# Patient Record
Sex: FEMALE | Race: WHITE | NOT HISPANIC OR LATINO | Employment: OTHER | URBAN - METROPOLITAN AREA
[De-identification: names, ages, dates, MRNs, and addresses within clinical notes are randomized per-mention and may not be internally consistent; named-entity substitution may affect disease eponyms.]

---

## 2017-04-14 ENCOUNTER — HOSPITAL ENCOUNTER (OUTPATIENT)
Dept: LAB | Facility: MEDICAL CENTER | Age: 72
End: 2017-04-14
Attending: INTERNAL MEDICINE
Payer: MEDICARE

## 2017-04-14 LAB
25(OH)D3 SERPL-MCNC: 24 NG/ML (ref 30–100)
ALBUMIN SERPL BCP-MCNC: 3.8 G/DL (ref 3.2–4.9)
ALBUMIN/GLOB SERPL: 1.2 G/DL
ALP SERPL-CCNC: 74 U/L (ref 30–99)
ALT SERPL-CCNC: 14 U/L (ref 2–50)
ANION GAP SERPL CALC-SCNC: 7 MMOL/L (ref 0–11.9)
APPEARANCE UR: CLEAR
AST SERPL-CCNC: 17 U/L (ref 12–45)
BACTERIA #/AREA URNS HPF: ABNORMAL /HPF
BASOPHILS # BLD AUTO: 0.6 % (ref 0–1.8)
BASOPHILS # BLD: 0.04 K/UL (ref 0–0.12)
BILIRUB SERPL-MCNC: 0.5 MG/DL (ref 0.1–1.5)
BILIRUB UR QL STRIP.AUTO: NEGATIVE
BUN SERPL-MCNC: 20 MG/DL (ref 8–22)
CALCIUM SERPL-MCNC: 9.6 MG/DL (ref 8.5–10.5)
CHLORIDE SERPL-SCNC: 101 MMOL/L (ref 96–112)
CHOLEST SERPL-MCNC: 118 MG/DL (ref 100–199)
CO2 SERPL-SCNC: 29 MMOL/L (ref 20–33)
COLOR UR: ABNORMAL
CREAT SERPL-MCNC: 1.14 MG/DL (ref 0.5–1.4)
CREAT UR-MCNC: 111.4 MG/DL
EOSINOPHIL # BLD AUTO: 0.22 K/UL (ref 0–0.51)
EOSINOPHIL NFR BLD: 3.3 % (ref 0–6.9)
EPI CELLS #/AREA URNS HPF: ABNORMAL /HPF
ERYTHROCYTE [DISTWIDTH] IN BLOOD BY AUTOMATED COUNT: 41.1 FL (ref 35.9–50)
EST. AVERAGE GLUCOSE BLD GHB EST-MCNC: 212 MG/DL
FERRITIN SERPL-MCNC: 76 NG/ML (ref 10–291)
GFR SERPL CREATININE-BSD FRML MDRD: 47 ML/MIN/1.73 M 2
GLOBULIN SER CALC-MCNC: 3.3 G/DL (ref 1.9–3.5)
GLUCOSE SERPL-MCNC: 172 MG/DL (ref 65–99)
GLUCOSE UR STRIP.AUTO-MCNC: NEGATIVE MG/DL
HBA1C MFR BLD: 9 % (ref 0–5.6)
HCT VFR BLD AUTO: 38 % (ref 37–47)
HDLC SERPL-MCNC: 39 MG/DL
HGB BLD-MCNC: 12.4 G/DL (ref 12–16)
HYALINE CASTS #/AREA URNS LPF: ABNORMAL /LPF
IMM GRANULOCYTES # BLD AUTO: 0.01 K/UL (ref 0–0.11)
IMM GRANULOCYTES NFR BLD AUTO: 0.1 % (ref 0–0.9)
IRON SATN MFR SERPL: 20 % (ref 15–55)
IRON SERPL-MCNC: 61 UG/DL (ref 40–170)
KETONES UR STRIP.AUTO-MCNC: NEGATIVE MG/DL
LDLC SERPL CALC-MCNC: 46 MG/DL
LEUKOCYTE ESTERASE UR QL STRIP.AUTO: ABNORMAL
LYMPHOCYTES # BLD AUTO: 1.72 K/UL (ref 1–4.8)
LYMPHOCYTES NFR BLD: 25.6 % (ref 22–41)
MAGNESIUM SERPL-MCNC: 2 MG/DL (ref 1.5–2.5)
MCH RBC QN AUTO: 30 PG (ref 27–33)
MCHC RBC AUTO-ENTMCNC: 32.6 G/DL (ref 33.6–35)
MCV RBC AUTO: 91.8 FL (ref 81.4–97.8)
MICRO URNS: ABNORMAL
MONOCYTES # BLD AUTO: 0.47 K/UL (ref 0–0.85)
MONOCYTES NFR BLD AUTO: 7 % (ref 0–13.4)
NEUTROPHILS # BLD AUTO: 4.25 K/UL (ref 2–7.15)
NEUTROPHILS NFR BLD: 63.4 % (ref 44–72)
NITRITE UR QL STRIP.AUTO: NEGATIVE
NRBC # BLD AUTO: 0 K/UL
NRBC BLD AUTO-RTO: 0 /100 WBC
PH UR STRIP.AUTO: 6 [PH]
PHOSPHATE SERPL-MCNC: 4 MG/DL (ref 2.5–4.5)
PLATELET # BLD AUTO: 171 K/UL (ref 164–446)
PMV BLD AUTO: 13.1 FL (ref 9–12.9)
POTASSIUM SERPL-SCNC: 4 MMOL/L (ref 3.6–5.5)
PROT SERPL-MCNC: 7.1 G/DL (ref 6–8.2)
PROT UR QL STRIP: NEGATIVE MG/DL
PROT UR-MCNC: 16.6 MG/DL (ref 0–15)
PROT/CREAT UR: 149 MG/G (ref 10–107)
PTH-INTACT SERPL-MCNC: 147.8 PG/ML (ref 14–72)
RBC # BLD AUTO: 4.14 M/UL (ref 4.2–5.4)
RBC # URNS HPF: ABNORMAL /HPF
RBC UR QL AUTO: NEGATIVE
SODIUM SERPL-SCNC: 137 MMOL/L (ref 135–145)
SP GR UR STRIP.AUTO: 1.01
TIBC SERPL-MCNC: 304 UG/DL (ref 250–450)
TRANS CELLS #/AREA URNS HPF: ABNORMAL /HPF
TRIGL SERPL-MCNC: 163 MG/DL (ref 0–149)
URATE SERPL-MCNC: 7.7 MG/DL (ref 1.9–8.2)
WBC # BLD AUTO: 6.7 K/UL (ref 4.8–10.8)
WBC #/AREA URNS HPF: ABNORMAL /HPF

## 2017-04-14 PROCEDURE — 83036 HEMOGLOBIN GLYCOSYLATED A1C: CPT

## 2017-04-14 PROCEDURE — 82728 ASSAY OF FERRITIN: CPT

## 2017-04-14 PROCEDURE — 82570 ASSAY OF URINE CREATININE: CPT

## 2017-04-14 PROCEDURE — 82306 VITAMIN D 25 HYDROXY: CPT

## 2017-04-14 PROCEDURE — 84550 ASSAY OF BLOOD/URIC ACID: CPT

## 2017-04-14 PROCEDURE — 81001 URINALYSIS AUTO W/SCOPE: CPT

## 2017-04-14 PROCEDURE — 83540 ASSAY OF IRON: CPT

## 2017-04-14 PROCEDURE — 83735 ASSAY OF MAGNESIUM: CPT

## 2017-04-14 PROCEDURE — 85025 COMPLETE CBC W/AUTO DIFF WBC: CPT

## 2017-04-14 PROCEDURE — 84100 ASSAY OF PHOSPHORUS: CPT

## 2017-04-14 PROCEDURE — 80053 COMPREHEN METABOLIC PANEL: CPT

## 2017-04-14 PROCEDURE — 83970 ASSAY OF PARATHORMONE: CPT

## 2017-04-14 PROCEDURE — 36415 COLL VENOUS BLD VENIPUNCTURE: CPT

## 2017-04-14 PROCEDURE — 84156 ASSAY OF PROTEIN URINE: CPT

## 2017-04-14 PROCEDURE — 83550 IRON BINDING TEST: CPT

## 2017-04-14 PROCEDURE — 80061 LIPID PANEL: CPT

## 2017-06-05 ENCOUNTER — HOSPITAL ENCOUNTER (OUTPATIENT)
Dept: LAB | Facility: MEDICAL CENTER | Age: 72
End: 2017-06-05
Attending: INTERNAL MEDICINE
Payer: MEDICARE

## 2017-06-05 LAB
ALBUMIN SERPL BCP-MCNC: 3.6 G/DL (ref 3.2–4.9)
ALBUMIN/GLOB SERPL: 1.2 G/DL
ALP SERPL-CCNC: 76 U/L (ref 30–99)
ALT SERPL-CCNC: 12 U/L (ref 2–50)
ANION GAP SERPL CALC-SCNC: 8 MMOL/L (ref 0–11.9)
AST SERPL-CCNC: 13 U/L (ref 12–45)
BILIRUB SERPL-MCNC: 0.5 MG/DL (ref 0.1–1.5)
BUN SERPL-MCNC: 26 MG/DL (ref 8–22)
CALCIUM SERPL-MCNC: 9.5 MG/DL (ref 8.5–10.5)
CHLORIDE SERPL-SCNC: 101 MMOL/L (ref 96–112)
CO2 SERPL-SCNC: 26 MMOL/L (ref 20–33)
CREAT SERPL-MCNC: 1.31 MG/DL (ref 0.5–1.4)
EST. AVERAGE GLUCOSE BLD GHB EST-MCNC: 214 MG/DL
GFR SERPL CREATININE-BSD FRML MDRD: 40 ML/MIN/1.73 M 2
GLOBULIN SER CALC-MCNC: 3 G/DL (ref 1.9–3.5)
GLUCOSE SERPL-MCNC: 311 MG/DL (ref 65–99)
HBA1C MFR BLD: 9.1 % (ref 0–5.6)
POTASSIUM SERPL-SCNC: 4.8 MMOL/L (ref 3.6–5.5)
PROT SERPL-MCNC: 6.6 G/DL (ref 6–8.2)
SODIUM SERPL-SCNC: 135 MMOL/L (ref 135–145)

## 2017-06-05 PROCEDURE — 36415 COLL VENOUS BLD VENIPUNCTURE: CPT

## 2017-06-05 PROCEDURE — 80053 COMPREHEN METABOLIC PANEL: CPT

## 2017-06-05 PROCEDURE — 83036 HEMOGLOBIN GLYCOSYLATED A1C: CPT

## 2017-07-01 ENCOUNTER — HOSPITAL ENCOUNTER (OUTPATIENT)
Dept: LAB | Facility: MEDICAL CENTER | Age: 72
End: 2017-07-01
Attending: INTERNAL MEDICINE
Payer: MEDICARE

## 2017-07-01 LAB
25(OH)D3 SERPL-MCNC: 35 NG/ML (ref 30–100)
ALBUMIN SERPL BCP-MCNC: 3.6 G/DL (ref 3.2–4.9)
ALBUMIN/GLOB SERPL: 1 G/DL
ALP SERPL-CCNC: 79 U/L (ref 30–99)
ALT SERPL-CCNC: 11 U/L (ref 2–50)
ANION GAP SERPL CALC-SCNC: 6 MMOL/L (ref 0–11.9)
APPEARANCE UR: CLEAR
AST SERPL-CCNC: 13 U/L (ref 12–45)
BACTERIA #/AREA URNS HPF: ABNORMAL /HPF
BASOPHILS # BLD AUTO: 0.7 % (ref 0–1.8)
BASOPHILS # BLD: 0.05 K/UL (ref 0–0.12)
BILIRUB SERPL-MCNC: 0.4 MG/DL (ref 0.1–1.5)
BILIRUB UR QL STRIP.AUTO: NEGATIVE
BUN SERPL-MCNC: 26 MG/DL (ref 8–22)
CALCIUM SERPL-MCNC: 9.4 MG/DL (ref 8.5–10.5)
CHLORIDE SERPL-SCNC: 102 MMOL/L (ref 96–112)
CHOLEST SERPL-MCNC: 136 MG/DL (ref 100–199)
CO2 SERPL-SCNC: 27 MMOL/L (ref 20–33)
COLOR UR: YELLOW
CREAT SERPL-MCNC: 1.19 MG/DL (ref 0.5–1.4)
CREAT UR-MCNC: 48.1 MG/DL
EOSINOPHIL # BLD AUTO: 0.17 K/UL (ref 0–0.51)
EOSINOPHIL NFR BLD: 2.3 % (ref 0–6.9)
EPI CELLS #/AREA URNS HPF: ABNORMAL /HPF
ERYTHROCYTE [DISTWIDTH] IN BLOOD BY AUTOMATED COUNT: 44.2 FL (ref 35.9–50)
EST. AVERAGE GLUCOSE BLD GHB EST-MCNC: 194 MG/DL
FERRITIN SERPL-MCNC: 91.4 NG/ML (ref 10–291)
GFR SERPL CREATININE-BSD FRML MDRD: 44 ML/MIN/1.73 M 2
GLOBULIN SER CALC-MCNC: 3.5 G/DL (ref 1.9–3.5)
GLUCOSE SERPL-MCNC: 177 MG/DL (ref 65–99)
GLUCOSE UR STRIP.AUTO-MCNC: NEGATIVE MG/DL
HBA1C MFR BLD: 8.4 % (ref 0–5.6)
HCT VFR BLD AUTO: 35.2 % (ref 37–47)
HDLC SERPL-MCNC: 41 MG/DL
HGB BLD-MCNC: 11.2 G/DL (ref 12–16)
HYALINE CASTS #/AREA URNS LPF: ABNORMAL /LPF
IMM GRANULOCYTES # BLD AUTO: 0.03 K/UL (ref 0–0.11)
IMM GRANULOCYTES NFR BLD AUTO: 0.4 % (ref 0–0.9)
IRON SATN MFR SERPL: 21 % (ref 15–55)
IRON SERPL-MCNC: 58 UG/DL (ref 40–170)
KETONES UR STRIP.AUTO-MCNC: NEGATIVE MG/DL
LDLC SERPL CALC-MCNC: 69 MG/DL
LEUKOCYTE ESTERASE UR QL STRIP.AUTO: ABNORMAL
LYMPHOCYTES # BLD AUTO: 1.72 K/UL (ref 1–4.8)
LYMPHOCYTES NFR BLD: 23.4 % (ref 22–41)
MAGNESIUM SERPL-MCNC: 2.3 MG/DL (ref 1.5–2.5)
MCH RBC QN AUTO: 29.8 PG (ref 27–33)
MCHC RBC AUTO-ENTMCNC: 31.8 G/DL (ref 33.6–35)
MCV RBC AUTO: 93.6 FL (ref 81.4–97.8)
MICRO URNS: ABNORMAL
MONOCYTES # BLD AUTO: 0.49 K/UL (ref 0–0.85)
MONOCYTES NFR BLD AUTO: 6.7 % (ref 0–13.4)
NEUTROPHILS # BLD AUTO: 4.89 K/UL (ref 2–7.15)
NEUTROPHILS NFR BLD: 66.5 % (ref 44–72)
NITRITE UR QL STRIP.AUTO: NEGATIVE
NRBC # BLD AUTO: 0 K/UL
NRBC BLD AUTO-RTO: 0 /100 WBC
PH UR STRIP.AUTO: 6.5 [PH]
PHOSPHATE SERPL-MCNC: 3.8 MG/DL (ref 2.5–4.5)
PLATELET # BLD AUTO: 210 K/UL (ref 164–446)
PMV BLD AUTO: 12.4 FL (ref 9–12.9)
POTASSIUM SERPL-SCNC: 4.4 MMOL/L (ref 3.6–5.5)
PROT SERPL-MCNC: 7.1 G/DL (ref 6–8.2)
PROT UR QL STRIP: NEGATIVE MG/DL
PROT UR-MCNC: 9.1 MG/DL (ref 0–15)
PROT/CREAT UR: 189 MG/G (ref 10–107)
PTH-INTACT SERPL-MCNC: 132.9 PG/ML (ref 14–72)
RBC # BLD AUTO: 3.76 M/UL (ref 4.2–5.4)
RBC # URNS HPF: ABNORMAL /HPF
RBC UR QL AUTO: NEGATIVE
SODIUM SERPL-SCNC: 135 MMOL/L (ref 135–145)
SP GR UR STRIP.AUTO: 1.01
TIBC SERPL-MCNC: 274 UG/DL (ref 250–450)
TRIGL SERPL-MCNC: 131 MG/DL (ref 0–149)
URATE SERPL-MCNC: 7.1 MG/DL (ref 1.9–8.2)
WBC # BLD AUTO: 7.4 K/UL (ref 4.8–10.8)
WBC #/AREA URNS HPF: ABNORMAL /HPF

## 2017-07-01 PROCEDURE — 81001 URINALYSIS AUTO W/SCOPE: CPT

## 2017-07-01 PROCEDURE — 84550 ASSAY OF BLOOD/URIC ACID: CPT

## 2017-07-01 PROCEDURE — 84100 ASSAY OF PHOSPHORUS: CPT

## 2017-07-01 PROCEDURE — 82570 ASSAY OF URINE CREATININE: CPT

## 2017-07-01 PROCEDURE — 80061 LIPID PANEL: CPT

## 2017-07-01 PROCEDURE — 83970 ASSAY OF PARATHORMONE: CPT

## 2017-07-01 PROCEDURE — 83036 HEMOGLOBIN GLYCOSYLATED A1C: CPT

## 2017-07-01 PROCEDURE — 82306 VITAMIN D 25 HYDROXY: CPT

## 2017-07-01 PROCEDURE — 36415 COLL VENOUS BLD VENIPUNCTURE: CPT

## 2017-07-01 PROCEDURE — 84156 ASSAY OF PROTEIN URINE: CPT

## 2017-07-01 PROCEDURE — 82728 ASSAY OF FERRITIN: CPT

## 2017-07-01 PROCEDURE — 85025 COMPLETE CBC W/AUTO DIFF WBC: CPT

## 2017-07-01 PROCEDURE — 83550 IRON BINDING TEST: CPT

## 2017-07-01 PROCEDURE — 83735 ASSAY OF MAGNESIUM: CPT

## 2017-07-01 PROCEDURE — 80053 COMPREHEN METABOLIC PANEL: CPT

## 2017-07-01 PROCEDURE — 83540 ASSAY OF IRON: CPT

## 2017-09-02 ENCOUNTER — HOSPITAL ENCOUNTER (OUTPATIENT)
Dept: LAB | Facility: MEDICAL CENTER | Age: 72
End: 2017-09-02
Attending: INTERNAL MEDICINE
Payer: MEDICARE

## 2017-09-02 LAB
ALBUMIN SERPL BCP-MCNC: 3.6 G/DL (ref 3.2–4.9)
ALBUMIN/GLOB SERPL: 1.2 G/DL
ALP SERPL-CCNC: 75 U/L (ref 30–99)
ALT SERPL-CCNC: 20 U/L (ref 2–50)
ANION GAP SERPL CALC-SCNC: 8 MMOL/L (ref 0–11.9)
AST SERPL-CCNC: 21 U/L (ref 12–45)
BILIRUB SERPL-MCNC: 0.5 MG/DL (ref 0.1–1.5)
BUN SERPL-MCNC: 20 MG/DL (ref 8–22)
CALCIUM SERPL-MCNC: 9.7 MG/DL (ref 8.5–10.5)
CHLORIDE SERPL-SCNC: 102 MMOL/L (ref 96–112)
CO2 SERPL-SCNC: 28 MMOL/L (ref 20–33)
CREAT SERPL-MCNC: 1.13 MG/DL (ref 0.5–1.4)
EST. AVERAGE GLUCOSE BLD GHB EST-MCNC: 223 MG/DL
FASTING STATUS PATIENT QL REPORTED: NORMAL
GFR SERPL CREATININE-BSD FRML MDRD: 47 ML/MIN/1.73 M 2
GLOBULIN SER CALC-MCNC: 3.1 G/DL (ref 1.9–3.5)
GLUCOSE SERPL-MCNC: 334 MG/DL (ref 65–99)
HBA1C MFR BLD: 9.4 % (ref 0–5.6)
POTASSIUM SERPL-SCNC: 4.1 MMOL/L (ref 3.6–5.5)
PROT SERPL-MCNC: 6.7 G/DL (ref 6–8.2)
SODIUM SERPL-SCNC: 138 MMOL/L (ref 135–145)

## 2017-09-02 PROCEDURE — 36415 COLL VENOUS BLD VENIPUNCTURE: CPT

## 2017-09-02 PROCEDURE — 83036 HEMOGLOBIN GLYCOSYLATED A1C: CPT

## 2017-09-02 PROCEDURE — 80053 COMPREHEN METABOLIC PANEL: CPT

## 2017-10-05 ENCOUNTER — OFFICE VISIT (OUTPATIENT)
Dept: CARDIOLOGY | Facility: MEDICAL CENTER | Age: 72
End: 2017-10-05
Payer: MEDICARE

## 2017-10-05 VITALS
DIASTOLIC BLOOD PRESSURE: 74 MMHG | BODY MASS INDEX: 35.79 KG/M2 | HEIGHT: 67 IN | HEART RATE: 94 BPM | WEIGHT: 228 LBS | OXYGEN SATURATION: 94 % | SYSTOLIC BLOOD PRESSURE: 152 MMHG

## 2017-10-05 DIAGNOSIS — I10 ESSENTIAL HYPERTENSION, BENIGN: ICD-10-CM

## 2017-10-05 DIAGNOSIS — I27.20 PULMONARY HYPERTENSION (HCC): Chronic | ICD-10-CM

## 2017-10-05 DIAGNOSIS — E78.2 HYPERLIPIDEMIA, MIXED: ICD-10-CM

## 2017-10-05 DIAGNOSIS — R55 NEAR SYNCOPE: ICD-10-CM

## 2017-10-05 DIAGNOSIS — I05.0 MITRAL VALVE STENOSIS, UNSPECIFIED ETIOLOGY: ICD-10-CM

## 2017-10-05 PROCEDURE — 99214 OFFICE O/P EST MOD 30 MIN: CPT | Performed by: INTERNAL MEDICINE

## 2017-10-05 ASSESSMENT — ENCOUNTER SYMPTOMS
SHORTNESS OF BREATH: 0
DIZZINESS: 1
PALPITATIONS: 0

## 2017-10-05 NOTE — PROGRESS NOTES
"Subjective:   Ophelia Sosa is a 71 y.o. female who presents today for followup evaluation of systolic murmur and hypertension.    Last seen on 8/18/2016.    Since 8/18/2016 appointment the patient has had episodes of increased fatigue and lightheadedness with near syncope while at the grocery store by the time she is finished and checking out.  She states that her blood pressure has dropped at times to the 80s when she is sitting in her recliner.  No loss of consciousness.  She's lost \"52 pounds\" over the past year and a half because of chronic dental problems requiring teeth extractions.  Her diabetes and blood pressure labile with levels down to the 60s. Her glycohemoglobin is over 9%.  She was to get on Holter monitor last year but didn't feel well.    Past medical history  1-2 months ago the patient began having head congestion and treated for sinus infection by her PCP Dr. Roberta Hurd  She had 2 courses of antibiotics.  She's had chronic balance problems that have worsened.  She's had positional lightheadedness.  Dr. Hurd 1st discontinue losartan and then Lasix.  She continued to have problems and was hospitalized on 7/23/2016 after she passed out at home due to hypoglycemia.  Blood sugar was 49.    Was seen by PMA.  On CPAP.    Past medical history  Follow for systolic murmur.  Has other coronary risk factors being addressed by other physicians.  No cardiac symptoms.  Has problems with Charcot joint/foot.    Past Medical History:   Diagnosis Date   • ASTHMA     patient denies   • Congestive heart failure (CMS-[facility])    • COPD    • COPD (chronic obstructive pulmonary disease) (CMS-[facility])    • Diabetes    • Diastolic dysfunction    • DM (diabetes mellitus) (CMS-[facility])    • Heart murmur    • HLD (hyperlipidemia)    • HTN (hypertension)    • Hypertension    • Obstructive sleep apnea    • Osteoarthritis    • Pulmonary hypertension    • Sleep apnea      Past Surgical History:   Procedure Laterality Date   • BREAST " BIOPSY     • GYN SURGERY  hysterectomy   • MITRAL VALVE REPLACE     • OTHER ABDOMINAL SURGERY     • OTHER ORTHOPEDIC SURGERY     • TONSILLECTOMY Bilateral      Family History   Problem Relation Age of Onset   • Hypertension Mother    • Heart Disease Mother    • Diabetes Father    • Hypertension Father    • Diabetes Maternal Grandmother    • Diabetes Paternal Grandfather      History   Smoking Status   • Former Smoker   • Packs/day: 1.50   • Quit date: 11/2/2012   Smokeless Tobacco   • Never Used     Allergies   Allergen Reactions   • Ativan      DELIRIUM, CONFUSION.   • Advair [Fluticasone-Salmeterol]    • Ambien [Zolpidem Tartrate]    • Erythromycin Vomiting   • Lipitor [Atorvastatin Calcium]    • Pcn [Penicillins] Hives   • Pravachol [Pravastatin Sodium]      ADVERSE REACTION.     Outpatient Encounter Prescriptions as of 10/5/2017   Medication Sig Dispense Refill   • albuterol (VENTOLIN OR PROVENTIL) 108 (90 BASE) MCG/ACT Aero Soln inhalation aerosol Inhale 2 Puffs by mouth every 6 hours as needed for Shortness of Breath.     • mupirocin (BACTROBAN) 2 % Ointment Apply  to affected area(s) as needed.     • fluticasone (FLONASE) 50 MCG/ACT nasal spray Spray 1 Spray in nose as needed.     • furosemide (LASIX) 40 MG TABS TAKE ONE TABLET ORALLY EVERY  OTHER DAY ALTERNATING WITH 2 TABS EVERY O THER DAY 45 Tab 6   • KLOR-CON 8 MEQ tablet TAKE 1 TAB BY MOUTH EVERY DAY   ALTERNATING WITH 2 TABLETS EVERY OTHERDAY. 45 Each 1   • insulin NPH (HUMULIN N) 100 UNIT/ML SUSP Inject  as instructed. USE AS DIRECTED.      • hydrocodone-acetaminophen (VICODIN ES) 7.5-750 MG per tablet Take  by mouth.       • losartan (COZAAR) 50 MG TABS Take 100 mg by mouth every day.     • glimepiride (AMARYL) 4 MG TABS Take 4 mg by mouth 2 times a day. Night time dose at 1700     • rosuvastatin (CRESTOR) 5 MG TABS Take 5 mg by mouth every evening. M, W, F     • vitamin D, Ergocalciferol, (DRISDOL) 89251 UNITS Cap capsule Take 50,000 Units by mouth  "every 30 days.     • tiotropium (SPIRIVA) 18 MCG Cap Inhale 18 mcg by mouth at bedtime as needed.     • omeprazole (PRILOSEC) 20 MG delayed-release capsule Take 20 mg by mouth every day.     • clobetasol (TEMOVATE) 0.05 % Ointment Apply  to affected area(s) as needed.     • fluticasone-salmeterol (ADVAIR) 100-50 MCG/DOSE AEPB Inhale 1 Puff by mouth every 12 hours.       • flyticasone (CUTIVATE) 0.005 % ointment Apply  to affected area(s). apply thin layer as directed        No facility-administered encounter medications on file as of 10/5/2017.      Review of Systems   Respiratory: Negative for shortness of breath.    Cardiovascular: Negative for chest pain, palpitations and leg swelling.   Neurological: Positive for dizziness.        Objective:   /74   Pulse 94   Ht 1.702 m (5' 7.01\")   Wt 103.4 kg (228 lb)   SpO2 94%   BMI 35.70 kg/m²     Physical Exam   Constitutional: She is oriented to person, place, and time. She appears well-nourished. No distress.   Neck: No JVD present. Carotid bruit is not present.   Normal jugular venous pressure.   Cardiovascular: Normal rate, regular rhythm, S1 normal, S2 normal and normal heart sounds.  Exam reveals no gallop and no friction rub.    No murmur heard.  Pulses:       Carotid pulses are 1+ on the right side, and 1+ on the left side.       Radial pulses are 1+ on the right side, and 1+ on the left side.        Posterior tibial pulses are 1+ on the right side, and 1+ on the left side.   Pulmonary/Chest: Effort normal and breath sounds normal. She has no wheezes. She has no rhonchi. She has no rales.   Abdominal:   Markedly protuberant   Musculoskeletal: She exhibits no edema.   Neurological: She is alert and oriented to person, place, and time. Coordination abnormal.   Skin: Skin is warm and dry.   Psychiatric: She has a normal mood and affect. Her behavior is normal.     11/05/2012 ECHOCARDIOGRAM  EF 70%.  Mild tricuspid regurgitation. Right ventricular systolic " "pressure is   estimated to be 30 mmHg.  Mitral annular calcification. Thickened mitral valve leaflets. Mild   mitral stenosis. Mild mitral regurgitation.  Thickened aortic valve leaflets. Aortic annular calcification. Mild   aortic stenosis.  Moderate tricuspid regurgitation. Right ventricular systolic pressure   is estimated to be 57 mmhg.  No prior study available for comparison    09/28/2016 ECHOCARDIOGRAM  Normal left ventricular size and systolic function.  Severe concentric left ventricular hypertrophy.  Left ventricular ejection fraction is visually estimated to be 70%.  Diffuse severe mitral annular calcification.  Thickened mitral valve leaflets: Mean transvalvular gradient is 8  mmHg   at a heart rate of  75BPM; Mitral valve area 2.7 cm2.  Aortic sclerosis without stenosis.  Estimated right ventricular systolic pressure  is 55 mmHg.  Right heart pressures are consistent with moderate pulmonary   hypertension.  Mildly dilated right ventricle.  Normal right ventricular systolic function    Assessment:     1. Near syncope  ECHOCARDIOGRAM COMP W/O CONT    CBC WITHOUT DIFFERENTIAL    HOLTER MONITOR STUDY   2. Mitral valve stenosis, unspecified etiology  ECHOCARDIOGRAM COMP W/O CONT   3. Pulmonary hypertension  ECHOCARDIOGRAM COMP W/O CONT   4. Essential hypertension, benign     5. Hyperlipidemia, mixed         Medical Decision Making:  Today's Assessment / Status / Plan:     Near syncope.    Mitral stenosis.    Diastolic CHF. Compensated. Diagnosed 2004.    Pulmonary hypertension.    Sleep apnea. Untreated. Not on CPAP.     Hypertension. Controlled as an outpatient. \"White coat syndrome\".    Diabetes mellitus. Poorly controlled.    Infection times sinus infection ×2.    Balance problems. Significant.    Syncope related to hypoglycemia 7/23/2016 at the [facility].    History of lightheadedness. Required previous discontinuation Lasix and losartan.    Dental extractions.    Weight " loss.    Recommendation  Repeat echocardiogram  24-hour Holter monitor.  Recheck CBC.  Follow-up 3 months.    We'll make further recommendations based on the above evaluation.

## 2017-10-06 ENCOUNTER — TELEPHONE (OUTPATIENT)
Dept: CARDIOLOGY | Facility: MEDICAL CENTER | Age: 72
End: 2017-10-06

## 2017-10-07 NOTE — TELEPHONE ENCOUNTER
Pt is confused d/t what it says on her visit summary. Questions answered regarding what lab work she needs.     question about future labwork   Received: Today   Message Contents   HECTOR Briggs/Rishabh       Patient has question about future lab work Dr. Wilkinson wants. She can be reached at [phone].

## 2017-10-10 ENCOUNTER — HOSPITAL ENCOUNTER (OUTPATIENT)
Dept: LAB | Facility: MEDICAL CENTER | Age: 72
End: 2017-10-10
Attending: INTERNAL MEDICINE
Payer: MEDICARE

## 2017-10-10 DIAGNOSIS — R55 NEAR SYNCOPE: ICD-10-CM

## 2017-10-10 LAB
ERYTHROCYTE [DISTWIDTH] IN BLOOD BY AUTOMATED COUNT: 43.4 FL (ref 35.9–50)
HCT VFR BLD AUTO: 35.6 % (ref 37–47)
HGB BLD-MCNC: 11.4 G/DL (ref 12–16)
MCH RBC QN AUTO: 29.9 PG (ref 27–33)
MCHC RBC AUTO-ENTMCNC: 32 G/DL (ref 33.6–35)
MCV RBC AUTO: 93.4 FL (ref 81.4–97.8)
PLATELET # BLD AUTO: 204 K/UL (ref 164–446)
PMV BLD AUTO: 12.4 FL (ref 9–12.9)
RBC # BLD AUTO: 3.81 M/UL (ref 4.2–5.4)
WBC # BLD AUTO: 9.8 K/UL (ref 4.8–10.8)

## 2017-10-10 PROCEDURE — 36415 COLL VENOUS BLD VENIPUNCTURE: CPT

## 2017-10-10 PROCEDURE — 85027 COMPLETE CBC AUTOMATED: CPT

## 2017-10-25 ENCOUNTER — HOSPITAL ENCOUNTER (OUTPATIENT)
Dept: CARDIOLOGY | Facility: MEDICAL CENTER | Age: 72
End: 2017-10-25
Attending: INTERNAL MEDICINE
Payer: MEDICARE

## 2017-10-25 DIAGNOSIS — I27.20 PULMONARY HYPERTENSION (HCC): Chronic | ICD-10-CM

## 2017-10-25 DIAGNOSIS — R55 NEAR SYNCOPE: ICD-10-CM

## 2017-10-25 DIAGNOSIS — I05.0 MITRAL VALVE STENOSIS, UNSPECIFIED ETIOLOGY: ICD-10-CM

## 2017-10-25 PROCEDURE — 93306 TTE W/DOPPLER COMPLETE: CPT

## 2017-10-25 PROCEDURE — 93306 TTE W/DOPPLER COMPLETE: CPT | Mod: 26 | Performed by: INTERNAL MEDICINE

## 2017-10-26 ENCOUNTER — TELEPHONE (OUTPATIENT)
Dept: CARDIOLOGY | Facility: MEDICAL CENTER | Age: 72
End: 2017-10-26

## 2017-10-26 LAB
LV EJECT FRACT  99904: 65
LV EJECT FRACT MOD 2C 99903: 60.37
LV EJECT FRACT MOD 4C 99902: 63.8
LV EJECT FRACT MOD BP 99901: 63.14

## 2017-10-30 NOTE — TELEPHONE ENCOUNTER
Pt notified regarding her lab results and echo results per Dr. Wilkinson. She explains she is still experiencing episodes of exertional lightheadedness. She is scheduled for a holter monitor 11/14. She is hoping this will reveal what is going on.  Reviewed her appt time/date for the holter placement

## 2017-11-25 ENCOUNTER — APPOINTMENT (OUTPATIENT)
Dept: LAB | Facility: MEDICAL CENTER | Age: 72
End: 2017-11-25
Payer: MEDICARE

## 2017-12-01 ENCOUNTER — HOSPITAL ENCOUNTER (OUTPATIENT)
Dept: LAB | Facility: MEDICAL CENTER | Age: 72
End: 2017-12-01
Attending: INTERNAL MEDICINE
Payer: MEDICARE

## 2017-12-01 LAB
ALBUMIN SERPL BCP-MCNC: 3.7 G/DL (ref 3.2–4.9)
ALBUMIN/GLOB SERPL: 1.1 G/DL
ALP SERPL-CCNC: 75 U/L (ref 30–99)
ALT SERPL-CCNC: 14 U/L (ref 2–50)
ANION GAP SERPL CALC-SCNC: 6 MMOL/L (ref 0–11.9)
AST SERPL-CCNC: 15 U/L (ref 12–45)
BILIRUB SERPL-MCNC: 0.4 MG/DL (ref 0.1–1.5)
BUN SERPL-MCNC: 25 MG/DL (ref 8–22)
CALCIUM SERPL-MCNC: 10.2 MG/DL (ref 8.5–10.5)
CHLORIDE SERPL-SCNC: 100 MMOL/L (ref 96–112)
CO2 SERPL-SCNC: 31 MMOL/L (ref 20–33)
CREAT SERPL-MCNC: 0.98 MG/DL (ref 0.5–1.4)
EST. AVERAGE GLUCOSE BLD GHB EST-MCNC: 220 MG/DL
GFR SERPL CREATININE-BSD FRML MDRD: 56 ML/MIN/1.73 M 2
GLOBULIN SER CALC-MCNC: 3.4 G/DL (ref 1.9–3.5)
GLUCOSE SERPL-MCNC: 233 MG/DL (ref 65–99)
HBA1C MFR BLD: 9.3 % (ref 0–5.6)
POTASSIUM SERPL-SCNC: 4.1 MMOL/L (ref 3.6–5.5)
PROT SERPL-MCNC: 7.1 G/DL (ref 6–8.2)
SODIUM SERPL-SCNC: 137 MMOL/L (ref 135–145)
T4 FREE SERPL-MCNC: 0.78 NG/DL (ref 0.53–1.43)
TSH SERPL DL<=0.005 MIU/L-ACNC: 0.82 UIU/ML (ref 0.3–3.7)

## 2017-12-01 PROCEDURE — 83036 HEMOGLOBIN GLYCOSYLATED A1C: CPT

## 2017-12-01 PROCEDURE — 36415 COLL VENOUS BLD VENIPUNCTURE: CPT

## 2017-12-01 PROCEDURE — 84443 ASSAY THYROID STIM HORMONE: CPT

## 2017-12-01 PROCEDURE — 84439 ASSAY OF FREE THYROXINE: CPT

## 2017-12-01 PROCEDURE — 80053 COMPREHEN METABOLIC PANEL: CPT

## 2017-12-23 ENCOUNTER — APPOINTMENT (OUTPATIENT)
Dept: LAB | Facility: MEDICAL CENTER | Age: 72
End: 2017-12-23
Payer: MEDICARE

## 2018-01-09 ENCOUNTER — NON-PROVIDER VISIT (OUTPATIENT)
Dept: CARDIOLOGY | Facility: MEDICAL CENTER | Age: 73
End: 2018-01-09
Payer: MEDICARE

## 2018-01-09 DIAGNOSIS — R55 NEAR SYNCOPE: ICD-10-CM

## 2018-01-09 DIAGNOSIS — I49.3 PVC (PREMATURE VENTRICULAR CONTRACTION): ICD-10-CM

## 2018-01-09 DIAGNOSIS — I49.1 PREMATURE ATRIAL CONTRACTION: ICD-10-CM

## 2018-01-09 DIAGNOSIS — I45.10 RBBB: ICD-10-CM

## 2018-01-18 ENCOUNTER — TELEPHONE (OUTPATIENT)
Dept: CARDIOLOGY | Facility: MEDICAL CENTER | Age: 73
End: 2018-01-18

## 2018-01-18 NOTE — TELEPHONE ENCOUNTER
----- Message from Dora Craven, Med Ass't sent at 1/18/2018 10:36 AM PST -----  Regarding: Results   Contact: [phone]  SW     Please call patient she she would like results from Monitor.    Thanks

## 2018-01-19 LAB — EKG IMPRESSION: NORMAL

## 2018-01-19 PROCEDURE — 93224 XTRNL ECG REC UP TO 48 HRS: CPT | Performed by: INTERNAL MEDICINE

## 2018-01-22 NOTE — TELEPHONE ENCOUNTER
"Discussed patient's Holter Monitor results and next appointment scheduled on 2/5/18.  Patient currently was diagnosed with the flu.      Patient also reports that her blood pressures has been on the low side.  She denies any chest pain. She only complains of light headedness.  She states she has been holding her Losartan.  She states she has continued to take her lasix which was ordered by Dr. Temple.      She reports over last week BP was taken only when she \"wasn't feeling well\" and are as follows.     116/79  98/47  88/42     Since holding her Losartan she feels better and last /49 .      FYI to Dr. Wilkinson.  Patient has F/V on 2/5/18      "

## 2018-01-23 NOTE — TELEPHONE ENCOUNTER
Contacted patient to follow up.  Patient reports yesterday afternoon BP was 146/90 and after working in the kitchen, then getting ready for bed she didn't feel good, she took BP again last night and it was 97/49 at 2100.      Patient advised to keep BP log, and stay well hydrated and call office in one week to report BP log. Patient also advised to call with any concerns.  Patient acknowledges understanding.

## 2018-02-28 ENCOUNTER — OFFICE VISIT (OUTPATIENT)
Dept: CARDIOLOGY | Facility: MEDICAL CENTER | Age: 73
End: 2018-02-28
Payer: MEDICARE

## 2018-02-28 VITALS
BODY MASS INDEX: 35.47 KG/M2 | SYSTOLIC BLOOD PRESSURE: 172 MMHG | HEIGHT: 67 IN | OXYGEN SATURATION: 94 % | WEIGHT: 226 LBS | RESPIRATION RATE: 14 BRPM | DIASTOLIC BLOOD PRESSURE: 71 MMHG | HEART RATE: 80 BPM

## 2018-02-28 DIAGNOSIS — I50.32 DIASTOLIC CHF, CHRONIC (HCC): ICD-10-CM

## 2018-02-28 DIAGNOSIS — I05.0 MITRAL VALVE STENOSIS, UNSPECIFIED ETIOLOGY: ICD-10-CM

## 2018-02-28 DIAGNOSIS — I10 ESSENTIAL HYPERTENSION, BENIGN: ICD-10-CM

## 2018-02-28 DIAGNOSIS — R55 NEAR SYNCOPE: ICD-10-CM

## 2018-02-28 PROCEDURE — 99214 OFFICE O/P EST MOD 30 MIN: CPT | Performed by: INTERNAL MEDICINE

## 2018-02-28 RX ORDER — PEN NEEDLE, DIABETIC 29 G X1/2"
NEEDLE, DISPOSABLE MISCELLANEOUS
Refills: 99 | COMMUNITY
Start: 2018-01-29 | End: 2018-11-16 | Stop reason: CLARIF

## 2018-02-28 RX ORDER — LEVOFLOXACIN 500 MG/1
TABLET, FILM COATED ORAL
Refills: 0 | COMMUNITY
Start: 2018-01-16 | End: 2018-11-16 | Stop reason: CLARIF

## 2018-02-28 RX ORDER — CLOBETASOL PROPIONATE 0.5 MG/G
CREAM TOPICAL
Refills: 2 | COMMUNITY
Start: 2018-01-26 | End: 2018-11-16 | Stop reason: CLARIF

## 2018-02-28 RX ORDER — BLOOD-GLUCOSE METER
KIT MISCELLANEOUS
Refills: 99 | COMMUNITY
Start: 2018-01-11 | End: 2018-11-16 | Stop reason: CLARIF

## 2018-02-28 RX ORDER — LOSARTAN POTASSIUM 100 MG/1
100 TABLET ORAL
Refills: 1 | COMMUNITY
Start: 2018-01-18 | End: 2018-11-16 | Stop reason: CLARIF

## 2018-02-28 RX ORDER — HYDROCODONE BITARTRATE AND ACETAMINOPHEN 7.5; 325 MG/1; MG/1
TABLET ORAL
Refills: 0 | COMMUNITY
Start: 2017-12-07 | End: 2021-06-09

## 2018-02-28 ASSESSMENT — ENCOUNTER SYMPTOMS
DIZZINESS: 0
PALPITATIONS: 0
LOSS OF CONSCIOUSNESS: 0
SHORTNESS OF BREATH: 0

## 2018-02-28 NOTE — PROGRESS NOTES
"Subjective:   Ophelia Sosa is a 71 y.o. female who presents today for followup evaluation of near syncope, aortic stenosis, diastolic CHF and hypertension.    Last seen on 10/5/2017.    Since 10/5/2017 appointment the patient has gotten better.  At her last appointment she was evaluated for near syncope.  She closely monitored her blood pressure and had low recordings below 100.  Her Holter monitor was unremarkable for an explanation of her near-syncope.  In January, 2018 she discontinue losartan.  Her near syncopal episodes completely resolved.  She has continued to monitor her blood pressure which has been consistently normal-see media for blood pressure log.   She states that she has \"white coat syndrome\" for years.  She continues to have problems with her balance but is thankful that \"I'm not almost passing out all the time\".  She was diagnosed with \"congestive heart failure many years ago followed by Dr. Temple on chronic Lasix therapy which she continues.  She has no active heart failure symptoms.    Since 8/18/2016 appointment the patient has had episodes of increased fatigue and lightheadedness with near syncope while at the grocery store by the time she is finished and checking out.  She states that her blood pressure has dropped at times to the 80s when she is sitting in her recliner.  No loss of consciousness.  She's lost \"52 pounds\" over the past year and a half because of chronic dental problems requiring teeth extractions.  Her diabetes and blood pressure labile with levels down to the 60s. Her glycohemoglobin is over 9%.  She was to get on Holter monitor last year but didn't feel well.    Past medical history  1-2 months ago the patient began having head congestion and treated for sinus infection by her PCP Dr. Roberta Hurd  She had 2 courses of antibiotics.  She's had chronic balance problems that have worsened.  She's had positional lightheadedness.  Dr. Hurd 1st discontinue losartan and then " "Lasix.  She continued to have problems and was hospitalized on 7/23/2016 after she passed out at home due to hypoglycemia.  Blood sugar was 49.    Was seen by PMA.  On CPAP.    Past medical history  Follow for systolic murmur.  Has other coronary risk factors being addressed by other physicians.  No cardiac symptoms.  Has problems with Charcot joint/foot.    Past Medical History:   Diagnosis Date   • ASTHMA     patient denies   • Congestive heart failure (CMS-[facility])    • COPD    • COPD (chronic obstructive pulmonary disease) (CMS-HCC)    • Diabetes    • Diastolic dysfunction    • DM (diabetes mellitus) (CMS-HCC)    • Heart murmur    • HLD (hyperlipidemia)    • HTN (hypertension)    • Hypertension    • Obstructive sleep apnea    • Osteoarthritis    • Pulmonary hypertension    • Sleep apnea      Past Surgical History:   Procedure Laterality Date   • BREAST BIOPSY     • GYN SURGERY  hysterectomy   • MITRAL VALVE REPLACE     • OTHER ABDOMINAL SURGERY     • OTHER ORTHOPEDIC SURGERY     • TONSILLECTOMY Bilateral      Family History   Problem Relation Age of Onset   • Hypertension Mother    • Heart Disease Mother    • Diabetes Father    • Hypertension Father    • Diabetes Maternal Grandmother    • Diabetes Paternal Grandfather      History   Smoking Status   • Former Smoker   • Packs/day: 1.50   • Quit date: 11/2/2012   Smokeless Tobacco   • Never Used     Allergies   Allergen Reactions   • Ativan      DELIRIUM, CONFUSION.   • Advair [Fluticasone-Salmeterol]    • Ambien [Zolpidem Tartrate]    • Erythromycin Vomiting   • Lipitor [Atorvastatin Calcium]    • Pcn [Penicillins] Hives   • Pravachol [Pravastatin Sodium]      ADVERSE REACTION.     Outpatient Encounter Prescriptions as of 2/28/2018   Medication Sig Dispense Refill   • losartan (COZAAR) 100 MG Tab Take 100 mg by mouth every day.  1   • B-D INS SYR ULTRAFINE 1CC/30G 30G X 1/2\" 1 ML Misc TEST BLOOD SUGAR 3 TIMES DAILY  99   • FREESTYLE LITE strip TEST BLOOD SUGAR 5 " TIMES DAILY  99   • clobetasol (TEMOVATE) 0.05 % Cream APPLY SPARINGLYTO AFFECTED AREA TWICE A DAY  2   • HYDROcodone-acetaminophen (NORCO) 7.5-325 MG per tablet TAKE 1 TO 2 TABLETS BY MOUTH TWICE A DAY AS NEEDED FOR PAIN  0   • vitamin D, Ergocalciferol, (DRISDOL) 14059 UNITS Cap capsule Take 50,000 Units by mouth every 30 days.     • albuterol (VENTOLIN OR PROVENTIL) 108 (90 BASE) MCG/ACT Aero Soln inhalation aerosol Inhale 2 Puffs by mouth every 6 hours as needed for Shortness of Breath.     • tiotropium (SPIRIVA) 18 MCG Cap Inhale 18 mcg by mouth at bedtime as needed.     • omeprazole (PRILOSEC) 20 MG delayed-release capsule Take 20 mg by mouth as needed.     • mupirocin (BACTROBAN) 2 % Ointment Apply  to affected area(s) as needed.     • fluticasone (FLONASE) 50 MCG/ACT nasal spray Spray 1 Spray in nose as needed.     • furosemide (LASIX) 40 MG TABS TAKE ONE TABLET ORALLY EVERY  OTHER DAY ALTERNATING WITH 2 TABS EVERY O THER DAY (Patient taking differently: TAKE ONE TABLET ORALLY EVERY  OTHER DAY ALTERNATING WITH 2 TABS EVERY OTHER DAY (TAKES 1 TAB AT NIGHT)) 45 Tab 6   • KLOR-CON 8 MEQ tablet TAKE 1 TAB BY MOUTH EVERY DAY   ALTERNATING WITH 2 TABLETS EVERY OTHERDAY. 45 Each 1   • fluticasone-salmeterol (ADVAIR) 100-50 MCG/DOSE AEPB Inhale 1 Puff by mouth every 12 hours. PRN     • flyticasone (CUTIVATE) 0.005 % ointment Apply  to affected area(s). apply thin layer as directed      • insulin NPH (HUMULIN N) 100 UNIT/ML SUSP Inject  as instructed. USE AS DIRECTED.      • glimepiride (AMARYL) 4 MG TABS Take 4 mg by mouth 2 times a day. Night time dose at 1700     • rosuvastatin (CRESTOR) 5 MG TABS Take 5 mg by mouth. M, W, F     • levoFLOXacin (LEVAQUIN) 500 MG tablet TAKE ONE TABLET DAILY FOR SINUSITIS AND BRONCHITIS  0   • clobetasol (TEMOVATE) 0.05 % Ointment Apply  to affected area(s) as needed.     • hydrocodone-acetaminophen (VICODIN ES) 7.5-750 MG per tablet Take  by mouth.       • losartan (COZAAR) 50 MG  "TABS Take 100 mg by mouth every day.       No facility-administered encounter medications on file as of 2/28/2018.      Review of Systems   Respiratory: Negative for shortness of breath.    Cardiovascular: Negative for chest pain, palpitations and leg swelling.   Neurological: Negative for dizziness and loss of consciousness.        Objective:   BP (!) 172/71   Pulse 80   Resp 14   Ht 1.702 m (5' 7\")   Wt 102.5 kg (226 lb)   SpO2 94%   BMI 35.40 kg/m²     Physical Exam   Constitutional: She is oriented to person, place, and time. She appears well-nourished. No distress.   Neck: No JVD present. Carotid bruit is not present.   Normal jugular venous pressure.   Cardiovascular: Normal rate, regular rhythm, S1 normal, S2 normal and normal heart sounds.  Exam reveals no gallop and no friction rub.    No murmur heard.  Pulses:       Carotid pulses are 1+ on the right side, and 1+ on the left side.       Radial pulses are 1+ on the right side, and 1+ on the left side.        Posterior tibial pulses are 1+ on the right side, and 1+ on the left side.   Pulmonary/Chest: Effort normal and breath sounds normal. She has no wheezes. She has no rhonchi. She has no rales.   Abdominal:   Markedly protuberant   Musculoskeletal: She exhibits no edema.   Neurological: She is alert and oriented to person, place, and time. Coordination abnormal.   Skin: Skin is warm and dry.   Psychiatric: She has a normal mood and affect. Her behavior is normal.     11/05/2012 ECHOCARDIOGRAM  EF 70%.  Mild tricuspid regurgitation. Right ventricular systolic pressure is   estimated to be 30 mmHg.  Mitral annular calcification. Thickened mitral valve leaflets. Mild   mitral stenosis. Mild mitral regurgitation.  Thickened aortic valve leaflets. Aortic annular calcification. Mild   aortic stenosis.  Moderate tricuspid regurgitation. Right ventricular systolic pressure   is estimated to be 57 mmhg.  No prior study available for comparison    09/28/2016 " "ECHOCARDIOGRAM  Normal left ventricular size and systolic function.  Severe concentric left ventricular hypertrophy.  Left ventricular ejection fraction is visually estimated to be 70%.  Diffuse severe mitral annular calcification.  Thickened mitral valve leaflets: Mean transvalvular gradient is 8  mmHg   at a heart rate of  75BPM; Mitral valve area 2.7 cm2.  Aortic sclerosis without stenosis.  Estimated right ventricular systolic pressure  is 55 mmHg.  Right heart pressures are consistent with moderate pulmonary   hypertension.  Mildly dilated right ventricle.  Normal right ventricular systolic function     10/25/2017 ECHOCARDIOGRAM  Normal left ventricular systolic function.  Left ventricular ejection fraction is visually estimated to be 65%.  Moderate concentric left ventricular hypertrophy.  Dense diffuse mitral annular calcification with thickened mitral valve   leaflets.  Mild to moderate mitral stenosis.  Right heart pressures are consistent with moderate pulmonary   hypertension.  Aortic sclerosis without stenosis.    Assessment:     1. Near syncope     2. Mitral valve stenosis, unspecified etiology     3. Diastolic CHF, chronic (CMS-HCC)     4. Essential hypertension, benign         Medical Decision Making:  Today's Assessment / Status / Plan:     Near syncope. Resolved secondary to antihypertensive medication.    Mitral stenosis.    Diastolic and valvular CHF. Compensated. Diagnosed 2004.    Pulmonary hypertension.    Hypertension. Controlled as an outpatient. \"White coat syndrome\".    Diabetes mellitus. Poorly controlled.    Sleep apnea. Untreated. Not on CPAP.    Infection times sinus infection ×2.    Balance problems. Significant.    Syncope related to hypoglycemia 7/23/2016 at the [facility].    History of lightheadedness. Required previous discontinuation Lasix and losartan.    Dental extractions.    Weight loss.    Recommendation  Continue current therapy.  Reviewed her blood pressure " log.  Continue to monitor blood pressure.  Follow-up 6 months.

## 2018-03-02 ENCOUNTER — HOSPITAL ENCOUNTER (OUTPATIENT)
Dept: LAB | Facility: MEDICAL CENTER | Age: 73
End: 2018-03-02
Attending: INTERNAL MEDICINE
Payer: MEDICARE

## 2018-03-02 LAB
25(OH)D3 SERPL-MCNC: 28 NG/ML (ref 30–100)
ALBUMIN SERPL BCP-MCNC: 3.8 G/DL (ref 3.2–4.9)
ALBUMIN SERPL BCP-MCNC: 3.9 G/DL (ref 3.2–4.9)
ALBUMIN/GLOB SERPL: 1.1 G/DL
ALBUMIN/GLOB SERPL: 1.1 G/DL
ALP SERPL-CCNC: 72 U/L (ref 30–99)
ALP SERPL-CCNC: 75 U/L (ref 30–99)
ALT SERPL-CCNC: 11 U/L (ref 2–50)
ALT SERPL-CCNC: 11 U/L (ref 2–50)
ANION GAP SERPL CALC-SCNC: 8 MMOL/L (ref 0–11.9)
ANION GAP SERPL CALC-SCNC: 9 MMOL/L (ref 0–11.9)
APPEARANCE UR: CLEAR
AST SERPL-CCNC: 16 U/L (ref 12–45)
AST SERPL-CCNC: 18 U/L (ref 12–45)
BACTERIA #/AREA URNS HPF: ABNORMAL /HPF
BASOPHILS # BLD AUTO: 0.6 % (ref 0–1.8)
BASOPHILS # BLD: 0.05 K/UL (ref 0–0.12)
BILIRUB SERPL-MCNC: 0.4 MG/DL (ref 0.1–1.5)
BILIRUB SERPL-MCNC: 0.4 MG/DL (ref 0.1–1.5)
BILIRUB UR QL STRIP.AUTO: NEGATIVE
BUN SERPL-MCNC: 18 MG/DL (ref 8–22)
BUN SERPL-MCNC: 20 MG/DL (ref 8–22)
CALCIUM SERPL-MCNC: 9.1 MG/DL (ref 8.5–10.5)
CALCIUM SERPL-MCNC: 9.4 MG/DL (ref 8.5–10.5)
CHLORIDE SERPL-SCNC: 101 MMOL/L (ref 96–112)
CHLORIDE SERPL-SCNC: 101 MMOL/L (ref 96–112)
CHOLEST SERPL-MCNC: 132 MG/DL (ref 100–199)
CO2 SERPL-SCNC: 27 MMOL/L (ref 20–33)
CO2 SERPL-SCNC: 28 MMOL/L (ref 20–33)
COLOR UR: YELLOW
CREAT SERPL-MCNC: 1.14 MG/DL (ref 0.5–1.4)
CREAT SERPL-MCNC: 1.14 MG/DL (ref 0.5–1.4)
CREAT UR-MCNC: 95.7 MG/DL
EOSINOPHIL # BLD AUTO: 0.16 K/UL (ref 0–0.51)
EOSINOPHIL NFR BLD: 2.1 % (ref 0–6.9)
EPI CELLS #/AREA URNS HPF: ABNORMAL /HPF
ERYTHROCYTE [DISTWIDTH] IN BLOOD BY AUTOMATED COUNT: 42.6 FL (ref 35.9–50)
EST. AVERAGE GLUCOSE BLD GHB EST-MCNC: 206 MG/DL
GLOBULIN SER CALC-MCNC: 3.4 G/DL (ref 1.9–3.5)
GLOBULIN SER CALC-MCNC: 3.4 G/DL (ref 1.9–3.5)
GLUCOSE SERPL-MCNC: 228 MG/DL (ref 65–99)
GLUCOSE SERPL-MCNC: 232 MG/DL (ref 65–99)
GLUCOSE UR STRIP.AUTO-MCNC: NEGATIVE MG/DL
HBA1C MFR BLD: 8.8 % (ref 0–5.6)
HCT VFR BLD AUTO: 38.2 % (ref 37–47)
HDLC SERPL-MCNC: 42 MG/DL
HGB BLD-MCNC: 12.3 G/DL (ref 12–16)
HYALINE CASTS #/AREA URNS LPF: ABNORMAL /LPF
IMM GRANULOCYTES # BLD AUTO: 0.03 K/UL (ref 0–0.11)
IMM GRANULOCYTES NFR BLD AUTO: 0.4 % (ref 0–0.9)
KETONES UR STRIP.AUTO-MCNC: NEGATIVE MG/DL
LDLC SERPL CALC-MCNC: 67 MG/DL
LEUKOCYTE ESTERASE UR QL STRIP.AUTO: ABNORMAL
LYMPHOCYTES # BLD AUTO: 1.98 K/UL (ref 1–4.8)
LYMPHOCYTES NFR BLD: 25.5 % (ref 22–41)
MAGNESIUM SERPL-MCNC: 2 MG/DL (ref 1.5–2.5)
MCH RBC QN AUTO: 29.6 PG (ref 27–33)
MCHC RBC AUTO-ENTMCNC: 32.2 G/DL (ref 33.6–35)
MCV RBC AUTO: 91.8 FL (ref 81.4–97.8)
MICRO URNS: ABNORMAL
MONOCYTES # BLD AUTO: 0.56 K/UL (ref 0–0.85)
MONOCYTES NFR BLD AUTO: 7.2 % (ref 0–13.4)
NEUTROPHILS # BLD AUTO: 4.98 K/UL (ref 2–7.15)
NEUTROPHILS NFR BLD: 64.2 % (ref 44–72)
NITRITE UR QL STRIP.AUTO: NEGATIVE
NRBC # BLD AUTO: 0 K/UL
NRBC BLD-RTO: 0 /100 WBC
PH UR STRIP.AUTO: 6.5 [PH]
PHOSPHATE SERPL-MCNC: 3 MG/DL (ref 2.5–4.5)
PLATELET # BLD AUTO: 227 K/UL (ref 164–446)
PMV BLD AUTO: 12.1 FL (ref 9–12.9)
POTASSIUM SERPL-SCNC: 3.9 MMOL/L (ref 3.6–5.5)
POTASSIUM SERPL-SCNC: 4 MMOL/L (ref 3.6–5.5)
PROT SERPL-MCNC: 7.2 G/DL (ref 6–8.2)
PROT SERPL-MCNC: 7.3 G/DL (ref 6–8.2)
PROT UR QL STRIP: 30 MG/DL
PROT UR-MCNC: 37.4 MG/DL (ref 0–15)
PROT/CREAT UR: 391 MG/G (ref 10–107)
PTH-INTACT SERPL-MCNC: 167.3 PG/ML (ref 14–72)
RBC # BLD AUTO: 4.16 M/UL (ref 4.2–5.4)
RBC # URNS HPF: ABNORMAL /HPF
RBC UR QL AUTO: NEGATIVE
SODIUM SERPL-SCNC: 137 MMOL/L (ref 135–145)
SODIUM SERPL-SCNC: 137 MMOL/L (ref 135–145)
SP GR UR STRIP.AUTO: 1.02
TRIGL SERPL-MCNC: 115 MG/DL (ref 0–149)
URATE SERPL-MCNC: 7.6 MG/DL (ref 1.9–8.2)
UROBILINOGEN UR STRIP.AUTO-MCNC: 0.2 MG/DL
WBC # BLD AUTO: 7.8 K/UL (ref 4.8–10.8)
WBC #/AREA URNS HPF: ABNORMAL /HPF

## 2018-03-02 PROCEDURE — 84100 ASSAY OF PHOSPHORUS: CPT

## 2018-03-02 PROCEDURE — 84156 ASSAY OF PROTEIN URINE: CPT

## 2018-03-02 PROCEDURE — 84550 ASSAY OF BLOOD/URIC ACID: CPT

## 2018-03-02 PROCEDURE — 81001 URINALYSIS AUTO W/SCOPE: CPT

## 2018-03-02 PROCEDURE — 85025 COMPLETE CBC W/AUTO DIFF WBC: CPT

## 2018-03-02 PROCEDURE — 80061 LIPID PANEL: CPT

## 2018-03-02 PROCEDURE — 83970 ASSAY OF PARATHORMONE: CPT

## 2018-03-02 PROCEDURE — 83036 HEMOGLOBIN GLYCOSYLATED A1C: CPT

## 2018-03-02 PROCEDURE — 36415 COLL VENOUS BLD VENIPUNCTURE: CPT

## 2018-03-02 PROCEDURE — 80053 COMPREHEN METABOLIC PANEL: CPT | Mod: 91

## 2018-03-02 PROCEDURE — 83735 ASSAY OF MAGNESIUM: CPT

## 2018-03-02 PROCEDURE — 82570 ASSAY OF URINE CREATININE: CPT

## 2018-03-02 PROCEDURE — 82306 VITAMIN D 25 HYDROXY: CPT

## 2018-03-02 PROCEDURE — 80053 COMPREHEN METABOLIC PANEL: CPT

## 2018-03-08 ENCOUNTER — TELEPHONE (OUTPATIENT)
Dept: PULMONOLOGY | Facility: HOSPICE | Age: 73
End: 2018-03-08

## 2018-03-21 ENCOUNTER — TELEPHONE (OUTPATIENT)
Dept: CARDIOLOGY | Facility: MEDICAL CENTER | Age: 73
End: 2018-03-21

## 2018-03-21 DIAGNOSIS — I73.9 PERIPHERAL VASCULAR DISEASE (HCC): ICD-10-CM

## 2018-03-21 NOTE — TELEPHONE ENCOUNTER
"discuss her medication losartan   Received: Today   Message Contents   [address] [place] Powers R.N.   Phone Number: [phone]             DARION/Rishabh     Pt is calling to discuss her medication losartan. States she saw her nephrologist, Dr. Rosas last week and suggested she be put back on a low dosage of losartan 50 mg. Please call pt to advise at  [phone] after 4 pm or tomorrow morning.        Pt reports at recent appt with Dr. Rosas, nephrology, he suggested that she go back on losartan 50mg due to rising blood pressure and it \"would help protect her kidneys\". Pts BP is now in 130's-140's/70's-80's. Pt would like to retry losartan 50mg. She has a new BP cuff and will continue to monitor her readings.     To Dr. Wilkinson  "

## 2018-03-23 NOTE — TELEPHONE ENCOUNTER
Okay on the losartan 50 mg but she had near syncope with low blood pressures on it before.  She should closely monitor her symptoms and blood pressure.

## 2018-05-24 ENCOUNTER — PATIENT OUTREACH (OUTPATIENT)
Dept: HEALTH INFORMATION MANAGEMENT | Facility: OTHER | Age: 73
End: 2018-05-24

## 2018-05-24 NOTE — PROGRESS NOTES
Outcome: Called pt to verify info. When I was leaving a voice message she answered and said that wasn't feeling good and hung up.    Please transfer to Patient Outreach Team at [phone] when patient returns call.    [facility] Verified: yes    Attempt # 1

## 2018-05-30 DIAGNOSIS — J45.909 ASTHMA WITH BRONCHITIS: ICD-10-CM

## 2018-05-30 RX ORDER — ALBUTEROL SULFATE 90 UG/1
2 AEROSOL, METERED RESPIRATORY (INHALATION) EVERY 6 HOURS PRN
Qty: 8.5 G | Refills: 1 | Status: SHIPPED | OUTPATIENT
Start: 2018-05-30 | End: 2020-01-03

## 2018-05-30 NOTE — TELEPHONE ENCOUNTER
Pt notified on vm that rx was sent to the pharmacy on file and to  [phone] if he had any questions.

## 2018-05-30 NOTE — TELEPHONE ENCOUNTER
Caller Name: Ophelia Sosa                 Call Back Number: [phone] (home)         Patient approves a detailed voicemail message: yes    Have we ever prescribed this med? Yes.  If yes, what date? 6/10/16    Last OV: 6/10/16- Dr. Charles    Next OV: 6/2/18    DX: Asthma with Bronchitis    Medications: Ventolin HFA

## 2018-06-02 ENCOUNTER — HOSPITAL ENCOUNTER (OUTPATIENT)
Dept: LAB | Facility: MEDICAL CENTER | Age: 73
End: 2018-06-02
Attending: INTERNAL MEDICINE
Payer: MEDICARE

## 2018-06-02 LAB
ALBUMIN SERPL BCP-MCNC: 3.8 G/DL (ref 3.2–4.9)
ALBUMIN/GLOB SERPL: 1.2 G/DL
ALP SERPL-CCNC: 64 U/L (ref 30–99)
ALT SERPL-CCNC: 16 U/L (ref 2–50)
ANION GAP SERPL CALC-SCNC: 7 MMOL/L (ref 0–11.9)
AST SERPL-CCNC: 16 U/L (ref 12–45)
BILIRUB SERPL-MCNC: 0.5 MG/DL (ref 0.1–1.5)
BUN SERPL-MCNC: 25 MG/DL (ref 8–22)
CALCIUM SERPL-MCNC: 9.3 MG/DL (ref 8.5–10.5)
CHLORIDE SERPL-SCNC: 102 MMOL/L (ref 96–112)
CO2 SERPL-SCNC: 28 MMOL/L (ref 20–33)
CREAT SERPL-MCNC: 1.14 MG/DL (ref 0.5–1.4)
EST. AVERAGE GLUCOSE BLD GHB EST-MCNC: 232 MG/DL
GLOBULIN SER CALC-MCNC: 3.3 G/DL (ref 1.9–3.5)
GLUCOSE SERPL-MCNC: 293 MG/DL (ref 65–99)
HBA1C MFR BLD: 9.7 % (ref 0–5.6)
POTASSIUM SERPL-SCNC: 4.4 MMOL/L (ref 3.6–5.5)
PROT SERPL-MCNC: 7.1 G/DL (ref 6–8.2)
SODIUM SERPL-SCNC: 137 MMOL/L (ref 135–145)

## 2018-06-02 PROCEDURE — 80053 COMPREHEN METABOLIC PANEL: CPT

## 2018-06-02 PROCEDURE — 36415 COLL VENOUS BLD VENIPUNCTURE: CPT

## 2018-06-02 PROCEDURE — 83036 HEMOGLOBIN GLYCOSYLATED A1C: CPT

## 2018-07-26 ENCOUNTER — HOSPITAL ENCOUNTER (OUTPATIENT)
Dept: RADIOLOGY | Facility: MEDICAL CENTER | Age: 73
End: 2018-07-26
Attending: FAMILY MEDICINE
Payer: MEDICARE

## 2018-07-26 DIAGNOSIS — R42 VERTIGO: ICD-10-CM

## 2018-07-26 PROCEDURE — 70551 MRI BRAIN STEM W/O DYE: CPT

## 2018-08-31 ENCOUNTER — HOSPITAL ENCOUNTER (OUTPATIENT)
Dept: LAB | Facility: MEDICAL CENTER | Age: 73
End: 2018-08-31
Attending: INTERNAL MEDICINE
Payer: MEDICARE

## 2018-08-31 ENCOUNTER — APPOINTMENT (OUTPATIENT)
Dept: LAB | Facility: MEDICAL CENTER | Age: 73
End: 2018-08-31
Payer: MEDICARE

## 2018-08-31 LAB
ALBUMIN SERPL BCP-MCNC: 4 G/DL (ref 3.2–4.9)
ALBUMIN/GLOB SERPL: 1.5 G/DL
ALP SERPL-CCNC: 81 U/L (ref 30–99)
ALT SERPL-CCNC: 15 U/L (ref 2–50)
ANION GAP SERPL CALC-SCNC: 9 MMOL/L (ref 0–11.9)
AST SERPL-CCNC: 18 U/L (ref 12–45)
BILIRUB SERPL-MCNC: 0.4 MG/DL (ref 0.1–1.5)
BUN SERPL-MCNC: 23 MG/DL (ref 8–22)
CALCIUM SERPL-MCNC: 9.7 MG/DL (ref 8.5–10.5)
CHLORIDE SERPL-SCNC: 99 MMOL/L (ref 96–112)
CO2 SERPL-SCNC: 29 MMOL/L (ref 20–33)
CREAT SERPL-MCNC: 1.19 MG/DL (ref 0.5–1.4)
EST. AVERAGE GLUCOSE BLD GHB EST-MCNC: 229 MG/DL
GLOBULIN SER CALC-MCNC: 2.6 G/DL (ref 1.9–3.5)
GLUCOSE SERPL-MCNC: 277 MG/DL (ref 65–99)
HBA1C MFR BLD: 9.6 % (ref 0–5.6)
POTASSIUM SERPL-SCNC: 4.2 MMOL/L (ref 3.6–5.5)
PROT SERPL-MCNC: 6.6 G/DL (ref 6–8.2)
SODIUM SERPL-SCNC: 137 MMOL/L (ref 135–145)

## 2018-08-31 PROCEDURE — 80053 COMPREHEN METABOLIC PANEL: CPT

## 2018-08-31 PROCEDURE — 83036 HEMOGLOBIN GLYCOSYLATED A1C: CPT

## 2018-08-31 PROCEDURE — 36415 COLL VENOUS BLD VENIPUNCTURE: CPT

## 2018-09-05 ENCOUNTER — OFFICE VISIT (OUTPATIENT)
Dept: CARDIOLOGY | Facility: MEDICAL CENTER | Age: 73
End: 2018-09-05
Payer: MEDICARE

## 2018-09-05 VITALS
HEIGHT: 67 IN | RESPIRATION RATE: 14 BRPM | WEIGHT: 228 LBS | HEART RATE: 78 BPM | OXYGEN SATURATION: 95 % | DIASTOLIC BLOOD PRESSURE: 78 MMHG | SYSTOLIC BLOOD PRESSURE: 148 MMHG | BODY MASS INDEX: 35.79 KG/M2

## 2018-09-05 DIAGNOSIS — I50.30 DIASTOLIC CHF DUE TO VALVULAR DISEASE (HCC): ICD-10-CM

## 2018-09-05 DIAGNOSIS — I10 ESSENTIAL HYPERTENSION, BENIGN: ICD-10-CM

## 2018-09-05 DIAGNOSIS — I38 DIASTOLIC CHF DUE TO VALVULAR DISEASE (HCC): ICD-10-CM

## 2018-09-05 DIAGNOSIS — I27.20 PULMONARY HYPERTENSION (HCC): Chronic | ICD-10-CM

## 2018-09-05 DIAGNOSIS — I05.0 MITRAL VALVE STENOSIS, UNSPECIFIED ETIOLOGY: ICD-10-CM

## 2018-09-05 PROCEDURE — 99214 OFFICE O/P EST MOD 30 MIN: CPT | Performed by: INTERNAL MEDICINE

## 2018-09-05 RX ORDER — PEN NEEDLE, DIABETIC 29 G X1/2"
NEEDLE, DISPOSABLE MISCELLANEOUS
COMMUNITY
Start: 2018-08-28 | End: 2018-11-16 | Stop reason: CLARIF

## 2018-09-05 RX ORDER — DIAZEPAM 2 MG/1
1 TABLET ORAL 2 TIMES DAILY
COMMUNITY
Start: 2018-06-30 | End: 2018-11-16

## 2018-09-05 RX ORDER — POTASSIUM CHLORIDE 600 MG/1
CAPSULE, EXTENDED RELEASE ORAL
COMMUNITY
Start: 2018-07-12 | End: 2018-11-16 | Stop reason: CLARIF

## 2018-09-05 ASSESSMENT — ENCOUNTER SYMPTOMS
LOSS OF CONSCIOUSNESS: 0
PALPITATIONS: 0
COUGH: 0
DIZZINESS: 1
SHORTNESS OF BREATH: 0
MYALGIAS: 0

## 2018-09-05 NOTE — PROGRESS NOTES
"Chief Complaint   Patient presents with   •  CHF related to mitral stenosis.            Subjective:   Ophelia Sosa is a 73 y.o. female who presents today for followup evaluation of near syncope, aortic stenosis, diastolic CHF and hypertension.     Last seen on 2/28/2018.    Since 2/20/2018 the patient had no cardiac symptoms.  She was restarted on losartan 50 mg daily by Dr. Phan Rosas, nephrologist.  She has developed vertigo.  MRI showed no specific evidence of stroke.  She has no heart failure symptoms.     Since 10/5/2017 appointment the patient has gotten better.  At her last appointment she was evaluated for near syncope.  She closely monitored her blood pressure and had low recordings below 100.  Her Holter monitor was unremarkable for an explanation of her near-syncope.  In January, 2018 she discontinue losartan.  Her near syncopal episodes completely resolved.  She has continued to monitor her blood pressure which has been consistently normal-see media for blood pressure log.   She states that she has \"white coat syndrome\" for years.  She continues to have problems with her balance but is thankful that \"I'm not almost passing out all the time\".  She was diagnosed with \"congestive heart failure many years ago followed by Dr. Temple on chronic Lasix therapy which she continues.  She has no active heart failure symptoms.     Since 8/18/2016 appointment the patient has had episodes of increased fatigue and lightheadedness with near syncope while at the grocery store by the time she is finished and checking out.  She states that her blood pressure has dropped at times to the 80s when she is sitting in her recliner.  No loss of consciousness.  She's lost \"52 pounds\" over the past year and a half because of chronic dental problems requiring teeth extractions.  Her diabetes and blood pressure labile with levels down to the 60s. Her glycohemoglobin is over 9%.  She was to get on Holter monitor last year but " didn't feel well.     Past medical history  1-2 months ago the patient began having head congestion and treated for sinus infection by her PCP Dr. Roberta Hurd  She had 2 courses of antibiotics.  She's had chronic balance problems that have worsened.  She's had positional lightheadedness.  Dr. Hurd 1st discontinue losartan and then Lasix.  She continued to have problems and was hospitalized on 7/23/2016 after she passed out at home due to hypoglycemia.  Blood sugar was 49.     Was seen by PMA.  On CPAP.     Past medical history  Follow for systolic murmur.  Has other coronary risk factors being addressed by other physicians.  No cardiac symptoms.  Has problems with Charcot joint/foot.    Past Medical History:   Diagnosis Date   • ASTHMA     patient denies   • Congestive heart failure (HCC)    • COPD    • COPD (chronic obstructive pulmonary disease) (HCC)    • Diabetes    • Diastolic dysfunction    • DM (diabetes mellitus) (HCC)    • Heart murmur    • HLD (hyperlipidemia)    • HTN (hypertension)    • Hypertension    • Obstructive sleep apnea    • Osteoarthritis    • Pulmonary hypertension (HCC)    • Sleep apnea      Past Surgical History:   Procedure Laterality Date   • BREAST BIOPSY     • GYN SURGERY  hysterectomy   • MITRAL VALVE REPLACE     • OTHER ABDOMINAL SURGERY     • OTHER ORTHOPEDIC SURGERY     • TONSILLECTOMY Bilateral      Family History   Problem Relation Age of Onset   • Hypertension Mother    • Heart Disease Mother    • Diabetes Father    • Hypertension Father    • Diabetes Maternal Grandmother    • Diabetes Paternal Grandfather      Social History     Social History   • Marital status: Single     Spouse name: N/A   • Number of children: N/A   • Years of education: N/A     Occupational History   • Not on file.     Social History Main Topics   • Smoking status: Former Smoker     Packs/day: 1.50     Quit date: 11/2/2012   • Smokeless tobacco: Never Used   • Alcohol use No   • Drug use: No   • Sexual  "activity: Not on file     Other Topics Concern   • Not on file     Social History Narrative   • No narrative on file     Allergies   Allergen Reactions   • Ativan      DELIRIUM, CONFUSION.   • Advair [Fluticasone-Salmeterol]    • Ambien [Zolpidem Tartrate]    • Erythromycin Vomiting   • Lipitor [Atorvastatin Calcium]    • Pcn [Penicillins] Hives   • Pravachol [Pravastatin Sodium]      ADVERSE REACTION.     Outpatient Encounter Prescriptions as of 9/5/2018   Medication Sig Dispense Refill   • diazePAM (VALIUM) 2 MG Tab Take 1 mg by mouth 2 Times a Day.     • BD INSULIN SYRINGE ULTRAFINE 31G X 5/16\" 0.3 ML Misc      • albuterol 108 (90 Base) MCG/ACT Aero Soln inhalation aerosol Inhale 2 Puffs by mouth every 6 hours as needed for Shortness of Breath. 8.5 g 1   • B-D INS SYR ULTRAFINE 1CC/30G 30G X 1/2\" 1 ML Misc TEST BLOOD SUGAR 3 TIMES DAILY  99   • FREESTYLE LITE strip TEST BLOOD SUGAR 5 TIMES DAILY  99   • clobetasol (TEMOVATE) 0.05 % Cream APPLY SPARINGLYTO AFFECTED AREA TWICE A DAY  2   • HYDROcodone-acetaminophen (NORCO) 7.5-325 MG per tablet TAKE 1 TO 2 TABLETS BY MOUTH TWICE A DAY AS NEEDED FOR PAIN  0   • tiotropium (SPIRIVA) 18 MCG Cap Inhale 18 mcg by mouth at bedtime as needed.     • omeprazole (PRILOSEC) 20 MG delayed-release capsule Take 20 mg by mouth as needed.     • mupirocin (BACTROBAN) 2 % Ointment Apply  to affected area(s) as needed.     • fluticasone (FLONASE) 50 MCG/ACT nasal spray Spray 1 Spray in nose as needed.     • furosemide (LASIX) 40 MG TABS TAKE ONE TABLET ORALLY EVERY  OTHER DAY ALTERNATING WITH 2 TABS EVERY O THER DAY (Patient taking differently: TAKES 1 TAB AT NIGHT) 45 Tab 6   • KLOR-CON 8 MEQ tablet TAKE 1 TAB BY MOUTH EVERY DAY   ALTERNATING WITH 2 TABLETS EVERY OTHERDAY. (Patient taking differently: TAKE ONE TAB BY MOUTH AT NIGHT) 45 Each 1   • flyticasone (CUTIVATE) 0.005 % ointment Apply  to affected area(s). apply thin layer as directed      • insulin NPH (HUMULIN N) 100 " "UNIT/ML SUSP Inject  as instructed. USE AS DIRECTED.      • losartan (COZAAR) 50 MG TABS Take 100 mg by mouth every day.     • glimepiride (AMARYL) 4 MG TABS Take 4 mg by mouth 2 times a day. Night time dose at 1700     • rosuvastatin (CRESTOR) 5 MG TABS Take 5 mg by mouth. M, W, F     • KLOR-CON SPRINKLE 8 MEQ Cap CR capsule      • losartan (COZAAR) 100 MG Tab Take 100 mg by mouth every day.  1   • levoFLOXacin (LEVAQUIN) 500 MG tablet TAKE ONE TABLET DAILY FOR SINUSITIS AND BRONCHITIS  0   • vitamin D, Ergocalciferol, (DRISDOL) 31404 UNITS Cap capsule Take 50,000 Units by mouth every 30 days.     • clobetasol (TEMOVATE) 0.05 % Ointment Apply  to affected area(s) as needed.     • fluticasone-salmeterol (ADVAIR) 100-50 MCG/DOSE AEPB Inhale 1 Puff by mouth every 12 hours. PRN     • hydrocodone-acetaminophen (VICODIN ES) 7.5-750 MG per tablet Take  by mouth.         No facility-administered encounter medications on file as of 9/5/2018.      Review of Systems   Respiratory: Negative for cough and shortness of breath.    Cardiovascular: Negative for chest pain and palpitations.   Musculoskeletal: Negative for myalgias.   Neurological: Positive for dizziness. Negative for loss of consciousness.        Objective:   /78   Pulse 78   Resp 14   Ht 1.702 m (5' 7\")   Wt 103.4 kg (228 lb)   SpO2 95%   BMI 35.71 kg/m²     Physical Exam   Constitutional: She is oriented to person, place, and time. She appears well-developed and well-nourished. No distress.   Walks with the assistance of a cane.   Neck: No JVD present.   Cardiovascular: Normal rate, regular rhythm and intact distal pulses.  Exam reveals no gallop and no friction rub.    Murmur heard.  Pulmonary/Chest: Effort normal and breath sounds normal. No respiratory distress. She has no wheezes. She has no rales.   Abdominal: Soft. She exhibits no distension and no mass. There is no tenderness.   Protuberant.   Musculoskeletal: She exhibits no edema. "   Neurological: She is alert and oriented to person, place, and time.   Skin: Skin is warm and dry.   Psychiatric: She has a normal mood and affect. Her behavior is normal.     11/05/2012 ECHOCARDIOGRAM  EF 70%.  Mild tricuspid regurgitation. Right ventricular systolic pressure is   estimated to be 30 mmHg.  Mitral annular calcification. Thickened mitral valve leaflets. Mild   mitral stenosis. Mild mitral regurgitation.  Thickened aortic valve leaflets. Aortic annular calcification. Mild   aortic stenosis.  Moderate tricuspid regurgitation. Right ventricular systolic pressure   is estimated to be 57 mmhg.  No prior study available for comparison     09/28/2016 ECHOCARDIOGRAM  Normal left ventricular size and systolic function.  Severe concentric left ventricular hypertrophy.  Left ventricular ejection fraction is visually estimated to be 70%.  Diffuse severe mitral annular calcification.  Thickened mitral valve leaflets: Mean transvalvular gradient is 8  mmHg   at a heart rate of  75BPM; Mitral valve area 2.7 cm2.  Aortic sclerosis without stenosis.  Estimated right ventricular systolic pressure  is 55 mmHg.  Right heart pressures are consistent with moderate pulmonary   hypertension.  Mildly dilated right ventricle.  Normal right ventricular systolic function      10/25/2017 ECHOCARDIOGRAM  Normal left ventricular systolic function.  Left ventricular ejection fraction is visually estimated to be 65%.  Moderate concentric left ventricular hypertrophy.  Dense diffuse mitral annular calcification with thickened mitral valve   leaflets.  Mild to moderate mitral stenosis.  Right heart pressures are consistent with moderate pulmonary   hypertension.  Aortic sclerosis without stenosis.    1. Age-related cerebral atrophy.    2. Minimal periventricular white matter changes consistent with chronic microvascular ischemic gliosis.    Assessment:     1. Mitral valve stenosis, unspecified etiology  ECHOCARDIOGRAM COMP W/O CONT    2. Pulmonary hypertension (HCC)     3. Diastolic CHF due to valvular disease (HCC)     4. Essential hypertension, benign         Medical Decision Making:  Today's Assessment / Status / Plan:   Mitral stenosis.     Diastolic and valvular CHF. Compensated. Diagnosed 2004.     Pulmonary hypertension.     Hypertension.  Restarted on losartan 50 mg daily.     Diabetes mellitus. Poorly controlled.     Sleep apnea. Untreated. Not on CPAP.     Infection times sinus infection ×2.     Balance problems. Significant.  Vertigo.     Syncope related to hypoglycemia 7/23/2016 at the [facility].     History of lightheadedness. Required previous discontinuation Lasix and losartan.     Dental extractions.     Weight loss.     Recommendation and Recommendation  1.  The patient is stable from a cardiac standpoint related to her mitral stenosis and pulmonary hypertension.  2.  Follow-up echocardiogram.  3.  Follow-up 6 months.

## 2018-09-22 ENCOUNTER — HOSPITAL ENCOUNTER (OUTPATIENT)
Dept: LAB | Facility: MEDICAL CENTER | Age: 73
End: 2018-09-22
Attending: INTERNAL MEDICINE
Payer: MEDICARE

## 2018-09-22 LAB
25(OH)D3 SERPL-MCNC: 40 NG/ML (ref 30–100)
ALBUMIN SERPL BCP-MCNC: 4.1 G/DL (ref 3.2–4.9)
ALBUMIN/GLOB SERPL: 1.2 G/DL
ALP SERPL-CCNC: 77 U/L (ref 30–99)
ALT SERPL-CCNC: 15 U/L (ref 2–50)
ANION GAP SERPL CALC-SCNC: 8 MMOL/L (ref 0–11.9)
APPEARANCE UR: CLEAR
AST SERPL-CCNC: 15 U/L (ref 12–45)
BACTERIA #/AREA URNS HPF: ABNORMAL /HPF
BASOPHILS # BLD AUTO: 0.9 % (ref 0–1.8)
BASOPHILS # BLD: 0.07 K/UL (ref 0–0.12)
BILIRUB SERPL-MCNC: 0.5 MG/DL (ref 0.1–1.5)
BILIRUB UR QL STRIP.AUTO: NEGATIVE
BUN SERPL-MCNC: 25 MG/DL (ref 8–22)
CALCIUM SERPL-MCNC: 9.8 MG/DL (ref 8.5–10.5)
CHLORIDE SERPL-SCNC: 99 MMOL/L (ref 96–112)
CHOLEST SERPL-MCNC: 124 MG/DL (ref 100–199)
CO2 SERPL-SCNC: 29 MMOL/L (ref 20–33)
COLOR UR: YELLOW
CREAT SERPL-MCNC: 1.4 MG/DL (ref 0.5–1.4)
CREAT UR-MCNC: 99.2 MG/DL
EOSINOPHIL # BLD AUTO: 0.21 K/UL (ref 0–0.51)
EOSINOPHIL NFR BLD: 2.8 % (ref 0–6.9)
EPI CELLS #/AREA URNS HPF: ABNORMAL /HPF
ERYTHROCYTE [DISTWIDTH] IN BLOOD BY AUTOMATED COUNT: 42.7 FL (ref 35.9–50)
EST. AVERAGE GLUCOSE BLD GHB EST-MCNC: 220 MG/DL
FERRITIN SERPL-MCNC: 45.9 NG/ML (ref 10–291)
GLOBULIN SER CALC-MCNC: 3.3 G/DL (ref 1.9–3.5)
GLUCOSE SERPL-MCNC: 345 MG/DL (ref 65–99)
GLUCOSE UR STRIP.AUTO-MCNC: 250 MG/DL
HBA1C MFR BLD: 9.3 % (ref 0–5.6)
HCT VFR BLD AUTO: 41.1 % (ref 37–47)
HDLC SERPL-MCNC: 44 MG/DL
HGB BLD-MCNC: 13 G/DL (ref 12–16)
HYALINE CASTS #/AREA URNS LPF: ABNORMAL /LPF
IMM GRANULOCYTES # BLD AUTO: 0.01 K/UL (ref 0–0.11)
IMM GRANULOCYTES NFR BLD AUTO: 0.1 % (ref 0–0.9)
IRON SATN MFR SERPL: 18 % (ref 15–55)
IRON SERPL-MCNC: 60 UG/DL (ref 40–170)
KETONES UR STRIP.AUTO-MCNC: NEGATIVE MG/DL
LDLC SERPL CALC-MCNC: 51 MG/DL
LEUKOCYTE ESTERASE UR QL STRIP.AUTO: ABNORMAL
LYMPHOCYTES # BLD AUTO: 1.76 K/UL (ref 1–4.8)
LYMPHOCYTES NFR BLD: 23.8 % (ref 22–41)
MAGNESIUM SERPL-MCNC: 2.2 MG/DL (ref 1.5–2.5)
MCH RBC QN AUTO: 29.5 PG (ref 27–33)
MCHC RBC AUTO-ENTMCNC: 31.6 G/DL (ref 33.6–35)
MCV RBC AUTO: 93.2 FL (ref 81.4–97.8)
MICRO URNS: ABNORMAL
MONOCYTES # BLD AUTO: 0.46 K/UL (ref 0–0.85)
MONOCYTES NFR BLD AUTO: 6.2 % (ref 0–13.4)
NEUTROPHILS # BLD AUTO: 4.9 K/UL (ref 2–7.15)
NEUTROPHILS NFR BLD: 66.2 % (ref 44–72)
NITRITE UR QL STRIP.AUTO: NEGATIVE
NRBC # BLD AUTO: 0 K/UL
NRBC BLD-RTO: 0 /100 WBC
PH UR STRIP.AUTO: 6 [PH]
PHOSPHATE SERPL-MCNC: 3.8 MG/DL (ref 2.5–4.5)
PLATELET # BLD AUTO: 200 K/UL (ref 164–446)
PMV BLD AUTO: 12.4 FL (ref 9–12.9)
POTASSIUM SERPL-SCNC: 4.1 MMOL/L (ref 3.6–5.5)
PROT SERPL-MCNC: 7.4 G/DL (ref 6–8.2)
PROT UR QL STRIP: NEGATIVE MG/DL
PROT UR-MCNC: 14.1 MG/DL (ref 0–15)
PROT/CREAT UR: 142 MG/G (ref 10–107)
PTH-INTACT SERPL-MCNC: 149.9 PG/ML (ref 14–72)
RBC # BLD AUTO: 4.41 M/UL (ref 4.2–5.4)
RBC # URNS HPF: ABNORMAL /HPF
RBC UR QL AUTO: NEGATIVE
SODIUM SERPL-SCNC: 136 MMOL/L (ref 135–145)
SP GR UR STRIP.AUTO: 1.02
TIBC SERPL-MCNC: 326 UG/DL (ref 250–450)
TRIGL SERPL-MCNC: 146 MG/DL (ref 0–149)
URATE SERPL-MCNC: 8.2 MG/DL (ref 1.9–8.2)
UROBILINOGEN UR STRIP.AUTO-MCNC: 0.2 MG/DL
WBC # BLD AUTO: 7.4 K/UL (ref 4.8–10.8)
WBC #/AREA URNS HPF: ABNORMAL /HPF

## 2018-09-22 PROCEDURE — 84156 ASSAY OF PROTEIN URINE: CPT

## 2018-09-22 PROCEDURE — 80053 COMPREHEN METABOLIC PANEL: CPT

## 2018-09-22 PROCEDURE — 82728 ASSAY OF FERRITIN: CPT

## 2018-09-22 PROCEDURE — 82570 ASSAY OF URINE CREATININE: CPT

## 2018-09-22 PROCEDURE — 83735 ASSAY OF MAGNESIUM: CPT

## 2018-09-22 PROCEDURE — 83970 ASSAY OF PARATHORMONE: CPT

## 2018-09-22 PROCEDURE — 83550 IRON BINDING TEST: CPT

## 2018-09-22 PROCEDURE — 81001 URINALYSIS AUTO W/SCOPE: CPT

## 2018-09-22 PROCEDURE — 83540 ASSAY OF IRON: CPT

## 2018-09-22 PROCEDURE — 36415 COLL VENOUS BLD VENIPUNCTURE: CPT

## 2018-09-22 PROCEDURE — 82306 VITAMIN D 25 HYDROXY: CPT

## 2018-09-22 PROCEDURE — 84100 ASSAY OF PHOSPHORUS: CPT

## 2018-09-22 PROCEDURE — 80061 LIPID PANEL: CPT

## 2018-09-22 PROCEDURE — 84550 ASSAY OF BLOOD/URIC ACID: CPT

## 2018-09-22 PROCEDURE — 85025 COMPLETE CBC W/AUTO DIFF WBC: CPT

## 2018-09-22 PROCEDURE — 83036 HEMOGLOBIN GLYCOSYLATED A1C: CPT

## 2018-10-17 ENCOUNTER — APPOINTMENT (OUTPATIENT)
Dept: LAB | Facility: MEDICAL CENTER | Age: 73
End: 2018-10-17
Payer: MEDICARE

## 2018-10-18 ENCOUNTER — PATIENT OUTREACH (OUTPATIENT)
Dept: HEALTH INFORMATION MANAGEMENT | Facility: OTHER | Age: 73
End: 2018-10-18

## 2018-10-18 NOTE — PROGRESS NOTES
Outcome: Left Message    Please transfer to Patient Outreach Team at [phone] when patient returns call.    WebIZ Checked & Epic Updated:  yes    [facility] Verified: yes    Attempt # 1

## 2018-11-03 NOTE — PROGRESS NOTES
Outcome: Non [facility] PCP - has been seeing her provider at [facility] since 1996Left Message    Please transfer to Patient Outreach Team at [phone] when patient returns call.    Attempt # 2

## 2018-11-16 ENCOUNTER — HOSPITAL ENCOUNTER (INPATIENT)
Facility: MEDICAL CENTER | Age: 73
LOS: 2 days | DRG: 243 | End: 2018-11-18
Attending: EMERGENCY MEDICINE | Admitting: INTERNAL MEDICINE
Payer: MEDICARE

## 2018-11-16 ENCOUNTER — APPOINTMENT (OUTPATIENT)
Dept: RADIOLOGY | Facility: MEDICAL CENTER | Age: 73
DRG: 243 | End: 2018-11-16
Attending: EMERGENCY MEDICINE
Payer: MEDICARE

## 2018-11-16 ENCOUNTER — APPOINTMENT (OUTPATIENT)
Dept: RADIOLOGY | Facility: MEDICAL CENTER | Age: 73
DRG: 243 | End: 2018-11-16
Attending: INTERNAL MEDICINE
Payer: MEDICARE

## 2018-11-16 DIAGNOSIS — I45.9 HEART BLOCK: ICD-10-CM

## 2018-11-16 PROBLEM — I44.2 COMPLETE HEART BLOCK (HCC): Chronic | Status: ACTIVE | Noted: 2018-11-16

## 2018-11-16 PROBLEM — I45.2 RIGHT BUNDLE BRANCH BLOCK (RBBB) PLUS LEFT ANTERIOR (LA) HEMIBLOCK: Chronic | Status: ACTIVE | Noted: 2018-11-16

## 2018-11-16 PROBLEM — N17.9 STAGE 3 ACUTE KIDNEY INJURY (HCC): Status: ACTIVE | Noted: 2018-11-16

## 2018-11-16 LAB
ALBUMIN SERPL BCP-MCNC: 3.9 G/DL (ref 3.2–4.9)
ALBUMIN/GLOB SERPL: 1.3 G/DL
ALP SERPL-CCNC: 70 U/L (ref 30–99)
ALT SERPL-CCNC: 10 U/L (ref 2–50)
ANION GAP SERPL CALC-SCNC: 8 MMOL/L (ref 0–11.9)
APTT PPP: 26.4 SEC (ref 24.7–36)
AST SERPL-CCNC: 12 U/L (ref 12–45)
BASOPHILS # BLD AUTO: 0.6 % (ref 0–1.8)
BASOPHILS # BLD: 0.05 K/UL (ref 0–0.12)
BILIRUB SERPL-MCNC: 0.1 MG/DL (ref 0.1–1.5)
BUN SERPL-MCNC: 24 MG/DL (ref 8–22)
CALCIUM SERPL-MCNC: 9.8 MG/DL (ref 8.5–10.5)
CHLORIDE SERPL-SCNC: 101 MMOL/L (ref 96–112)
CO2 SERPL-SCNC: 29 MMOL/L (ref 20–33)
CREAT SERPL-MCNC: 1.24 MG/DL (ref 0.5–1.4)
EKG IMPRESSION: NORMAL
EOSINOPHIL # BLD AUTO: 0.11 K/UL (ref 0–0.51)
EOSINOPHIL NFR BLD: 1.3 % (ref 0–6.9)
ERYTHROCYTE [DISTWIDTH] IN BLOOD BY AUTOMATED COUNT: 44.3 FL (ref 35.9–50)
EST. AVERAGE GLUCOSE BLD GHB EST-MCNC: 212 MG/DL
GLOBULIN SER CALC-MCNC: 3.1 G/DL (ref 1.9–3.5)
GLUCOSE BLD-MCNC: 122 MG/DL (ref 65–99)
GLUCOSE BLD-MCNC: 167 MG/DL (ref 65–99)
GLUCOSE BLD-MCNC: 198 MG/DL (ref 65–99)
GLUCOSE SERPL-MCNC: 256 MG/DL (ref 65–99)
HBA1C MFR BLD: 9 % (ref 0–5.6)
HCT VFR BLD AUTO: 37 % (ref 37–47)
HGB BLD-MCNC: 12.1 G/DL (ref 12–16)
IMM GRANULOCYTES # BLD AUTO: 0.02 K/UL (ref 0–0.11)
IMM GRANULOCYTES NFR BLD AUTO: 0.2 % (ref 0–0.9)
INR PPP: 1.03 (ref 0.87–1.13)
LYMPHOCYTES # BLD AUTO: 1.62 K/UL (ref 1–4.8)
LYMPHOCYTES NFR BLD: 18.6 % (ref 22–41)
MAGNESIUM SERPL-MCNC: 2.1 MG/DL (ref 1.5–2.5)
MCH RBC QN AUTO: 29.7 PG (ref 27–33)
MCHC RBC AUTO-ENTMCNC: 32.7 G/DL (ref 33.6–35)
MCV RBC AUTO: 90.9 FL (ref 81.4–97.8)
MONOCYTES # BLD AUTO: 0.54 K/UL (ref 0–0.85)
MONOCYTES NFR BLD AUTO: 6.2 % (ref 0–13.4)
NEUTROPHILS # BLD AUTO: 6.37 K/UL (ref 2–7.15)
NEUTROPHILS NFR BLD: 73.1 % (ref 44–72)
NRBC # BLD AUTO: 0 K/UL
NRBC BLD-RTO: 0 /100 WBC
PLATELET # BLD AUTO: 196 K/UL (ref 164–446)
PMV BLD AUTO: 11.3 FL (ref 9–12.9)
POTASSIUM SERPL-SCNC: 4 MMOL/L (ref 3.6–5.5)
PROT SERPL-MCNC: 7 G/DL (ref 6–8.2)
PROTHROMBIN TIME: 13.6 SEC (ref 12–14.6)
RBC # BLD AUTO: 4.07 M/UL (ref 4.2–5.4)
SODIUM SERPL-SCNC: 138 MMOL/L (ref 135–145)
TROPONIN I SERPL-MCNC: 0.01 NG/ML (ref 0–0.04)
TSH SERPL DL<=0.005 MIU/L-ACNC: 1.63 UIU/ML (ref 0.38–5.33)
WBC # BLD AUTO: 8.7 K/UL (ref 4.8–10.8)

## 2018-11-16 PROCEDURE — 84443 ASSAY THYROID STIM HORMONE: CPT

## 2018-11-16 PROCEDURE — 84484 ASSAY OF TROPONIN QUANT: CPT

## 2018-11-16 PROCEDURE — 99153 MOD SED SAME PHYS/QHP EA: CPT

## 2018-11-16 PROCEDURE — 33208 INSRT HEART PM ATRIAL & VENT: CPT | Mod: KX | Performed by: INTERNAL MEDICINE

## 2018-11-16 PROCEDURE — 99223 1ST HOSP IP/OBS HIGH 75: CPT | Mod: GC | Performed by: INTERNAL MEDICINE

## 2018-11-16 PROCEDURE — 700111 HCHG RX REV CODE 636 W/ 250 OVERRIDE (IP)

## 2018-11-16 PROCEDURE — 93005 ELECTROCARDIOGRAM TRACING: CPT | Performed by: EMERGENCY MEDICINE

## 2018-11-16 PROCEDURE — 304952 HCHG R 2 PADS

## 2018-11-16 PROCEDURE — 83036 HEMOGLOBIN GLYCOSYLATED A1C: CPT

## 2018-11-16 PROCEDURE — 33208 INSRT HEART PM ATRIAL & VENT: CPT

## 2018-11-16 PROCEDURE — 770020 HCHG ROOM/CARE - TELE (206)

## 2018-11-16 PROCEDURE — 99291 CRITICAL CARE FIRST HOUR: CPT

## 2018-11-16 PROCEDURE — C1894 INTRO/SHEATH, NON-LASER: HCPCS

## 2018-11-16 PROCEDURE — 93005 ELECTROCARDIOGRAM TRACING: CPT | Performed by: INTERNAL MEDICINE

## 2018-11-16 PROCEDURE — C1898 LEAD, PMKR, OTHER THAN TRANS: HCPCS

## 2018-11-16 PROCEDURE — 700117 HCHG RX CONTRAST REV CODE 255: Performed by: INTERNAL MEDICINE

## 2018-11-16 PROCEDURE — 305387 HCHG SUTURES

## 2018-11-16 PROCEDURE — 0JH606Z INSERTION OF PACEMAKER, DUAL CHAMBER INTO CHEST SUBCUTANEOUS TISSUE AND FASCIA, OPEN APPROACH: ICD-10-PCS | Performed by: INTERNAL MEDICINE

## 2018-11-16 PROCEDURE — A9270 NON-COVERED ITEM OR SERVICE: HCPCS | Performed by: STUDENT IN AN ORGANIZED HEALTH CARE EDUCATION/TRAINING PROGRAM

## 2018-11-16 PROCEDURE — 71045 X-RAY EXAM CHEST 1 VIEW: CPT

## 2018-11-16 PROCEDURE — 700111 HCHG RX REV CODE 636 W/ 250 OVERRIDE (IP): Performed by: STUDENT IN AN ORGANIZED HEALTH CARE EDUCATION/TRAINING PROGRAM

## 2018-11-16 PROCEDURE — 02H63JZ INSERTION OF PACEMAKER LEAD INTO RIGHT ATRIUM, PERCUTANEOUS APPROACH: ICD-10-PCS | Performed by: INTERNAL MEDICINE

## 2018-11-16 PROCEDURE — 304853 HCHG PPM TEST CABLE

## 2018-11-16 PROCEDURE — 85610 PROTHROMBIN TIME: CPT

## 2018-11-16 PROCEDURE — 02HK3JZ INSERTION OF PACEMAKER LEAD INTO RIGHT VENTRICLE, PERCUTANEOUS APPROACH: ICD-10-PCS | Performed by: INTERNAL MEDICINE

## 2018-11-16 PROCEDURE — 99152 MOD SED SAME PHYS/QHP 5/>YRS: CPT

## 2018-11-16 PROCEDURE — C1892 INTRO/SHEATH,FIXED,PEEL-AWAY: HCPCS

## 2018-11-16 PROCEDURE — 82962 GLUCOSE BLOOD TEST: CPT | Mod: 91

## 2018-11-16 PROCEDURE — 700102 HCHG RX REV CODE 250 W/ 637 OVERRIDE(OP): Performed by: STUDENT IN AN ORGANIZED HEALTH CARE EDUCATION/TRAINING PROGRAM

## 2018-11-16 PROCEDURE — C1785 PMKR, DUAL, RATE-RESP: HCPCS

## 2018-11-16 PROCEDURE — 93010 ELECTROCARDIOGRAM REPORT: CPT | Performed by: INTERNAL MEDICINE

## 2018-11-16 PROCEDURE — 700101 HCHG RX REV CODE 250

## 2018-11-16 PROCEDURE — 99223 1ST HOSP IP/OBS HIGH 75: CPT | Performed by: INTERNAL MEDICINE

## 2018-11-16 PROCEDURE — 85025 COMPLETE CBC W/AUTO DIFF WBC: CPT

## 2018-11-16 PROCEDURE — 80053 COMPREHEN METABOLIC PANEL: CPT

## 2018-11-16 PROCEDURE — 85730 THROMBOPLASTIN TIME PARTIAL: CPT

## 2018-11-16 PROCEDURE — 99152 MOD SED SAME PHYS/QHP 5/>YRS: CPT | Performed by: INTERNAL MEDICINE

## 2018-11-16 PROCEDURE — 93005 ELECTROCARDIOGRAM TRACING: CPT

## 2018-11-16 PROCEDURE — 83735 ASSAY OF MAGNESIUM: CPT

## 2018-11-16 RX ORDER — FUROSEMIDE 40 MG/1
40 TABLET ORAL EVERY EVENING
COMMUNITY
End: 2020-04-09 | Stop reason: SDUPTHER

## 2018-11-16 RX ORDER — INSULIN GLARGINE 100 [IU]/ML
0.2 INJECTION, SOLUTION SUBCUTANEOUS EVERY EVENING
Status: DISCONTINUED | OUTPATIENT
Start: 2018-11-16 | End: 2018-11-16

## 2018-11-16 RX ORDER — DEXTROSE MONOHYDRATE 25 G/50ML
25 INJECTION, SOLUTION INTRAVENOUS
Status: DISCONTINUED | OUTPATIENT
Start: 2018-11-16 | End: 2018-11-16

## 2018-11-16 RX ORDER — LOSARTAN POTASSIUM 50 MG/1
50 TABLET ORAL DAILY
Status: DISCONTINUED | OUTPATIENT
Start: 2018-11-16 | End: 2018-11-18 | Stop reason: HOSPADM

## 2018-11-16 RX ORDER — CHOLECALCIFEROL (VITAMIN D3) 50 MCG
6000 TABLET ORAL
Status: DISCONTINUED | OUTPATIENT
Start: 2018-11-16 | End: 2018-11-16

## 2018-11-16 RX ORDER — CHOLECALCIFEROL (VITAMIN D3) 50 MCG
6000 TABLET ORAL
COMMUNITY
End: 2021-10-19

## 2018-11-16 RX ORDER — POLYETHYLENE GLYCOL 3350 17 G/17G
1 POWDER, FOR SOLUTION ORAL
Status: DISCONTINUED | OUTPATIENT
Start: 2018-11-16 | End: 2018-11-18 | Stop reason: HOSPADM

## 2018-11-16 RX ORDER — LIDOCAINE HYDROCHLORIDE 20 MG/ML
INJECTION, SOLUTION INFILTRATION; PERINEURAL
Status: COMPLETED
Start: 2018-11-16 | End: 2018-11-16

## 2018-11-16 RX ORDER — ESCITALOPRAM OXALATE 10 MG/1
10 TABLET ORAL EVERY MORNING
COMMUNITY
End: 2020-01-09

## 2018-11-16 RX ORDER — DIAZEPAM 2 MG/1
1 TABLET ORAL 2 TIMES DAILY
Status: ON HOLD | COMMUNITY
End: 2018-11-18

## 2018-11-16 RX ORDER — MIDAZOLAM HYDROCHLORIDE 1 MG/ML
INJECTION INTRAMUSCULAR; INTRAVENOUS
Status: COMPLETED
Start: 2018-11-16 | End: 2018-11-16

## 2018-11-16 RX ORDER — ROSUVASTATIN CALCIUM 5 MG/1
5 TABLET, COATED ORAL
Status: DISCONTINUED | OUTPATIENT
Start: 2018-11-16 | End: 2018-11-18 | Stop reason: HOSPADM

## 2018-11-16 RX ORDER — BUPIVACAINE HYDROCHLORIDE 2.5 MG/ML
INJECTION, SOLUTION EPIDURAL; INFILTRATION; INTRACAUDAL
Status: COMPLETED
Start: 2018-11-16 | End: 2018-11-16

## 2018-11-16 RX ORDER — POTASSIUM CHLORIDE 600 MG/1
8 TABLET, FILM COATED, EXTENDED RELEASE ORAL EVERY EVENING
COMMUNITY
End: 2020-04-09 | Stop reason: SDUPTHER

## 2018-11-16 RX ORDER — ACETAMINOPHEN 325 MG/1
650 TABLET ORAL EVERY 6 HOURS PRN
Status: DISCONTINUED | OUTPATIENT
Start: 2018-11-16 | End: 2018-11-18 | Stop reason: HOSPADM

## 2018-11-16 RX ORDER — DEXTROSE MONOHYDRATE 25 G/50ML
25 INJECTION, SOLUTION INTRAVENOUS
Status: DISCONTINUED | OUTPATIENT
Start: 2018-11-16 | End: 2018-11-18 | Stop reason: HOSPADM

## 2018-11-16 RX ORDER — AMOXICILLIN 250 MG
2 CAPSULE ORAL 2 TIMES DAILY
Status: DISCONTINUED | OUTPATIENT
Start: 2018-11-16 | End: 2018-11-18 | Stop reason: HOSPADM

## 2018-11-16 RX ORDER — BISACODYL 10 MG
10 SUPPOSITORY, RECTAL RECTAL
Status: DISCONTINUED | OUTPATIENT
Start: 2018-11-16 | End: 2018-11-18 | Stop reason: HOSPADM

## 2018-11-16 RX ORDER — ESCITALOPRAM OXALATE 10 MG/1
10 TABLET ORAL EVERY MORNING
Status: DISCONTINUED | OUTPATIENT
Start: 2018-11-17 | End: 2018-11-18 | Stop reason: HOSPADM

## 2018-11-16 RX ORDER — HEPARIN SODIUM 5000 [USP'U]/ML
5000 INJECTION, SOLUTION INTRAVENOUS; SUBCUTANEOUS EVERY 8 HOURS
Status: DISCONTINUED | OUTPATIENT
Start: 2018-11-16 | End: 2018-11-18 | Stop reason: HOSPADM

## 2018-11-16 RX ORDER — LABETALOL HYDROCHLORIDE 5 MG/ML
10 INJECTION, SOLUTION INTRAVENOUS EVERY 4 HOURS PRN
Status: DISCONTINUED | OUTPATIENT
Start: 2018-11-16 | End: 2018-11-16

## 2018-11-16 RX ADMIN — VANCOMYCIN HYDROCHLORIDE 1400 MG: 1 INJECTION, POWDER, LYOPHILIZED, FOR SOLUTION INTRAVENOUS at 15:20

## 2018-11-16 RX ADMIN — ROSUVASTATIN CALCIUM 5 MG: 5 TABLET, FILM COATED ORAL at 18:28

## 2018-11-16 RX ADMIN — LIDOCAINE HYDROCHLORIDE: 20 INJECTION, SOLUTION INFILTRATION; PERINEURAL at 15:17

## 2018-11-16 RX ADMIN — LOSARTAN POTASSIUM 50 MG: 50 TABLET ORAL at 18:29

## 2018-11-16 RX ADMIN — FENTANYL CITRATE 100 MCG: 50 INJECTION, SOLUTION INTRAMUSCULAR; INTRAVENOUS at 15:21

## 2018-11-16 RX ADMIN — IOHEXOL 10 ML: 350 INJECTION, SOLUTION INTRAVENOUS at 16:00

## 2018-11-16 RX ADMIN — MIDAZOLAM HYDROCHLORIDE 2 MG: 1 INJECTION, SOLUTION INTRAMUSCULAR; INTRAVENOUS at 15:29

## 2018-11-16 RX ADMIN — MIDAZOLAM HYDROCHLORIDE 4 MG: 1 INJECTION, SOLUTION INTRAMUSCULAR; INTRAVENOUS at 15:21

## 2018-11-16 RX ADMIN — VANCOMYCIN HYDROCHLORIDE 1000 MG: 1 INJECTION, POWDER, LYOPHILIZED, FOR SOLUTION INTRAVENOUS at 15:20

## 2018-11-16 RX ADMIN — BUPIVACAINE HYDROCHLORIDE: 2.5 INJECTION, SOLUTION EPIDURAL; INFILTRATION; INTRACAUDAL; PERINEURAL at 15:17

## 2018-11-16 ASSESSMENT — COGNITIVE AND FUNCTIONAL STATUS - GENERAL
CLIMB 3 TO 5 STEPS WITH RAILING: A LITTLE
MOBILITY SCORE: 23
SUGGESTED CMS G CODE MODIFIER MOBILITY: CI
SUGGESTED CMS G CODE MODIFIER DAILY ACTIVITY: CH
DAILY ACTIVITIY SCORE: 24

## 2018-11-16 ASSESSMENT — ENCOUNTER SYMPTOMS
LOSS OF CONSCIOUSNESS: 1
HEMOPTYSIS: 0
BLURRED VISION: 0
ABDOMINAL PAIN: 0
SEIZURES: 0
DOUBLE VISION: 0
NAUSEA: 0
EYE REDNESS: 0
FOCAL WEAKNESS: 0
POLYDIPSIA: 0
WEIGHT LOSS: 1
MYALGIAS: 0
DEPRESSION: 1
ORTHOPNEA: 0
NERVOUS/ANXIOUS: 1
NECK PAIN: 0
DEPRESSION: 0
DIAPHORESIS: 0
WEAKNESS: 1
HEADACHES: 0
TINGLING: 1
HALLUCINATIONS: 1
COUGH: 0
FEVER: 0
PALPITATIONS: 0
CHILLS: 0
PHOTOPHOBIA: 0
DIZZINESS: 1
EYE PAIN: 0
EYE DISCHARGE: 0
PALPITATIONS: 1
HEARTBURN: 0

## 2018-11-16 ASSESSMENT — COPD QUESTIONNAIRES
COPD SCREENING SCORE: 2
DURING THE PAST 4 WEEKS HOW MUCH DID YOU FEEL SHORT OF BREATH: NONE/LITTLE OF THE TIME
IN THE PAST 12 MONTHS DO YOU DO LESS THAN YOU USED TO BECAUSE OF YOUR BREATHING PROBLEMS: DISAGREE/UNSURE
DO YOU EVER COUGH UP ANY MUCUS OR PHLEGM?: NO/ONLY WITH OCCASIONAL COLDS OR INFECTIONS
HAVE YOU SMOKED AT LEAST 100 CIGARETTES IN YOUR ENTIRE LIFE: NO/DON'T KNOW

## 2018-11-16 ASSESSMENT — LIFESTYLE VARIABLES
EVER_SMOKED: YES
ALCOHOL_USE: NO
SUBSTANCE_ABUSE: 0

## 2018-11-16 ASSESSMENT — PATIENT HEALTH QUESTIONNAIRE - PHQ9
2. FEELING DOWN, DEPRESSED, IRRITABLE, OR HOPELESS: NOT AT ALL
1. LITTLE INTEREST OR PLEASURE IN DOING THINGS: NOT AT ALL
SUM OF ALL RESPONSES TO PHQ9 QUESTIONS 1 AND 2: 0

## 2018-11-16 ASSESSMENT — PAIN SCALES - GENERAL
PAINLEVEL_OUTOF10: 0
PAINLEVEL_OUTOF10: 0

## 2018-11-16 NOTE — NON-PROVIDER
"      Internal Medicine Medical Student Admitting History and Physical  Note Author: Danny Chavez, Student    Name Ophelia Sosa 1945   Age/Sex 73 y.o. female   MRN [id number]   Code Status DNAR/DNI       Chief Complaint:  Syncope    HPI:  Patient is a 73 year old female presenting with syncope with pertinent past medical history of CHF, DMII, HTN, and RBBB. Patient had a Holter monitor that did not demonstrate any significant arrhythmias but has been having recurrent syncopal episodes this AM. Patient reports she was in her usual state of health this AM when she passed out spilling a glass of water on her table. She nearly passed out again when she got up from cleaning the water. Patient reports multiple episodes of dizziness and nearly passing out when she moved to standing position from sitting down. Patient had 2 additional similar episodes in transit to the ED and in the ED during one of which she had a significant pause over 10 seconds with complete heart block and rare ventricular escape. Patient reports experiencing hallucinations (seeing colors) and \"feeling funny\" during the times she nearly passed out likening them to a drug trip. During these syncopal episodes, patient remembers coming to her senses almost immediately after regaining consciousness.     In the ED, ECG reveals RBB. Telemetry monitoring reveals heart block with few escape. Patient was placed on pacer pads. Patient was seen by Dr. Gallardo, cardiology, taken into cath lab for permanent pacemaker implantation, completed successfully.     Review of Systems   Constitutional: Positive for malaise/fatigue and weight loss. Negative for diaphoresis.   Eyes: Negative for blurred vision, double vision, photophobia, pain, discharge and redness.   Cardiovascular: Positive for palpitations. Negative for chest pain and orthopnea.   Gastrointestinal: Negative for abdominal pain and heartburn.   Neurological: Positive for dizziness, tingling, loss " of consciousness and weakness. Negative for focal weakness, seizures and headaches.   Psychiatric/Behavioral: Positive for depression and hallucinations. Negative for substance abuse. The patient is nervous/anxious.              Past Medical History (chronic problems, known complications, current management)     Past Medical History:   Diagnosis Date   • ASTHMA     patient denies   • Complete heart block (HCC) 11/16/2018   • Congestive heart failure (HCC)    • COPD    • COPD (chronic obstructive pulmonary disease) (HCC)    • Depressed    • Diabetes    • Diastolic dysfunction    • DM (diabetes mellitus) (HCC)    • Heart murmur    • HLD (hyperlipidemia)    • HTN (hypertension)    • Hypertension    • Obstructive sleep apnea    • Osteoarthritis    • Pulmonary hypertension (HCC)    • 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   • Sleep apnea      Past Surgical History:  Past Surgical History:   Procedure Laterality Date   • BREAST BIOPSY     • GYN SURGERY  hysterectomy   • MITRAL VALVE REPLACE     • OTHER ABDOMINAL SURGERY     • OTHER ORTHOPEDIC SURGERY     • TONSILLECTOMY Bilateral        Medication Allergy/Sensitivities:  Ativan; Advair [fluticasone-salmeterol]; Ambien [zolpidem tartrate]; Erythromycin; Lipitor [atorvastatin calcium]; Pcn [penicillins]; and Pravachol [pravastatin sodium]    Family History:  Family History   Problem Relation Age of Onset   • Hypertension Mother    • Heart Disease Mother    • Diabetes Father    • Hypertension Father    • Diabetes Maternal Grandmother    • Diabetes Paternal Grandfather        Social History:  Social History     Social History   • Marital status: Single     Spouse name: N/A   • Number of children: N/A   • Years of education: N/A     Occupational History   • Not on file.     Social History Main Topics   • Smoking status: Former Smoker     Packs/day: 1.50     Quit date: 11/2/2012   • Smokeless tobacco: Never Used   • Alcohol use No   • Drug  "use: No   • Sexual activity: Not on file     Other Topics Concern   • Not on file     Social History Narrative   • No narrative on file   Total pack years: 20            Physical Exam     Vitals:    11/16/18 1113 11/16/18 1116 11/16/18 1230 11/16/18 1245   BP:  160/53     Pulse:  65 61 63   Resp:  20 18 (!) 22   Temp:  36.7 °C (98.1 °F)     TempSrc:  Temporal     SpO2:   (!) 83% 95%   Weight: 96 kg (211 lb 10.3 oz)      Height: 1.702 m (5' 7\")        Body mass index is 33.15 kg/m².  /53   Pulse 63   Temp 36.7 °C (98.1 °F) (Temporal)   Resp (!) 22   Ht 1.702 m (5' 7\")   Wt 96 kg (211 lb 10.3 oz)   SpO2 95%   BMI 33.15 kg/m²   O2 therapy: Pulse Oximetry: 95 %, O2 Delivery: None (Room Air)    Physical Exam   Constitutional: She is oriented to person, place, and time and well-developed, well-nourished, and in no distress.   HENT:   Head: Normocephalic and atraumatic.   Eyes: Conjunctivae and EOM are normal. Right eye exhibits no discharge. Left eye exhibits no discharge. No scleral icterus.   Neck: Normal range of motion. Neck supple. No tracheal deviation present.   Cardiovascular: Normal rate and regular rhythm.  Exam reveals no gallop and no friction rub.    Murmur heard.  Systolic murmur noted.    Pulmonary/Chest: Effort normal and breath sounds normal. No respiratory distress. She has no wheezes. She has no rales.   Musculoskeletal: Normal range of motion. She exhibits no deformity.   Lymphadenopathy:     She has no cervical adenopathy.   Neurological: She is alert and oriented to person, place, and time. No cranial nerve deficit. GCS score is 15.   Skin: Skin is warm and dry. She is not diaphoretic.   Psychiatric:   Difficulty word finding.      Results for PAYAL BAUER (MRN [id number]) as of 11/16/2018 13:58   Ref. Range 11/16/2018 11:15   WBC Latest Ref Range: 4.8 - 10.8 K/uL 8.7   RBC Latest Ref Range: 4.20 - 5.40 M/uL 4.07 (L)   Hemoglobin Latest Ref Range: 12.0 - 16.0 g/dL 12.1   Hematocrit Latest " Ref Range: 37.0 - 47.0 % 37.0   MCV Latest Ref Range: 81.4 - 97.8 fL 90.9   MCH Latest Ref Range: 27.0 - 33.0 pg 29.7   MCHC Latest Ref Range: 33.6 - 35.0 g/dL 32.7 (L)   RDW Latest Ref Range: 35.9 - 50.0 fL 44.3   Platelet Count Latest Ref Range: 164 - 446 K/uL 196   MPV Latest Ref Range: 9.0 - 12.9 fL 11.3   Neutrophils-Polys Latest Ref Range: 44.00 - 72.00 % 73.10 (H)   Neutrophils (Absolute) Latest Ref Range: 2.00 - 7.15 K/uL 6.37   Lymphocytes Latest Ref Range: 22.00 - 41.00 % 18.60 (L)   Lymphs (Absolute) Latest Ref Range: 1.00 - 4.80 K/uL 1.62   Monocytes Latest Ref Range: 0.00 - 13.40 % 6.20   Monos (Absolute) Latest Ref Range: 0.00 - 0.85 K/uL 0.54   Eosinophils Latest Ref Range: 0.00 - 6.90 % 1.30   Eos (Absolute) Latest Ref Range: 0.00 - 0.51 K/uL 0.11   Basophils Latest Ref Range: 0.00 - 1.80 % 0.60   Baso (Absolute) Latest Ref Range: 0.00 - 0.12 K/uL 0.05   Immature Granulocytes Latest Ref Range: 0.00 - 0.90 % 0.20   Immature Granulocytes (abs) Latest Ref Range: 0.00 - 0.11 K/uL 0.02   Nucleated RBC Latest Units: /100 WBC 0.00   NRBC (Absolute) Latest Units: K/uL 0.00   Sodium Latest Ref Range: 135 - 145 mmol/L 138   Potassium Latest Ref Range: 3.6 - 5.5 mmol/L 4.0   Chloride Latest Ref Range: 96 - 112 mmol/L 101   Co2 Latest Ref Range: 20 - 33 mmol/L 29   Anion Gap Latest Ref Range: 0.0 - 11.9  8.0   Glucose Latest Ref Range: 65 - 99 mg/dL 256 (H)   Bun Latest Ref Range: 8 - 22 mg/dL 24 (H)   Creatinine Latest Ref Range: 0.50 - 1.40 mg/dL 1.24   GFR If  Latest Ref Range: >60 mL/min/1.73 m 2 51 (A)   GFR If Non  Latest Ref Range: >60 mL/min/1.73 m 2 42 (A)   Calcium Latest Ref Range: 8.5 - 10.5 mg/dL 9.8   AST(SGOT) Latest Ref Range: 12 - 45 U/L 12   ALT(SGPT) Latest Ref Range: 2 - 50 U/L 10   Alkaline Phosphatase Latest Ref Range: 30 - 99 U/L 70   Total Bilirubin Latest Ref Range: 0.1 - 1.5 mg/dL 0.1   Albumin Latest Ref Range: 3.2 - 4.9 g/dL 3.9   Total Protein Latest  "Ref Range: 6.0 - 8.2 g/dL 7.0   Globulin Latest Ref Range: 1.9 - 3.5 g/dL 3.1   A-G Ratio Latest Units: g/dL 1.3   Magnesium Latest Ref Range: 1.5 - 2.5 mg/dL 2.1   Troponin I Latest Ref Range: 0.00 - 0.04 ng/mL 0.01   PT Latest Ref Range: 12.0 - 14.6 sec 13.6   INR Latest Ref Range: 0.87 - 1.13  1.03   APTT Latest Ref Range: 24.7 - 36.0 sec 26.4   TSH Latest Ref Range: 0.380 - 5.330 uIU/mL 1.630     ECG:  SINUS RHYTHM   RBBB AND LAFB     CXR:   No acute cardiopulmonary abnormality.    Assessment/Plan     Patient is a 73 year old female with pertinent history of CHF, RBBB, DMII and HTN presenting with new onset repeated syncopal/presyncopal episodes. Patient describes the episodes as position dependent and are accompanied by \"weird\" feelings, malaise and hallucination-like experiences. In the ED patient was placed on telemetry and a greater than 10s of heart block with rare escape beats were noted. Patient was symptomatic at this time. Patient is likely having similar episodes of complete heart block during her syncopal episodes. Other differential include orthostatic syncope given patient's reported history of having these episodes when sitting/standing up.     # HEART BLOCK - Likely secondary to worsening RBBB which she has had chronically. Patient has multiple conditions that can increase her risk of developing cardiomyopathy/heart block such as DMII, HTN, Hyperlipidemia, CHF with ventricular thickening and previous RBBB. Patient is not on any chronotropic agents.     - Urgent permanent pacemaker implantation done today successfully by Dr. Glalardo, cardiology.   - Patient admitted to telemetry.  - Continue to monitor for additional heart block events.   - Avoid CCB and BB  - Hold Labetolol for now.     # SYNCOPE - Patient reports multiple episodes of syncopal and presyncopal events that correlated with sitting/standing up and \"weird\" feeling/hallucinations. This is likely secondary to her heart block as one was " caught on tele during one of her symptomatic episodes. However, syncope to position change is also concerning for orthostatic syncope.     - Continue to monitor patient on tele for symptoms post procedure.  - Orthostatics in the morning. Check with nurse.   - Hold labetolol for now for HTN management.     # CHF - Present on admission/chronic condition. Last echocardiogram was last year.    - Echocardiogram in AM.  - Monitor patient fluid status in AM.  - Holding furosemide at the moment.     # HTN - Present on admission/chronic condition.    - continue home losartan.  - holding furosemide.  - avoid labetolol for now.      # DMII - Present on admission/chronic condition. Poorly controlled.    - On admission blood sugar was 256.  - Accuchecks.  - Hypoglycemia protocol.  - Patient's home insulin regimen reported:   - Morning regular insulin 4 U and NPH 50 U   - After dinner, if high regular insulin 4 U   - Before bed, if high 2-6 U NPH  - Patient currently on moderate SSI.     # PULMONARY HTN - Present on admission/chronic condition. Stable     - Continue to monitor.     # COPD - Present on admission/chronic condition. Stable    - Continue to monitor.     # HYPERLIPIDEMIA - Present on admission/chronic condition. Stable    - Continue home rosuvastatin.    # CHARLIE - Present on admission/chronic condition. Stable    - Continue to monitor.    # OSTEOARTHRITIS - Present on admission/chronic condition. Stable    - Continue to monitor.     # MAJOR DEPRESSION - Patient reports chronic major depression that acutely worsened one month ago. Patient was started on Lexapro by PCP. Denies SI and HI today.     - Depression screening in AM.  - Continue home lexapro.

## 2018-11-16 NOTE — OP REPORT
[facility] ELECTROPHYSIOLOGY PROCEDURE NOTE    PROCEDURE PERFORMED: Permanent Pacemaker Implantation    DATE OF SERVICE: 11/16/2018    : Marshal Cook MD    ASSISTANT: None    ANESTHESIA: Local and moderate sedation (start time 255, stop time 338, total dose given 6 mg IV versed, 100 mcg IV fentanyl)    EBL: 30 cc    SPECIMENS: None    INDICATION(S):  High grade AV block  Syncope    DESCRIPTION OF PROCEDURE:  After informed written consent, the patient was brought to the electrophysiology lab in the fasting, unsedated state. The patient was prepped and draped in the usual sterile fashion. The procedure was performed under moderate sedation with local anesthetic. A left upper extremity venogram was performed, demonstrating a patent subclavian vein. A left infraclavicular incision was made with a scalpel and the pectoral device pocket was created using a combination of blunt dissection and electrocautery. The modified Seldinger technique was used to gain access to the left axillary vein. A peel-away hemostasis sheath was placed in the vein. Under fluoroscopic guidance, the pacemaker leads were introduced into the heart. The ventricular lead was advanced to the RVOT and then lowered into position at the RV apex. The atrial lead was positioned on R atrial appendage. The leads were tested and had satisfactory sensing and pacing parameters. High output ventricular pacing did not produce extracardiac stimulation. The leads were sutured to the underlying pectoral muscle with interrupted silk over a silastic suture sleeve. The device pocket was irrigated with antibiotic solution, inspected, and no bleeding was seen. The leads were connected to the pacemaker pulse generator and the device was inserted into the pocket. The wound was closed with three layers of absorbable sutures. Following recovery from sedation, the patient was transferred to a monitored bed in good condition.     IMPLANTED DEVICE  INFORMATION:  Pulse generator is a [employer] model UY3180  Serial # [id number]    LEAD INFORMATION:  1)Right atrial lead is a SJM model #[id number]/46, serial #[id number], P wave 1.5 millivolts, threshold 2.25 Volts at 0.5 milliseconds, pacing impedance 760 Ohms.    2)Right ventricular lead is a SJM model #[id number]/52, serial #[id number], R wave 5.2 millivolts, threshold 0.75 Volts at 0.5 milliseconds, pacing impedance 740 Ohms.    DEVICE PROGRAMMING:  DDD 60 -120 ppm    COMPLICATION(S): None    FLUOROSCOPY TIME: 1.7 minutes    IMPRESSIONS:  1. Successful dual chamber pacemaker implantation    RECOMMENDATIONS:  1. Transfer to monitored bed  2. Chest x-ray  3. Device interrogation prior to hospital discharge  4. Followup in device clinic

## 2018-11-16 NOTE — ED NOTES
At aprox 1121 pt began to have increased dizziness and fleeing unwell. Pt had an episode of 5-10 seconds of unresponsiveness and had a slight amount of muscle spasms. Pt became responsive again and stated she had a hallucination/dream. Pt alert and oriented with clear memory, only unsure about what happened in the few seconds.

## 2018-11-16 NOTE — CONSULTS
Reason for Consult:  Asked by Dr Gresham to see this patient with recurrent syncope  Patient's PCP: Ashleigh Hurd M.D.    CC:   Chief Complaint   Patient presents with   • Syncope     3-4 syncopal episodes since 0900 per pt's report.   • Dizziness     pt reports intermittent dizziness. EMS states when pt was c/o intesnes dizziness they noted ventricular pause of 5-6 seconds on monitor.       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 Temple prior to that for a history of hypertension diabetes dyslipidemia right bundle branch block she has had multiple episodes of syncope and vertigo over the years and had a Holter monitor that did not demonstrate any significant arrhythmias but unfortunately the last 2 days she has had recurrent syncope she had true syncope yesterday falling in her house did not seek medical care but then had recurrent syncope likely multiple times again today without a clear trigger.  Here in the emergency room at 1121 she had a significant pause over 10 seconds with complete heart block and rare ventricular escape.    Medications / Drug list prior to admission:  No current facility-administered medications on file prior to encounter.      Current Outpatient Prescriptions on File Prior to Encounter   Medication Sig Dispense Refill   • albuterol 108 (90 Base) MCG/ACT Aero Soln inhalation aerosol Inhale 2 Puffs by mouth every 6 hours as needed for Shortness of Breath. 8.5 g 1   • HYDROcodone-acetaminophen (NORCO) 7.5-325 MG per tablet TAKE 1/2 TO 1 TABLET BY MOUTH AS NEEDED  0   • fluticasone (FLONASE) 50 MCG/ACT nasal spray Spray 1 Spray in nose 1 time daily as needed.     • losartan (COZAAR) 50 MG TABS Take 50 mg by mouth every day.     • glimepiride (AMARYL) 4 MG TABS Take 4 mg by mouth 2 times a day. Night time dose at 1700     • rosuvastatin (CRESTOR) 5 MG TABS Take 5 mg by mouth 3 times a week. M, W, F         Current list of  administered Medications:  No current facility-administered medications for this encounter.     Current Outpatient Prescriptions:   •  furosemide (LASIX) 40 MG Tab, Take 40 mg by mouth every evening., Disp: , Rfl:   •  potassium chloride (KLOR-CON) 8 MEQ tablet, Take 8 mEq by mouth every evening., Disp: , Rfl:   •  diazePAM (VALIUM) 2 MG Tab, Take 1 mg by mouth 2 Times a Day. VERTIGO, Disp: , Rfl:   •  Cholecalciferol (VITAMIN D) 2000 UNIT Tab, Take 6,000 Units by mouth 3 times a week. M,W,F, Disp: , Rfl:   •  escitalopram (LEXAPRO) 10 MG Tab, Take 10 mg by mouth every morning., Disp: , Rfl:   •  albuterol 108 (90 Base) MCG/ACT Aero Soln inhalation aerosol, Inhale 2 Puffs by mouth every 6 hours as needed for Shortness of Breath., Disp: 8.5 g, Rfl: 1  •  HYDROcodone-acetaminophen (NORCO) 7.5-325 MG per tablet, TAKE 1/2 TO 1 TABLET BY MOUTH AS NEEDED, Disp: , Rfl: 0  •  fluticasone (FLONASE) 50 MCG/ACT nasal spray, Spray 1 Spray in nose 1 time daily as needed., Disp: , Rfl:   •  losartan (COZAAR) 50 MG TABS, Take 50 mg by mouth every day., Disp: , Rfl:   •  glimepiride (AMARYL) 4 MG TABS, Take 4 mg by mouth 2 times a day. Night time dose at 1700, Disp: , Rfl:   •  rosuvastatin (CRESTOR) 5 MG TABS, Take 5 mg by mouth 3 times a week. M, W, F, Disp: , Rfl:     Past Medical History:   Diagnosis Date   • ASTHMA     patient denies   • Congestive heart failure (HCC)    • COPD    • COPD (chronic obstructive pulmonary disease) (HCC)    • Depressed    • Diabetes    • Diastolic dysfunction    • DM (diabetes mellitus) (HCC)    • Heart murmur    • HLD (hyperlipidemia)    • HTN (hypertension)    • Hypertension    • Obstructive sleep apnea    • Osteoarthritis    • Pulmonary hypertension (HCC)    • Sleep apnea        Past Surgical History:   Procedure Laterality Date   • BREAST BIOPSY     • GYN SURGERY  hysterectomy   • MITRAL VALVE REPLACE     • OTHER ABDOMINAL SURGERY     • OTHER ORTHOPEDIC SURGERY     • TONSILLECTOMY Bilateral   "      Family History   Problem Relation Age of Onset   • Hypertension Mother    • Heart Disease Mother    • Diabetes Father    • Hypertension Father    • Diabetes Maternal Grandmother    • Diabetes Paternal Grandfather      Patient family history was personally reviewed, no pertinent family history to current presentation    Social History     Social History   • Marital status: Single     Spouse name: N/A   • Number of children: N/A   • Years of education: N/A     Occupational History   • Not on file.     Social History Main Topics   • Smoking status: Former Smoker     Packs/day: 1.50     Quit date: 11/2/2012   • Smokeless tobacco: Never Used   • Alcohol use No   • Drug use: No   • Sexual activity: Not on file     Other Topics Concern   • Not on file     Social History Narrative   • No narrative on file       ALLERGIES:  Allergies   Allergen Reactions   • Ativan      DELIRIUM, CONFUSION.   • Advair [Fluticasone-Salmeterol]    • Ambien [Zolpidem Tartrate]    • Erythromycin Vomiting   • Lipitor [Atorvastatin Calcium]    • Pcn [Penicillins] Hives   • Pravachol [Pravastatin Sodium]      ADVERSE REACTION.       Review of systems:  A complete review of symptoms was reviewed with patient. This is reviewed in H&P and PMH. ALL OTHERS reviewed and negative    Physical exam:  Patient Vitals for the past 24 hrs:   BP Temp Temp src Pulse Resp SpO2 Height Weight   11/16/18 1245 - - - 63 (!) 22 95 % - -   11/16/18 1230 - - - 61 18 (!) 83 % - -   11/16/18 1116 160/53 36.7 °C (98.1 °F) Temporal 65 20 - - -   11/16/18 1113 - - - - - - 1.702 m (5' 7\") 96 kg (211 lb 10.3 oz)       General: No acute distress.   EYES: no jaundice  HEENT: OP clear   Neck: No bruits No JVD.   CVS:  RRR. S1 + S2.  Systolic ejection murmur do not appreciate a diastolic murmur although she does have an element of mitral stenosis  Resp: CTAB. No wheezing or crackles/rhonchi.  Abdomen: Soft, NT, ND,  Skin: Grossly nothing acute no obvious rashes  Neurological: " Alert, Moves all extremities, no cranial nerve defects on limited exam  Extremities: Pulse 2+ in b/l LE.  no edema. No cyanosis.       Data:  Laboratory studies personally reviewed by me:  Recent Results (from the past 24 hour(s))   EKG    Collection Time: 18 11:14 AM   Result Value Ref Range    Report       [facility] Emergency Dept.    Test Date:  2018  Pt Name:    PAYAL BAUER                Department: ER  MRN:        [id number]                      Room:        10  Gender:     Female                       Technician: KAYE  :        [id number]                   Requested By:ER TRIAGE PROTOCOL  Order #:    [id number]                    Reading MD:    Measurements  Intervals                                Axis  Rate:       65                           P:          -24  UT:         193                          QRS:        -59  QRSD:       126                          T:          14  QT:         456  QTc:        475    Interpretive Statements  SINUS RHYTHM  RBBB AND LAFB  Compared to ECG 2015 05:48:40  Left anterior fascicular block now present  Early repolarization no longer present     CBC WITH DIFFERENTIAL    Collection Time: 18 11:15 AM   Result Value Ref Range    WBC 8.7 4.8 - 10.8 K/uL    RBC 4.07 (L) 4.20 - 5.40 M/uL    Hemoglobin 12.1 12.0 - 16.0 g/dL    Hematocrit 37.0 37.0 - 47.0 %    MCV 90.9 81.4 - 97.8 fL    MCH 29.7 27.0 - 33.0 pg    MCHC 32.7 (L) 33.6 - 35.0 g/dL    RDW 44.3 35.9 - 50.0 fL    Platelet Count 196 164 - 446 K/uL    MPV 11.3 9.0 - 12.9 fL    Neutrophils-Polys 73.10 (H) 44.00 - 72.00 %    Lymphocytes 18.60 (L) 22.00 - 41.00 %    Monocytes 6.20 0.00 - 13.40 %    Eosinophils 1.30 0.00 - 6.90 %    Basophils 0.60 0.00 - 1.80 %    Immature Granulocytes 0.20 0.00 - 0.90 %    Nucleated RBC 0.00 /100 WBC    Neutrophils (Absolute) 6.37 2.00 - 7.15 K/uL    Lymphs (Absolute) 1.62 1.00 - 4.80 K/uL    Monos (Absolute) 0.54 0.00 - 0.85 K/uL    Eos (Absolute)  0.11 0.00 - 0.51 K/uL    Baso (Absolute) 0.05 0.00 - 0.12 K/uL    Immature Granulocytes (abs) 0.02 0.00 - 0.11 K/uL    NRBC (Absolute) 0.00 K/uL   COMP METABOLIC PANEL    Collection Time: 11/16/18 11:15 AM   Result Value Ref Range    Sodium 138 135 - 145 mmol/L    Potassium 4.0 3.6 - 5.5 mmol/L    Chloride 101 96 - 112 mmol/L    Co2 29 20 - 33 mmol/L    Anion Gap 8.0 0.0 - 11.9    Glucose 256 (H) 65 - 99 mg/dL    Bun 24 (H) 8 - 22 mg/dL    Creatinine 1.24 0.50 - 1.40 mg/dL    Calcium 9.8 8.5 - 10.5 mg/dL    AST(SGOT) 12 12 - 45 U/L    ALT(SGPT) 10 2 - 50 U/L    Alkaline Phosphatase 70 30 - 99 U/L    Total Bilirubin 0.1 0.1 - 1.5 mg/dL    Albumin 3.9 3.2 - 4.9 g/dL    Total Protein 7.0 6.0 - 8.2 g/dL    Globulin 3.1 1.9 - 3.5 g/dL    A-G Ratio 1.3 g/dL   TROPONIN    Collection Time: 11/16/18 11:15 AM   Result Value Ref Range    Troponin I 0.01 0.00 - 0.04 ng/mL   PRTOTHROMBIN TIME (INR)    Collection Time: 11/16/18 11:15 AM   Result Value Ref Range    PT 13.6 12.0 - 14.6 sec    INR 1.03 0.87 - 1.13   APTT    Collection Time: 11/16/18 11:15 AM   Result Value Ref Range    APTT 26.4 24.7 - 36.0 sec   TSH    Collection Time: 11/16/18 11:15 AM   Result Value Ref Range    TSH 1.630 0.380 - 5.330 uIU/mL   ESTIMATED GFR    Collection Time: 11/16/18 11:15 AM   Result Value Ref Range    GFR If  51 (A) >60 mL/min/1.73 m 2    GFR If Non  42 (A) >60 mL/min/1.73 m 2       Imaging:  DX-CHEST-PORTABLE (1 VIEW)   Final Result      No acute cardiopulmonary abnormality.              EKG : personally reviewed by me sinus rhythm with right bundle branch block and left anterior fascicular block    Telemetry shows complete heart block    All pertinent features of laboratory and imaging reviewed including primary images where applicable      Principal Problem:    Complete heart block (HCC) (Chronic) POA: Yes  Active Problems:    Essential hypertension, benign POA: Yes    Syncope and collapse POA: Yes     Mitral stenosis POA: Yes    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 (Chronic) POA: Yes  Resolved Problems:    * No resolved hospital problems. *      Assessment / Plan:  Complete heart block with recurrent syncope  Fortunately were able to capture complete heart block on the telemetry in the emergency room with a pause in the heart over 10 seconds and she actually did not have a meaningful junctional escape with a ventricular escape so she would clearly benefit from a pacemaker she was hesitant as she thought that this would require general anesthesia but I reinforced is just moderate sedation with a local.    We will arrange for pacemaker next couple of hours if she is stable she can likely go home tomorrow    Plan of care discussed with the patient the cath team including Dr. lopez and Dr. Gresham      Future Appointments  Date Time Provider Department Center   12/17/2018 1:00 PM A [facility] None   3/11/2019 1:00 PM Aman Wilkinson M.D. [facility] None       It is my pleasure to participate in the care of Ms. Sosa.  Please do not hesitate to contact me with questions or concerns.    Michael Gallardo MD PhD FAC  Cardiologist [facility]    11/16/2018

## 2018-11-16 NOTE — ED PROVIDER NOTES
ED Provider Note    CHIEF COMPLAINT  Chief Complaint   Patient presents with   • Syncope     3-4 syncopal episodes since 0900 per pt's report.   • Dizziness     pt reports intermittent dizziness. EMS states when pt was c/o intesnes dizziness they noted ventricular pause of 5-6 seconds on monitor.       HPI  Ophelia Sosa is a 73 y.o. female who presents complaining of passing out.  She is passed out 3 or 4 times since this morning.  Presently her only complaint is that she is very scared.  She has not had this before.  She denies any changes in her medications.  Denies any headache, chest pain.  No abdominal pain.  Per EMS, the patient had a change in rhythm during transport which point she was symptomatic.  She has had this here prior to me coming into the room.  There is no other complaint.  However, the patient does point out that she has a difficult airway.    PAST MEDICAL HISTORY  Past Medical History:   Diagnosis Date   • ASTHMA     patient denies   • Congestive heart failure (HCC)    • COPD    • COPD (chronic obstructive pulmonary disease) (HCC)    • Depressed    • Diabetes    • Diastolic dysfunction    • DM (diabetes mellitus) (HCC)    • Heart murmur    • HLD (hyperlipidemia)    • HTN (hypertension)    • Hypertension    • Obstructive sleep apnea    • Osteoarthritis    • Pulmonary hypertension (HCC)    • Sleep apnea        FAMILY HISTORY  Family History   Problem Relation Age of Onset   • Hypertension Mother    • Heart Disease Mother    • Diabetes Father    • Hypertension Father    • Diabetes Maternal Grandmother    • Diabetes Paternal Grandfather        SOCIAL HISTORY  Social History   Substance Use Topics   • Smoking status: Former Smoker     Packs/day: 1.50     Quit date: 11/2/2012   • Smokeless tobacco: Never Used   • Alcohol use No     She is a retired nurse    SURGICAL HISTORY  Past Surgical History:   Procedure Laterality Date   • BREAST BIOPSY     • GYN SURGERY  hysterectomy   • MITRAL VALVE REPLACE    "  • OTHER ABDOMINAL SURGERY     • OTHER ORTHOPEDIC SURGERY     • TONSILLECTOMY Bilateral        CURRENT MEDICATIONS  Home Medications     Reviewed by Davi Martinez R.N. (Registered Nurse) on 11/16/18 at 1119  Med List Status: Not Addressed   Medication Last Dose Status   albuterol 108 (90 Base) MCG/ACT Aero Soln inhalation aerosol 9/5/2018 Active   B-D INS SYR ULTRAFINE 1CC/30G 30G X 1/2\" 1 ML Misc 9/5/2018 Active   BD INSULIN SYRINGE ULTRAFINE 31G X 5/16\" 0.3 ML Misc 9/5/2018 Active   clobetasol (TEMOVATE) 0.05 % Cream 9/5/2018 Active   clobetasol (TEMOVATE) 0.05 % Ointment  Active   diazePAM (VALIUM) 2 MG Tab 9/5/2018 Active   fluticasone (FLONASE) 50 MCG/ACT nasal spray 9/5/2018 Active   fluticasone-salmeterol (ADVAIR) 100-50 MCG/DOSE AEPB 2/28/2018 Active   flyticasone (CUTIVATE) 0.005 % ointment 9/5/2018 Active   FREESTYLE LITE strip 9/5/2018 Active   furosemide (LASIX) 40 MG TABS 9/5/2018 Active   glimepiride (AMARYL) 4 MG TABS 9/5/2018 Active   HYDROcodone-acetaminophen (NORCO) 7.5-325 MG per tablet 9/5/2018 Active   hydrocodone-acetaminophen (VICODIN ES) 7.5-750 MG per tablet 10/5/2017 Active   insulin NPH (HUMULIN N) 100 UNIT/ML SUSP 9/5/2018 Active   KLOR-CON 8 MEQ tablet 9/5/2018 Active   KLOR-CON SPRINKLE 8 MEQ Cap CR capsule  Active   levoFLOXacin (LEVAQUIN) 500 MG tablet  Active   losartan (COZAAR) 100 MG Tab 2/28/2018 Active   losartan (COZAAR) 50 MG TABS 9/5/2018 Active   mupirocin (BACTROBAN) 2 % Ointment 9/5/2018 Active   omeprazole (PRILOSEC) 20 MG delayed-release capsule 9/5/2018 Active   rosuvastatin (CRESTOR) 5 MG TABS 9/5/2018 Active   tiotropium (SPIRIVA) 18 MCG Cap 9/5/2018 Active   vitamin D, Ergocalciferol, (DRISDOL) 86501 UNITS Cap capsule 2/28/2018 Active                I have reviewed the nurses notes and/or the list brought with the patient.    ALLERGIES  Allergies   Allergen Reactions   • Ativan      DELIRIUM, CONFUSION.   • Advair [Fluticasone-Salmeterol]    • Ambien [Zolpidem " "Tartrate]    • Erythromycin Vomiting   • Lipitor [Atorvastatin Calcium]    • Pcn [Penicillins] Hives   • Pravachol [Pravastatin Sodium]      ADVERSE REACTION.       REVIEW OF SYSTEMS  See HPI for further details. Review of systems as above, otherwise all other systems are negative.     PHYSICAL EXAM  VITAL SIGNS: /53   Pulse 65   Temp 36.7 °C (98.1 °F) (Temporal)   Resp 20   Ht 1.702 m (5' 7\")   Wt 96 kg (211 lb 10.3 oz)   BMI 33.15 kg/m²     Constitutional: Well appearing patient in no acute distress.  Not toxic, nor ill in appearance.  HENT: Mucus membranes moist.  Oropharynx is clear.  Eyes: Pupils equally round.  No scleral icterus.   Neck: Full nontender range of motion.  Lymphatic: No cervical lymphadenopathy noted.   Cardiovascular: Regular heart rate and rhythm.  No murmurs, rubs, nor gallop appreciated.   Thorax & Lungs: Chest is nontender.  Lungs are clear to auscultation with good air movement bilaterally.  No wheeze, rhonchi, nor rales.   Abdomen: Soft, with no tenderness, rebound nor guarding.  No mass, pulsatile mass, nor hepatosplenomegaly appreciated.  Skin: No purpura nor petechia noted.  Extremities/Musculoskeletal: No sign of trauma.  Calves are nontender with no cords nor edema.  No Jae's sign.  Pulses are intact all around.   Neurologic: Alert & oriented.  Strength and sensation is intact all around.  Gait is normal.  Psychiatric: Extremely anxious, however not inappropriate for the clinical situation.    EKG  I interpreted this EKG myself.  This is a 12-lead study.  The rhythm is sinus with a rate of 65.  Right bundle branch block.  There are no ST segment nor T wave abnormalities.  Interpretation: No ST segment elevation myocardial infarction.    Telemetry review: The patient goes from a sinus rhythm into a heart block with an escape beat.  Return to a sinus.    LABS  Labs Reviewed   CBC WITH DIFFERENTIAL - Abnormal; Notable for the following:        Result Value    RBC 4.07 (*)  "    MCHC 32.7 (*)     Neutrophils-Polys 73.10 (*)     Lymphocytes 18.60 (*)     All other components within normal limits    Narrative:     Indicate which anticoagulants the patient is on:->UNKNOWN   COMP METABOLIC PANEL - Abnormal; Notable for the following:     Glucose 256 (*)     Bun 24 (*)     All other components within normal limits    Narrative:     Indicate which anticoagulants the patient is on:->UNKNOWN   ESTIMATED GFR - Abnormal; Notable for the following:     GFR If  51 (*)     GFR If Non  42 (*)     All other components within normal limits    Narrative:     Indicate which anticoagulants the patient is on:->UNKNOWN   TROPONIN    Narrative:     Indicate which anticoagulants the patient is on:->UNKNOWN   PROTHROMBIN TIME    Narrative:     Indicate which anticoagulants the patient is on:->UNKNOWN   APTT    Narrative:     Indicate which anticoagulants the patient is on:->UNKNOWN   TSH    Narrative:     Indicate which anticoagulants the patient is on:->UNKNOWN         RADIOLOGY/PROCEDURES  I have reviewed the patient's film interpretations myself, and they are read out by the radiologist as: .  DX-CHEST-PORTABLE (1 VIEW)   Final Result      No acute cardiopulmonary abnormality.            MEDICAL RECORD  I have reviewed patient's medical record and pertinent results are listed above.    COURSE & MEDICAL DECISION MAKING  I have reviewed any medical record information, laboratory studies and radiographic results as noted above.  Patient presents with episodes of syncope.  It appears that she has an arrhythmia, likely heart block as a precipitant of this.  She is placed on pacer pads, these will be used if necessary.  Review of her medications, she is not on any chronotropic agents.  Case discussed with Dr. Gallardo, cardiology, who has seen her.  [facility]      FINAL IMPRESSION  1. Heart block    2.  Syncope secondary to above       This  dictation was created using voice recognition software.    Electronically signed by: Pawan Gresham, 11/16/2018 11:53 AM

## 2018-11-16 NOTE — PROGRESS NOTES
"Cardiology Follow Up Progress Note    Date of Service  11/16/2018    Attending Physician  Pawan Gresham M.D.    Reason for consultation     Recurrent syncope in the setting of ventricular pause of 10 seconds on telemetry monitor    History of     Hypertension, diabetes, dyslipidemia, right bundle branch block, syncope    Admitted for     Recurrent multiple syncope, in ER found to have over 10 seconds pause with complete heart block    Interim Events    11/70/18, no rhythm issues overnight,paced rhythm, site uncomplicated, feels weak and dizzy ,\" not rested \"  Review of Systems  Review of Systems   Respiratory: Negative for apnea, cough, choking, chest tightness, shortness of breath, wheezing and stridor.    Cardiovascular: Negative for chest pain and leg swelling.       Vital signs in last 24 hours  Temp:  [36.7 °C (98.1 °F)] 36.7 °C (98.1 °F)  Pulse:  [61-67] 67  Resp:  [17-22] 17  BP: (160)/(53) 160/53    Physical Exam  Physical Exam   Constitutional: She is oriented to person, place, and time. She appears well-developed.   HENT:   Head: Normocephalic.   Eyes: Conjunctivae are normal.   Neck: No JVD present. No thyromegaly present.   Cardiovascular:   Pulses:       Carotid pulses are 2+ on the right side, and 2+ on the left side.       Radial pulses are 2+ on the right side, and 2+ on the left side.   paced   Pulmonary/Chest: She has no wheezes.   Abdominal: Soft.   Musculoskeletal: She exhibits no edema.   Neurological: She is alert and oriented to person, place, and time.   Skin: Skin is warm and dry.   Pocket site uncomplicated       Lab Review  Lab Results   Component Value Date/Time    WBC 8.7 11/16/2018 11:15 AM    RBC 4.07 (L) 11/16/2018 11:15 AM    HEMOGLOBIN 12.1 11/16/2018 11:15 AM    HEMATOCRIT 37.0 11/16/2018 11:15 AM    MCV 90.9 11/16/2018 11:15 AM    MCH 29.7 11/16/2018 11:15 AM    MCHC 32.7 (L) 11/16/2018 11:15 AM    MPV 11.3 11/16/2018 11:15 AM      Lab Results   Component Value Date/Time    " SODIUM 138 11/16/2018 11:15 AM    POTASSIUM 4.0 11/16/2018 11:15 AM    CHLORIDE 101 11/16/2018 11:15 AM    CO2 29 11/16/2018 11:15 AM    GLUCOSE 256 (H) 11/16/2018 11:15 AM    BUN 24 (H) 11/16/2018 11:15 AM    CREATININE 1.24 11/16/2018 11:15 AM      Lab Results   Component Value Date/Time    ASTSGOT 12 11/16/2018 11:15 AM    ALTSGPT 10 11/16/2018 11:15 AM     Lab Results   Component Value Date/Time    CHOLSTRLTOT 124 09/22/2018 07:49 AM    LDL 51 09/22/2018 07:49 AM    HDL 44 09/22/2018 07:49 AM    TRIGLYCERIDE 146 09/22/2018 07:49 AM    TROPONINI 0.01 11/16/2018 11:15 AM               Assessment    Complete heart block with recurrent syncope  Diabetes poorly controlled  Hypertension    Plan    S/p successful implantation of dual-chamber pacemaker, [employer] model , secondary to high-grade AV block with syncope    Device was checked this morning, functioning well      Chest x-ray 11/17/18, unremarkable, no late pneumothorax      Stable for discharge      We will arrange for an appointment with our device clinic in 1 week    PPM restrictions reviewed with patient. Do not raise affected arm above head or behind back for six weeks. May remove arm sling after 24 hrs, please wear if trouble remembering arm limitations and prn at night. No heavy lifting/pushing for six weeks. No driving for first week. No showers first week. Keep dressing on, clean, and dry until sees us in follow up. Wound care reviewed. Instructed to report s/s of infection such as warmth/redness/drainage/swelling at site or fever/chills.  I

## 2018-11-16 NOTE — ED TRIAGE NOTES
Chief Complaint   Patient presents with   • Syncope     3-4 syncopal episodes since 0900 per pt's report.   • Dizziness     pt reports intermittent dizziness. EMS states when pt was c/o intesnes dizziness they noted ventricular pause of 5-6 seconds on monitor.     Pt BIB EMS for syncopal episodes this am. Found to have intense dizziness and syncopal episodes with ventricular pause lasting approximately 5-6 seconds.

## 2018-11-17 ENCOUNTER — APPOINTMENT (OUTPATIENT)
Dept: RADIOLOGY | Facility: MEDICAL CENTER | Age: 73
DRG: 243 | End: 2018-11-17
Attending: INTERNAL MEDICINE
Payer: MEDICARE

## 2018-11-17 PROBLEM — F41.9 ANXIETY: Status: ACTIVE | Noted: 2018-11-17

## 2018-11-17 LAB
ANION GAP SERPL CALC-SCNC: 7 MMOL/L (ref 0–11.9)
BASOPHILS # BLD AUTO: 0.5 % (ref 0–1.8)
BASOPHILS # BLD: 0.05 K/UL (ref 0–0.12)
BUN SERPL-MCNC: 24 MG/DL (ref 8–22)
CALCIUM SERPL-MCNC: 9.6 MG/DL (ref 8.5–10.5)
CHLORIDE SERPL-SCNC: 104 MMOL/L (ref 96–112)
CHOLEST SERPL-MCNC: 116 MG/DL (ref 100–199)
CO2 SERPL-SCNC: 26 MMOL/L (ref 20–33)
CREAT SERPL-MCNC: 1.14 MG/DL (ref 0.5–1.4)
EOSINOPHIL # BLD AUTO: 0.13 K/UL (ref 0–0.51)
EOSINOPHIL NFR BLD: 1.2 % (ref 0–6.9)
ERYTHROCYTE [DISTWIDTH] IN BLOOD BY AUTOMATED COUNT: 45.2 FL (ref 35.9–50)
GLUCOSE BLD-MCNC: 150 MG/DL (ref 65–99)
GLUCOSE BLD-MCNC: 178 MG/DL (ref 65–99)
GLUCOSE BLD-MCNC: 258 MG/DL (ref 65–99)
GLUCOSE BLD-MCNC: 317 MG/DL (ref 65–99)
GLUCOSE BLD-MCNC: 318 MG/DL (ref 65–99)
GLUCOSE SERPL-MCNC: 163 MG/DL (ref 65–99)
HCT VFR BLD AUTO: 37.5 % (ref 37–47)
HDLC SERPL-MCNC: 43 MG/DL
HGB BLD-MCNC: 11.8 G/DL (ref 12–16)
IMM GRANULOCYTES # BLD AUTO: 0.04 K/UL (ref 0–0.11)
IMM GRANULOCYTES NFR BLD AUTO: 0.4 % (ref 0–0.9)
LDLC SERPL CALC-MCNC: 47 MG/DL
LYMPHOCYTES # BLD AUTO: 2.5 K/UL (ref 1–4.8)
LYMPHOCYTES NFR BLD: 22.9 % (ref 22–41)
MCH RBC QN AUTO: 29.4 PG (ref 27–33)
MCHC RBC AUTO-ENTMCNC: 31.5 G/DL (ref 33.6–35)
MCV RBC AUTO: 93.3 FL (ref 81.4–97.8)
MONOCYTES # BLD AUTO: 0.8 K/UL (ref 0–0.85)
MONOCYTES NFR BLD AUTO: 7.3 % (ref 0–13.4)
NEUTROPHILS # BLD AUTO: 7.42 K/UL (ref 2–7.15)
NEUTROPHILS NFR BLD: 67.7 % (ref 44–72)
NRBC # BLD AUTO: 0 K/UL
NRBC BLD-RTO: 0 /100 WBC
PLATELET # BLD AUTO: 210 K/UL (ref 164–446)
PMV BLD AUTO: 11.1 FL (ref 9–12.9)
POTASSIUM SERPL-SCNC: 4.4 MMOL/L (ref 3.6–5.5)
RBC # BLD AUTO: 4.02 M/UL (ref 4.2–5.4)
SODIUM SERPL-SCNC: 137 MMOL/L (ref 135–145)
TRIGL SERPL-MCNC: 130 MG/DL (ref 0–149)
WBC # BLD AUTO: 10.9 K/UL (ref 4.8–10.8)

## 2018-11-17 PROCEDURE — G8979 MOBILITY GOAL STATUS: HCPCS | Mod: CI | Performed by: PHYSICAL THERAPIST

## 2018-11-17 PROCEDURE — 93005 ELECTROCARDIOGRAM TRACING: CPT | Performed by: INTERNAL MEDICINE

## 2018-11-17 PROCEDURE — 85025 COMPLETE CBC W/AUTO DIFF WBC: CPT

## 2018-11-17 PROCEDURE — G8978 MOBILITY CURRENT STATUS: HCPCS | Mod: CJ | Performed by: PHYSICAL THERAPIST

## 2018-11-17 PROCEDURE — 99232 SBSQ HOSP IP/OBS MODERATE 35: CPT | Mod: GC | Performed by: INTERNAL MEDICINE

## 2018-11-17 PROCEDURE — 99232 SBSQ HOSP IP/OBS MODERATE 35: CPT | Performed by: INTERNAL MEDICINE

## 2018-11-17 PROCEDURE — 71045 X-RAY EXAM CHEST 1 VIEW: CPT

## 2018-11-17 PROCEDURE — 36415 COLL VENOUS BLD VENIPUNCTURE: CPT

## 2018-11-17 PROCEDURE — 93010 ELECTROCARDIOGRAM REPORT: CPT | Performed by: INTERNAL MEDICINE

## 2018-11-17 PROCEDURE — 97161 PT EVAL LOW COMPLEX 20 MIN: CPT | Performed by: PHYSICAL THERAPIST

## 2018-11-17 PROCEDURE — 700102 HCHG RX REV CODE 250 W/ 637 OVERRIDE(OP): Performed by: STUDENT IN AN ORGANIZED HEALTH CARE EDUCATION/TRAINING PROGRAM

## 2018-11-17 PROCEDURE — 82962 GLUCOSE BLOOD TEST: CPT | Mod: 91

## 2018-11-17 PROCEDURE — 80048 BASIC METABOLIC PNL TOTAL CA: CPT

## 2018-11-17 PROCEDURE — 80061 LIPID PANEL: CPT

## 2018-11-17 PROCEDURE — A9270 NON-COVERED ITEM OR SERVICE: HCPCS | Performed by: STUDENT IN AN ORGANIZED HEALTH CARE EDUCATION/TRAINING PROGRAM

## 2018-11-17 PROCEDURE — 770020 HCHG ROOM/CARE - TELE (206)

## 2018-11-17 RX ADMIN — ESCITALOPRAM OXALATE 10 MG: 10 TABLET ORAL at 05:33

## 2018-11-17 RX ADMIN — INSULIN HUMAN 2 UNITS: 100 INJECTION, SOLUTION PARENTERAL at 21:08

## 2018-11-17 RX ADMIN — INSULIN HUMAN 3 UNITS: 100 INJECTION, SOLUTION PARENTERAL at 12:09

## 2018-11-17 RX ADMIN — STANDARDIZED SENNA CONCENTRATE AND DOCUSATE SODIUM 2 TABLET: 8.6; 5 TABLET, FILM COATED ORAL at 05:33

## 2018-11-17 RX ADMIN — LOSARTAN POTASSIUM 50 MG: 50 TABLET ORAL at 05:33

## 2018-11-17 RX ADMIN — STANDARDIZED SENNA CONCENTRATE AND DOCUSATE SODIUM 2 TABLET: 8.6; 5 TABLET, FILM COATED ORAL at 16:22

## 2018-11-17 RX ADMIN — INSULIN HUMAN 4 UNITS: 100 INJECTION, SOLUTION PARENTERAL at 16:16

## 2018-11-17 RX ADMIN — ACETAMINOPHEN 650 MG: 325 TABLET, FILM COATED ORAL at 21:02

## 2018-11-17 ASSESSMENT — ENCOUNTER SYMPTOMS
FEVER: 0
PALPITATIONS: 0
DEPRESSION: 0
APNEA: 0
NERVOUS/ANXIOUS: 1
SHORTNESS OF BREATH: 0
CHOKING: 0
POLYDIPSIA: 0
NAUSEA: 0
COUGH: 0
WHEEZING: 0
STRIDOR: 0
BLURRED VISION: 0
DOUBLE VISION: 0
DIZZINESS: 0
CHEST TIGHTNESS: 0
HEADACHES: 0
HEMOPTYSIS: 0
CHILLS: 0
HEARTBURN: 0

## 2018-11-17 ASSESSMENT — COGNITIVE AND FUNCTIONAL STATUS - GENERAL
MOBILITY SCORE: 21
SUGGESTED CMS G CODE MODIFIER MOBILITY: CJ
CLIMB 3 TO 5 STEPS WITH RAILING: A LITTLE
STANDING UP FROM CHAIR USING ARMS: A LITTLE
MOVING FROM LYING ON BACK TO SITTING ON SIDE OF FLAT BED: A LITTLE

## 2018-11-17 ASSESSMENT — GAIT ASSESSMENTS
DISTANCE (FEET): 40
ASSISTIVE DEVICE: SINGLE POINT CANE
GAIT LEVEL OF ASSIST: CONTACT GUARD ASSIST

## 2018-11-17 ASSESSMENT — PAIN SCALES - GENERAL: PAINLEVEL_OUTOF10: 0

## 2018-11-17 NOTE — NON-PROVIDER
"       Internal Medicine Medical Student Note  Note Author: Aneesh Kathy, Student    Name Ophelia Sosa 1945   Age/Sex 73 y.o. female   MRN [id number]   Code Status FULL             Reason for interval visit  (Principal Problem)   Complete heart block (HCC)    Interval Problem Daily Status Update  (problem status, last 24 hours, new history, new data )   Patient is a 73 year old female who presented with syncope and dizziness found to have episodes of heart block and ventricular pause on monitor during symptomatic episodes. Patient had emergent pacemaker placed yesterday.     No acute overnight events. Patient reports feeling dizzy when she got up this morning, but denies episodes of syncope. Patient reports the feeling of dizziness she felt today was different from what she felt yesterday. Patient reports she is feeling \"hungover\" this morning but believes it is mostly due to residual effects from her anesthesia last night. Patient also has chronic vertigo that was worked up by an ENT outpatient so it is difficult to tell whether the dizziness is from the vertigo, the anesthesia or from her syncope. Pacemaker was interrogated by Dr. Gallardo this AM found to be functioning properly and telemetry showed rare pacing.   Patient also reports feelings of anxiety and is generally upset about the events that happened yesterday. Patient reports she wants to go home to her pet and sit in her resting chair. Patient reports feeling like the world is going too fast and reports \"wanting to get off the world\", but denies SI at this time.       Physical Exam       Vitals:    18 0000 18 0308 18 0810 18 1154   BP: 147/64 159/62 152/61 144/47   Pulse: 67 66 67 71   Resp:    Temp: 36.9 °C (98.4 °F) 36.8 °C (98.3 °F) 36.7 °C (98 °F) 36.4 °C (97.5 °F)   TempSrc: Temporal Temporal Temporal Temporal   SpO2: 97% 97% 94% 91%   Weight:       Height:         Body mass index is 34.46 kg/m². Weight: " 99.8 kg (220 lb 0.3 oz)  Oxygen Therapy:  Pulse Oximetry: 91 %, O2 (LPM): 0, O2 Delivery: None (Room Air)    Physical Exam   Constitutional: She is oriented to person, place, and time.   Patient appears anxious and nervous.    HENT:   Head: Normocephalic and atraumatic.   Eyes: Conjunctivae and EOM are normal. No scleral icterus.   Neck: Normal range of motion.   Cardiovascular: Normal rate and regular rhythm.  Exam reveals no gallop and no friction rub.    Murmur heard.  Systolic murmur.    Pulmonary/Chest: Effort normal and breath sounds normal. No respiratory distress. She has no wheezes. She has no rales.   Abdominal: Soft. Bowel sounds are normal. She exhibits no distension. There is no tenderness. There is no rebound and no guarding.   Musculoskeletal: Normal range of motion. She exhibits no edema, tenderness or deformity.   Neurological: She is alert and oriented to person, place, and time. No cranial nerve deficit. GCS score is 15.   Skin: Skin is warm and dry. No rash noted. She is not diaphoretic. No erythema.   Psychiatric:   Patient has depressed affect and mood. Patient appears anxious.      Results for PAYAL BAUER (MRN [id number]) as of 11/17/2018 15:28   Ref. Range 11/17/2018 03:15   WBC Latest Ref Range: 4.8 - 10.8 K/uL 10.9 (H)   RBC Latest Ref Range: 4.20 - 5.40 M/uL 4.02 (L)   Hemoglobin Latest Ref Range: 12.0 - 16.0 g/dL 11.8 (L)   Hematocrit Latest Ref Range: 37.0 - 47.0 % 37.5   MCV Latest Ref Range: 81.4 - 97.8 fL 93.3   MCH Latest Ref Range: 27.0 - 33.0 pg 29.4   MCHC Latest Ref Range: 33.6 - 35.0 g/dL 31.5 (L)   RDW Latest Ref Range: 35.9 - 50.0 fL 45.2   Platelet Count Latest Ref Range: 164 - 446 K/uL 210   MPV Latest Ref Range: 9.0 - 12.9 fL 11.1   Neutrophils-Polys Latest Ref Range: 44.00 - 72.00 % 67.70   Neutrophils (Absolute) Latest Ref Range: 2.00 - 7.15 K/uL 7.42 (H)   Lymphocytes Latest Ref Range: 22.00 - 41.00 % 22.90   Lymphs (Absolute) Latest Ref Range: 1.00 - 4.80 K/uL 2.50  "  Monocytes Latest Ref Range: 0.00 - 13.40 % 7.30   Monos (Absolute) Latest Ref Range: 0.00 - 0.85 K/uL 0.80   Eosinophils Latest Ref Range: 0.00 - 6.90 % 1.20   Eos (Absolute) Latest Ref Range: 0.00 - 0.51 K/uL 0.13   Basophils Latest Ref Range: 0.00 - 1.80 % 0.50   Baso (Absolute) Latest Ref Range: 0.00 - 0.12 K/uL 0.05   Immature Granulocytes Latest Ref Range: 0.00 - 0.90 % 0.40   Immature Granulocytes (abs) Latest Ref Range: 0.00 - 0.11 K/uL 0.04   Nucleated RBC Latest Units: /100 WBC 0.00   NRBC (Absolute) Latest Units: K/uL 0.00   Sodium Latest Ref Range: 135 - 145 mmol/L 137   Potassium Latest Ref Range: 3.6 - 5.5 mmol/L 4.4   Chloride Latest Ref Range: 96 - 112 mmol/L 104   Co2 Latest Ref Range: 20 - 33 mmol/L 26   Anion Gap Latest Ref Range: 0.0 - 11.9  7.0   Glucose Latest Ref Range: 65 - 99 mg/dL 163 (H)   Bun Latest Ref Range: 8 - 22 mg/dL 24 (H)   Creatinine Latest Ref Range: 0.50 - 1.40 mg/dL 1.14   GFR If  Latest Ref Range: >60 mL/min/1.73 m 2 56 (A)   GFR If Non  Latest Ref Range: >60 mL/min/1.73 m 2 47 (A)   Calcium Latest Ref Range: 8.5 - 10.5 mg/dL 9.6   Cholesterol,Tot Latest Ref Range: 100 - 199 mg/dL 116   Triglycerides Latest Ref Range: 0 - 149 mg/dL 130   HDL Latest Ref Range: >=40 mg/dL 43   LDL Latest Ref Range: <100 mg/dL 47     CXR  Stable chest findings compared with 11/16.    ECG   SINUS RHYTHM   ATRIAL PREMATURE COMPLEX   RBBB AND LAFB     AccuChecks 178, 150, 258    Assessment/Plan     Patient is a 73 year old female with pertinent history of CHF, RBBB, DMII and HTN presenting with new onset repeated syncopal/presyncopal episodes. Patient describes the episodes as position dependent and are accompanied by \"weird\" feelings, malaise and hallucination-like experiences. In the ED patient was placed on telemetry and a greater than 10s of heart block with rare escape beats were noted. Patient was symptomatic at this time. Patient is likely having similar " "episodes of complete heart block during her syncopal episodes. Other differential include orthostatic syncope given patient's reported history of having these episodes when sitting/standing up. Patient had emergency pacemaker placed yesterday.      # HEART BLOCK - Likely secondary to worsening RBBB which she has had chronically. Patient has multiple conditions that can increase her risk of developing cardiomyopathy/heart block such as DMII, HTN, Hyperlipidemia, CHF with ventricular thickening and previous RBBB. Patient is not on any chronotropic agents.      - Urgent permanent pacemaker implantation done yesterday successfully by Dr. Gallardo, cardiology.   - Pacemaker interrogated and found to be functioning normally.  - Tele shows rare pacing.   - Continue to monitor for additional heart block events. Check with tele in AM.   - Losartan.  - Avoid CCB and BB  - Hold labetolol for now.   - Cardiology f/u OP.  - Cardiology signed off and clears for d/c from their standpoint.  - D/c in AM given patient is stable.      # SYNCOPE - Patient reports multiple episodes of syncopal and presyncopal events that correlated with sitting/standing up and \"weird\" feeling/hallucinations. This is likely secondary to her heart block as one was caught on tele during one of her symptomatic episodes. However, syncope to position change is also concerning for orthostatic syncope.      - Patient denies syncope since yesterday. Dizziness reported, but unlikely presyncopal episodes.   - Continue to monitor patient on tele for pacing and symptoms post procedure.   - Losartan  - Hold labetolol for now for HTN management.      # CHF - Present on admission/chronic condition. Last echocardiogram was last year.     - Monitor patient fluid status in AM.  - Holding furosemide at the moment.      # HTN - Present on admission/chronic condition.     - continue home losartan.  - holding furosemide.  - avoid labetolol for now.      # DMII - Present on " admission/chronic condition. Poorly controlled.     - On admission blood sugar was 256.  - Accuchecks show blood sugar adequately controlled at the moment.   - Hypoglycemia protocol.  - Patient's home insulin regimen reported:              - Morning regular insulin 4 U and NPH 50 U              - After dinner, if high regular insulin 4 U              - Before bed, if high 2-6 U NPH  - Patient currently on moderate SSI.   - Consider metformin treatment.     # VERTIGO - Present on admission. Has previous work up with ENT for vertigo unknown etiology per patient. Likely medication induced per patient.    - Monitor for dizziness.  - Fall precaution.  - Antihistamine or anticholinergic is persistent    # PULMONARY HTN - Present on admission/chronic condition. Stable      - Continue to monitor.     # COPD - Present on admission/chronic condition. Stable     - Continue to monitor.      # HYPERLIPIDEMIA - Present on admission/chronic condition. Stable     - Continue home rosuvastatin.     # CHARLIE - Present on admission/chronic condition. Stable     - Continue to monitor.     # OSTEOARTHRITIS - Present on admission/chronic condition. Stable     - Continue to monitor.      # MAJOR DEPRESSION - Patient reports chronic major depression that acutely worsened one month ago. Patient was started on Lexapro by PCP. Denies SI and HI today.      - Patient is highly anxious and upset over what happened yesterday, but denies SI today.   - Continue home lexapro.

## 2018-11-17 NOTE — ASSESSMENT & PLAN NOTE
Pt presented with GFR of 42  Uncontrolled Type 2 DM  Hx of HTN    Plan:   Control HTN and Diabetes  NS

## 2018-11-17 NOTE — PROGRESS NOTES
Received bedside report from RN, pt care assumed, VSS, pt assessment complete. Pt AAOx4, chronic c/o pain at this time. No signs of acute distress noted at this time. POC discussed with pt and verbalizes no questions. Pt denies any additional needs at this time. Bed in lowest position, bed alarm off, pt educated on fall risk and verbalized understanding, call light within reach, hourly rounding initiated. In a sinus rhythm.

## 2018-11-17 NOTE — THERAPY
"Physical Therapy Evaluation completed.   Bed Mobility:  Supine to Sit: Supervised  Transfers: Sit to Stand: Stand by Assist  Gait: Level Of Assist: Contact Guard Assist with Single Point Cane       Plan of Care: Will benefit from Physical Therapy 2 times per week  Discharge Recommendations: Equipment: No Equipment Needed. Post-acute therapy Discharge to home with outpatient or home health for additional skilled therapy services.    See \"Rehab Therapy-Acute\" Patient Summary Report for complete documentation.     "

## 2018-11-17 NOTE — PROGRESS NOTES
Bedside report with DAY [place], pt is alert and oriented on .5 L via NC to maintain oxygen stats, will provide IS if cannot wean overnight. Call bell within reach, dinner provided, pt aware of overnight POC. Dressing to L chest intact with small red spot noted, will continue to monitor.

## 2018-11-17 NOTE — ASSESSMENT & PLAN NOTE
EKG consistent with RBBB  Pt reports syncope correlated with 6 second pause and 10 second pause on monitors    Plan:  Pacemaker  Cardiology on board

## 2018-11-17 NOTE — H&P
Internal Medicine Admitting History and Physical    Note Author: Rene Cassidy M.D.       Name Ophelia Sosa 1945   Age/Sex 73 y.o. female   MRN [id number]   Code Status Full Code     After 5PM or if no immediate response to page, please call for cross-coverage  Attending/Team: Jessenia/Micha See Patient List for primary contact information  Call [phone] to page    1st Call - Day Intern (R1):   Ange 2nd Call - Day Sr. Resident (R2/R3):   Deric       Chief Complaint:   Syncope     HPI:  Patient is a 73-year-old female who was found to have multiple syncopal episodes that started this morning.  Patient reports bending over to clean floor after standing up felt dizzy and passed out. She reports feeling dizzy 2-3 times in the last 24 hour, however no loss of consciousness in the last 24 hours until this morning. Patient also reports hallucinating/dream after these episodes. Denies trauma to head or any injuries. PMH significant for hypertension, Type 2 Diabetes Mellitus, MVR, dyslipidemia, and RBBB.    In route to ED: pt was was found to have intense dizziness that correlated with ventricular pause that lasted 5-6 seconds on monitor    In ED: Pt was found to have 10 seconds pause with complete heart block and rare ventricular escape. Cardiology was consulted. Pt was scheduled for pacemaker this evening.       Review of Systems   Constitutional: Negative for chills and fever.   HENT: Negative for hearing loss and tinnitus.    Eyes: Negative for blurred vision and double vision.   Respiratory: Negative for cough and hemoptysis.    Cardiovascular: Negative for chest pain and palpitations.   Gastrointestinal: Negative for heartburn and nausea.   Genitourinary: Negative for dysuria and urgency.   Musculoskeletal: Negative for myalgias and neck pain.   Skin: Negative for itching and rash.   Neurological: Positive for dizziness and loss of consciousness. Negative for seizures.    Endo/Heme/Allergies: Negative for environmental allergies and polydipsia.   Psychiatric/Behavioral: Negative for depression and suicidal ideas.              Past Medical History (Chronic medical problem, known complications and current treatment)    Past Medical History:   Diagnosis Date   • ASTHMA     patient denies   • Complete heart block (HCC) 11/16/2018   • Congestive heart failure (HCC)    • COPD    • COPD (chronic obstructive pulmonary disease) (HCC)    • Depressed    • Diabetes    • Diastolic dysfunction    • DM (diabetes mellitus) (HCC)    • Heart murmur    • HLD (hyperlipidemia)    • HTN (hypertension)    • Hypertension    • Obstructive sleep apnea    • Osteoarthritis    • Pulmonary hypertension (HCC)    • 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   • Sleep apnea          Past Surgical History:  Past Surgical History:   Procedure Laterality Date   • BREAST BIOPSY     • GYN SURGERY  hysterectomy   • MITRAL VALVE REPLACE     • OTHER ABDOMINAL SURGERY     • OTHER ORTHOPEDIC SURGERY     • TONSILLECTOMY Bilateral        Current Outpatient Medications:  Home Medications     Reviewed by Magali Soto (Pharmacy Tech) on 11/16/18 at 1218  Med List Status: Complete   Medication Last Dose Status   albuterol 108 (90 Base) MCG/ACT Aero Soln inhalation aerosol 11/16/2018 Active   Cholecalciferol (VITAMIN D) 2000 UNIT Tab 11/14/2018 Active   diazePAM (VALIUM) 2 MG Tab 11/16/2018 Active   escitalopram (LEXAPRO) 10 MG Tab 11/16/2018 Active   fluticasone (FLONASE) 50 MCG/ACT nasal spray 11/16/2018 Active   furosemide (LASIX) 40 MG Tab 11/15/2018 Active   glimepiride (AMARYL) 4 MG TABS 11/16/2018 Active   HYDROcodone-acetaminophen (NORCO) 7.5-325 MG per tablet 11/16/2018 Active   losartan (COZAAR) 50 MG TABS 11/15/2018 Active   potassium chloride (KLOR-CON) 8 MEQ tablet 11/15/2018 Active   rosuvastatin (CRESTOR) 5 MG TABS 11/14/2018 Active                Medication  "Allergy/Sensitivities:  Allergies   Allergen Reactions   • Ativan      DELIRIUM, CONFUSION.   • Advair [Fluticasone-Salmeterol]    • Ambien [Zolpidem Tartrate]    • Erythromycin Vomiting   • Lipitor [Atorvastatin Calcium]    • Pcn [Penicillins] Hives   • Pravachol [Pravastatin Sodium]      ADVERSE REACTION.         Family History (mandatory)   Family History   Problem Relation Age of Onset   • Hypertension Mother    • Heart Disease Mother    • Diabetes Father    • Hypertension Father    • Diabetes Maternal Grandmother    • Diabetes Paternal Grandfather        Social History (mandatory)   Social History     Social History   • Marital status: Single     Spouse name: N/A   • Number of children: N/A   • Years of education: N/A     Occupational History   • Not on file.     Social History Main Topics   • Smoking status: Former Smoker     Packs/day: 1.50     Quit date: 11/2/2012   • Smokeless tobacco: Never Used   • Alcohol use No   • Drug use: No   • Sexual activity: Not on file     Other Topics Concern   • Not on file     Social History Narrative   • No narrative on file     Living situation: Alone  PCP : Ashleigh Hurd M.D.    Physical Exam     Vitals:    11/16/18 1116 11/16/18 1230 11/16/18 1245 11/16/18 1345   BP: 160/53      Pulse: 65 61 63 67   Resp: 20 18 (!) 22 17   Temp: 36.7 °C (98.1 °F)      TempSrc: Temporal      SpO2:  95% 95% 92%   Weight:       Height:         Body mass index is 33.15 kg/m².  /53   Pulse 67   Temp 36.7 °C (98.1 °F) (Temporal)   Resp 17   Ht 1.702 m (5' 7\")   Wt 96 kg (211 lb 10.3 oz)   SpO2 92%   BMI 33.15 kg/m²   O2 therapy: Pulse Oximetry: 92 %, O2 Delivery: None (Room Air)    Physical Exam   Constitutional: She is oriented to person, place, and time and well-developed, well-nourished, and in no distress. No distress.   HENT:   Head: Normocephalic and atraumatic.   Mouth/Throat: No oropharyngeal exudate.   Eyes: Pupils are equal, round, and reactive to light. EOM are " normal. Right eye exhibits no discharge.   Neck: Normal range of motion. No tracheal deviation present. No thyromegaly present.   Cardiovascular: Normal rate and regular rhythm.    Murmur (Systolic murmur) heard.  Pulmonary/Chest: Effort normal and breath sounds normal. No stridor. No respiratory distress. She has no wheezes.   Abdominal: Soft. Bowel sounds are normal. She exhibits no distension. There is no tenderness.   Musculoskeletal: Normal range of motion. She exhibits no edema or tenderness.   Neurological: She is alert and oriented to person, place, and time. No cranial nerve deficit.   Skin: Skin is warm and dry. She is not diaphoretic.   Psychiatric: Mood, memory, affect and judgment normal.         Data Review       Old Records Request:   Deferred  Current Records review/summary: Completed    Lab Data Review:  Recent Results (from the past 24 hour(s))   EKG    Collection Time: 18 11:14 AM   Result Value Ref Range    Report       [facility] Emergency Dept.    Test Date:  2018  Pt Name:    PAYAL BAUER                Department: ER  MRN:        [id number]                      Room:        10  Gender:     Female                       Technician: KAYE  :        1945                   Requested By:ER TRIAGE PROTOCOL  Order #:    [id number]                    Reading MD:    Measurements  Intervals                                Axis  Rate:       65                           P:          -24  NE:         193                          QRS:        -59  QRSD:       126                          T:          14  QT:         456  QTc:        475    Interpretive Statements  SINUS RHYTHM  RBBB AND LAFB  Compared to ECG 2015 05:48:40  Left anterior fascicular block now present  Early repolarization no longer present     CBC WITH DIFFERENTIAL    Collection Time: 18 11:15 AM   Result Value Ref Range    WBC 8.7 4.8 - 10.8 K/uL    RBC 4.07 (L) 4.20 - 5.40 M/uL    Hemoglobin 12.1  12.0 - 16.0 g/dL    Hematocrit 37.0 37.0 - 47.0 %    MCV 90.9 81.4 - 97.8 fL    MCH 29.7 27.0 - 33.0 pg    MCHC 32.7 (L) 33.6 - 35.0 g/dL    RDW 44.3 35.9 - 50.0 fL    Platelet Count 196 164 - 446 K/uL    MPV 11.3 9.0 - 12.9 fL    Neutrophils-Polys 73.10 (H) 44.00 - 72.00 %    Lymphocytes 18.60 (L) 22.00 - 41.00 %    Monocytes 6.20 0.00 - 13.40 %    Eosinophils 1.30 0.00 - 6.90 %    Basophils 0.60 0.00 - 1.80 %    Immature Granulocytes 0.20 0.00 - 0.90 %    Nucleated RBC 0.00 /100 WBC    Neutrophils (Absolute) 6.37 2.00 - 7.15 K/uL    Lymphs (Absolute) 1.62 1.00 - 4.80 K/uL    Monos (Absolute) 0.54 0.00 - 0.85 K/uL    Eos (Absolute) 0.11 0.00 - 0.51 K/uL    Baso (Absolute) 0.05 0.00 - 0.12 K/uL    Immature Granulocytes (abs) 0.02 0.00 - 0.11 K/uL    NRBC (Absolute) 0.00 K/uL   COMP METABOLIC PANEL    Collection Time: 11/16/18 11:15 AM   Result Value Ref Range    Sodium 138 135 - 145 mmol/L    Potassium 4.0 3.6 - 5.5 mmol/L    Chloride 101 96 - 112 mmol/L    Co2 29 20 - 33 mmol/L    Anion Gap 8.0 0.0 - 11.9    Glucose 256 (H) 65 - 99 mg/dL    Bun 24 (H) 8 - 22 mg/dL    Creatinine 1.24 0.50 - 1.40 mg/dL    Calcium 9.8 8.5 - 10.5 mg/dL    AST(SGOT) 12 12 - 45 U/L    ALT(SGPT) 10 2 - 50 U/L    Alkaline Phosphatase 70 30 - 99 U/L    Total Bilirubin 0.1 0.1 - 1.5 mg/dL    Albumin 3.9 3.2 - 4.9 g/dL    Total Protein 7.0 6.0 - 8.2 g/dL    Globulin 3.1 1.9 - 3.5 g/dL    A-G Ratio 1.3 g/dL   TROPONIN    Collection Time: 11/16/18 11:15 AM   Result Value Ref Range    Troponin I 0.01 0.00 - 0.04 ng/mL   PRTOTHROMBIN TIME (INR)    Collection Time: 11/16/18 11:15 AM   Result Value Ref Range    PT 13.6 12.0 - 14.6 sec    INR 1.03 0.87 - 1.13   APTT    Collection Time: 11/16/18 11:15 AM   Result Value Ref Range    APTT 26.4 24.7 - 36.0 sec   TSH    Collection Time: 11/16/18 11:15 AM   Result Value Ref Range    TSH 1.630 0.380 - 5.330 uIU/mL   ESTIMATED GFR    Collection Time: 11/16/18 11:15 AM   Result Value Ref Range    GFR If   51 (A) >60 mL/min/1.73 m 2    GFR If Non  42 (A) >60 mL/min/1.73 m 2   Hemoglobin A1c    Collection Time: 18 11:15 AM   Result Value Ref Range    Glycohemoglobin 9.0 (H) 0.0 - 5.6 %    Est Avg Glucose 212 mg/dL   Magnesium    Collection Time: 18 11:15 AM   Result Value Ref Range    Magnesium 2.1 1.5 - 2.5 mg/dL   ACCU-CHEK GLUCOSE    Collection Time: 18  1:46 PM   Result Value Ref Range    Glucose - Accu-Ck 198 (H) 65 - 99 mg/dL   EKG    Collection Time: 18  4:55 PM   Result Value Ref Range    Report       [facility]    Test Date:  2018  Pt Name:    PAYAL BAUER                Department: 171  MRN:        [id number]                      Room:       [facility]  Gender:     Female                       Technician: [facility]  :        1945                   Requested By:MINI LICEA  Order #:    [id number]                    Reading MD: Giorgio Abraham MD    Measurements  Intervals                                Axis  Rate:       65                           P:          -36  MI:         204                          QRS:        -60  QRSD:       128                          T:          5  QT:         476  QTc:        495    Interpretive Statements  SINUS RHYTHM  RBBB AND LAFB  Compared to ECG 2018 11:14:08  No significant changes    Electronically Signed On 2018 17:03:31 PST by Giorgio Abraham MD         Imaging/Procedures Review:    Independant Imaging Review: Completed  DX-CHEST-PORTABLE (1 VIEW)   Final Result      No acute cardiopulmonary abnormality.      DX-CHEST-PORTABLE (1 VIEW)    (Results Pending)      EKG:   EKG Independant Review: Completed  QTc:495, HR: 65, Normal Sinus Rhythm, no ST/T changes     Records reviewed and summarized in current documentation :  Yes  [employer] teaching service handout given to patient:  No         Assessment/Plan     * Complete heart block (HCC)- (present on admission)   Assessment & Plan    Pt was was found to  have intense dizziness that correlated with ventricular pause that lasted 5-6 seconds on monitor in route to [facility]  Pt was found to have 10 seconds pause with complete heart block and rare ventricular escape in the ED  EKG: RBBB    Plan:  Pacemaker as per cardiology  Losartan      Syncope and collapse- (present on admission)   Assessment & Plan    Pt was was found to have intense dizziness that correlated with ventricular pause that lasted 5-6 seconds on monitor in route to [facility]  Pt was found to have 10 seconds pause with complete heart block and rare ventricular escape in the ED  EKG: RBBB      Plan:  Pacemaker as per cardiology  Losartan          DM (diabetes mellitus) (HCC)- (present on admission)   Assessment & Plan    Pt presented with Glucose of 256 at the time of admission  Glycohemoglobin of 9 at time of admission    Plan:  Humulin Sliding scale  Hypoglycemia protocol       Stage 3 acute kidney injury (HCC)   Assessment & Plan    Pt presented with GFR of 42  Uncontrolled Type 2 DM  Hx of HTN    Plan:   Control HTN and Diabetes  NS       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- (present on admission)   Assessment & Plan    EKG consistent with RBBB  Pt reports syncope correlated with 6 second pause and 10 second pause on monitors    Plan:  Pacemaker  Cardiology on board       Mitral stenosis- (present on admission)   Assessment & Plan    Pt was found to have Mitral stenosis on ECHO    Plan  Control BP with Losartan       Essential hypertension, benign- (present on admission)   Assessment & Plan    Hx of HTN    Controlled with Home med: losartan   CTM         Anticipated Hospital stay: Observation admit        Quality Measures  Quality-Core Measures   Reviewed items::  Labs reviewed, Medications reviewed, Radiology images reviewed and EKG reviewed  Rosas catheter::  No Rosas  DVT prophylaxis pharmacological::  Heparin    PCP: Ashleigh uHrd M.D.

## 2018-11-17 NOTE — ASSESSMENT & PLAN NOTE
Pt was was found to have intense dizziness that correlated with ventricular pause that lasted 5-6 seconds on monitor in route to [facility]  Pt was found to have 10 seconds pause with complete heart block and rare ventricular escape in the ED  EKG: RBBB    Plan:  Pacemaker as per cardiology  Losartan

## 2018-11-17 NOTE — PROGRESS NOTES
"Report received at bedside. Pt transported to room T719 on zoll monitor. Placed on tele box, monitor room verified. Complaint of 5/10 pain in L shoulder. States \"its more sore than painful\".  2 RN skin check completed. Pt oriented to room and unit routine. POC discussed, all questions answered.   "

## 2018-11-17 NOTE — ASSESSMENT & PLAN NOTE
Pt presented with Glucose of 256 at the time of admission  Glycohemoglobin of 9 at time of admission  Pt followed by outpt endocrinologist. Will defer to him to adjust medications at the time of discharge    Plan:  Humulin Sliding scale  Hypoglycemia protocol

## 2018-11-17 NOTE — SENIOR ADMIT NOTE
Mrs. Sosa is a 73 years old lady who presents to the emergency department by EMS because of 3-4 syncopal episode which lasted for 5-10 seconds after having severe dizziness spells this morning (patient was cleaning the floor at her home and she felt dizzy and passed out when she stood up)    Past medical history significant for CHF (on Lasix 40 mg), mitral valve replacement, COPD (former smoker of 1& 1/2 pack/day, quit in 2012, controlled with albuterol inhaler as needed, Advair, Spiriva), diabetes mellitus (insulin-dependent, Amaryl 4 mg tab, A1c is 9, complicated by CKD), dyslipidemia (on Crestor 5 mg daily), hypertension (on losartan), obstructive sleep apnea with pulmonary hypertension.    EMS reported ventricular pauses that lasted 5-6 seconds on monitor.  Patient denies previous similar episode, recent change in her medications, chest pain, recent upper respiratory infection, fever, chills, cough.    In the emergency department the patient had 10 seconds pauses with complete heart block on telemetry monitor.  Cardiologist has been consulted, they decided on doing pacemaker, plan was discussed with the patient and the patient agree on the plan, the patient tolerated the procedure very well and she currently in pacing rhythm.     Positive physical findings: 2/6 Systolic murmur    Impression:  - Complete heart block (3rd degree)    Plan  - Admit the Pt to tele floor  - Continue monitoring the patient, S/P PPM  - IDDM with CKD ==> ISS with hypoglycemia protocol  - Essential HTN ==> Losartan  - COPD, resume home meds as needed, Prevnar 13  - Dyslipidemia, crestor 5 mg  - Depression. Lexapro 10 mg  - DVT prophylaxis, heparin 5000u TID (CKD)  - Cardiac diet  - Follow card rec.  - Replete electrolytes as required.

## 2018-11-17 NOTE — PROGRESS NOTES
"2 RN skin check with Monet  Bilateral ears pink but blanching, bilat elbows flaky and dry, skin under pannus red and yeasty, she states \"I use cream at home for that\". Bilateral shins have small scabs to them, bilateral feet dry and flaky.   "

## 2018-11-17 NOTE — CARE PLAN
Problem: Safety  Goal: Will remain free from injury  Outcome: PROGRESSING AS EXPECTED  Pt oriented to unit procedures, demonstrates use of call bell, understands to call for assistance to bathroom    Problem: Infection  Goal: Will remain free from infection  Outcome: PROGRESSING AS EXPECTED  Vital signs WNL, pacemaker placement site looks CDI

## 2018-11-18 VITALS
WEIGHT: 224.65 LBS | DIASTOLIC BLOOD PRESSURE: 84 MMHG | HEIGHT: 67 IN | HEART RATE: 72 BPM | BODY MASS INDEX: 35.26 KG/M2 | TEMPERATURE: 98 F | RESPIRATION RATE: 18 BRPM | OXYGEN SATURATION: 90 % | SYSTOLIC BLOOD PRESSURE: 167 MMHG

## 2018-11-18 LAB
EKG IMPRESSION: NORMAL
GLUCOSE BLD-MCNC: 273 MG/DL (ref 65–99)
GLUCOSE BLD-MCNC: 303 MG/DL (ref 65–99)

## 2018-11-18 PROCEDURE — A9270 NON-COVERED ITEM OR SERVICE: HCPCS | Performed by: STUDENT IN AN ORGANIZED HEALTH CARE EDUCATION/TRAINING PROGRAM

## 2018-11-18 PROCEDURE — 82962 GLUCOSE BLOOD TEST: CPT | Mod: 91

## 2018-11-18 PROCEDURE — 700102 HCHG RX REV CODE 250 W/ 637 OVERRIDE(OP): Performed by: STUDENT IN AN ORGANIZED HEALTH CARE EDUCATION/TRAINING PROGRAM

## 2018-11-18 PROCEDURE — 99239 HOSP IP/OBS DSCHRG MGMT >30: CPT | Mod: GC | Performed by: INTERNAL MEDICINE

## 2018-11-18 RX ADMIN — STANDARDIZED SENNA CONCENTRATE AND DOCUSATE SODIUM 2 TABLET: 8.6; 5 TABLET, FILM COATED ORAL at 06:00

## 2018-11-18 RX ADMIN — LOSARTAN POTASSIUM 50 MG: 50 TABLET ORAL at 04:44

## 2018-11-18 RX ADMIN — ESCITALOPRAM OXALATE 10 MG: 10 TABLET ORAL at 04:44

## 2018-11-18 RX ADMIN — ACETAMINOPHEN 650 MG: 325 TABLET, FILM COATED ORAL at 04:45

## 2018-11-18 RX ADMIN — INSULIN HUMAN 3 UNITS: 100 INJECTION, SOLUTION PARENTERAL at 11:37

## 2018-11-18 RX ADMIN — INSULIN HUMAN 2 UNITS: 100 INJECTION, SOLUTION PARENTERAL at 04:58

## 2018-11-18 NOTE — PROGRESS NOTES
Internal Medicine Interval Note  Note Author: Rene Cassidy M.D.     Name Ophelia Sosa 1945   Age/Sex 73 y.o. female   MRN [id number]   Code Status Full Code     After 5PM or if no immediate response to page, please call for cross-coverage  Attending/Team: Richie/Micha See Patient List for primary contact information  Call [phone] to page    1st Call - Day Intern (R1):   Ange 2nd Call - Day Sr. Resident (R2/R3):   Deric         Reason for interval visit  (Principal Problem)   Syncope      Interval Problem Daily Status Update  (24 hours, problem oriented, brief subjective history, new lab/imaging data pertinent to that problem)   A&Ox4. Pt was seen resting comfortably in bed. She reports feeling little weak.  She reports feeling some anxiety due to recent pacemaker placement    -s/p pacemaker for heart block. Pacemaker interrogated. Pacing approprietly   -Physical Therapy  -Cardio signed off. Appreciate recs      Review of Systems   Constitutional: Negative for chills and fever.   HENT: Negative for hearing loss and tinnitus.    Eyes: Negative for blurred vision and double vision.   Respiratory: Negative for cough and hemoptysis.    Cardiovascular: Negative for chest pain and palpitations.        Pain at pacemaker placement site     Gastrointestinal: Negative for heartburn and nausea.   Genitourinary: Negative for dysuria and urgency.   Musculoskeletal: Positive for joint pain (Chronic joint pain).   Neurological: Negative for dizziness and headaches.   Endo/Heme/Allergies: Negative for environmental allergies and polydipsia.   Psychiatric/Behavioral: Negative for depression and suicidal ideas. The patient is nervous/anxious (Due to recent syncope and pacemaker placement).        Disposition/Barriers to discharge:   Weakness  S/p pacemaker placement    Consultants/Specialty  Cardiology    PCP: Ashleigh Hurd M.D.      Quality Measures  Quality-Core Measures   Reviewed  items::  Medications reviewed, Labs reviewed, Radiology images reviewed and EKG reviewed  Rosas catheter::  No Rosas  DVT prophylaxis pharmacological::  Heparin          Physical Exam       Vitals:    11/17/18 0308 11/17/18 0810 11/17/18 1154 11/17/18 1531   BP: 159/62 152/61 144/47 139/60   Pulse: 66 67 71 68   Resp: 18 17 17 17   Temp: 36.8 °C (98.3 °F) 36.7 °C (98 °F) 36.4 °C (97.5 °F) 36.6 °C (97.8 °F)   TempSrc: Temporal Temporal Temporal Temporal   SpO2: 97% 94% 91% 93%   Weight:       Height:         Body mass index is 34.46 kg/m². Weight: 99.8 kg (220 lb 0.3 oz)  Oxygen Therapy:  Pulse Oximetry: 93 %, O2 (LPM): 0, O2 Delivery: None (Room Air)    Physical Exam   Constitutional: She is oriented to person, place, and time and well-developed, well-nourished, and in no distress. No distress.   HENT:   Head: Normocephalic and atraumatic.   Eyes: Pupils are equal, round, and reactive to light. EOM are normal.   Neck: Normal range of motion. Neck supple. No tracheal deviation present. No thyromegaly present.   Cardiovascular: Normal rate and regular rhythm.  Exam reveals no gallop.    Murmur (Systolic) heard.  Pulmonary/Chest: Effort normal and breath sounds normal. No respiratory distress. She has no wheezes.   Pacemaker in place   Abdominal: Soft. Bowel sounds are normal. She exhibits no distension. There is no tenderness.   Musculoskeletal: Normal range of motion. She exhibits no edema or tenderness.   Neurological: She is alert and oriented to person, place, and time. No cranial nerve deficit.   Skin: Skin is warm and dry. No rash noted. She is not diaphoretic. No erythema.   Psychiatric: Mood, memory, affect and judgment normal.             Assessment/Plan     * Complete heart block (HCC)- (present on admission)   Assessment & Plan    Pt was was found to have intense dizziness that correlated with ventricular pause that lasted 5-6 seconds on monitor in route to [facility]  Pt was found to have 10 seconds pause with  complete heart block and rare ventricular escape in the ED  EKG: RBBB    Plan:  Pacemaker as per cardiology  Losartan      Syncope and collapse- (present on admission)   Assessment & Plan    Pt was was found to have intense dizziness that correlated with ventricular pause that lasted 5-6 seconds on monitor in route to [facility]  Pt was found to have 10 seconds pause with complete heart block and rare ventricular escape in the ED  EKG: RBBB      Plan:  Pacemaker as per cardiology  Losartan          DM (diabetes mellitus) (HCC)- (present on admission)   Assessment & Plan    Pt presented with Glucose of 256 at the time of admission  Glycohemoglobin of 9 at time of admission  Pt followed by outpt endocrinologist. Will defer to him to adjust medications at the time of discharge    Plan:  Humulin Sliding scale  Hypoglycemia protocol       Stage 3 acute kidney injury (HCC)   Assessment & Plan    Pt presented with GFR of 42  Uncontrolled Type 2 DM  Hx of HTN    Plan:   Control HTN and Diabetes  NS       Anxiety   Assessment & Plan    She reports feeling some anxiety due to recent pacemaker placement  Pt reassured/educated on her medical condition    Home Escitalopram     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- (present on admission)   Assessment & Plan    EKG consistent with RBBB  Pt reports syncope correlated with 6 second pause and 10 second pause on monitors    Plan:  Pacemaker  Cardiology on board       Mitral stenosis- (present on admission)   Assessment & Plan    Pt was found to have Mitral stenosis on ECHO    Plan  Control BP with Losartan       Essential hypertension, benign- (present on admission)   Assessment & Plan    Hx of HTN    Controlled with Home med: losartan   CTM

## 2018-11-18 NOTE — PROGRESS NOTES
Report with DAY Anderson, pt is alert and oriented on room air sitting up in chair eating dinner, reports she was able to ambulate several times today but endorses some dizziness. Call bell within reach, plan of care discussed with the patient, no request for pain medication at this time.

## 2018-11-18 NOTE — ASSESSMENT & PLAN NOTE
She reports feeling some anxiety due to recent pacemaker placement  Pt reassured/educated on her medical condition    Home Escitalopram

## 2018-11-18 NOTE — PROGRESS NOTES
Received bedside report from RN, pt care assumed, VSS, pt assessment complete. Pt AAOx4, no c/o  pain at this time. No signs of acute distress noted at this time. POC discussed with pt and verbalizes no questions. Pt denies any additional needs at this time. Bed in lowest position, bed alarm off, pt educated on fall risk and verbalized understanding, call light within reach, hourly rounding initiated. Patient has a sinus rhythm with some paced rhythm noted.

## 2018-11-18 NOTE — DISCHARGE INSTRUCTIONS
Pacemaker Implantation, Care After  Refer to this sheet over the next few weeks. These instructions provide you with information on caring for yourself after the procedure. Your health care provider may also give you more specific instructions. Your treatment has been planned according to current medical practices, but problems sometimes occur. Call your health care provider if you have any problems or questions regarding your pacemaker.   WHAT TO EXPECT AFTER THE PROCEDURE  · You may feel pain. Some pain is normal. It may last a few days.  · A slight bump may be seen over the skin where the device was placed. Sometimes, it is possible to feel the device under the skin. This is normal.  · In the months and years afterward, your health care provider will check the device, the leads, and the battery every few months. Eventually, when the battery is low, the device will be replaced.  HOME CARE INSTRUCTIONS  Medicines  · Take medicines only as directed by your health care provider.  · If you were prescribed an antibiotic medicine, finish it all even if you start to feel better.  · Do not take any other medicines without asking your health care provider first. Some medicines, including certain painkillers, can cause bleeding in your stomach after surgery.  Wound Care  · Do not remove the bandage on your chest until directed to do so by your health care provider.  · After your bandage is removed, you may see pieces of tape called skin adhesive strips over the area where the cut was made (incision site). Let them fall off on their own.  · Check the incision site every day to make sure it is not infected, bleeding, or starting to pull apart.  · Do not use lotions or ointments near the incision site unless directed to do so.  · Keep the incision area clean and dry for 2-3 days after the procedure or as directed by your health care provider. It takes several weeks for the incision site to completely heal.  · Do not take  baths, swim, or use a hot tub until your health care provider approves.  Activities  · Try to walk a little every day. Exercising is important after this procedure. It is also important to use your shoulder on the side of the pacemaker in daily tasks that do not require exaggerated motion.  · Avoid sudden jerking, pulling, or chopping movements that pull your upper arm far away from your body for at least 6 weeks.  · Do not lift your upper arm above your shoulders for at least 6 weeks. This means no tennis, golf, or swimming for this period of time. If you sleep with the arm above your head, use a restraint to prevent this from happening as you sleep.  · You may go back to work when your health care provider says it is okay. Check with your health care provider before you start to drive or play sports.  Other Instructions  · Follow diet instructions if they were provided. You should be able to eat what you usually do right away, but you may need to limit your salt intake.  · Weigh yourself every day. If you suddenly gain weight, fluid may be building up in your body.  · Always carry your pacemaker identification card with you. The card should list the implant date, device model, and . Consider wearing a medical alert bracelet or necklace.  · Tell all health care providers that you have a pacemaker. This may prevent them from giving you a magnetic resource imaging scan (MRI) because of the strong magnets used during that test.  · If you must pass through a metal detector, quickly walk through it. Do not stop under the detector or stand near it.  · Avoid places or objects with a strong electric or magnetic field, including:  ¨ Airport security crandall. When at the airport, let officials know you have a pacemaker. Your ID card will let you be checked in a way that is safe for you and that will not damage your pacemaker. Also, do not let a security person wave a magnetic wand near your pacemaker. That can make  it stop working.  ¨ Power plants.  ¨ Large electrical generators.  ¨ Radiofrequency transmission towers, such as cell phone and radio towers.  · Do not use amateur (ham) radio equipment or electric (arc) welding torches. Some devices are safe to use if held at least 1 foot from your pacemaker. These include power tools, lawn mowers, and speakers. If you are unsure of whether something is safe to use, ask your health care provider.  · You may safely use electric blankets, heating pads, computers, and microwave ovens.  · When using your cell phone, hold it to the ear opposite the pacemaker. Do not leave your cell phone in a pocket over the pacemaker.  · Keep all follow-up visits as directed by your health care provider. This is how your health care provider makes sure your chest is healing the way it should. Ask your health care provider when you should come back to have your stitches or staples taken out.  · Have your pacemaker checked every 3-6 months or as directed by your health care provider. Most pacemakers last for 4-8 years before a new one is needed.  SEEK MEDICAL CARE IF:  · You gain weight suddenly.  · Your legs or feet swell more than they have before.  · It feels like your heart is fluttering or skipping beats (heart palpitations).  · You have a fever.  SEEK IMMEDIATE MEDICAL CARE IF:  · You have chest pain.  · You feel more short of breath than you have felt before.  · You feel more light-headed than you have felt before.  · You have problems with your incision site, such as swelling or bleeding, or it starts to open up.  · You have drainage, redness, swelling, or pain at your incision site.     This information is not intended to replace advice given to you by your health care provider. Make sure you discuss any questions you have with your health care provider.     Document Released: 07/07/2006 Document Revised: 01/08/2016 Document Reviewed: 04/19/2013  [employer] Mobile Interactive Patient Education ©2016  [employer]  Discharge Instructions    Discharged to home by car with relative. Discharged via wheelchair, hospital escort: Yes.  Special equipment needed: Cane    Be sure to schedule a follow-up appointment with your primary care doctor or any specialists as instructed.     Discharge Plan:   Diet Plan: Discussed  Activity Level: Discussed  Confirmed Follow up Appointment: Appointment Scheduled  Confirmed Symptoms Management: Discussed  Medication Reconciliation Updated: Yes  Pneumococcal Vaccine Administered/Refused: Not given - Patient refused pneumococcal vaccine  Influenza Vaccine Indication: Not indicated: Previously immunized this influenza season and > 8 years of age    I understand that a diet low in cholesterol, fat, and sodium is recommended for good health. Unless I have been given specific instructions below for another diet, I accept this instruction as my diet prescription.   Other diet:     Special Instructions: None    · Is patient discharged on Warfarin / Coumadin?   No     Depression / Suicide Risk    As you are discharged from this [facility] facility, it is important to learn how to keep safe from harming yourself.    Recognize the warning signs:  · Abrupt changes in personality, positive or negative- including increase in energy   · Giving away possessions  · Change in eating patterns- significant weight changes-  positive or negative  · Change in sleeping patterns- unable to sleep or sleeping all the time   · Unwillingness or inability to communicate  · Depression  · Unusual sadness, discouragement and loneliness  · Talk of wanting to die  · Neglect of personal appearance   · Rebelliousness- reckless behavior  · Withdrawal from people/activities they love  · Confusion- inability to concentrate     If you or a loved one observes any of these behaviors or has concerns about self-harm, here's what you can do:  · Talk about it- your feelings and reasons for harming yourself  · Remove any means  that you might use to hurt yourself (examples: pills, rope, extension cords, firearm)  · Get professional help from the community (Mental Health, Substance Abuse, psychological counseling)  · Do not be alone:Call your Safe Contact- someone whom you trust who will be there for you.  · Call your local CRISIS HOTLINE 666-5599 or 859-794-2697  · Call your local Children's Mobile Crisis Response Team [place] (512) 783-8926 or www.Pantry  · Call the toll free National Suicide Prevention Hotlines   · National Suicide Prevention Lifeline 401-886-YVSY (7828)  · National Hope Line Network 800-SUICIDE (498-7444)

## 2018-11-19 ENCOUNTER — PATIENT OUTREACH (OUTPATIENT)
Dept: HEALTH INFORMATION MANAGEMENT | Facility: OTHER | Age: 73
End: 2018-11-19

## 2018-11-19 NOTE — PROGRESS NOTES
SCP post discharge med rec completed. No clinically significant medication issues noted. Patient denies any side effects, barriers to accessing medications, or trouble with adherence.

## 2018-11-19 NOTE — DISCHARGE SUMMARY
Internal Medicine Discharge Summary  Note Author: Rene Cassidy M.D.       Name Ophelia Sosa 1945   Age/Sex 73 y.o. female   MRN [id number]         Admit Date:  2018       Discharge Date:   2018    Service:   [facility] Internal Medicine Purple Team  Attending Physician(s):   Dr. Quiroz       Senior Resident(s):   Dr. Leos  Leonidas Resident(s):   Dr. Cassidy  PCP: Ashleigh Hurd M.D.      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 *      Hospital Summary (Brief Narrative):       Patient is a 73-year-old female who presents to ED for 3-4 syncopal episodes.  In route to ED, patient reported intense dizziness which correlated with 5-6 seconds pause with ventricular pause. In ED, patient was found to have 10 second pause complete heart block and rare ventricular escape. Cardiology was consulted. Dual-chamber pacemaker was placed.      Patient also reported feeling some weakness. Observed overnight. Patient reported feeling well and ready to go home the next day.  Patient was educated on avoiding use of left arm for at least 1 week and lifting anything for 2 weeks after that.  Do not raise affected arm on both head or behind back for 6 weeks.  Pacer was interrogated and pacing appropriately.  Please follow-up with cardiology.     Past medical history significant for type 2 diabetes mellitus.  At the time of admission, glucose was 256 and glycohemoglobin at 9.  During the hospital stay, blood sugars were  controlled with Humalog sliding scale.  Will defer to primary care physician to adjust home diabetic medication.    Patient's hypertension controlled with home losartan 50 mg  Continue home escitalopram for depression    Consultants:     Cardiology    Procedures:        Pacemaker placement    Imaging/ Testing:      DX-CHEST-PORTABLE (1 VIEW)   Final Result      Stable chest findings compared with 11/16.      DX-CHEST-PORTABLE (1 VIEW)   Final Result      Pacer leads project appropriately. No pneumothorax.      DX-CHEST-PORTABLE (1 VIEW)   Final Result      No acute cardiopulmonary abnormality.            Discharge Medications:         Medication Reconciliation: Completed       Medication List      CONTINUE taking these medications      Instructions   albuterol 108 (90 Base) MCG/ACT Aers inhalation aerosol   Inhale 2 Puffs by mouth every 6 hours as needed for Shortness of Breath.  Dose:  2 Puff     escitalopram 10 MG Tabs  Commonly known as:  LEXAPRO   Take 10 mg by mouth every morning.  Dose:  10 mg     fluticasone 50 MCG/ACT nasal spray  Commonly known as:  FLONASE   Spray 1 Spray in nose 1 time daily as needed.  Dose:  1 Spray     furosemide 40 MG Tabs  Commonly known as:  LASIX   Take 40 mg by mouth every evening.  Dose:  40 mg     glimepiride 4 MG Tabs  Commonly known as:  AMARYL   Take 4 mg by mouth 2 times a day. Night time dose at 1700  Dose:  4 mg     HYDROcodone-acetaminophen 7.5-325 MG per tablet  Commonly known as:  NORCO   TAKE 1/2 TO 1 TABLET BY MOUTH AS NEEDED     losartan 50 MG Tabs  Commonly known as:  COZAAR   Take 50 mg by mouth every day.  Dose:  50 mg     potassium chloride 8 MEQ tablet  Commonly known as:  KLOR-CON   Take 8 mEq by mouth every evening.  Dose:  8 mEq     rosuvastatin 5 MG Tabs  Commonly known as:  CRESTOR   Take 5 mg by mouth 3 times a week. M, W, F  Dose:  5 mg     vitamin D 2000 UNIT Tabs   Take 6,000 Units by mouth 3 times a week. M,W,F  Dose:  6000 Units        STOP taking  these medications    diazePAM 2 MG Tabs  Commonly known as:  VALIUM            Disposition:   Home    Diet:   Diabetic and cardiac    Activity:   As tolerated    Instructions:      Please follow-up with cardiology and primary care physicians  Please take medications as prescribed  Please return to emergency if you feel dizziness, and lightheadedness    The patient was instructed to return to the ER in the event of worsening symptoms. I have counseled the patient on the importance of compliance and the patient has agreed to proceed with all medical recommendations and follow up plan indicated above.   The patient understands that all medications come with benefits and risks. Risks may include permanent injury or death and these risks can be minimized with close reassessment and monitoring.        Primary Care Provider:     Discharge summary faxed to primary care provider:  Deferred  Copy of discharge summary given to the patient: Deferred      Follow up appointment details :      Nov 20, 2018  8:30 AM PST  Pacer Check Only with PACER CHECK-[facility]  [facility] (--) [address], [address]  [place] [address]  [phone]   Dec 17, 2018  1:00 PM PST  Established Patient Pulmonary with A Rotation  [facility] (--) [address]  [address]  [place] [address]  [phone]   Mar 11, 2019  1:00 PM PDT  Follow up with Aman Wilkinson M.D.  [facility] (--) [address], [address]  [place] [address]  [phone]         Pending Studies:        None    Time spent on discharge day patient visit, preparing discharge paperwork and arranging for patient follow up.    Summary of follow up issues:   Please follow up Primary Care Provider for diabetic control  Please follow-up with cardiology    Discharge Time (Minutes) :    35 minutes  Hospital Course Type: Observation Stay      Condition on Discharge  stable  ______________________________________________________________________    Interval history/exam for day of discharge:    Patient was seen resting comfortably in bed.  She reports wanting to go home.  Able to ambulate with walker.  No overnight events.  Denies fever, chills, lightheadedness, dizziness, lethargy.    Physical Exam   Constitutional: She is oriented to person, place, and time and well-developed, well-nourished, and in no distress. No distress.   HENT:   Head: Normocephalic and atraumatic.   Eyes: Pupils are equal, round, and reactive to light. EOM are normal.   Neck: Normal range of motion. Neck supple. No tracheal deviation present. No thyromegaly present.   Cardiovascular: Normal rate and regular rhythm.  Exam reveals no gallop.    Murmur (Systolic) heard.  Pulmonary/Chest: Effort normal and breath sounds normal. No respiratory distress. She has no wheezes.   Pacemaker in place  Abdominal: Soft. Bowel sounds are normal. She exhibits no distension. There is no tenderness.   Musculoskeletal: Normal range of motion. She exhibits no edema or tenderness.   Neurological: She is alert and oriented to person, place, and time. No cranial nerve deficit.   Skin: Skin is warm and dry. No rash noted. She is not diaphoretic. No erythema.   Psychiatric: Mood, memory, affect and judgment normal    Most Recent Labs:    Lab Results   Component Value Date/Time    WBC 10.9 (H) 11/17/2018 03:15 AM    RBC 4.02 (L) 11/17/2018 03:15 AM    HEMOGLOBIN 11.8 (L) 11/17/2018 03:15 AM    HEMATOCRIT 37.5 11/17/2018 03:15 AM    MCV 93.3 11/17/2018 03:15 AM    MCH 29.4 11/17/2018 03:15 AM    MCHC 31.5 (L) 11/17/2018 03:15 AM    MPV 11.1 11/17/2018 03:15 AM    NEUTSPOLYS 67.70 11/17/2018 03:15 AM    LYMPHOCYTES 22.90 11/17/2018 03:15 AM    MONOCYTES 7.30 11/17/2018 03:15 AM    EOSINOPHILS 1.20 11/17/2018 03:15 AM    BASOPHILS 0.50 11/17/2018 03:15 AM      Lab Results   Component Value Date/Time    SODIUM 137 11/17/2018 03:15 AM     POTASSIUM 4.4 11/17/2018 03:15 AM    CHLORIDE 104 11/17/2018 03:15 AM    CO2 26 11/17/2018 03:15 AM    GLUCOSE 163 (H) 11/17/2018 03:15 AM    BUN 24 (H) 11/17/2018 03:15 AM    CREATININE 1.14 11/17/2018 03:15 AM      Lab Results   Component Value Date/Time    ALTSGPT 10 11/16/2018 11:15 AM    ASTSGOT 12 11/16/2018 11:15 AM    ALKPHOSPHAT 70 11/16/2018 11:15 AM    TBILIRUBIN 0.1 11/16/2018 11:15 AM    ALBUMIN 3.9 11/16/2018 11:15 AM    ALBUMIN 4.13 06/10/2011 10:17 AM    GLOBULIN 3.1 11/16/2018 11:15 AM    INR 1.03 11/16/2018 11:15 AM     Lab Results   Component Value Date/Time    PROTHROMBTM 13.6 11/16/2018 11:15 AM    INR 1.03 11/16/2018 11:15 AM

## 2018-11-20 ENCOUNTER — NON-PROVIDER VISIT (OUTPATIENT)
Dept: CARDIOLOGY | Facility: MEDICAL CENTER | Age: 73
End: 2018-11-20
Payer: MEDICARE

## 2018-11-20 DIAGNOSIS — Z95.0 CARDIAC PACEMAKER IN SITU: ICD-10-CM

## 2018-11-20 PROCEDURE — 93280 PM DEVICE PROGR EVAL DUAL: CPT | Performed by: INTERNAL MEDICINE

## 2018-12-18 ENCOUNTER — PATIENT OUTREACH (OUTPATIENT)
Dept: HEALTH INFORMATION MANAGEMENT | Facility: OTHER | Age: 73
End: 2018-12-18

## 2019-01-09 ENCOUNTER — NON-PROVIDER VISIT (OUTPATIENT)
Dept: CARDIOLOGY | Facility: MEDICAL CENTER | Age: 74
End: 2019-01-09
Payer: MEDICARE

## 2019-01-12 ENCOUNTER — HOSPITAL ENCOUNTER (OUTPATIENT)
Dept: LAB | Facility: MEDICAL CENTER | Age: 74
End: 2019-01-12
Attending: INTERNAL MEDICINE
Payer: MEDICARE

## 2019-01-12 LAB
25(OH)D3 SERPL-MCNC: 33 NG/ML (ref 30–100)
ALBUMIN SERPL BCP-MCNC: 3.8 G/DL (ref 3.2–4.9)
ALBUMIN/GLOB SERPL: 1.2 G/DL
ALP SERPL-CCNC: 69 U/L (ref 30–99)
ALT SERPL-CCNC: 13 U/L (ref 2–50)
ANION GAP SERPL CALC-SCNC: 6 MMOL/L (ref 0–11.9)
APPEARANCE UR: CLEAR
AST SERPL-CCNC: 15 U/L (ref 12–45)
BACTERIA #/AREA URNS HPF: NEGATIVE /HPF
BASOPHILS # BLD AUTO: 0.8 % (ref 0–1.8)
BASOPHILS # BLD: 0.06 K/UL (ref 0–0.12)
BILIRUB SERPL-MCNC: 0.5 MG/DL (ref 0.1–1.5)
BILIRUB UR QL STRIP.AUTO: NEGATIVE
BUN SERPL-MCNC: 24 MG/DL (ref 8–22)
CALCIUM SERPL-MCNC: 9.5 MG/DL (ref 8.5–10.5)
CHLORIDE SERPL-SCNC: 103 MMOL/L (ref 96–112)
CHOLEST SERPL-MCNC: 136 MG/DL (ref 100–199)
CO2 SERPL-SCNC: 29 MMOL/L (ref 20–33)
COLOR UR: YELLOW
CREAT SERPL-MCNC: 1.28 MG/DL (ref 0.5–1.4)
CREAT UR-MCNC: 84.7 MG/DL
EOSINOPHIL # BLD AUTO: 0.19 K/UL (ref 0–0.51)
EOSINOPHIL NFR BLD: 2.4 % (ref 0–6.9)
EPI CELLS #/AREA URNS HPF: NEGATIVE /HPF
ERYTHROCYTE [DISTWIDTH] IN BLOOD BY AUTOMATED COUNT: 44 FL (ref 35.9–50)
EST. AVERAGE GLUCOSE BLD GHB EST-MCNC: 212 MG/DL
FASTING STATUS PATIENT QL REPORTED: NORMAL
FERRITIN SERPL-MCNC: 80.4 NG/ML (ref 10–291)
GLOBULIN SER CALC-MCNC: 3.3 G/DL (ref 1.9–3.5)
GLUCOSE SERPL-MCNC: 183 MG/DL (ref 65–99)
GLUCOSE UR STRIP.AUTO-MCNC: NEGATIVE MG/DL
HBA1C MFR BLD: 9 % (ref 0–5.6)
HCT VFR BLD AUTO: 38.9 % (ref 37–47)
HDLC SERPL-MCNC: 48 MG/DL
HGB BLD-MCNC: 12.8 G/DL (ref 12–16)
HYALINE CASTS #/AREA URNS LPF: NORMAL /LPF
IMM GRANULOCYTES # BLD AUTO: 0.02 K/UL (ref 0–0.11)
IMM GRANULOCYTES NFR BLD AUTO: 0.3 % (ref 0–0.9)
IRON SATN MFR SERPL: 19 % (ref 15–55)
IRON SERPL-MCNC: 57 UG/DL (ref 40–170)
KETONES UR STRIP.AUTO-MCNC: NEGATIVE MG/DL
LDLC SERPL CALC-MCNC: 72 MG/DL
LEUKOCYTE ESTERASE UR QL STRIP.AUTO: ABNORMAL
LYMPHOCYTES # BLD AUTO: 2.04 K/UL (ref 1–4.8)
LYMPHOCYTES NFR BLD: 26.1 % (ref 22–41)
MAGNESIUM SERPL-MCNC: 2.3 MG/DL (ref 1.5–2.5)
MCH RBC QN AUTO: 30.6 PG (ref 27–33)
MCHC RBC AUTO-ENTMCNC: 32.9 G/DL (ref 33.6–35)
MCV RBC AUTO: 93.1 FL (ref 81.4–97.8)
MICRO URNS: ABNORMAL
MONOCYTES # BLD AUTO: 0.54 K/UL (ref 0–0.85)
MONOCYTES NFR BLD AUTO: 6.9 % (ref 0–13.4)
NEUTROPHILS # BLD AUTO: 4.98 K/UL (ref 2–7.15)
NEUTROPHILS NFR BLD: 63.5 % (ref 44–72)
NITRITE UR QL STRIP.AUTO: NEGATIVE
NRBC # BLD AUTO: 0 K/UL
NRBC BLD-RTO: 0 /100 WBC
PH UR STRIP.AUTO: 6.5 [PH]
PHOSPHATE SERPL-MCNC: 3.7 MG/DL (ref 2.5–4.5)
PLATELET # BLD AUTO: 188 K/UL (ref 164–446)
PMV BLD AUTO: 12.4 FL (ref 9–12.9)
POTASSIUM SERPL-SCNC: 4 MMOL/L (ref 3.6–5.5)
PROT SERPL-MCNC: 7.1 G/DL (ref 6–8.2)
PROT UR QL STRIP: NEGATIVE MG/DL
PROT UR-MCNC: 8.7 MG/DL (ref 0–15)
PTH-INTACT SERPL-MCNC: 195.5 PG/ML (ref 14–72)
RBC # BLD AUTO: 4.18 M/UL (ref 4.2–5.4)
RBC # URNS HPF: NORMAL /HPF
RBC UR QL AUTO: NEGATIVE
SODIUM SERPL-SCNC: 138 MMOL/L (ref 135–145)
SP GR UR STRIP.AUTO: 1.01
TIBC SERPL-MCNC: 302 UG/DL (ref 250–450)
TRIGL SERPL-MCNC: 79 MG/DL (ref 0–149)
URATE SERPL-MCNC: 6.9 MG/DL (ref 1.9–8.2)
UROBILINOGEN UR STRIP.AUTO-MCNC: 0.2 MG/DL
WBC # BLD AUTO: 7.8 K/UL (ref 4.8–10.8)
WBC #/AREA URNS HPF: NORMAL /HPF

## 2019-01-12 PROCEDURE — 81001 URINALYSIS AUTO W/SCOPE: CPT

## 2019-01-12 PROCEDURE — 84156 ASSAY OF PROTEIN URINE: CPT

## 2019-01-12 PROCEDURE — 83735 ASSAY OF MAGNESIUM: CPT

## 2019-01-12 PROCEDURE — 83550 IRON BINDING TEST: CPT

## 2019-01-12 PROCEDURE — 80061 LIPID PANEL: CPT

## 2019-01-12 PROCEDURE — 85025 COMPLETE CBC W/AUTO DIFF WBC: CPT

## 2019-01-12 PROCEDURE — 82570 ASSAY OF URINE CREATININE: CPT

## 2019-01-12 PROCEDURE — 84550 ASSAY OF BLOOD/URIC ACID: CPT

## 2019-01-12 PROCEDURE — 82728 ASSAY OF FERRITIN: CPT

## 2019-01-12 PROCEDURE — 87086 URINE CULTURE/COLONY COUNT: CPT

## 2019-01-12 PROCEDURE — 83970 ASSAY OF PARATHORMONE: CPT

## 2019-01-12 PROCEDURE — 82306 VITAMIN D 25 HYDROXY: CPT

## 2019-01-12 PROCEDURE — 83036 HEMOGLOBIN GLYCOSYLATED A1C: CPT

## 2019-01-12 PROCEDURE — 84100 ASSAY OF PHOSPHORUS: CPT

## 2019-01-12 PROCEDURE — 83540 ASSAY OF IRON: CPT

## 2019-01-12 PROCEDURE — 36415 COLL VENOUS BLD VENIPUNCTURE: CPT

## 2019-01-12 PROCEDURE — 80053 COMPREHEN METABOLIC PANEL: CPT

## 2019-01-14 LAB
BACTERIA UR CULT: NORMAL
SIGNIFICANT IND 70042: NORMAL
SITE SITE: NORMAL
SOURCE SOURCE: NORMAL

## 2019-01-31 LAB — EKG IMPRESSION: NORMAL

## 2019-03-07 ENCOUNTER — TELEPHONE (OUTPATIENT)
Dept: CARDIOLOGY | Facility: MEDICAL CENTER | Age: 74
End: 2019-03-07

## 2019-03-08 NOTE — TELEPHONE ENCOUNTER
Spoke with patient about her upcoming appointment with SW 3-. Patient swathi spaced having to get the Echo done due to her going in to get a PPM placed with Dr. Cook. Informed patient I will let DARION know about what happen. Patient confirmed appointment day and time.

## 2019-03-11 ENCOUNTER — OFFICE VISIT (OUTPATIENT)
Dept: CARDIOLOGY | Facility: MEDICAL CENTER | Age: 74
End: 2019-03-11
Payer: MEDICARE

## 2019-03-11 VITALS
WEIGHT: 213 LBS | BODY MASS INDEX: 33.43 KG/M2 | OXYGEN SATURATION: 93 % | SYSTOLIC BLOOD PRESSURE: 132 MMHG | DIASTOLIC BLOOD PRESSURE: 62 MMHG | HEIGHT: 67 IN | HEART RATE: 86 BPM

## 2019-03-11 DIAGNOSIS — I05.0 MITRAL VALVE STENOSIS, UNSPECIFIED ETIOLOGY: ICD-10-CM

## 2019-03-11 DIAGNOSIS — I27.20 PULMONARY HYPERTENSION (HCC): Chronic | ICD-10-CM

## 2019-03-11 DIAGNOSIS — I50.30 DIASTOLIC CHF DUE TO VALVULAR DISEASE (HCC): ICD-10-CM

## 2019-03-11 DIAGNOSIS — I10 ESSENTIAL HYPERTENSION, BENIGN: ICD-10-CM

## 2019-03-11 DIAGNOSIS — Z95.0 CARDIAC PACEMAKER: ICD-10-CM

## 2019-03-11 DIAGNOSIS — I38 DIASTOLIC CHF DUE TO VALVULAR DISEASE (HCC): ICD-10-CM

## 2019-03-11 PROCEDURE — 99214 OFFICE O/P EST MOD 30 MIN: CPT | Performed by: INTERNAL MEDICINE

## 2019-03-11 RX ORDER — BLOOD-GLUCOSE METER
KIT MISCELLANEOUS
Refills: 12 | COMMUNITY
Start: 2019-01-25

## 2019-03-11 RX ORDER — ROSUVASTATIN CALCIUM 5 MG/1
TABLET, COATED ORAL
Refills: 5 | COMMUNITY
Start: 2019-02-16 | End: 2020-07-23 | Stop reason: SDUPTHER

## 2019-03-11 ASSESSMENT — ENCOUNTER SYMPTOMS
DIZZINESS: 1
SHORTNESS OF BREATH: 0
COUGH: 0
MYALGIAS: 0
PALPITATIONS: 0
LOSS OF CONSCIOUSNESS: 0

## 2019-03-11 NOTE — PROGRESS NOTES
"Chief Complaint   Patient presents with   •  CHF related to mitral stenosis.            Subjective:   Ophelia Sosa is a 73 y.o. female who presents today for followup evaluation of near syncope, aortic stenosis, diastolic CHF and hypertension.     Last seen on 9/5/2018.    Since 9/5/2018 the patient developed advanced AV block required a permanent pacemaker on 11/18/2018.  Since the patient is developed orthostatic hypotension.  Dr. Rosas, her nephrologist has adjusted antihypertensive therapy and normalized her blood pressure.  Now she is having positional lightheadedness however he is aware of this and have mutually decided to continue current medications and for her to be cautious about any abrupt positional changes.  Pacemaker is functioning normally.    Since 2/20/2018 the patient had no cardiac symptoms.  She was restarted on losartan 50 mg daily by Dr. Phan Rosas, nephrologist.  She has developed vertigo.  MRI showed no specific evidence of stroke.  She has no heart failure symptoms.     Since 10/5/2017 appointment the patient has gotten better.  At her last appointment she was evaluated for near syncope.  She closely monitored her blood pressure and had low recordings below 100.  Her Holter monitor was unremarkable for an explanation of her near-syncope.  In January, 2018 she discontinue losartan.  Her near syncopal episodes completely resolved.  She has continued to monitor her blood pressure which has been consistently normal-see media for blood pressure log.   She states that she has \"white coat syndrome\" for years.  She continues to have problems with her balance but is thankful that \"I'm not almost passing out all the time\".  She was diagnosed with \"congestive heart failure many years ago followed by Dr. Temple on chronic Lasix therapy which she continues.  She has no active heart failure symptoms.     Since 8/18/2016 appointment the patient has had episodes of increased fatigue and " "lightheadedness with near syncope while at the grocery store by the time she is finished and checking out.  She states that her blood pressure has dropped at times to the 80s when she is sitting in her recliner.  No loss of consciousness.  She's lost \"52 pounds\" over the past year and a half because of chronic dental problems requiring teeth extractions.  Her diabetes and blood pressure labile with levels down to the 60s. Her glycohemoglobin is over 9%.  She was to get on Holter monitor last year but didn't feel well.     Past medical history  1-2 months ago the patient began having head congestion and treated for sinus infection by her PCP Dr. Roberta Hurd  She had 2 courses of antibiotics.  She's had chronic balance problems that have worsened.  She's had positional lightheadedness.  Dr. Hurd 1st discontinue losartan and then Lasix.  She continued to have problems and was hospitalized on 7/23/2016 after she passed out at home due to hypoglycemia.  Blood sugar was 49.     Was seen by PMA.  On CPAP.     Past medical history  Follow for systolic murmur.  Has other coronary risk factors being addressed by other physicians.  No cardiac symptoms.  Has problems with Charcot joint/foot.    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   • Sleep apnea      Past Surgical History:   Procedure Laterality Date   • BREAST BIOPSY     • GYN SURGERY  hysterectomy   • MITRAL VALVE REPLACE     • OTHER ABDOMINAL SURGERY     • OTHER " ORTHOPEDIC SURGERY     • TONSILLECTOMY Bilateral      Family History   Problem Relation Age of Onset   • Hypertension Mother    • Heart Disease Mother    • Diabetes Father    • Hypertension Father    • Diabetes Maternal Grandmother    • Diabetes Paternal Grandfather      Social History     Social History   • Marital status: Single     Spouse name: N/A   • Number of children: N/A   • Years of education: N/A     Occupational History   • Not on file.     Social History Main Topics   • Smoking status: Former Smoker     Packs/day: 1.50     Quit date: 11/2/2012   • Smokeless tobacco: Never Used   • Alcohol use No   • Drug use: No   • Sexual activity: Not on file     Other Topics Concern   • Not on file     Social History Narrative   • No narrative on file     Allergies   Allergen Reactions   • Ativan      DELIRIUM, CONFUSION.   • Advair [Fluticasone-Salmeterol]    • Ambien [Zolpidem Tartrate]    • Erythromycin Vomiting   • Lipitor [Atorvastatin Calcium]    • Pcn [Penicillins] Hives   • Pravachol [Pravastatin Sodium]      ADVERSE REACTION.     Outpatient Encounter Prescriptions as of 3/11/2019   Medication Sig Dispense Refill   • rosuvastatin (CRESTOR) 5 MG Tab TAKE ONE TABLET AT BEDTIME FOR CHOLESTEROL. TAKE THREE TIMES WEEKLY  5   • furosemide (LASIX) 40 MG Tab Take 40 mg by mouth every evening.     • potassium chloride (KLOR-CON) 8 MEQ tablet Take 8 mEq by mouth every evening.     • Cholecalciferol (VITAMIN D) 2000 UNIT Tab Take 6,000 Units by mouth 3 times a week. M,W,F     • escitalopram (LEXAPRO) 10 MG Tab Take 10 mg by mouth every morning.     • HYDROcodone-acetaminophen (NORCO) 7.5-325 MG per tablet TAKE 1/2 TO 1 TABLET BY MOUTH AS NEEDED  0   • fluticasone (FLONASE) 50 MCG/ACT nasal spray Spray 1 Spray in nose 1 time daily as needed.     • losartan (COZAAR) 50 MG TABS Take 50 mg by mouth every day.     • glimepiride (AMARYL) 4 MG TABS Take 4 mg by mouth 2 times a day. Night time dose at 1700     • FREESTYLE LITE  "strip TEST BLOOD SUGAR 3 TIMES DAILY  12   • albuterol 108 (90 Base) MCG/ACT Aero Soln inhalation aerosol Inhale 2 Puffs by mouth every 6 hours as needed for Shortness of Breath. (Patient not taking: Reported on 3/11/2019) 8.5 g 1   • rosuvastatin (CRESTOR) 5 MG TABS Take 5 mg by mouth 3 times a week. M, W, F       No facility-administered encounter medications on file as of 3/11/2019.      Review of Systems   Respiratory: Negative for cough and shortness of breath.    Cardiovascular: Negative for chest pain and palpitations.   Musculoskeletal: Negative for myalgias.   Neurological: Positive for dizziness. Negative for loss of consciousness.        Objective:   /62 (BP Location: Left arm, Patient Position: Sitting, BP Cuff Size: Adult)   Pulse 86   Ht 1.702 m (5' 7\")   Wt 96.6 kg (213 lb)   SpO2 93%   BMI 33.36 kg/m²     Physical Exam   Constitutional: She is oriented to person, place, and time. She appears well-developed and well-nourished. No distress.   Walks with the assistance of a cane.   Neck: No JVD present.   Cardiovascular: Normal rate, regular rhythm and intact distal pulses.  Exam reveals no gallop and no friction rub.    Murmur heard.  Pulmonary/Chest: Effort normal and breath sounds normal. No respiratory distress. She has no wheezes. She has no rales.   Pulse generator.    Abdominal:   Protuberant.   Musculoskeletal: She exhibits no edema.   Neurological: She is alert and oriented to person, place, and time.   Skin: Skin is warm and dry.   Psychiatric: She has a normal mood and affect. Her behavior is normal.     11/05/2012 ECHOCARDIOGRAM  EF 70%.  Mild tricuspid regurgitation. Right ventricular systolic pressure is   estimated to be 30 mmHg.  Mitral annular calcification. Thickened mitral valve leaflets. Mild   mitral stenosis. Mild mitral regurgitation.  Thickened aortic valve leaflets. Aortic annular calcification. Mild   aortic stenosis.  Moderate tricuspid regurgitation. Right " ventricular systolic pressure   is estimated to be 57 mmhg.  No prior study available for comparison     09/28/2016 ECHOCARDIOGRAM  Normal left ventricular size and systolic function.  Severe concentric left ventricular hypertrophy.  Left ventricular ejection fraction is visually estimated to be 70%.  Diffuse severe mitral annular calcification.  Thickened mitral valve leaflets: Mean transvalvular gradient is 8  mmHg   at a heart rate of  75BPM; Mitral valve area 2.7 cm2.  Aortic sclerosis without stenosis.  Estimated right ventricular systolic pressure  is 55 mmHg.  Right heart pressures are consistent with moderate pulmonary   hypertension.  Mildly dilated right ventricle.  Normal right ventricular systolic function      10/25/2017 ECHOCARDIOGRAM  Normal left ventricular systolic function.  Left ventricular ejection fraction is visually estimated to be 65%.  Moderate concentric left ventricular hypertrophy.  Dense diffuse mitral annular calcification with thickened mitral valve   leaflets.  Mild to moderate mitral stenosis.  Right heart pressures are consistent with moderate pulmonary   hypertension.  Aortic sclerosis without stenosis.    1. Age-related cerebral atrophy.    2. Minimal periventricular white matter changes consistent with chronic microvascular ischemic gliosis.    Assessment:     1. Mitral valve stenosis, unspecified etiology  EC-ECHOCARDIOGRAM COMPLETE W/O CONT   2. Diastolic CHF due to valvular disease (HCC)  EC-ECHOCARDIOGRAM COMPLETE W/O CONT   3. Pulmonary hypertension (HCC)     4. Cardiac pacemaker     5. Essential hypertension, benign         Medical Decision Making:  Today's Assessment / Status / Plan:   Mitral stenosis.     Diastolic and valvular CHF. Compensated. Diagnosed 2004.     Permanent pacemaker.  11/18/2018    Pulmonary hypertension.     Hypertension.      Dyslipidemia.  On Crestor.    Left anterior hemiblock/right bundle branch block new para diabetes mellitus. Poorly  controlled.     Sleep apnea. Untreated. Not on CPAP.     Infection times sinus infection ×2.     Balance problems. Significant.  Vertigo.     Syncope related to hypoglycemia 7/23/2016 at the [facility].     History of lightheadedness. Required previous discontinuation Lasix and losartan.     Dental extractions.     Weight loss.     Recommendation and Recommendation  1.  The patient is stable from a cardiac standpoint related to her mitral stenosis and pulmonary hypertension.  2.  Pacemaker is functioning normally.  3.  Blood pressure is controlled.  4.  Reviewed lipid panel 1/12/2019 is normal.  5.  Follow-up echocardiogram.  6.  Follow-up 6 months.

## 2019-03-12 ENCOUNTER — HOSPITAL ENCOUNTER (OUTPATIENT)
Dept: LAB | Facility: MEDICAL CENTER | Age: 74
End: 2019-03-12
Attending: INTERNAL MEDICINE
Payer: MEDICARE

## 2019-03-12 LAB
ALBUMIN SERPL BCP-MCNC: 3.8 G/DL (ref 3.2–4.9)
ALBUMIN/GLOB SERPL: 1.4 G/DL
ALP SERPL-CCNC: 71 U/L (ref 30–99)
ALT SERPL-CCNC: 14 U/L (ref 2–50)
ANION GAP SERPL CALC-SCNC: 9 MMOL/L (ref 0–11.9)
AST SERPL-CCNC: 21 U/L (ref 12–45)
BILIRUB SERPL-MCNC: 0.5 MG/DL (ref 0.1–1.5)
BUN SERPL-MCNC: 19 MG/DL (ref 8–22)
CALCIUM SERPL-MCNC: 9.6 MG/DL (ref 8.5–10.5)
CHLORIDE SERPL-SCNC: 104 MMOL/L (ref 96–112)
CO2 SERPL-SCNC: 28 MMOL/L (ref 20–33)
CREAT SERPL-MCNC: 1.35 MG/DL (ref 0.5–1.4)
EST. AVERAGE GLUCOSE BLD GHB EST-MCNC: 203 MG/DL
GLOBULIN SER CALC-MCNC: 2.8 G/DL (ref 1.9–3.5)
GLUCOSE SERPL-MCNC: 115 MG/DL (ref 65–99)
HBA1C MFR BLD: 8.7 % (ref 0–5.6)
POTASSIUM SERPL-SCNC: 3.8 MMOL/L (ref 3.6–5.5)
PROT SERPL-MCNC: 6.6 G/DL (ref 6–8.2)
SODIUM SERPL-SCNC: 141 MMOL/L (ref 135–145)
T4 FREE SERPL-MCNC: 1.05 NG/DL (ref 0.53–1.43)
TSH SERPL DL<=0.005 MIU/L-ACNC: 0.65 UIU/ML (ref 0.38–5.33)

## 2019-03-12 PROCEDURE — 83036 HEMOGLOBIN GLYCOSYLATED A1C: CPT

## 2019-03-12 PROCEDURE — 80053 COMPREHEN METABOLIC PANEL: CPT

## 2019-03-12 PROCEDURE — 84443 ASSAY THYROID STIM HORMONE: CPT

## 2019-03-12 PROCEDURE — 36415 COLL VENOUS BLD VENIPUNCTURE: CPT

## 2019-03-12 PROCEDURE — 84439 ASSAY OF FREE THYROXINE: CPT

## 2019-04-13 ENCOUNTER — HOSPITAL ENCOUNTER (OUTPATIENT)
Dept: LAB | Facility: MEDICAL CENTER | Age: 74
End: 2019-04-13
Attending: INTERNAL MEDICINE
Payer: MEDICARE

## 2019-04-13 LAB
25(OH)D3 SERPL-MCNC: 24 NG/ML (ref 30–100)
ALBUMIN SERPL BCP-MCNC: 3.8 G/DL (ref 3.2–4.9)
ALBUMIN/GLOB SERPL: 1.4 G/DL
ALP SERPL-CCNC: 67 U/L (ref 30–99)
ALT SERPL-CCNC: 12 U/L (ref 2–50)
ANION GAP SERPL CALC-SCNC: 9 MMOL/L (ref 0–11.9)
APPEARANCE UR: CLEAR
AST SERPL-CCNC: 16 U/L (ref 12–45)
BACTERIA #/AREA URNS HPF: NEGATIVE /HPF
BASOPHILS # BLD AUTO: 0.8 % (ref 0–1.8)
BASOPHILS # BLD: 0.06 K/UL (ref 0–0.12)
BILIRUB SERPL-MCNC: 0.7 MG/DL (ref 0.1–1.5)
BILIRUB UR QL STRIP.AUTO: NEGATIVE
BUN SERPL-MCNC: 21 MG/DL (ref 8–22)
CALCIUM SERPL-MCNC: 9.2 MG/DL (ref 8.5–10.5)
CHLORIDE SERPL-SCNC: 102 MMOL/L (ref 96–112)
CHOLEST SERPL-MCNC: 137 MG/DL (ref 100–199)
CO2 SERPL-SCNC: 29 MMOL/L (ref 20–33)
COLOR UR: YELLOW
CREAT SERPL-MCNC: 1.25 MG/DL (ref 0.5–1.4)
CREAT UR-MCNC: 96.2 MG/DL
EOSINOPHIL # BLD AUTO: 0.2 K/UL (ref 0–0.51)
EOSINOPHIL NFR BLD: 2.8 % (ref 0–6.9)
EPI CELLS #/AREA URNS HPF: ABNORMAL /HPF
ERYTHROCYTE [DISTWIDTH] IN BLOOD BY AUTOMATED COUNT: 43.3 FL (ref 35.9–50)
EST. AVERAGE GLUCOSE BLD GHB EST-MCNC: 203 MG/DL
GLOBULIN SER CALC-MCNC: 2.8 G/DL (ref 1.9–3.5)
GLUCOSE SERPL-MCNC: 251 MG/DL (ref 65–99)
GLUCOSE UR STRIP.AUTO-MCNC: 100 MG/DL
HBA1C MFR BLD: 8.7 % (ref 0–5.6)
HCT VFR BLD AUTO: 40.2 % (ref 37–47)
HDLC SERPL-MCNC: 38 MG/DL
HGB BLD-MCNC: 12.7 G/DL (ref 12–16)
IMM GRANULOCYTES # BLD AUTO: 0.02 K/UL (ref 0–0.11)
IMM GRANULOCYTES NFR BLD AUTO: 0.3 % (ref 0–0.9)
KETONES UR STRIP.AUTO-MCNC: NEGATIVE MG/DL
LDLC SERPL CALC-MCNC: 75 MG/DL
LEUKOCYTE ESTERASE UR QL STRIP.AUTO: ABNORMAL
LYMPHOCYTES # BLD AUTO: 1.71 K/UL (ref 1–4.8)
LYMPHOCYTES NFR BLD: 23.6 % (ref 22–41)
MAGNESIUM SERPL-MCNC: 2.1 MG/DL (ref 1.5–2.5)
MCH RBC QN AUTO: 30 PG (ref 27–33)
MCHC RBC AUTO-ENTMCNC: 31.6 G/DL (ref 33.6–35)
MCV RBC AUTO: 94.8 FL (ref 81.4–97.8)
MICRO URNS: ABNORMAL
MONOCYTES # BLD AUTO: 0.51 K/UL (ref 0–0.85)
MONOCYTES NFR BLD AUTO: 7 % (ref 0–13.4)
NEUTROPHILS # BLD AUTO: 4.74 K/UL (ref 2–7.15)
NEUTROPHILS NFR BLD: 65.5 % (ref 44–72)
NITRITE UR QL STRIP.AUTO: NEGATIVE
NRBC # BLD AUTO: 0 K/UL
NRBC BLD-RTO: 0 /100 WBC
PH UR STRIP.AUTO: 6 [PH]
PHOSPHATE SERPL-MCNC: 3.4 MG/DL (ref 2.5–4.5)
PLATELET # BLD AUTO: 189 K/UL (ref 164–446)
PMV BLD AUTO: 12.5 FL (ref 9–12.9)
POTASSIUM SERPL-SCNC: 4.2 MMOL/L (ref 3.6–5.5)
PROT SERPL-MCNC: 6.6 G/DL (ref 6–8.2)
PROT UR QL STRIP: NEGATIVE MG/DL
PROT UR-MCNC: 10.7 MG/DL (ref 0–15)
PTH-INTACT SERPL-MCNC: 213.5 PG/ML (ref 14–72)
RBC # BLD AUTO: 4.24 M/UL (ref 4.2–5.4)
RBC # URNS HPF: ABNORMAL /HPF
RBC UR QL AUTO: NEGATIVE
SODIUM SERPL-SCNC: 140 MMOL/L (ref 135–145)
SP GR UR STRIP.AUTO: 1.01
TRIGL SERPL-MCNC: 121 MG/DL (ref 0–149)
URATE SERPL-MCNC: 7.4 MG/DL (ref 1.9–8.2)
UROBILINOGEN UR STRIP.AUTO-MCNC: 0.2 MG/DL
WBC # BLD AUTO: 7.2 K/UL (ref 4.8–10.8)
WBC #/AREA URNS HPF: ABNORMAL /HPF

## 2019-04-13 PROCEDURE — 81001 URINALYSIS AUTO W/SCOPE: CPT

## 2019-04-13 PROCEDURE — 84550 ASSAY OF BLOOD/URIC ACID: CPT

## 2019-04-13 PROCEDURE — 82306 VITAMIN D 25 HYDROXY: CPT

## 2019-04-13 PROCEDURE — 85025 COMPLETE CBC W/AUTO DIFF WBC: CPT

## 2019-04-13 PROCEDURE — 83036 HEMOGLOBIN GLYCOSYLATED A1C: CPT

## 2019-04-13 PROCEDURE — 84100 ASSAY OF PHOSPHORUS: CPT

## 2019-04-13 PROCEDURE — 36415 COLL VENOUS BLD VENIPUNCTURE: CPT

## 2019-04-13 PROCEDURE — 80061 LIPID PANEL: CPT

## 2019-04-13 PROCEDURE — 82570 ASSAY OF URINE CREATININE: CPT

## 2019-04-13 PROCEDURE — 83735 ASSAY OF MAGNESIUM: CPT

## 2019-04-13 PROCEDURE — 80053 COMPREHEN METABOLIC PANEL: CPT

## 2019-04-13 PROCEDURE — 84156 ASSAY OF PROTEIN URINE: CPT

## 2019-04-13 PROCEDURE — 83970 ASSAY OF PARATHORMONE: CPT

## 2019-05-24 ENCOUNTER — HOSPITAL ENCOUNTER (OUTPATIENT)
Dept: CARDIOLOGY | Facility: MEDICAL CENTER | Age: 74
End: 2019-05-24
Attending: INTERNAL MEDICINE
Payer: MEDICARE

## 2019-05-24 DIAGNOSIS — I05.0 MITRAL VALVE STENOSIS, UNSPECIFIED ETIOLOGY: ICD-10-CM

## 2019-05-24 DIAGNOSIS — I50.30 DIASTOLIC CHF DUE TO VALVULAR DISEASE (HCC): ICD-10-CM

## 2019-05-24 DIAGNOSIS — I38 DIASTOLIC CHF DUE TO VALVULAR DISEASE (HCC): ICD-10-CM

## 2019-05-24 PROCEDURE — 93306 TTE W/DOPPLER COMPLETE: CPT

## 2019-05-24 PROCEDURE — 93306 TTE W/DOPPLER COMPLETE: CPT | Mod: 26 | Performed by: INTERNAL MEDICINE

## 2019-05-25 LAB
LV EJECT FRACT  99904: 65
LV EJECT FRACT MOD 2C 99903: 55.15
LV EJECT FRACT MOD 4C 99902: 60.87
LV EJECT FRACT MOD BP 99901: 60.28

## 2019-06-15 ENCOUNTER — APPOINTMENT (OUTPATIENT)
Dept: LAB | Facility: MEDICAL CENTER | Age: 74
End: 2019-06-15
Payer: MEDICARE

## 2019-06-17 ENCOUNTER — HOSPITAL ENCOUNTER (OUTPATIENT)
Dept: LAB | Facility: MEDICAL CENTER | Age: 74
End: 2019-06-17
Attending: INTERNAL MEDICINE
Payer: MEDICARE

## 2019-06-17 LAB
ALBUMIN SERPL BCP-MCNC: 3.7 G/DL (ref 3.2–4.9)
ALBUMIN/GLOB SERPL: 1.3 G/DL
ALP SERPL-CCNC: 67 U/L (ref 30–99)
ALT SERPL-CCNC: 10 U/L (ref 2–50)
ANION GAP SERPL CALC-SCNC: 8 MMOL/L (ref 0–11.9)
AST SERPL-CCNC: 15 U/L (ref 12–45)
BILIRUB SERPL-MCNC: 0.4 MG/DL (ref 0.1–1.5)
BUN SERPL-MCNC: 25 MG/DL (ref 8–22)
CALCIUM SERPL-MCNC: 9.6 MG/DL (ref 8.5–10.5)
CHLORIDE SERPL-SCNC: 102 MMOL/L (ref 96–112)
CO2 SERPL-SCNC: 31 MMOL/L (ref 20–33)
CREAT SERPL-MCNC: 1.46 MG/DL (ref 0.5–1.4)
GLOBULIN SER CALC-MCNC: 2.8 G/DL (ref 1.9–3.5)
GLUCOSE SERPL-MCNC: 216 MG/DL (ref 65–99)
POTASSIUM SERPL-SCNC: 4.2 MMOL/L (ref 3.6–5.5)
PROT SERPL-MCNC: 6.5 G/DL (ref 6–8.2)
SODIUM SERPL-SCNC: 141 MMOL/L (ref 135–145)

## 2019-06-17 PROCEDURE — 83036 HEMOGLOBIN GLYCOSYLATED A1C: CPT

## 2019-06-17 PROCEDURE — 36415 COLL VENOUS BLD VENIPUNCTURE: CPT

## 2019-06-17 PROCEDURE — 80053 COMPREHEN METABOLIC PANEL: CPT

## 2019-06-18 LAB
EST. AVERAGE GLUCOSE BLD GHB EST-MCNC: 194 MG/DL
HBA1C MFR BLD: 8.4 % (ref 0–5.6)

## 2019-07-05 ENCOUNTER — OFFICE VISIT (OUTPATIENT)
Dept: CARDIOLOGY | Facility: MEDICAL CENTER | Age: 74
End: 2019-07-05
Payer: MEDICARE

## 2019-07-05 ENCOUNTER — NON-PROVIDER VISIT (OUTPATIENT)
Dept: CARDIOLOGY | Facility: MEDICAL CENTER | Age: 74
End: 2019-07-05
Payer: MEDICARE

## 2019-07-05 VITALS
HEART RATE: 76 BPM | OXYGEN SATURATION: 95 % | SYSTOLIC BLOOD PRESSURE: 132 MMHG | WEIGHT: 208 LBS | BODY MASS INDEX: 32.65 KG/M2 | DIASTOLIC BLOOD PRESSURE: 60 MMHG | HEIGHT: 67 IN

## 2019-07-05 DIAGNOSIS — E78.2 HYPERLIPIDEMIA, MIXED: ICD-10-CM

## 2019-07-05 DIAGNOSIS — I38 DIASTOLIC CHF DUE TO VALVULAR DISEASE (HCC): ICD-10-CM

## 2019-07-05 DIAGNOSIS — I44.2 COMPLETE HEART BLOCK (HCC): Chronic | ICD-10-CM

## 2019-07-05 DIAGNOSIS — I50.30 DIASTOLIC CHF DUE TO VALVULAR DISEASE (HCC): ICD-10-CM

## 2019-07-05 DIAGNOSIS — I27.20 PULMONARY HYPERTENSION (HCC): Chronic | ICD-10-CM

## 2019-07-05 DIAGNOSIS — Z95.0 CARDIAC PACEMAKER: ICD-10-CM

## 2019-07-05 DIAGNOSIS — R42 ORTHOSTATIC DIZZINESS: ICD-10-CM

## 2019-07-05 DIAGNOSIS — I05.0 MITRAL VALVE STENOSIS, UNSPECIFIED ETIOLOGY: ICD-10-CM

## 2019-07-05 DIAGNOSIS — I10 ESSENTIAL HYPERTENSION, BENIGN: ICD-10-CM

## 2019-07-05 PROCEDURE — 93280 PM DEVICE PROGR EVAL DUAL: CPT | Performed by: INTERNAL MEDICINE

## 2019-07-05 PROCEDURE — 99214 OFFICE O/P EST MOD 30 MIN: CPT | Mod: 25 | Performed by: INTERNAL MEDICINE

## 2019-07-05 ASSESSMENT — ENCOUNTER SYMPTOMS
PALPITATIONS: 0
MYALGIAS: 0
LOSS OF CONSCIOUSNESS: 0
COUGH: 0
SHORTNESS OF BREATH: 0
DIZZINESS: 1

## 2019-07-05 NOTE — PROGRESS NOTES
"Chief Complaint   Patient presents with   •  CHF related to mitral stenosis.            Subjective:   Ophelia Sosa is a 73 y.o. female who presents today for followup evaluation of mitral stenosis, pulmonary hypertension, permanent pacemaker with a history of untreated sleep apnea, diabetes mellitus, hypertension hyperlipidemia.    Last seen on 3/11/2019.    Since 3/11/2019 appointment the patient has had no heart failure symptoms chest pain.  Continues to have orthostatic dizziness.  Continues to have a chronic post medication a.m. sleepiness having to sleep for an hour and a half after her morning medications.  When she does not take it she feels much better.    Since 9/5/2018 the patient developed advanced AV block required a permanent pacemaker on 11/18/2018.  Since the patient is developed orthostatic hypotension.  Dr. Rosas, her nephrologist has adjusted antihypertensive therapy and normalized her blood pressure.  Now she is having positional lightheadedness however he is aware of this and have mutually decided to continue current medications and for her to be cautious about any abrupt positional changes.  Pacemaker is functioning normally.    Since 2/20/2018 the patient had no cardiac symptoms.  She was restarted on losartan 50 mg daily by Dr. Phan Rosas, nephrologist.  She has developed vertigo.  MRI showed no specific evidence of stroke.  She has no heart failure symptoms.     Since 10/5/2017 appointment the patient has gotten better.  At her last appointment she was evaluated for near syncope.  She closely monitored her blood pressure and had low recordings below 100.  Her Holter monitor was unremarkable for an explanation of her near-syncope.  In January, 2018 she discontinue losartan.  Her near syncopal episodes completely resolved.  She has continued to monitor her blood pressure which has been consistently normal-see media for blood pressure log.   She states that she has \"white coat syndrome\" " "for years.  She continues to have problems with her balance but is thankful that \"I'm not almost passing out all the time\".  She was diagnosed with \"congestive heart failure many years ago followed by Dr. Temple on chronic Lasix therapy which she continues.  She has no active heart failure symptoms.     Since 8/18/2016 appointment the patient has had episodes of increased fatigue and lightheadedness with near syncope while at the grocery store by the time she is finished and checking out.  She states that her blood pressure has dropped at times to the 80s when she is sitting in her recliner.  No loss of consciousness.  She's lost \"52 pounds\" over the past year and a half because of chronic dental problems requiring teeth extractions.  Her diabetes and blood pressure labile with levels down to the 60s. Her glycohemoglobin is over 9%.  She was to get on Holter monitor last year but didn't feel well.     Past medical history  1-2 months ago the patient began having head congestion and treated for sinus infection by her PCP Dr. Roberta Hurd  She had 2 courses of antibiotics.  She's had chronic balance problems that have worsened.  She's had positional lightheadedness.  Dr. Hurd 1st discontinue losartan and then Lasix.  She continued to have problems and was hospitalized on 7/23/2016 after she passed out at home due to hypoglycemia.  Blood sugar was 49.     Was seen by PMA.  On CPAP.     Past medical history  Follow for systolic murmur.  Has other coronary risk factors being addressed by other physicians.  No cardiac symptoms.  Has problems with Charcot joint/foot.    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   • Sleep apnea      Past Surgical History:   Procedure Laterality Date   • BREAST BIOPSY     • GYN SURGERY  hysterectomy   • MITRAL VALVE REPLACE     • OTHER ABDOMINAL SURGERY     • OTHER ORTHOPEDIC SURGERY     • TONSILLECTOMY Bilateral      Family History   Problem Relation Age of Onset   • Hypertension Mother    • Heart Disease Mother    • Diabetes Father    • Hypertension Father    • Diabetes Maternal Grandmother    • Diabetes Paternal Grandfather      Social History     Social History   • Marital status: Single     Spouse name: N/A   • Number of children: N/A   • Years of education: N/A     Occupational History   • Not on file.     Social History Main Topics   • Smoking status: Former Smoker     Packs/day: 1.50     Quit date: 11/2/2012   • Smokeless tobacco: Never Used   • Alcohol use No   • Drug use: No   • Sexual activity: Not on file     Other Topics Concern   • Not on file     Social History Narrative   • No narrative on file     Allergies   Allergen Reactions   • Ativan      DELIRIUM, CONFUSION.   • Advair [Fluticasone-Salmeterol]    • Ambien [Zolpidem Tartrate]    • Erythromycin Vomiting   • Lipitor [Atorvastatin Calcium]    • Pcn [Penicillins] Hives   • Pravachol [Pravastatin Sodium]      ADVERSE REACTION.     Outpatient Encounter Prescriptions as of 7/5/2019   Medication Sig Dispense Refill   • rosuvastatin (CRESTOR) 5 MG Tab TAKE ONE TABLET AT BEDTIME FOR CHOLESTEROL. TAKE THREE TIMES WEEKLY  5   • FREESTYLE LITE strip TEST BLOOD SUGAR 3 TIMES DAILY  12   • furosemide (LASIX) 40 MG Tab Take 40 mg by mouth every evening.     • potassium chloride (KLOR-CON) 8 MEQ tablet Take 8 mEq by mouth every evening.     • Cholecalciferol (VITAMIN D) 2000 UNIT Tab Take 6,000 Units by mouth 3 times a week. M,W,F     •  "escitalopram (LEXAPRO) 10 MG Tab Take 10 mg by mouth every morning.     • losartan (COZAAR) 50 MG TABS Take 50 mg by mouth every day.     • glimepiride (AMARYL) 4 MG TABS Take 4 mg by mouth 2 times a day. Night time dose at 1700     • albuterol 108 (90 Base) MCG/ACT Aero Soln inhalation aerosol Inhale 2 Puffs by mouth every 6 hours as needed for Shortness of Breath. (Patient not taking: Reported on 3/11/2019) 8.5 g 1   • HYDROcodone-acetaminophen (NORCO) 7.5-325 MG per tablet TAKE 1/2 TO 1 TABLET BY MOUTH AS NEEDED  0   • fluticasone (FLONASE) 50 MCG/ACT nasal spray Spray 1 Spray in nose 1 time daily as needed.     • rosuvastatin (CRESTOR) 5 MG TABS Take 5 mg by mouth 3 times a week. M, W, F       No facility-administered encounter medications on file as of 7/5/2019.      Review of Systems   Respiratory: Negative for cough and shortness of breath.    Cardiovascular: Negative for chest pain and palpitations.   Musculoskeletal: Negative for myalgias.   Neurological: Positive for dizziness. Negative for loss of consciousness.        Objective:   /60 (BP Location: Left arm, Patient Position: Sitting, BP Cuff Size: Large adult)   Pulse 76   Ht 1.702 m (5' 7\")   Wt 94.3 kg (208 lb)   SpO2 95%   BMI 32.58 kg/m²     Physical Exam   Constitutional: She is oriented to person, place, and time. She appears well-developed and well-nourished. No distress.   Walks with the assistance of a cane.   Neck: No JVD present.   Cardiovascular: Normal rate, regular rhythm and intact distal pulses.  Exam reveals no gallop and no friction rub.    Murmur heard.  Pulmonary/Chest: Effort normal and breath sounds normal. No respiratory distress. She has no wheezes. She has no rales.   Pulse generator.    Abdominal:   Protuberant.   Musculoskeletal: She exhibits no edema.   Neurological: She is alert and oriented to person, place, and time.   Skin: Skin is warm and dry.   Psychiatric: She has a normal mood and affect. Her behavior is " normal.     11/05/2012 ECHOCARDIOGRAM  EF 70%.  Mild tricuspid regurgitation. Right ventricular systolic pressure is   estimated to be 30 mmHg.  Mitral annular calcification. Thickened mitral valve leaflets. Mild   mitral stenosis. Mild mitral regurgitation.  Thickened aortic valve leaflets. Aortic annular calcification. Mild   aortic stenosis.  Moderate tricuspid regurgitation. Right ventricular systolic pressure   is estimated to be 57 mmhg.  No prior study available for comparison     09/28/2016 ECHOCARDIOGRAM  Normal left ventricular size and systolic function.  Severe concentric left ventricular hypertrophy.  Left ventricular ejection fraction is visually estimated to be 70%.  Diffuse severe mitral annular calcification.  Thickened mitral valve leaflets: Mean transvalvular gradient is 8  mmHg   at a heart rate of  75BPM; Mitral valve area 2.7 cm2.  Aortic sclerosis without stenosis.  Estimated right ventricular systolic pressure  is 55 mmHg.  Right heart pressures are consistent with moderate pulmonary   hypertension.  Mildly dilated right ventricle.  Normal right ventricular systolic function      10/25/2017 ECHOCARDIOGRAM  Normal left ventricular systolic function.  Left ventricular ejection fraction is visually estimated to be 65%.  Moderate concentric left ventricular hypertrophy.  Dense diffuse mitral annular calcification with thickened mitral valve   leaflets.  Mild to moderate mitral stenosis.  Right heart pressures are consistent with moderate pulmonary   hypertension.  Aortic sclerosis without stenosis.    ECHOCARDIOGRAM 05/24/2019  Normal left ventricular systolic function.  Left ventricular ejection fraction is visually estimated to be 65%.  Mild mitral stenosis.  Aortic sclerosis without stenosis.  Right heart pressures are consistent with moderate pulmonary   hypertension.  Pacer/ICD wire seen in right ventricle.      BRAIN MRI 07/26/2018  1. Age-related cerebral atrophy.  2. Minimal  periventricular white matter changes consistent with chronic microvascular ischemic gliosis.    Assessment:     1. Mitral valve stenosis, unspecified etiology     2. Diastolic CHF due to valvular disease (HCC)     3. Cardiac pacemaker     4. Pulmonary hypertension (HCC)     5. Essential hypertension, benign     6. Hyperlipidemia, mixed     7. Orthostatic dizziness         Medical Decision Making:  Today's Assessment / Status / Plan:   Mitral stenosis.     Diastolic and valvular CHF. Compensated. Diagnosed 2004.     Permanent pacemaker.  11/18/2018    Pulmonary hypertension.     Hypertension.      Dyslipidemia.  On Crestor.    Orthostatic dizziness    Left anterior hemiblock/right bundle branch block new para diabetes mellitus. Poorly controlled.     Sleep apnea. Untreated. Not on CPAP.     Diabetes mellitus since age 37.    Infection times sinus infection ×2.     Balance problems. Significant.  Vertigo.     Syncope related to hypoglycemia 7/23/2016 at the [facility].     History of lightheadedness. Required previous discontinuation Lasix and losartan.     Dental extractions.     Weight loss.     Orthostatic dizziness.     Recommendation and Recommendation  1.  The patient is stable from a cardiac standpoint related to her mitral stenosis and pulmonary hypertension.  2.  Pacemaker interrogation from today shows pacemaker is functioning normally.  3.  Blood pressure is controlled.  4.  Reviewed lipid panel 4/13/2019 is normal.  5.  Echocardiogram from 2019 reviewed with patient.  6.  Patient may try adjusting her losartan in an attempt to avoid a.m. fatigue and some ambulance and orthostatic lightheadedness.  7.  Has a follow-up appoint with Dr. Rivera in the near future.  8.  Follow-up 6 months.

## 2019-08-15 ENCOUNTER — HOSPITAL ENCOUNTER (OUTPATIENT)
Dept: LAB | Facility: MEDICAL CENTER | Age: 74
End: 2019-08-15
Attending: FAMILY MEDICINE
Payer: MEDICARE

## 2019-08-15 ENCOUNTER — HOSPITAL ENCOUNTER (OUTPATIENT)
Dept: LAB | Facility: MEDICAL CENTER | Age: 74
End: 2019-08-15
Attending: INTERNAL MEDICINE
Payer: MEDICARE

## 2019-08-15 LAB
25(OH)D3 SERPL-MCNC: 43 NG/ML (ref 30–100)
ALBUMIN SERPL BCP-MCNC: 4 G/DL (ref 3.2–4.9)
ALBUMIN/GLOB SERPL: 1.1 G/DL
ALP SERPL-CCNC: 75 U/L (ref 30–99)
ALT SERPL-CCNC: 14 U/L (ref 2–50)
ANION GAP SERPL CALC-SCNC: 7 MMOL/L (ref 0–11.9)
APPEARANCE UR: CLEAR
AST SERPL-CCNC: 13 U/L (ref 12–45)
BACTERIA #/AREA URNS HPF: NEGATIVE /HPF
BASOPHILS # BLD AUTO: 0.6 % (ref 0–1.8)
BASOPHILS # BLD: 0.06 K/UL (ref 0–0.12)
BILIRUB SERPL-MCNC: 0.4 MG/DL (ref 0.1–1.5)
BILIRUB UR QL STRIP.AUTO: NEGATIVE
BUN SERPL-MCNC: 38 MG/DL (ref 8–22)
CALCIUM SERPL-MCNC: 10.1 MG/DL (ref 8.5–10.5)
CHLORIDE SERPL-SCNC: 100 MMOL/L (ref 96–112)
CO2 SERPL-SCNC: 30 MMOL/L (ref 20–33)
COLOR UR: YELLOW
CREAT SERPL-MCNC: 1.56 MG/DL (ref 0.5–1.4)
CREAT UR-MCNC: 116.4 MG/DL
EOSINOPHIL # BLD AUTO: 0.09 K/UL (ref 0–0.51)
EOSINOPHIL NFR BLD: 0.9 % (ref 0–6.9)
EPI CELLS #/AREA URNS HPF: NEGATIVE /HPF
ERYTHROCYTE [DISTWIDTH] IN BLOOD BY AUTOMATED COUNT: 44.2 FL (ref 35.9–50)
EST. AVERAGE GLUCOSE BLD GHB EST-MCNC: 214 MG/DL
GLOBULIN SER CALC-MCNC: 3.5 G/DL (ref 1.9–3.5)
GLUCOSE SERPL-MCNC: 324 MG/DL (ref 65–99)
GLUCOSE UR STRIP.AUTO-MCNC: NEGATIVE MG/DL
HBA1C MFR BLD: 9.1 % (ref 0–5.6)
HCT VFR BLD AUTO: 39.4 % (ref 37–47)
HGB BLD-MCNC: 12.8 G/DL (ref 12–16)
HYALINE CASTS #/AREA URNS LPF: ABNORMAL /LPF
IMM GRANULOCYTES # BLD AUTO: 0.03 K/UL (ref 0–0.11)
IMM GRANULOCYTES NFR BLD AUTO: 0.3 % (ref 0–0.9)
KETONES UR STRIP.AUTO-MCNC: NEGATIVE MG/DL
LEUKOCYTE ESTERASE UR QL STRIP.AUTO: ABNORMAL
LYMPHOCYTES # BLD AUTO: 1.71 K/UL (ref 1–4.8)
LYMPHOCYTES NFR BLD: 17.2 % (ref 22–41)
MAGNESIUM SERPL-MCNC: 2.4 MG/DL (ref 1.5–2.5)
MCH RBC QN AUTO: 30.5 PG (ref 27–33)
MCHC RBC AUTO-ENTMCNC: 32.5 G/DL (ref 33.6–35)
MCV RBC AUTO: 93.8 FL (ref 81.4–97.8)
MICRO URNS: ABNORMAL
MONOCYTES # BLD AUTO: 0.64 K/UL (ref 0–0.85)
MONOCYTES NFR BLD AUTO: 6.4 % (ref 0–13.4)
NEUTROPHILS # BLD AUTO: 7.4 K/UL (ref 2–7.15)
NEUTROPHILS NFR BLD: 74.6 % (ref 44–72)
NITRITE UR QL STRIP.AUTO: NEGATIVE
NRBC # BLD AUTO: 0 K/UL
NRBC BLD-RTO: 0 /100 WBC
PH UR STRIP.AUTO: 5.5 [PH] (ref 5–8)
PHOSPHATE SERPL-MCNC: 4.6 MG/DL (ref 2.5–4.5)
PLATELET # BLD AUTO: 199 K/UL (ref 164–446)
PMV BLD AUTO: 12.3 FL (ref 9–12.9)
POTASSIUM SERPL-SCNC: 4.3 MMOL/L (ref 3.6–5.5)
PROT SERPL-MCNC: 7.5 G/DL (ref 6–8.2)
PROT UR QL STRIP: NEGATIVE MG/DL
PROT UR-MCNC: 12.8 MG/DL (ref 0–15)
PTH-INTACT SERPL-MCNC: 196.6 PG/ML (ref 14–72)
RBC # BLD AUTO: 4.2 M/UL (ref 4.2–5.4)
RBC # URNS HPF: ABNORMAL /HPF
RBC UR QL AUTO: NEGATIVE
SODIUM SERPL-SCNC: 137 MMOL/L (ref 135–145)
SP GR UR STRIP.AUTO: 1.02
TSH SERPL DL<=0.005 MIU/L-ACNC: 1.43 UIU/ML (ref 0.38–5.33)
URATE SERPL-MCNC: 8.8 MG/DL (ref 1.9–8.2)
UROBILINOGEN UR STRIP.AUTO-MCNC: 0.2 MG/DL
WBC # BLD AUTO: 9.9 K/UL (ref 4.8–10.8)
WBC #/AREA URNS HPF: ABNORMAL /HPF

## 2019-08-15 PROCEDURE — 84550 ASSAY OF BLOOD/URIC ACID: CPT

## 2019-08-15 PROCEDURE — 84443 ASSAY THYROID STIM HORMONE: CPT

## 2019-08-15 PROCEDURE — 82570 ASSAY OF URINE CREATININE: CPT

## 2019-08-15 PROCEDURE — 85025 COMPLETE CBC W/AUTO DIFF WBC: CPT

## 2019-08-15 PROCEDURE — 81001 URINALYSIS AUTO W/SCOPE: CPT

## 2019-08-15 PROCEDURE — 36415 COLL VENOUS BLD VENIPUNCTURE: CPT

## 2019-08-15 PROCEDURE — 80053 COMPREHEN METABOLIC PANEL: CPT

## 2019-08-15 PROCEDURE — 83036 HEMOGLOBIN GLYCOSYLATED A1C: CPT

## 2019-08-15 PROCEDURE — 84100 ASSAY OF PHOSPHORUS: CPT

## 2019-08-15 PROCEDURE — 83970 ASSAY OF PARATHORMONE: CPT

## 2019-08-15 PROCEDURE — 83735 ASSAY OF MAGNESIUM: CPT

## 2019-08-15 PROCEDURE — 82306 VITAMIN D 25 HYDROXY: CPT

## 2019-08-15 PROCEDURE — 84156 ASSAY OF PROTEIN URINE: CPT

## 2019-09-14 ENCOUNTER — HOSPITAL ENCOUNTER (OUTPATIENT)
Dept: LAB | Facility: MEDICAL CENTER | Age: 74
End: 2019-09-14
Attending: INTERNAL MEDICINE
Payer: MEDICARE

## 2019-09-14 LAB
ALBUMIN SERPL BCP-MCNC: 4 G/DL (ref 3.2–4.9)
ALBUMIN/GLOB SERPL: 1.4 G/DL
ALP SERPL-CCNC: 64 U/L (ref 30–99)
ALT SERPL-CCNC: 17 U/L (ref 2–50)
ANION GAP SERPL CALC-SCNC: 6 MMOL/L (ref 0–11.9)
AST SERPL-CCNC: 21 U/L (ref 12–45)
BILIRUB SERPL-MCNC: 0.5 MG/DL (ref 0.1–1.5)
BUN SERPL-MCNC: 22 MG/DL (ref 8–22)
CALCIUM SERPL-MCNC: 9.4 MG/DL (ref 8.5–10.5)
CHLORIDE SERPL-SCNC: 100 MMOL/L (ref 96–112)
CO2 SERPL-SCNC: 29 MMOL/L (ref 20–33)
CREAT SERPL-MCNC: 1.24 MG/DL (ref 0.5–1.4)
EST. AVERAGE GLUCOSE BLD GHB EST-MCNC: 206 MG/DL
GLOBULIN SER CALC-MCNC: 2.8 G/DL (ref 1.9–3.5)
GLUCOSE SERPL-MCNC: 355 MG/DL (ref 65–99)
HBA1C MFR BLD: 8.8 % (ref 0–5.6)
POTASSIUM SERPL-SCNC: 4.4 MMOL/L (ref 3.6–5.5)
PROT SERPL-MCNC: 6.8 G/DL (ref 6–8.2)
SODIUM SERPL-SCNC: 135 MMOL/L (ref 135–145)

## 2019-09-14 PROCEDURE — 80053 COMPREHEN METABOLIC PANEL: CPT

## 2019-09-14 PROCEDURE — 83036 HEMOGLOBIN GLYCOSYLATED A1C: CPT

## 2019-09-14 PROCEDURE — 36415 COLL VENOUS BLD VENIPUNCTURE: CPT

## 2019-11-06 ENCOUNTER — HOSPITAL ENCOUNTER (OUTPATIENT)
Dept: LAB | Facility: MEDICAL CENTER | Age: 74
End: 2019-11-06
Attending: INTERNAL MEDICINE
Payer: MEDICARE

## 2019-11-06 LAB
ALBUMIN SERPL BCP-MCNC: 4.1 G/DL (ref 3.2–4.9)
ALBUMIN/GLOB SERPL: 1.4 G/DL
ALP SERPL-CCNC: 79 U/L (ref 30–99)
ALT SERPL-CCNC: 10 U/L (ref 2–50)
ANION GAP SERPL CALC-SCNC: 9 MMOL/L (ref 0–11.9)
APPEARANCE UR: ABNORMAL
AST SERPL-CCNC: 15 U/L (ref 12–45)
BACTERIA #/AREA URNS HPF: ABNORMAL /HPF
BASOPHILS # BLD AUTO: 1.1 % (ref 0–1.8)
BASOPHILS # BLD: 0.07 K/UL (ref 0–0.12)
BILIRUB SERPL-MCNC: 0.4 MG/DL (ref 0.1–1.5)
BILIRUB UR QL STRIP.AUTO: NEGATIVE
BUN SERPL-MCNC: 24 MG/DL (ref 8–22)
CALCIUM SERPL-MCNC: 9.4 MG/DL (ref 8.5–10.5)
CAOX CRY #/AREA URNS HPF: ABNORMAL /HPF
CHLORIDE SERPL-SCNC: 102 MMOL/L (ref 96–112)
CHOLEST SERPL-MCNC: 136 MG/DL (ref 100–199)
CO2 SERPL-SCNC: 27 MMOL/L (ref 20–33)
COLOR UR: YELLOW
CREAT SERPL-MCNC: 1.25 MG/DL (ref 0.5–1.4)
EOSINOPHIL # BLD AUTO: 0.23 K/UL (ref 0–0.51)
EOSINOPHIL NFR BLD: 3.5 % (ref 0–6.9)
EPI CELLS #/AREA URNS HPF: ABNORMAL /HPF
ERYTHROCYTE [DISTWIDTH] IN BLOOD BY AUTOMATED COUNT: 43.3 FL (ref 35.9–50)
GLOBULIN SER CALC-MCNC: 2.9 G/DL (ref 1.9–3.5)
GLUCOSE SERPL-MCNC: 346 MG/DL (ref 65–99)
GLUCOSE UR STRIP.AUTO-MCNC: 500 MG/DL
HCT VFR BLD AUTO: 39.1 % (ref 37–47)
HDLC SERPL-MCNC: 41 MG/DL
HGB BLD-MCNC: 12.3 G/DL (ref 12–16)
HYALINE CASTS #/AREA URNS LPF: ABNORMAL /LPF
IMM GRANULOCYTES # BLD AUTO: 0.02 K/UL (ref 0–0.11)
IMM GRANULOCYTES NFR BLD AUTO: 0.3 % (ref 0–0.9)
KETONES UR STRIP.AUTO-MCNC: NEGATIVE MG/DL
LDLC SERPL CALC-MCNC: 71 MG/DL
LEUKOCYTE ESTERASE UR QL STRIP.AUTO: ABNORMAL
LYMPHOCYTES # BLD AUTO: 1.85 K/UL (ref 1–4.8)
LYMPHOCYTES NFR BLD: 28 % (ref 22–41)
MAGNESIUM SERPL-MCNC: 2.1 MG/DL (ref 1.5–2.5)
MCH RBC QN AUTO: 30 PG (ref 27–33)
MCHC RBC AUTO-ENTMCNC: 31.5 G/DL (ref 33.6–35)
MCV RBC AUTO: 95.4 FL (ref 81.4–97.8)
MICRO URNS: ABNORMAL
MONOCYTES # BLD AUTO: 0.53 K/UL (ref 0–0.85)
MONOCYTES NFR BLD AUTO: 8 % (ref 0–13.4)
NEUTROPHILS # BLD AUTO: 3.9 K/UL (ref 2–7.15)
NEUTROPHILS NFR BLD: 59.1 % (ref 44–72)
NITRITE UR QL STRIP.AUTO: NEGATIVE
NRBC # BLD AUTO: 0 K/UL
NRBC BLD-RTO: 0 /100 WBC
PH UR STRIP.AUTO: 5.5 [PH] (ref 5–8)
PHOSPHATE SERPL-MCNC: 3.4 MG/DL (ref 2.5–4.5)
PLATELET # BLD AUTO: 171 K/UL (ref 164–446)
PMV BLD AUTO: 12.2 FL (ref 9–12.9)
POTASSIUM SERPL-SCNC: 3.9 MMOL/L (ref 3.6–5.5)
PROT SERPL-MCNC: 7 G/DL (ref 6–8.2)
PROT UR QL STRIP: NEGATIVE MG/DL
RBC # BLD AUTO: 4.1 M/UL (ref 4.2–5.4)
RBC # URNS HPF: ABNORMAL /HPF
RBC UR QL AUTO: NEGATIVE
RENAL EPI CELLS #/AREA URNS HPF: ABNORMAL /HPF
SODIUM SERPL-SCNC: 138 MMOL/L (ref 135–145)
SP GR UR STRIP.AUTO: 1.02
TRIGL SERPL-MCNC: 118 MG/DL (ref 0–149)
URATE SERPL-MCNC: 7.8 MG/DL (ref 1.9–8.2)
UROBILINOGEN UR STRIP.AUTO-MCNC: 0.2 MG/DL
WBC # BLD AUTO: 6.6 K/UL (ref 4.8–10.8)
WBC #/AREA URNS HPF: ABNORMAL /HPF

## 2019-11-06 PROCEDURE — 84550 ASSAY OF BLOOD/URIC ACID: CPT

## 2019-11-06 PROCEDURE — 36415 COLL VENOUS BLD VENIPUNCTURE: CPT

## 2019-11-06 PROCEDURE — 81001 URINALYSIS AUTO W/SCOPE: CPT

## 2019-11-06 PROCEDURE — 85025 COMPLETE CBC W/AUTO DIFF WBC: CPT

## 2019-11-06 PROCEDURE — 80053 COMPREHEN METABOLIC PANEL: CPT

## 2019-11-06 PROCEDURE — 84100 ASSAY OF PHOSPHORUS: CPT

## 2019-11-06 PROCEDURE — 83735 ASSAY OF MAGNESIUM: CPT

## 2019-11-06 PROCEDURE — 80061 LIPID PANEL: CPT

## 2019-11-07 LAB
CREAT UR-MCNC: 93.7 MG/DL
PROT UR-MCNC: 16.7 MG/DL (ref 0–15)
PROT/CREAT UR: 178 MG/G (ref 10–107)

## 2019-12-07 ENCOUNTER — HOSPITAL ENCOUNTER (OUTPATIENT)
Dept: LAB | Facility: MEDICAL CENTER | Age: 74
End: 2019-12-07
Attending: INTERNAL MEDICINE
Payer: MEDICARE

## 2019-12-07 LAB
EST. AVERAGE GLUCOSE BLD GHB EST-MCNC: 223 MG/DL
HBA1C MFR BLD: 9.4 % (ref 0–5.6)

## 2019-12-07 PROCEDURE — 36415 COLL VENOUS BLD VENIPUNCTURE: CPT

## 2019-12-07 PROCEDURE — 83036 HEMOGLOBIN GLYCOSYLATED A1C: CPT

## 2019-12-10 ENCOUNTER — TELEPHONE (OUTPATIENT)
Dept: CARDIOLOGY | Facility: MEDICAL CENTER | Age: 74
End: 2019-12-10

## 2019-12-10 NOTE — TELEPHONE ENCOUNTER
Patient had labs completed by her nephrologist.  Explained these labs will suffice for January FV with DARION.  Patient explains she lost her PCP and is waiting to establish with a new PCP which her appointment is out 6 months.  She is on a waiting list for cancellation.  She is asking if medications can be refilled when the time comes.  Explained any cardiac medication can be refilled if needed.  Patient will be asking her other specialty doctors if they can assist too temporarily.  Patient denies any other need or concern.

## 2019-12-10 NOTE — TELEPHONE ENCOUNTER
DARION/gela    Pt calling to ask you to have SW to review all the recent lab work she has done for other physicians which are now in [facility], in preparation for appt with DARION 1/3/20.    Please call Ophelia if questions

## 2020-01-03 ENCOUNTER — OFFICE VISIT (OUTPATIENT)
Dept: CARDIOLOGY | Facility: MEDICAL CENTER | Age: 75
End: 2020-01-03
Payer: MEDICARE

## 2020-01-03 VITALS
OXYGEN SATURATION: 97 % | DIASTOLIC BLOOD PRESSURE: 70 MMHG | HEIGHT: 67 IN | BODY MASS INDEX: 33.59 KG/M2 | WEIGHT: 214 LBS | SYSTOLIC BLOOD PRESSURE: 128 MMHG | HEART RATE: 94 BPM

## 2020-01-03 DIAGNOSIS — I50.32 DIASTOLIC CHF, CHRONIC (HCC): ICD-10-CM

## 2020-01-03 DIAGNOSIS — I50.30 DIASTOLIC CHF DUE TO VALVULAR DISEASE (HCC): ICD-10-CM

## 2020-01-03 DIAGNOSIS — I45.2 RIGHT BUNDLE BRANCH BLOCK (RBBB) PLUS LEFT ANTERIOR (LA) HEMIBLOCK: Chronic | ICD-10-CM

## 2020-01-03 DIAGNOSIS — E78.2 HYPERLIPIDEMIA, MIXED: ICD-10-CM

## 2020-01-03 DIAGNOSIS — I10 ESSENTIAL HYPERTENSION, BENIGN: ICD-10-CM

## 2020-01-03 DIAGNOSIS — I05.0 MITRAL VALVE STENOSIS, UNSPECIFIED ETIOLOGY: ICD-10-CM

## 2020-01-03 DIAGNOSIS — I27.20 PULMONARY HYPERTENSION (HCC): Chronic | ICD-10-CM

## 2020-01-03 DIAGNOSIS — Z95.0 CARDIAC PACEMAKER: ICD-10-CM

## 2020-01-03 DIAGNOSIS — I38 DIASTOLIC CHF DUE TO VALVULAR DISEASE (HCC): ICD-10-CM

## 2020-01-03 PROCEDURE — 99214 OFFICE O/P EST MOD 30 MIN: CPT | Performed by: INTERNAL MEDICINE

## 2020-01-03 RX ORDER — LOSARTAN POTASSIUM 25 MG/1
25 TABLET ORAL 2 TIMES DAILY
COMMUNITY
Start: 2019-12-27

## 2020-01-03 RX ORDER — KETOCONAZOLE 20 MG/G
CREAM TOPICAL
COMMUNITY
Start: 2019-12-04 | End: 2021-06-09

## 2020-01-03 ASSESSMENT — ENCOUNTER SYMPTOMS
PALPITATIONS: 0
COUGH: 0
MYALGIAS: 0
LOSS OF CONSCIOUSNESS: 0
DIZZINESS: 1
SHORTNESS OF BREATH: 0

## 2020-01-03 NOTE — PROGRESS NOTES
Chief Complaint   Patient presents with   •  CHF related to mitral stenosis.            Subjective:   Ophelia Sosa is a 74 y.o. female who presents today for followup evaluation of mitral stenosis, pulmonary hypertension, permanent pacemaker with a history of untreated sleep apnea, diabetes mellitus, hypertension hyperlipidemia.    Last seen on 7/5/2019.    Since 7/5/2018 appointment the patient has had no cardiac problems or symptoms.  Major concern is chronic sleep problems.  She lost her PCP Dr. Roberta Hurd  For years she had been on a as needed oxycodone for sleep generally 120 tablets would last a year.    Since 3/11/2019 appointment the patient has had no heart failure symptoms chest pain.  Continues to have orthostatic dizziness.  Continues to have a chronic post medication a.m. sleepiness having to sleep for an hour and a half after her morning medications.  When she does not take it she feels much better.    Since 9/5/2018 the patient developed advanced AV block required a permanent pacemaker on 11/18/2018.  Since the patient is developed orthostatic hypotension.  Dr. Rosas, her nephrologist has adjusted antihypertensive therapy and normalized her blood pressure.  Now she is having positional lightheadedness however he is aware of this and have mutually decided to continue current medications and for her to be cautious about any abrupt positional changes.  Pacemaker is functioning normally.    Since 2/20/2018 the patient had no cardiac symptoms.  She was restarted on losartan 50 mg daily by Dr. Phan Rosas, nephrologist.  She has developed vertigo.  MRI showed no specific evidence of stroke.  She has no heart failure symptoms.     Since 10/5/2017 appointment the patient has gotten better.  At her last appointment she was evaluated for near syncope.  She closely monitored her blood pressure and had low recordings below 100.  Her Holter monitor was unremarkable for an explanation of her  "near-syncope.  In January, 2018 she discontinue losartan.  Her near syncopal episodes completely resolved.  She has continued to monitor her blood pressure which has been consistently normal-see media for blood pressure log.   She states that she has \"white coat syndrome\" for years.  She continues to have problems with her balance but is thankful that \"I'm not almost passing out all the time\".  She was diagnosed with \"congestive heart failure many years ago followed by Dr. Temple on chronic Lasix therapy which she continues.  She has no active heart failure symptoms.     Since 8/18/2016 appointment the patient has had episodes of increased fatigue and lightheadedness with near syncope while at the grocery store by the time she is finished and checking out.  She states that her blood pressure has dropped at times to the 80s when she is sitting in her recliner.  No loss of consciousness.  She's lost \"52 pounds\" over the past year and a half because of chronic dental problems requiring teeth extractions.  Her diabetes and blood pressure labile with levels down to the 60s. Her glycohemoglobin is over 9%.  She was to get on Holter monitor last year but didn't feel well.     Past medical history  1-2 months ago the patient began having head congestion and treated for sinus infection by her PCP Dr. Roberta Hurd  She had 2 courses of antibiotics.  She's had chronic balance problems that have worsened.  She's had positional lightheadedness.  Dr. Hurd 1st discontinue losartan and then Lasix.  She continued to have problems and was hospitalized on 7/23/2016 after she passed out at home due to hypoglycemia.  Blood sugar was 49.     Was seen by PMA.  On CPAP.     Past medical history  Follow for systolic murmur.  Has other coronary risk factors being addressed by other physicians.  No cardiac symptoms.  Has problems with Charcot joint/foot.    Past Medical History:   Diagnosis Date   • ASTHMA     patient denies   • Complete " heart block (HCC) 2018   • Congestive heart failure (HCC)    • COPD    • COPD (chronic obstructive pulmonary disease) ([facility])    • Depressed    • Diabetes    • Diastolic dysfunction    • DM (diabetes mellitus) ([facility])    • Heart murmur    • HLD (hyperlipidemia)    • HTN (hypertension)    • Hypertension    • Obstructive sleep apnea    • Osteoarthritis    • Pulmonary hypertension ([facility])    • 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 2018   • Sleep apnea      Past Surgical History:   Procedure Laterality Date   • BREAST BIOPSY     • GYN SURGERY  hysterectomy   • MITRAL VALVE REPLACE     • OTHER ABDOMINAL SURGERY     • OTHER ORTHOPEDIC SURGERY     • TONSILLECTOMY Bilateral      Family History   Problem Relation Age of Onset   • Hypertension Mother    • Heart Disease Mother    • Diabetes Father    • Hypertension Father    • Diabetes Maternal Grandmother    • Diabetes Paternal Grandfather      Social History     Socioeconomic History   • Marital status: Single     Spouse name: Not on file   • Number of children: Not on file   • Years of education: Not on file   • Highest education level: Not on file   Occupational History   • Not on file   Social Needs   • Financial resource strain: Not on file   • Food insecurity:     Worry: Not on file     Inability: Not on file   • Transportation needs:     Medical: Not on file     Non-medical: Not on file   Tobacco Use   • Smoking status: Former Smoker     Packs/day: 1.50     Last attempt to quit: 2012     Years since quittin.1   • Smokeless tobacco: Never Used   Substance and Sexual Activity   • Alcohol use: No   • Drug use: No   • Sexual activity: Not on file   Lifestyle   • Physical activity:     Days per week: Not on file     Minutes per session: Not on file   • Stress: Not on file   Relationships   • Social connections:     Talks on phone: Not on file     Gets together: Not on file     Attends Voodoo service: Not on file     Active  member of club or organization: Not on file     Attends meetings of clubs or organizations: Not on file     Relationship status: Not on file   • Intimate partner violence:     Fear of current or ex partner: Not on file     Emotionally abused: Not on file     Physically abused: Not on file     Forced sexual activity: Not on file   Other Topics Concern   • Not on file   Social History Narrative   • Not on file     Allergies   Allergen Reactions   • Ativan      DELIRIUM, CONFUSION.   • Advair [Fluticasone-Salmeterol]    • Ambien [Zolpidem Tartrate]    • Erythromycin Vomiting   • Lipitor [Atorvastatin Calcium]    • Pcn [Penicillins] Hives   • Pravachol [Pravastatin Sodium]      ADVERSE REACTION.     Outpatient Encounter Medications as of 1/3/2020   Medication Sig Dispense Refill   • losartan (COZAAR) 25 MG Tab Take 25 mg by mouth 2 Times a Day.     • ketoconazole (NIZORAL) 2 % Cream      • rosuvastatin (CRESTOR) 5 MG Tab TAKE ONE TABLET AT BEDTIME FOR CHOLESTEROL. TAKE THREE TIMES WEEKLY  5   • FREESTYLE LITE strip TEST BLOOD SUGAR 3 TIMES DAILY  12   • furosemide (LASIX) 40 MG Tab Take 40 mg by mouth every evening.     • potassium chloride (KLOR-CON) 8 MEQ tablet Take 8 mEq by mouth every evening.     • Cholecalciferol (VITAMIN D) 2000 UNIT Tab Take 6,000 Units by mouth 3 times a week. M,W,F     • escitalopram (LEXAPRO) 10 MG Tab Take 10 mg by mouth every morning.     • HYDROcodone-acetaminophen (NORCO) 7.5-325 MG per tablet TAKE 1/2 TO 1 TABLET BY MOUTH AS NEEDED  0   • fluticasone (FLONASE) 50 MCG/ACT nasal spray Spray 1 Spray in nose 1 time daily as needed.     • glimepiride (AMARYL) 4 MG TABS Take 4 mg by mouth 2 times a day. Night time dose at 1700     • [DISCONTINUED] albuterol 108 (90 Base) MCG/ACT Aero Soln inhalation aerosol Inhale 2 Puffs by mouth every 6 hours as needed for Shortness of Breath. (Patient not taking: Reported on 3/11/2019) 8.5 g 1   • [DISCONTINUED] losartan (COZAAR) 50 MG TABS Take 50 mg by  "mouth every day.     • [DISCONTINUED] rosuvastatin (CRESTOR) 5 MG TABS Take 5 mg by mouth 3 times a week. M, W, F       No facility-administered encounter medications on file as of 1/3/2020.      Review of Systems   Respiratory: Negative for cough and shortness of breath.    Cardiovascular: Negative for chest pain and palpitations.   Musculoskeletal: Negative for myalgias.   Neurological: Positive for dizziness. Negative for loss of consciousness.        Objective:   /70 (BP Location: Left arm, Patient Position: Sitting, BP Cuff Size: Adult)   Pulse 94   Ht 1.702 m (5' 7\")   Wt 97.1 kg (214 lb)   SpO2 97%   BMI 33.52 kg/m²     Physical Exam   Constitutional: She is oriented to person, place, and time. She appears well-developed and well-nourished. No distress.   Walks with the assistance of a cane.   Neck: No JVD present.   Cardiovascular: Normal rate, regular rhythm and intact distal pulses. Exam reveals no gallop and no friction rub.   Murmur heard.  Pulmonary/Chest: Effort normal and breath sounds normal. No respiratory distress. She has no wheezes. She has no rales.   Pulse generator.    Abdominal:   Protuberant.   Musculoskeletal:         General: No edema.   Neurological: She is alert and oriented to person, place, and time.   Skin: Skin is warm and dry.   Psychiatric: She has a normal mood and affect. Her behavior is normal.     11/05/2012 ECHOCARDIOGRAM  EF 70%.  Mild tricuspid regurgitation. Right ventricular systolic pressure is   estimated to be 30 mmHg.  Mitral annular calcification. Thickened mitral valve leaflets. Mild   mitral stenosis. Mild mitral regurgitation.  Thickened aortic valve leaflets. Aortic annular calcification. Mild   aortic stenosis.  Moderate tricuspid regurgitation. Right ventricular systolic pressure   is estimated to be 57 mmhg.  No prior study available for comparison     09/28/2016 ECHOCARDIOGRAM  Normal left ventricular size and systolic function.  Severe concentric " left ventricular hypertrophy.  Left ventricular ejection fraction is visually estimated to be 70%.  Diffuse severe mitral annular calcification.  Thickened mitral valve leaflets: Mean transvalvular gradient is 8  mmHg   at a heart rate of  75BPM; Mitral valve area 2.7 cm2.  Aortic sclerosis without stenosis.  Estimated right ventricular systolic pressure  is 55 mmHg.  Right heart pressures are consistent with moderate pulmonary   hypertension.  Mildly dilated right ventricle.  Normal right ventricular systolic function      10/25/2017 ECHOCARDIOGRAM  Normal left ventricular systolic function.  Left ventricular ejection fraction is visually estimated to be 65%.  Moderate concentric left ventricular hypertrophy.  Dense diffuse mitral annular calcification with thickened mitral valve   leaflets.  Mild to moderate mitral stenosis.  Right heart pressures are consistent with moderate pulmonary   hypertension.  Aortic sclerosis without stenosis.    ECHOCARDIOGRAM 05/24/2019  Normal left ventricular systolic function.  Left ventricular ejection fraction is visually estimated to be 65%.  Mild mitral stenosis.  Aortic sclerosis without stenosis.  Right heart pressures are consistent with moderate pulmonary   hypertension.  Pacer/ICD wire seen in right ventricle.    BRAIN MRI 07/26/2018  1. Age-related cerebral atrophy.  2. Minimal periventricular white matter changes consistent with chronic microvascular ischemic gliosis.    Assessment:     1. Mitral valve stenosis, unspecified etiology     2. Diastolic CHF due to valvular disease (HCC)     3. Diastolic CHF, chronic (HCC)     4. Cardiac pacemaker     5. Pulmonary hypertension (HCC)     6. 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     7. Essential hypertension, benign     8. Hyperlipidemia, mixed         Medical Decision Making:  Today's Assessment / Status / Plan:   Assessment  1.  Mitral stenosis.  2.  Diastolic and valvular CHF. Compensated.  Diagnosed 2004.  3.  Permanent pacemaker.  11/18/2018  4.  Pulmonary hypertension.  5.  Left anterior fascicular block/right bundle branch block.  6.  Hypertension.  7.  Dyslipidemia.  8.  Diabetes mellitus.  9.  CKD.  Orthostatic dizziness  Sleep apnea. Untreated. Not on CPAP.  Infection times sinus infection ×2.  Balance problems. Significant.  Vertigo.  Syncope related to hypoglycemia 7/23/2016 at the [facility].  History of lightheadedness. Required previous discontinuation Lasix and losartan.  Dental extractions.  Weight loss.     Recommendation and Recommendation  1.  The patient is stable from a cardiac standpoint related to her mitral stenosis and pulmonary hypertension.  2.  Pacemaker interrogation from today shows pacemaker is functioning normally.  3.  Blood pressure is controlled.  4.  Reviewed lipid panel 11/6/2019 is normal.  5.  Continue current cardiac therapy.  6.  Follow-up 6 months.

## 2020-01-09 ENCOUNTER — OFFICE VISIT (OUTPATIENT)
Dept: MEDICAL GROUP | Facility: PHYSICIAN GROUP | Age: 75
End: 2020-01-09
Payer: MEDICARE

## 2020-01-09 VITALS
BODY MASS INDEX: 34.06 KG/M2 | RESPIRATION RATE: 14 BRPM | HEIGHT: 67 IN | HEART RATE: 100 BPM | WEIGHT: 217 LBS | OXYGEN SATURATION: 96 % | SYSTOLIC BLOOD PRESSURE: 132 MMHG | TEMPERATURE: 97.7 F | DIASTOLIC BLOOD PRESSURE: 64 MMHG

## 2020-01-09 DIAGNOSIS — J44.9 CHRONIC OBSTRUCTIVE PULMONARY DISEASE, UNSPECIFIED COPD TYPE (HCC): ICD-10-CM

## 2020-01-09 DIAGNOSIS — E78.2 HYPERLIPIDEMIA, MIXED: ICD-10-CM

## 2020-01-09 DIAGNOSIS — Z76.89 ENCOUNTER TO ESTABLISH CARE: ICD-10-CM

## 2020-01-09 DIAGNOSIS — E11.22 TYPE 2 DIABETES MELLITUS WITH STAGE 3 CHRONIC KIDNEY DISEASE, WITH LONG-TERM CURRENT USE OF INSULIN (HCC): Chronic | ICD-10-CM

## 2020-01-09 DIAGNOSIS — Z23 NEED FOR VACCINATION: ICD-10-CM

## 2020-01-09 DIAGNOSIS — I44.2 COMPLETE HEART BLOCK (HCC): Chronic | ICD-10-CM

## 2020-01-09 DIAGNOSIS — Z79.4 TYPE 2 DIABETES MELLITUS WITH STAGE 3 CHRONIC KIDNEY DISEASE, WITH LONG-TERM CURRENT USE OF INSULIN (HCC): Chronic | ICD-10-CM

## 2020-01-09 DIAGNOSIS — R26.89 BALANCE PROBLEMS: ICD-10-CM

## 2020-01-09 DIAGNOSIS — G47.09 OTHER INSOMNIA: ICD-10-CM

## 2020-01-09 DIAGNOSIS — I10 ESSENTIAL HYPERTENSION, BENIGN: ICD-10-CM

## 2020-01-09 DIAGNOSIS — Z95.0 CARDIAC PACEMAKER: ICD-10-CM

## 2020-01-09 DIAGNOSIS — I27.20 PULMONARY HYPERTENSION (HCC): Chronic | ICD-10-CM

## 2020-01-09 DIAGNOSIS — N18.30 TYPE 2 DIABETES MELLITUS WITH STAGE 3 CHRONIC KIDNEY DISEASE, WITH LONG-TERM CURRENT USE OF INSULIN (HCC): Chronic | ICD-10-CM

## 2020-01-09 DIAGNOSIS — N18.30 STAGE 3 CHRONIC KIDNEY DISEASE: ICD-10-CM

## 2020-01-09 PROBLEM — N17.9 STAGE 3 ACUTE KIDNEY INJURY (HCC): Status: RESOLVED | Noted: 2018-11-16 | Resolved: 2020-01-09

## 2020-01-09 PROCEDURE — 8041 PR SCP AHA: Performed by: NURSE PRACTITIONER

## 2020-01-09 PROCEDURE — 90662 IIV NO PRSV INCREASED AG IM: CPT | Performed by: NURSE PRACTITIONER

## 2020-01-09 PROCEDURE — G0008 ADMIN INFLUENZA VIRUS VAC: HCPCS | Performed by: NURSE PRACTITIONER

## 2020-01-09 PROCEDURE — 99204 OFFICE O/P NEW MOD 45 MIN: CPT | Mod: 25 | Performed by: NURSE PRACTITIONER

## 2020-01-09 ASSESSMENT — PATIENT HEALTH QUESTIONNAIRE - PHQ9: CLINICAL INTERPRETATION OF PHQ2 SCORE: 0

## 2020-01-09 NOTE — ASSESSMENT & PLAN NOTE
Chronic.  She is currently followed by Dr. Rosas with nephrology.  Her next appointment is in March.  Dr. Rosas is also managing her PTH levels.

## 2020-01-09 NOTE — ASSESSMENT & PLAN NOTE
Chronic.  She is currently taking rosuvastatin.  She is tolerating medication.  Her last lipid panel was normal.

## 2020-01-09 NOTE — ASSESSMENT & PLAN NOTE
Chronic.  Patient not on any medication for it at this time.  She reports it is controlled.  She does not see pulmonology.  She quit smoking back in 2011.  She will take albuterol as needed but mostly when she has allergy flareups.

## 2020-01-09 NOTE — ASSESSMENT & PLAN NOTE
Acute.  This is been ongoing since Christmas, approximately 2 weeks ago.  She reports that she has been able to fall asleep every night around 10 PM but is waking up around 1 AM.  She has tried melatonin 5 mg, extra strength Tylenol, 4 drops of cannabis, relaxation, positional changes, and no napping during the day.  She does state that her previous PCP Dr. Hurd will give her 120 tablets of hydrocodone for sleep/pain and this would last over a year.  She states that when she takes a half a tablet of hydrocodone it does help her stay asleep.  She sleeps in a chair on her first floor.  She states that she turns her TV off at 10:00 and turns her tablet off at 9 PM.  She is not interested in starting any new medications as she is concerned about her diabetes and having low blood sugars and being unable to wake up.

## 2020-01-09 NOTE — ASSESSMENT & PLAN NOTE
Chronic. She has been to PT in past and it did not help. She does not want PT. She uses a single point cane.

## 2020-01-09 NOTE — PATIENT INSTRUCTIONS
Go to bed and wake up at consistent times whether work/school day or not. Keep room dark, quiet, and comfortable. Don't nap. Increase exposure to sunlight during awake times and avoid bright lights before going to sleep. Avoid stimulant or caffeine use more than 4 hours after wake time.

## 2020-01-09 NOTE — ASSESSMENT & PLAN NOTE
Chronic.  She is currently taking losartan.  This is managed by Dr. Rosas.  She does have dizziness with position changes, but she has learned to manage this.

## 2020-01-09 NOTE — ASSESSMENT & PLAN NOTE
Chronic.  She reports that her last A1C was 9%.  She is currently followed by Endocrine Dr. Guillen.  She states that her medications are managed by Dr. Guillen and they have agreed on sliding scale for insulin.  She is currently taking taking NPH 48 units in AM and NPH in the evening 2-7 units. She takes Regular insulin 4-7 units in AM and at 4:00 p.m. takes 2-6 units Regular insulin based on her sliding scale.  She reports no low blood sugars.  Her next appointment with Dr. Guillen is in January.  She does see him every 3 months.

## 2020-01-09 NOTE — ASSESSMENT & PLAN NOTE
Chronic.  She is followed by Dr. Joyce Araya.  She had pacemaker placed November 2018.  Her next pacer check is in February.  She does get her pacemaker checked regularly every 6 months.

## 2020-01-09 NOTE — ASSESSMENT & PLAN NOTE
Chronic.  She is followed by cardiology and them every 3 to 6 months.  Her next appointment is in June.  She is not on any medication.  Denies any shortness of breath or chest pain.

## 2020-01-09 NOTE — ASSESSMENT & PLAN NOTE
She was diagnosed with a complete heart block in November 2018.  She then had a pacemaker placed.  She sees Dr. jessica emery for pacemaker.

## 2020-01-09 NOTE — PROGRESS NOTES
Subjective:     CC: Establish care, unable to sleep, immunizations    HISTORY OF THE PRESENT ILLNESS: Patient is a 74 y.o. female. This pleasant patient is here today to establish care and discuss the following. Her prior PCP was Dr. Deedee Hurd ([place]).    [facility]  Has DNR    Cardiac pacemaker  Chronic.  She is followed by Dr. Jessica Araya.  She had pacemaker placed November 2018.  Her next pacer check is in February.  She does get her pacemaker checked regularly every 6 months.      DM (diabetes mellitus) ([facility])  Chronic.  She reports that her last A1C was 9%.  She is currently followed by Endocrine Dr. Guillen.  She states that her medications are managed by Dr. Guillen and they have agreed on sliding scale for insulin.  She is currently taking taking NPH 48 units in AM and NPH in the evening 2-7 units. She takes Regular insulin 4-7 units in AM and at 4:00 p.m. takes 2-6 units Regular insulin based on her sliding scale.  She reports no low blood sugars.  Her next appointment with Dr. Guillen is in January.  She does see him every 3 months.     Essential hypertension, benign  Chronic.  She is currently taking losartan.  This is managed by Dr. Rosas.  She does have dizziness with position changes, but she has learned to manage this.     Balance problems  Chronic. She has been to PT in past and it did not help. She does not want PT. She uses a single point cane.     Pulmonary hypertension  Chronic.  She is followed by cardiology and them every 3 to 6 months.  Her next appointment is in June.  She is not on any medication.  Denies any shortness of breath or chest pain.    Complete heart block (HCC)  She was diagnosed with a complete heart block in November 2018.  She then had a pacemaker placed.  She sees Dr. jessica araya for pacemaker.    COPD (chronic obstructive pulmonary disease) (HCC)  Chronic.  Patient not on any medication for it at this time.  She reports it is controlled.  She does not  see pulmonology.  She quit smoking back in 2011.  She will take albuterol as needed but mostly when she has allergy flareups.    Stage 3 chronic kidney disease (HCC)  Secondary hyperparathyroidism of renal origin  Chronic.  She is currently followed by Dr. Rosas with nephrology.  Her next appointment is in March.  Dr. Rosas is also managing her PTH levels.    Hyperlipidemia, mixed  Chronic.  She is currently taking rosuvastatin.  She is tolerating medication.  Her last lipid panel was normal.    Other insomnia  Acute.  This is been ongoing since Christmas, approximately 2 weeks ago.  She reports that she has been able to fall asleep every night around 10 PM but is waking up around 1 AM.  She has tried melatonin 5 mg, extra strength Tylenol, 4 drops of cannabis, relaxation, positional changes, and no napping during the day.  She does state that her previous PCP Dr. Hurd will give her 120 tablets of hydrocodone for sleep/pain and this would last over a year.  She states that when she takes a half a tablet of hydrocodone it does help her stay asleep.  She sleeps in a chair on her first floor.  She states that she turns her TV off at 10:00 and turns her tablet off at 9 PM.  She is not interested in starting any new medications as she is concerned about her diabetes and having low blood sugars and being unable to wake up.       Allergies: Ativan; Advair [fluticasone-salmeterol]; Ambien [zolpidem tartrate]; Erythromycin; Lipitor [atorvastatin calcium]; Pcn [penicillins]; and Pravachol [pravastatin sodium]    Current Outpatient Medications Ordered in Epic   Medication Sig Dispense Refill   • NS SOLN 60 mL with albuterol 2.5 mg/0.5 mL NEBU 5 mL 5 mg/hr by Nebulization route.     • losartan (COZAAR) 25 MG Tab Take 25 mg by mouth 2 Times a Day.     • ketoconazole (NIZORAL) 2 % Cream      • rosuvastatin (CRESTOR) 5 MG Tab TAKE ONE TABLET AT BEDTIME FOR CHOLESTEROL. TAKE THREE TIMES WEEKLY  5   • FREESTYLE LITE strip  TEST BLOOD SUGAR 3 TIMES DAILY  12   • furosemide (LASIX) 40 MG Tab Take 40 mg by mouth every evening.     • potassium chloride (KLOR-CON) 8 MEQ tablet Take 8 mEq by mouth every evening.     • Cholecalciferol (VITAMIN D) 2000 UNIT Tab Take 6,000 Units by mouth 3 times a week. M,W,F     • HYDROcodone-acetaminophen (NORCO) 7.5-325 MG per tablet TAKE 1/2 TO 1 TABLET BY MOUTH AS NEEDED  0   • fluticasone (FLONASE) 50 MCG/ACT nasal spray Spray 1 Spray in nose 1 time daily as needed.       No current Epic-ordered facility-administered medications on file.        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   Social History Narrative   • Not on file       Family History   Problem Relation Age of Onset   • Hypertension Mother    • Heart Disease Mother    • Diabetes Mother    • Diabetes Father    • Hypertension Father    •  "Diabetes Maternal Grandmother    • Diabetes Paternal Grandfather        Health Maintenance: Influenza vaccine given today.  She declines pneumonia vaccine today.  Defer diabetes health maintenance until next appointment.  We will get her eye records from her eye doctor Dr. Tomas.  Declines DEXA scan.    ROS:   Gen: no fevers/chills, no changes in weight, +difficulty sleeping  Pulm: no sob, no cough  CV: no chest pain, no palpitations  GI: no nausea/vomiting, no diarrhea  Neuro: no headaches, no numbness/tingling, +dizziness    All other systems reviewed and are negative.       Objective:     Vital signs reviewed.  Exam: /64   Pulse 100   Temp 36.5 °C (97.7 °F) (Temporal)   Resp 14   Ht 1.702 m (5' 7\")   Wt 98.4 kg (217 lb)   SpO2 96%  Body mass index is 33.99 kg/m².    General: Normal appearing. No distress.  HENT: Normocephalic. Ears normal shape and contour.   Eyes: Eyes conjunctiva clear lids without ptosis, pupils equal and reactive to light accommodation, lids normal.  Neck: Supple without JVD. Thyroid is not enlarged.  Pulmonary: Clear to ausculation.  Normal effort. No rales, ronchi, or wheezing.  Cardiovascular: Regular rate and rhythm without murmur. Radial pulses are intact and equal bilaterally.  Abdomen: Soft, nontender, nondistended. Normal bowel sounds.   Lymph: No cervical or supraclavicular lymph nodes are palpable  Skin: Warm and dry.  No obvious lesions.  Musculoskeletal: Normal gait. No extremity cyanosis, clubbing, or edema. Uses single point cane.   Psych: Normal mood and affect. Alert and oriented x3. Judgment and insight is normal.    Assessment & Plan:   74 y.o. female with the following -    1. Type 2 diabetes mellitus with stage 3 chronic kidney disease, with long-term current use of insulin ([facility])  Chronic unstable.  This is new to me.  Continue NPH and regular insulin as prescribed by endocrine.  Continue follow-up with endocrine.  We discussed diabetic health maintenance for " retinal exam and monofilament exam.  She reports she had her retinal exam done and she does not do the monofilament exam.  Continue to educate patient and provide diabetic health maintenance.    2. Essential hypertension, benign  Chronic stable.  This is new to me.  Continue losartan.  Continue follow-up with nephrology.  Her initial BP was elevated at 150/60.  On repeat blood pressure 132/64.    3. Hyperlipidemia, mixed  Chronic stable.  This is new to me.  Continue rosuvastatin.  Continue follow-up with cardiology.    4. Cardiac pacemaker  5. Complete heart block (HCC)  Chronic stable.  There is new to me.  Continue follow-up with Dr. jessica emery.  Continue pacemaker checks.    6. Pulmonary hypertension (HCC)  Chronic stable.  This new to me.  Continue follow-up with cardiology.    7. Stage 3 chronic kidney disease (HCC)  Secondary hyperparathyroidism of renal origin (N25.81)  Chronic stable.  This is new to me.  Continue follow-up with nephrology.    8. Other insomnia  Acute unstable.  This is new to me.  Discussed with her that I do not feel comfortable at this initial time refilling her hydrocodone prescription  As this is her first appointment.  She verbalizes understanding.  She states that she has a couple pills left.  She does not want to try any other medications other than Tylenol PM. Patient may take over-the-counter Tylenol PM.  She will try sleep hygiene.  Discussed sleep hygiene:   - Go to bed and wake up at consistent times whether work/school day or not.    - Keep room dark, quiet, and comfortable.    - No TV/ electronics in bed.  - Don't have naps.  - Avoid stimulant or caffeine use more than 4 hours after wake time.    - Avoid meal or exercise 2-3 hours prior to going to bed.  -Discussed adding exercise if possible during the day    .9. Chronic obstructive pulmonary disease, unspecified COPD type (HCC)  Chronic stable.  This is new to me.  Continue smoking cessation.  Continue albuterol as  needed.    10. Balance problems  Chronic stable.  This is new to me.  Patient declines referral to PT today.  She is ambulatory and mobile with a single-point cane.    11. Need for vaccination  Vaccine indicated.  Patient agreeable to vaccine.  I have placed the below orders and discussed them with an approved delegating provider.  The MA is performing the below orders under the direction of Dr. Ayon.  - INFLUENZA VACCINE, HIGH DOSE (65+ ONLY)    12. Encounter to establish care  New issue to me.  Patient is followed by multiple specialties and is compliant.      Return in about 3 months (around 4/9/2020) for DM maintenance, sleep.    Please note that this dictation was created using voice recognition software. I have made every reasonable attempt to correct obvious errors, but I expect that there are errors of grammar and possibly content that I did not discover before finalizing the note.

## 2020-02-21 ENCOUNTER — NON-PROVIDER VISIT (OUTPATIENT)
Dept: CARDIOLOGY | Facility: MEDICAL CENTER | Age: 75
End: 2020-02-21
Payer: MEDICARE

## 2020-02-21 DIAGNOSIS — Z95.0 CARDIAC PACEMAKER: ICD-10-CM

## 2020-02-21 PROCEDURE — 93280 PM DEVICE PROGR EVAL DUAL: CPT | Performed by: INTERNAL MEDICINE

## 2020-03-30 ENCOUNTER — PATIENT OUTREACH (OUTPATIENT)
Dept: HEALTH INFORMATION MANAGEMENT | Facility: OTHER | Age: 75
End: 2020-03-30

## 2020-03-30 NOTE — PROGRESS NOTES
Outcome: No answer no message left     Please transfer to Patient Outreach Team at [phone] when patient returns call.    [facility] Verified: yes    Attempt # 1

## 2020-03-31 ENCOUNTER — OFFICE VISIT (OUTPATIENT)
Dept: URGENT CARE | Facility: PHYSICIAN GROUP | Age: 75
End: 2020-03-31
Payer: MEDICARE

## 2020-03-31 VITALS
DIASTOLIC BLOOD PRESSURE: 64 MMHG | SYSTOLIC BLOOD PRESSURE: 152 MMHG | BODY MASS INDEX: 32.8 KG/M2 | WEIGHT: 209 LBS | TEMPERATURE: 97.8 F | OXYGEN SATURATION: 98 % | HEIGHT: 67 IN | HEART RATE: 86 BPM | RESPIRATION RATE: 16 BRPM
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
## 2020-03-31 PROCEDURE — 90471 IMMUNIZATION ADMIN: CPT | Performed by: PHYSICIAN ASSISTANT

## 2020-03-31 PROCEDURE — 90715 TDAP VACCINE 7 YRS/> IM: CPT | Performed by: PHYSICIAN ASSISTANT

## 2020-03-31 PROCEDURE — 99214 OFFICE O/P EST MOD 30 MIN: CPT | Mod: 25 | Performed by: PHYSICIAN ASSISTANT

## 2020-03-31 RX ORDER — DOXYCYCLINE HYCLATE 100 MG
100 TABLET ORAL 2 TIMES DAILY
Qty: 14 TAB | Refills: 0 | Status: SHIPPED | OUTPATIENT
Start: 2020-03-31 | End: 2020-04-07

## 2020-03-31 RX ORDER — CLINDAMYCIN HYDROCHLORIDE 300 MG/1
300 CAPSULE ORAL 3 TIMES DAILY
Qty: 21 CAP | Refills: 0 | Status: SHIPPED | OUTPATIENT
Start: 2020-03-31 | End: 2020-04-07

## 2020-03-31 ASSESSMENT — ENCOUNTER SYMPTOMS
ABDOMINAL PAIN: 0
VOMITING: 0
NAUSEA: 0
FEVER: 0
EYE REDNESS: 0
EYE DISCHARGE: 0
DIARRHEA: 0
DIZZINESS: 0
CHILLS: 0
SHORTNESS OF BREATH: 0
MUSCULOSKELETAL NEGATIVE: 1

## 2020-03-31 ASSESSMENT — FIBROSIS 4 INDEX: FIB4 SCORE: 2.08

## 2020-03-31 NOTE — PROGRESS NOTES
"Subjective:      Ophelia Sosa is a 74 y.o. female who presents with Animal Bite (Cat bite, R Wirst x3days (red swollen))            Animal Bite   This is a new problem. The current episode started in the past 7 days (1 week). The problem occurs constantly. The problem has been gradually worsening. Pertinent negatives include no abdominal pain, chest pain, chills, congestion, fever, nausea, rash or vomiting. Nothing aggravates the symptoms. Treatments tried: antibiotics. The treatment provided no relief.     Patient presents to urgent care reporting a cat bite on her right hand that occurred about 1 week ago. She reports redness and swelling of the area developing about 3 days ago and started taking a course of Bactrim without any improvement. The cat is her own and is UTD on all vaccinations. Patient denies fevers, chills, body aches, nausea, or discharge from the site. Tdap is out of date (>15 years ago).     Review of Systems   Constitutional: Negative for chills and fever.   HENT: Negative for congestion.    Eyes: Negative for discharge and redness.   Respiratory: Negative for shortness of breath.    Cardiovascular: Negative for chest pain.   Gastrointestinal: Negative for abdominal pain, diarrhea, nausea and vomiting.   Genitourinary: Negative.    Musculoskeletal: Negative.    Skin: Negative for rash.   Neurological: Negative for dizziness.        Objective:     /64 (BP Location: Left arm, Patient Position: Sitting, BP Cuff Size: Adult)   Pulse 86   Temp 36.6 °C (97.8 °F) (Temporal)   Resp 16   Ht 1.702 m (5' 7\")   Wt 94.8 kg (209 lb)   SpO2 98%   BMI 32.73 kg/m²        Physical Exam  Vitals signs and nursing note reviewed.   Constitutional:       General: She is not in acute distress.     Appearance: Normal appearance. She is well-developed. She is not diaphoretic.   HENT:      Head: Normocephalic and atraumatic.      Nose: Nose normal.   Eyes:      Pupils: Pupils are equal, round, and " reactive to light.   Neck:      Musculoskeletal: Normal range of motion.   Cardiovascular:      Rate and Rhythm: Normal rate.   Pulmonary:      Effort: Pulmonary effort is normal.   Musculoskeletal: Normal range of motion.        Arms:       Comments: Superficial puncture wounds present on dorsal aspect of right hand with surrounding erythema, minimal edema, and warmth to touch. No fluctuance or discharge present.    Skin:     General: Skin is warm and dry.   Neurological:      Mental Status: She is alert and oriented to person, place, and time.   Psychiatric:         Behavior: Behavior normal.          PMH:  has a past medical history of ASTHMA, Complete heart block (HCC) (11/16/2018), Congestive heart failure (HCC), COPD, COPD (chronic obstructive pulmonary disease) ([facility]), Depressed, Diabetes, Diastolic dysfunction, DM (diabetes mellitus) ([facility]), Heart murmur, HLD (hyperlipidemia), HTN (hypertension), Hypertension, Obstructive sleep apnea, Osteoarthritis, Pulmonary hypertension ([facility]), 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 (11/16/2018), and Sleep apnea.  MEDS:   Current Outpatient Medications:   •  clindamycin (CLEOCIN) 300 MG Cap, Take 1 Cap by mouth 3 times a day for 7 days., Disp: 21 Cap, Rfl: 0  •  doxycycline (VIBRAMYCIN) 100 MG Tab, Take 1 Tab by mouth 2 times a day for 7 days., Disp: 14 Tab, Rfl: 0  •  NS SOLN 60 mL with albuterol 2.5 mg/0.5 mL NEBU 5 mL, 5 mg/hr by Nebulization route., Disp: , Rfl:   •  losartan (COZAAR) 25 MG Tab, Take 25 mg by mouth 2 Times a Day., Disp: , Rfl:   •  ketoconazole (NIZORAL) 2 % Cream, , Disp: , Rfl:   •  rosuvastatin (CRESTOR) 5 MG Tab, TAKE ONE TABLET AT BEDTIME FOR CHOLESTEROL. TAKE THREE TIMES WEEKLY, Disp: , Rfl: 5  •  FREESTYLE LITE strip, TEST BLOOD SUGAR 3 TIMES DAILY, Disp: , Rfl: 12  •  furosemide (LASIX) 40 MG Tab, Take 40 mg by mouth every evening., Disp: , Rfl:   •  potassium chloride (KLOR-CON) 8 MEQ tablet, Take 8 mEq by mouth every  evening., Disp: , Rfl:   •  Cholecalciferol (VITAMIN D) 2000 UNIT Tab, Take 6,000 Units by mouth 3 times a week. M,W,F, Disp: , Rfl:   •  HYDROcodone-acetaminophen (NORCO) 7.5-325 MG per tablet, TAKE 1/2 TO 1 TABLET BY MOUTH AS NEEDED, Disp: , Rfl: 0  •  fluticasone (FLONASE) 50 MCG/ACT nasal spray, Spray 1 Spray in nose 1 time daily as needed., Disp: , Rfl:   ALLERGIES:   Allergies   Allergen Reactions   • Ativan      DELIRIUM, CONFUSION.   • Advair [Fluticasone-Salmeterol]    • Ambien [Zolpidem Tartrate]    • Erythromycin Vomiting   • Lipitor [Atorvastatin Calcium]    • Pcn [Penicillins] Hives   • Pravachol [Pravastatin Sodium]      ADVERSE REACTION.     SURGHX:   Past Surgical History:   Procedure Laterality Date   • BREAST BIOPSY     • GYN SURGERY  hysterectomy   • MITRAL VALVE REPLACE     • OTHER ABDOMINAL SURGERY     • OTHER ORTHOPEDIC SURGERY     • TONSILLECTOMY Bilateral      SOCHX:  reports that she quit smoking about 8 years ago. She smoked 1.50 packs per day. She has never used smokeless tobacco. She reports that she does not drink alcohol or use drugs.  FH: family history includes Diabetes in her father, maternal grandmother, mother, and paternal grandfather; Heart Disease in her mother; Hypertension in her father and mother.       Assessment/Plan:       1. Infected cat bite, initial encounter  - Tdap =>6yo IM    - clindamycin (CLEOCIN) 300 MG Cap; Take 1 Cap by mouth 3 times a day for 7 days.  Dispense: 21 Cap; Refill: 0  - doxycycline (VIBRAMYCIN) 100 MG Tab; Take 1 Tab by mouth 2 times a day for 7 days.  Dispense: 14 Tab; Refill: 0   - Complete full course of antibiotics as prescribed     Area of cellulitis outlined with black sharpie at today's visit. Encouraged to monitor closely and to seek medical attention immediately if area of redness extends beyond borders established today or for development of fevers, chills, body aches, nausea, etc. The patient demonstrated a good understanding and agreed  with the treatment plan.

## 2020-04-09 ENCOUNTER — TELEPHONE (OUTPATIENT)
Dept: CARDIOLOGY | Facility: MEDICAL CENTER | Age: 75
End: 2020-04-09

## 2020-04-09 DIAGNOSIS — I10 ESSENTIAL HYPERTENSION, BENIGN: Primary | ICD-10-CM

## 2020-04-09 NOTE — TELEPHONE ENCOUNTER
SW pt requesting requesting renewals/refills    -potassium chloride (KLOR-CON) 8 MEQ tablet  -furosemide (LASIX) 40 MG Tab    Pharmacy: [facility]/pharmacy #[id number] - [place], [place] - [address]

## 2020-04-13 RX ORDER — POTASSIUM CHLORIDE 600 MG/1
8 TABLET, FILM COATED, EXTENDED RELEASE ORAL EVERY EVENING
Qty: 100 TAB | Refills: 3 | Status: SHIPPED | OUTPATIENT
Start: 2020-04-13 | End: 2021-04-20 | Stop reason: SDUPTHER

## 2020-04-13 RX ORDER — FUROSEMIDE 40 MG/1
40 TABLET ORAL EVERY EVENING
Qty: 100 TAB | Refills: 3 | Status: SHIPPED | OUTPATIENT
Start: 2020-04-13 | End: 2021-04-20 | Stop reason: SDUPTHER

## 2020-04-18 ENCOUNTER — HOSPITAL ENCOUNTER (OUTPATIENT)
Dept: LAB | Facility: MEDICAL CENTER | Age: 75
End: 2020-04-18
Attending: INTERNAL MEDICINE
Payer: MEDICARE

## 2020-04-18 LAB
ALBUMIN SERPL BCP-MCNC: 4 G/DL (ref 3.2–4.9)
ALBUMIN SERPL BCP-MCNC: 4.1 G/DL (ref 3.2–4.9)
ALBUMIN/GLOB SERPL: 1.4 G/DL
ALP SERPL-CCNC: 70 U/L (ref 30–99)
ALT SERPL-CCNC: 11 U/L (ref 2–50)
ANION GAP SERPL CALC-SCNC: 15 MMOL/L (ref 7–16)
AST SERPL-CCNC: 19 U/L (ref 12–45)
BILIRUB SERPL-MCNC: 0.5 MG/DL (ref 0.1–1.5)
BUN SERPL-MCNC: 23 MG/DL (ref 8–22)
BUN SERPL-MCNC: 23 MG/DL (ref 8–22)
CALCIUM SERPL-MCNC: 9.7 MG/DL (ref 8.5–10.5)
CALCIUM SERPL-MCNC: 9.8 MG/DL (ref 8.5–10.5)
CHLORIDE SERPL-SCNC: 100 MMOL/L (ref 96–112)
CHLORIDE SERPL-SCNC: 99 MMOL/L (ref 96–112)
CO2 SERPL-SCNC: 23 MMOL/L (ref 20–33)
CO2 SERPL-SCNC: 24 MMOL/L (ref 20–33)
CREAT SERPL-MCNC: 1.24 MG/DL (ref 0.5–1.4)
CREAT SERPL-MCNC: 1.26 MG/DL (ref 0.5–1.4)
EST. AVERAGE GLUCOSE BLD GHB EST-MCNC: 200 MG/DL
GLOBULIN SER CALC-MCNC: 3 G/DL (ref 1.9–3.5)
GLUCOSE SERPL-MCNC: 257 MG/DL (ref 65–99)
GLUCOSE SERPL-MCNC: 258 MG/DL (ref 65–99)
HBA1C MFR BLD: 8.6 % (ref 0–5.6)
PHOSPHATE SERPL-MCNC: 3.8 MG/DL (ref 2.5–4.5)
POTASSIUM SERPL-SCNC: 4.4 MMOL/L (ref 3.6–5.5)
POTASSIUM SERPL-SCNC: 4.5 MMOL/L (ref 3.6–5.5)
PROT SERPL-MCNC: 7.1 G/DL (ref 6–8.2)
SODIUM SERPL-SCNC: 138 MMOL/L (ref 135–145)
SODIUM SERPL-SCNC: 139 MMOL/L (ref 135–145)
T4 FREE SERPL-MCNC: 1.31 NG/DL (ref 0.53–1.43)
TSH SERPL DL<=0.005 MIU/L-ACNC: 1.42 UIU/ML (ref 0.38–5.33)

## 2020-04-18 PROCEDURE — 80069 RENAL FUNCTION PANEL: CPT

## 2020-04-18 PROCEDURE — 84439 ASSAY OF FREE THYROXINE: CPT

## 2020-04-18 PROCEDURE — 80053 COMPREHEN METABOLIC PANEL: CPT

## 2020-04-18 PROCEDURE — 36415 COLL VENOUS BLD VENIPUNCTURE: CPT

## 2020-04-18 PROCEDURE — 84443 ASSAY THYROID STIM HORMONE: CPT

## 2020-04-18 PROCEDURE — 83036 HEMOGLOBIN GLYCOSYLATED A1C: CPT

## 2020-04-21 ENCOUNTER — TELEPHONE (OUTPATIENT)
Dept: CARDIOLOGY | Facility: MEDICAL CENTER | Age: 75
End: 2020-04-21

## 2020-04-21 ENCOUNTER — TELEPHONE (OUTPATIENT)
Dept: MEDICAL GROUP | Facility: PHYSICIAN GROUP | Age: 75
End: 2020-04-21

## 2020-04-21 NOTE — TELEPHONE ENCOUNTER
DARION/gela    Pt calling requesting SW review lab results from draw on 4/18, ordered by Kiarra Espinoza and Dr Phan Rosas. (Nephrology and Endocrinology).    She believes DARION would be interested and everything is going well.    Pt requesting a call back confirming SW will review as requested  after 3pm today.

## 2020-04-21 NOTE — TELEPHONE ENCOUNTER
VOICEMAIL  1. Caller Name: Ophelia Sosa                      Call Back Number: [phone] (home)     2. Message: Pt states she had labs drawn on 04/18/2020 for other providers and thought you might be interested in the results.    3. Patient approves office to leave a detailed voicemail/MyChart message: N\A

## 2020-04-24 NOTE — TELEPHONE ENCOUNTER
Contacted patient.  Discussed labs.  Confirmed patient's June appt.  Patient denies any questions or concerns.

## 2020-06-22 ENCOUNTER — TELEPHONE (OUTPATIENT)
Dept: MEDICAL GROUP | Facility: PHYSICIAN GROUP | Age: 75
End: 2020-06-22

## 2020-06-22 NOTE — TELEPHONE ENCOUNTER
Future Appointments       Provider Department Center    6/26/2020 1:20 PM JOSE ALEJANDRO [facility]    7/25/2020 8:00 AM LAB [facility]     8/17/2020 1:15 PM PACER CHECK-[facility] 2 [facility]     8/17/2020 1:40 PM Aman Wilkinson M.D. [facility]     11/7/2020 8:00 AM LAB [facility]         ESTABLISHED PATIENT PRE-VISIT PLANNING     Patient was NOT contacted to complete PVP.     Note: Patient will not be contacted if there is no indication to call.     1.  Reviewed notes from the last few office visits within the medical group: Yes 1/9/20    2.  If any orders were placed at last visit or intended to be done for this visit (i.e. 6 mos follow-up), do we have Results/Consult Notes?        •  Labs - Labs were not ordered at last office visit.   Note: If patient appointment is for lab review and patient did not complete labs, check with provider if OK to reschedule patient until labs completed.       •  Imaging - Imaging was not ordered at last office visit.       •  Referrals - No referrals were ordered at last office visit.    3. Is this appointment scheduled as a Hospital Follow-Up? No    4.  Immunizations were updated in [facility] using WebIZ?: Yes       •  Web Iz Recommendations: PNEUMOVAX (PPSV23), PREVNAR (PCV13) , TD and SHINGRIX (Shingles)    5.  Patient is due for the following Health Maintenance Topics:   Health Maintenance Due   Topic Date Due   • Annual Wellness Visit  1945   • Annual Pulmonary Function Test / Spirometry  07/27/1950   • RETINAL SCREENING  07/27/1962   • IMM HEP B VACCINE (1 of 3 - Risk 3-dose series) 07/27/1963   • PAP SMEAR  07/27/1965   • COLONOSCOPY  07/27/1994   • IMM ZOSTER VACCINES (1 of 2) 07/27/1994   • BONE DENSITY  07/27/2009   • MAMMOGRAM  09/22/2009   • IMM PNEUMOCOCCAL VACCINE: 65+ Years (2 of 2 - PCV13) 11/05/2011   • DIABETES MONOFILAMENT /  LE EXAM  01/21/2017       6. Orders for overdue Health Maintenance topics pended in Pre-Charting? N\A    7.  AHA (MDX) form printed for Provider? NO AHA FORM/ALERTS    8.  Patient was NOT informed to arrive 15 min prior to their scheduled appointment and bring in their medication bottles.

## 2020-06-26 ENCOUNTER — OFFICE VISIT (OUTPATIENT)
Dept: MEDICAL GROUP | Facility: PHYSICIAN GROUP | Age: 75
End: 2020-06-26
Payer: MEDICARE

## 2020-06-26 VITALS
DIASTOLIC BLOOD PRESSURE: 64 MMHG | BODY MASS INDEX: 34.53 KG/M2 | WEIGHT: 220 LBS | SYSTOLIC BLOOD PRESSURE: 124 MMHG | OXYGEN SATURATION: 98 % | TEMPERATURE: 97 F | HEIGHT: 67 IN | RESPIRATION RATE: 14 BRPM | HEART RATE: 100 BPM

## 2020-06-26 DIAGNOSIS — I50.30 DIASTOLIC CHF DUE TO VALVULAR DISEASE (HCC): ICD-10-CM

## 2020-06-26 DIAGNOSIS — I10 ESSENTIAL HYPERTENSION, BENIGN: ICD-10-CM

## 2020-06-26 DIAGNOSIS — N18.30 STAGE 3 CHRONIC KIDNEY DISEASE: ICD-10-CM

## 2020-06-26 DIAGNOSIS — Z79.4 TYPE 2 DIABETES MELLITUS WITH STAGE 3 CHRONIC KIDNEY DISEASE, WITH LONG-TERM CURRENT USE OF INSULIN (HCC): Chronic | ICD-10-CM

## 2020-06-26 DIAGNOSIS — I38 DIASTOLIC CHF DUE TO VALVULAR DISEASE (HCC): ICD-10-CM

## 2020-06-26 DIAGNOSIS — E11.22 TYPE 2 DIABETES MELLITUS WITH STAGE 3 CHRONIC KIDNEY DISEASE, WITH LONG-TERM CURRENT USE OF INSULIN (HCC): Chronic | ICD-10-CM

## 2020-06-26 DIAGNOSIS — N18.30 TYPE 2 DIABETES MELLITUS WITH STAGE 3 CHRONIC KIDNEY DISEASE, WITH LONG-TERM CURRENT USE OF INSULIN (HCC): Chronic | ICD-10-CM

## 2020-06-26 PROCEDURE — 99214 OFFICE O/P EST MOD 30 MIN: CPT | Performed by: NURSE PRACTITIONER

## 2020-06-26 RX ORDER — CLOBETASOL PROPIONATE 0.5 MG/G
CREAM TOPICAL
COMMUNITY
Start: 2020-03-31 | End: 2021-06-09 | Stop reason: SDUPTHER

## 2020-06-26 RX ORDER — GLIMEPIRIDE 4 MG/1
TABLET ORAL
COMMUNITY
Start: 2020-04-10 | End: 2022-08-18 | Stop reason: SDUPTHER

## 2020-06-26 ASSESSMENT — FIBROSIS 4 INDEX: FIB4 SCORE: 2.51

## 2020-06-26 NOTE — PROGRESS NOTES
Subjective:     CC: lab results    HPI:   Ophelia presents today with:    Stage 3 chronic kidney disease (HCC)  Chronic medical problem.  She continues follow-up with Dr. Rivera with nephrology.  She had recent labs that she like to review.  Last lab results:  Results for OPHELIA BAUER (MRN [id number]) as of 6/26/2020 14:11   Ref. Range 4/18/2020 07:55   Bun Latest Ref Range: 8 - 22 mg/dL 23 (H)   Creatinine Latest Ref Range: 0.50 - 1.40 mg/dL 1.24   GFR If  Latest Ref Range: >60 mL/min/1.73 m 2 51 (A)   GFR If Non  Latest Ref Range: >60 mL/min/1.73 m 2 42 (A)       Essential hypertension, benign  Chronic medical problem.  She is taking losartan 25 mg twice a day.  Her BP is at goal today.  She continues follow-up with nephrology.    DM (diabetes mellitus) ([facility]ly [facility])  Chronic medical problem.  She is followed by her endocrinologist Dr. Guillen.  She is continuing with her NPH, regular insulin sliding scale, and glimepiride.  She has appointments every 3 months with Dr. Guillen.  Last A1c:  Results for OPHELIA BAUER (MRN [id number]) as of 6/26/2020 14:11   Ref. Range 4/18/2020 07:55   Glycohemoglobin Latest Ref Range: 0.0 - 5.6 % 8.6 (H)       Diastolic CHF due to valvular disease (HCC)  Chronic medical problem.  She is followed by her cardiologist Dr. Wilkinson.  She is taking Lasix 40 mg every evening and potassium chloride 8 medical events every evening.  She has no chest pain, shortness of breath, dizziness, leg edema, or headaches today.  Her last potassium:  Results for OPHELIA BAUER (MRN [id number]) as of 6/26/2020 14:11   Ref. Range 4/18/2020 07:55   Potassium Latest Ref Range: 3.6 - 5.5 mmol/L 4.5           Past Medical History:   Diagnosis Date   • ASTHMA     patient denies   • Complete heart block (HCC) 11/16/2018   • Congestive heart failure (HCC)    • COPD    • COPD (chronic obstructive pulmonary disease) (HCC)    • Depressed    • Diabetes    • Diastolic  dysfunction    • DM (diabetes mellitus) (HCC)    • Heart murmur    • HLD (hyperlipidemia)    • HTN (hypertension)    • Hypertension    • Obstructive sleep apnea    • Osteoarthritis    • Pulmonary hypertension (HCC)    • 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 2018   • Sleep apnea        Social History     Tobacco Use   • Smoking status: Former Smoker     Packs/day: 1.50     Last attempt to quit: 2011     Years since quittin.6   • Smokeless tobacco: Never Used   Substance Use Topics   • Alcohol use: No   • Drug use: No       Current Outpatient Medications Ordered in Epic   Medication Sig Dispense Refill   • clobetasol (TEMOVATE) 0.05 % Cream      • furosemide (LASIX) 40 MG Tab Take 1 Tab by mouth every evening. 100 Tab 3   • potassium chloride (KLOR-CON) 8 MEQ tablet Take 1 Tab by mouth every evening. 100 Tab 3   • NS SOLN 60 mL with albuterol 2.5 mg/0.5 mL NEBU 5 mL 5 mg/hr by Nebulization route.     • losartan (COZAAR) 25 MG Tab Take 25 mg by mouth 2 Times a Day.     • ketoconazole (NIZORAL) 2 % Cream      • rosuvastatin (CRESTOR) 5 MG Tab TAKE ONE TABLET AT BEDTIME FOR CHOLESTEROL. TAKE THREE TIMES WEEKLY  5   • FREESTYLE LITE strip TEST BLOOD SUGAR 3 TIMES DAILY  12   • Cholecalciferol (VITAMIN D) 2000 UNIT Tab Take 6,000 Units by mouth 3 times a week. M,W,F     • HYDROcodone-acetaminophen (NORCO) 7.5-325 MG per tablet TAKE 1/2 TO 1 TABLET BY MOUTH AS NEEDED  0   • fluticasone (FLONASE) 50 MCG/ACT nasal spray Spray 1 Spray in nose 1 time daily as needed.     • glimepiride (AMARYL) 4 MG Tab TAKE 1 TABLET BY MOUTH TWICE A DAY       No current [facility]-ordered facility-administered medications on file.        Allergies:  Ativan; Advair [fluticasone-salmeterol]; Ambien [zolpidem tartrate]; Erythromycin; Lipitor [atorvastatin calcium]; Pcn [penicillins]; and Pravachol [pravastatin sodium]    Health Maintenance: Declines mammogram, colon cancer screening, Pap smear, and DEXA scan  "today.  For her diabetes health maintenance she states that her endocrinologist will do her monofilament and she will get her retina eye exam done in October.  We will request immunization records from [place].    ROS:  Gen: no fevers/chills, no changes in weight  Eyes: no changes in vision  ENT: no sore throat, no ear pain  Pulm: no sob, no cough  CV: no chest pain, no palpitations  GI: no nausea/vomiting, no diarrhea  : no dysuria  MSk: no myalgias, no falls  Skin: no rash  Neuro: no headaches, no dizziness      Objective:     Vital signs reviewed  Exam:  /64 (BP Location: Left arm, Patient Position: Sitting, BP Cuff Size: Large adult)   Pulse 100   Temp 36.1 °C (97 °F) (Temporal)   Resp 14   Ht 1.702 m (5' 7\")   Wt 99.8 kg (220 lb)   SpO2 98%   BMI 34.46 kg/m²  Body mass index is 34.46 kg/m².    Gen: Alert and oriented, No apparent distress.  Eyes:   Glasses in place. Lids normal.   Neck: Neck is supple without lymphadenopathy.  Lungs: Normal effort, CTA bilaterally, no wheezes, rhonchi, or rales  CV: Regular rate and rhythm with systolic murmur.  No rubs, or gallops.  Radial pulses 2+ bilaterally.  Ext: No clubbing, cyanosis, edema.  MSK:   Uses 4 point cane. Steady gait.      Assessment & Plan:     74 y.o. female with the following -     1. Type 2 diabetes mellitus with stage 3 chronic kidney disease, with long-term current use of insulin ([facility])  Chronic stable medical problem.  Continue follow-up with Dr. Guillen.  Continue NPH, regular insulin sliding scale, and glimepiride.  Continue to watch diet, increase exercise as tolerated.  She declines retinal screen today as she states that she was not prepared to do it and she will have a eye appointment in October with her eye doctor.  We agreed that if she does not have a retinal scan completed by her eye doctor in October that we will complete at next appointment around December 2020.  She declines monofilament, micro urine albumin, and lipid " panel today she states that Dr. Rivera and Dr. Guillen will complete.  Monitor and follow.    2. Diastolic CHF due to valvular disease (HCC)  Chronic stable medical problem.  Continue furosemide and potassium chloride.  Potassium stable.  Continue follow-up with cardiology.  Monitor and follow.    3. Essential hypertension, benign  Chronic stable medical problem.  Continue losartan.  BP at goal today.  Monitor and follow.    4. Stage 3 chronic kidney disease (HCC)  Chronic stable medical problem.  Continue follow-up with Dr. Rosas.  Labs are stable.  Continue to avoid nephrotoxins and avoid NSAIDs.  Monitor and follow.      Return in about 6 months (around 12/26/2020) for HTN, Diabetes, Labs.    Please note that this dictation was created using voice recognition software. I have made every reasonable attempt to correct obvious errors, but I expect that there are errors of grammar and possibly content that I did not discover before finalizing the note.

## 2020-06-26 NOTE — ASSESSMENT & PLAN NOTE
Chronic medical problem.  She is followed by her endocrinologist Dr. Guillen.  She is continuing with her NPH, regular insulin sliding scale, and glimepiride.  She has appointments every 3 months with Dr. Guillen.  Last A1c:  Results for PAYAL BAUER (MRN [id number]) as of 6/26/2020 14:11   Ref. Range 4/18/2020 07:55   Glycohemoglobin Latest Ref Range: 0.0 - 5.6 % 8.6 (H)

## 2020-06-26 NOTE — ASSESSMENT & PLAN NOTE
Chronic medical problem.  She is taking losartan 25 mg twice a day.  Her BP is at goal today.  She continues follow-up with nephrology.

## 2020-06-26 NOTE — LETTER
[facility]  JOSE ALEJANDRO [place]  [address]  [place] [address]  Fax: [phone]   Authorization for Release/Disclosure of   Protected Health Information   Name: PAYAL BAUER : 1945 SSN: [id number]   Address: [address] [place], [place] [address]  [place] [address] Phone:    [phone] (home)    I authorize the entity listed below to release/disclose the PHI below to:   [facility]/JOSE ALEJANDRO [place] and JOSE ALEJANDRO [place]   Provider or Entity Name:  Dr. Roberta Hurd- [facility]   Address   [facility] Phone:      Fax:     Reason for request: continuity of care   Information to be released:    [  ] LAST COLONOSCOPY,  including any PATH REPORT and follow-up  [  ] LAST FIT/COLOGUARD RESULT [  ] LAST DEXA  [  ] LAST MAMMOGRAM  [  ] LAST PAP  [  ] LAST LABS [  ] RETINA EXAM REPORT  [x] IMMUNIZATION RECORDS  [  ] Release all info      [  ] Check here and initial the line next to each item to release ALL health information INCLUDING  _____ Care and treatment for drug and / or alcohol abuse  _____ HIV testing, infection status, or AIDS  _____ Genetic Testing    DATES OF SERVICE OR TIME PERIOD TO BE DISCLOSED: _____________  I understand and acknowledge that:  * This Authorization may be revoked at any time by you in writing, except if your health information has already been used or disclosed.  * Your health information that will be used or disclosed as a result of you signing this authorization could be re-disclosed by the recipient. If this occurs, your re-disclosed health information may no longer be protected by State or Federal laws.  * You may refuse to sign this Authorization. Your refusal will not affect your ability to obtain treatment.  * This Authorization becomes effective upon signing and will  on (date) __________.      If no date is indicated, this Authorization will  one (1) year from the signature date.    Name:  Ophelia Sosa    Signature:   Date:     6/26/2020       PLEASE FAX REQUESTED RECORDS BACK TO: [phone]

## 2020-06-26 NOTE — ASSESSMENT & PLAN NOTE
Chronic medical problem.  She is followed by her cardiologist Dr. Wilkinson.  She is taking Lasix 40 mg every evening and potassium chloride 8 medical events every evening.  She has no chest pain, shortness of breath, dizziness, leg edema, or headaches today.  Her last potassium:  Results for PAYAL BAUER (MRN [id number]) as of 6/26/2020 14:11   Ref. Range 4/18/2020 07:55   Potassium Latest Ref Range: 3.6 - 5.5 mmol/L 4.5

## 2020-06-26 NOTE — ASSESSMENT & PLAN NOTE
Chronic medical problem.  She continues follow-up with Dr. Rivera with nephrology.  She had recent labs that she like to review.  Last lab results:  Results for PAYAL BAUER (MRN [id number]) as of 6/26/2020 14:11   Ref. Range 4/18/2020 07:55   Bun Latest Ref Range: 8 - 22 mg/dL 23 (H)   Creatinine Latest Ref Range: 0.50 - 1.40 mg/dL 1.24   GFR If  Latest Ref Range: >60 mL/min/1.73 m 2 51 (A)   GFR If Non  Latest Ref Range: >60 mL/min/1.73 m 2 42 (A)

## 2020-07-23 DIAGNOSIS — E78.2 MIXED HYPERLIPIDEMIA: ICD-10-CM

## 2020-07-23 RX ORDER — ROSUVASTATIN CALCIUM 5 MG/1
TABLET, COATED ORAL
Qty: 30 TAB | Refills: 5 | Status: SHIPPED | OUTPATIENT
Start: 2020-07-23

## 2020-07-23 NOTE — TELEPHONE ENCOUNTER
Received request via: Patient    Was the patient seen in the last year in this department? Yes 6/26/2020    Does the patient have an active prescription (recently filled or refills available) for medication(s) requested? No

## 2020-07-23 NOTE — TELEPHONE ENCOUNTER
Requested Prescriptions     Signed Prescriptions Disp Refills   • rosuvastatin (CRESTOR) 5 MG Tab 30 Tab 5     Sig: TAKE ONE TABLET AT BEDTIME FOR CHOLESTEROL. TAKE THREE TIMES WEEKLY     Authorizing Provider: FELIZ MCKENNA A.P.R.N.

## 2020-07-23 NOTE — TELEPHONE ENCOUNTER
Pt needs refill on Rx Rosuvastatin 5mg please.  Send to the [facility] in her chart.  Thank you  Gabriella

## 2020-07-30 ENCOUNTER — HOSPITAL ENCOUNTER (OUTPATIENT)
Dept: LAB | Facility: MEDICAL CENTER | Age: 75
End: 2020-07-30
Attending: INTERNAL MEDICINE
Payer: MEDICARE

## 2020-07-30 LAB
ALBUMIN SERPL BCP-MCNC: 4.1 G/DL (ref 3.2–4.9)
ALBUMIN/GLOB SERPL: 1.5 G/DL
ALP SERPL-CCNC: 81 U/L (ref 30–99)
ALT SERPL-CCNC: 15 U/L (ref 2–50)
ANION GAP SERPL CALC-SCNC: 13 MMOL/L (ref 7–16)
AST SERPL-CCNC: 15 U/L (ref 12–45)
BILIRUB SERPL-MCNC: 0.3 MG/DL (ref 0.1–1.5)
BUN SERPL-MCNC: 22 MG/DL (ref 8–22)
CALCIUM SERPL-MCNC: 10 MG/DL (ref 8.5–10.5)
CHLORIDE SERPL-SCNC: 96 MMOL/L (ref 96–112)
CO2 SERPL-SCNC: 27 MMOL/L (ref 20–33)
CREAT SERPL-MCNC: 1.08 MG/DL (ref 0.5–1.4)
GLOBULIN SER CALC-MCNC: 2.8 G/DL (ref 1.9–3.5)
GLUCOSE SERPL-MCNC: 212 MG/DL (ref 65–99)
POTASSIUM SERPL-SCNC: 3.9 MMOL/L (ref 3.6–5.5)
PROT SERPL-MCNC: 6.9 G/DL (ref 6–8.2)
SODIUM SERPL-SCNC: 136 MMOL/L (ref 135–145)

## 2020-07-30 PROCEDURE — 80053 COMPREHEN METABOLIC PANEL: CPT

## 2020-07-30 PROCEDURE — 83036 HEMOGLOBIN GLYCOSYLATED A1C: CPT

## 2020-07-30 PROCEDURE — 36415 COLL VENOUS BLD VENIPUNCTURE: CPT

## 2020-07-31 LAB
EST. AVERAGE GLUCOSE BLD GHB EST-MCNC: 209 MG/DL
HBA1C MFR BLD: 8.9 % (ref 0–5.6)

## 2020-08-17 ENCOUNTER — OFFICE VISIT (OUTPATIENT)
Dept: CARDIOLOGY | Facility: MEDICAL CENTER | Age: 75
End: 2020-08-17
Payer: MEDICARE

## 2020-08-17 ENCOUNTER — NON-PROVIDER VISIT (OUTPATIENT)
Dept: CARDIOLOGY | Facility: MEDICAL CENTER | Age: 75
End: 2020-08-17
Payer: MEDICARE

## 2020-08-17 VITALS
BODY MASS INDEX: 33.87 KG/M2 | HEART RATE: 78 BPM | SYSTOLIC BLOOD PRESSURE: 116 MMHG | WEIGHT: 215.8 LBS | HEIGHT: 67 IN | DIASTOLIC BLOOD PRESSURE: 66 MMHG | OXYGEN SATURATION: 98 % | RESPIRATION RATE: 12 BRPM

## 2020-08-17 DIAGNOSIS — Z95.0 CARDIAC PACEMAKER: ICD-10-CM

## 2020-08-17 DIAGNOSIS — I49.5 SICK SINUS SYNDROME (HCC): ICD-10-CM

## 2020-08-17 DIAGNOSIS — I27.20 PULMONARY HYPERTENSION (HCC): Chronic | ICD-10-CM

## 2020-08-17 DIAGNOSIS — E78.2 MIXED HYPERLIPIDEMIA: ICD-10-CM

## 2020-08-17 DIAGNOSIS — I10 ESSENTIAL HYPERTENSION, BENIGN: ICD-10-CM

## 2020-08-17 DIAGNOSIS — I05.0 MITRAL VALVE STENOSIS, UNSPECIFIED ETIOLOGY: ICD-10-CM

## 2020-08-17 PROCEDURE — 93280 PM DEVICE PROGR EVAL DUAL: CPT | Performed by: INTERNAL MEDICINE

## 2020-08-17 PROCEDURE — 99214 OFFICE O/P EST MOD 30 MIN: CPT | Mod: 25 | Performed by: INTERNAL MEDICINE

## 2020-08-17 RX ORDER — PEN NEEDLE, DIABETIC 29 G X1/2"
NEEDLE, DISPOSABLE MISCELLANEOUS
COMMUNITY
Start: 2020-07-03 | End: 2022-04-25 | Stop reason: SDUPTHER

## 2020-08-17 ASSESSMENT — ENCOUNTER SYMPTOMS
COUGH: 0
LOSS OF CONSCIOUSNESS: 0
MYALGIAS: 0
PALPITATIONS: 0
DIZZINESS: 0
SHORTNESS OF BREATH: 0

## 2020-08-17 ASSESSMENT — FIBROSIS 4 INDEX: FIB4 SCORE: 1.721325931647740838

## 2020-08-17 NOTE — PROGRESS NOTES
Ophelia Sosa was discharged on 11/18/18 from [facility] after being treated for multiple syncopal episodes. She followed up with her PCP, Dr. Hurd on 11/20/18. She has a Pacemaker check on 11/20/18 and is scheduled to follow up with her cardiologist, Dr. Wilkinson, on 1/9/19 and again on 3/11/19.   Patient advocate contacted the patient on several occasions throughout the life-cycle.  The patient has a high Lace score score, PPS screening was conducted 90%.     inability to enjoy life/decreased activity level

## 2020-08-17 NOTE — PROGRESS NOTES
"Chief Complaint   Patient presents with   •  CHF related to mitral stenosis.            Subjective:   Ophelia Sosa is a 75 y.o. female who presents today for followup evaluation of mitral stenosis, pulmonary hypertension, permanent pacemaker with a history of untreated sleep apnea, diabetes mellitus, hypertension hyperlipidemia.    Last seen on 1/3/2020.    Since 1/3/2020 the patient has had no cardiac problems or symptoms.  Lost her PCP.  With COVID-19 restrictions has had problems adjusting to being sequestered.  Developed panic attacks.  Occasionally has a shot of alcohol mixed with 16 ounces of water or soft drink.  Also uses daily cannabis oil which is allowing her to \"survive\".  Lost her best friend lately to chronic medical problems.    Since 7/5/2018 appointment the patient has had no cardiac problems or symptoms.  Major concern is chronic sleep problems.  She lost her PCP Dr. Roberta Hurd  For years she had been on a as needed oxycodone for sleep generally 120 tablets would last a year.    Since 3/11/2019 appointment the patient has had no heart failure symptoms chest pain.  Continues to have orthostatic dizziness.  Continues to have a chronic post medication a.m. sleepiness having to sleep for an hour and a half after her morning medications.  When she does not take it she feels much better.    Since 9/5/2018 the patient developed advanced AV block required a permanent pacemaker on 11/18/2018.  Since the patient is developed orthostatic hypotension.  Dr. Rosas, her nephrologist has adjusted antihypertensive therapy and normalized her blood pressure.  Now she is having positional lightheadedness however he is aware of this and have mutually decided to continue current medications and for her to be cautious about any abrupt positional changes.  Pacemaker is functioning normally.    Since 2/20/2018 the patient had no cardiac symptoms.  She was restarted on losartan 50 mg daily by Dr. Phan Rosas, " "nephrologist.  She has developed vertigo.  MRI showed no specific evidence of stroke.  She has no heart failure symptoms.     Since 10/5/2017 appointment the patient has gotten better.  At her last appointment she was evaluated for near syncope.  She closely monitored her blood pressure and had low recordings below 100.  Her Holter monitor was unremarkable for an explanation of her near-syncope.  In January, 2018 she discontinue losartan.  Her near syncopal episodes completely resolved.  She has continued to monitor her blood pressure which has been consistently normal-see media for blood pressure log.   She states that she has \"white coat syndrome\" for years.  She continues to have problems with her balance but is thankful that \"I'm not almost passing out all the time\".  She was diagnosed with \"congestive heart failure many years ago followed by Dr. Temple on chronic Lasix therapy which she continues.  She has no active heart failure symptoms.     Since 8/18/2016 appointment the patient has had episodes of increased fatigue and lightheadedness with near syncope while at the grocery store by the time she is finished and checking out.  She states that her blood pressure has dropped at times to the 80s when she is sitting in her recliner.  No loss of consciousness.  She's lost \"52 pounds\" over the past year and a half because of chronic dental problems requiring teeth extractions.  Her diabetes and blood pressure labile with levels down to the 60s. Her glycohemoglobin is over 9%.  She was to get on Holter monitor last year but didn't feel well.     Past medical history  1-2 months ago the patient began having head congestion and treated for sinus infection by her PCP Dr. Roberta Hurd  She had 2 courses of antibiotics.  She's had chronic balance problems that have worsened.  She's had positional lightheadedness.  Dr. Hurd 1st discontinue losartan and then Lasix.  She continued to have problems and was hospitalized " on 2016 after she passed out at home due to hypoglycemia.  Blood sugar was 49.     Was seen by PMA.  On CPAP.     Past medical history  Follow for systolic murmur.  Has other coronary risk factors being addressed by other physicians.  No cardiac symptoms.  Has problems with Charcot joint/foot.    Past Medical History:   Diagnosis Date   • ASTHMA     patient denies   • Complete heart block (HCC) 2018   • Congestive heart failure (HCC)    • COPD    • COPD (chronic obstructive pulmonary disease) (HCC)    • Depressed    • Diabetes    • Diastolic dysfunction    • DM (diabetes mellitus) (HCC)    • Heart murmur    • HLD (hyperlipidemia)    • HTN (hypertension)    • Hypertension    • Obstructive sleep apnea    • Osteoarthritis    • Pulmonary hypertension (HCC)    • 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 2018   • Sleep apnea      Past Surgical History:   Procedure Laterality Date   • BREAST BIOPSY     • GYN SURGERY  hysterectomy   • MITRAL VALVE REPLACE     • OTHER ABDOMINAL SURGERY     • OTHER ORTHOPEDIC SURGERY     • TONSILLECTOMY Bilateral      Family History   Problem Relation Age of Onset   • Hypertension Mother    • Heart Disease Mother    • Diabetes Mother    • Diabetes Father    • Hypertension Father    • Diabetes Maternal Grandmother    • Diabetes Paternal Grandfather      Social History     Socioeconomic History   • Marital status: Single     Spouse name: Not on file   • Number of children: Not on file   • Years of education: Not on file   • Highest education level: Not on file   Occupational History   • Not on file   Social Needs   • Financial resource strain: Not on file   • Food insecurity     Worry: Not on file     Inability: Not on file   • Transportation needs     Medical: Not on file     Non-medical: Not on file   Tobacco Use   • Smoking status: Former Smoker     Packs/day: 1.50     Quit date: 2011     Years since quittin.7   • Smokeless tobacco: Never  "Used   Substance and Sexual Activity   • Alcohol use: No   • Drug use: No   • Sexual activity: Not on file   Lifestyle   • Physical activity     Days per week: Not on file     Minutes per session: Not on file   • Stress: Not on file   Relationships   • Social connections     Talks on phone: Not on file     Gets together: Not on file     Attends [employer] service: Not on file     Active member of club or organization: Not on file     Attends meetings of clubs or organizations: Not on file     Relationship status: Not on file   • Intimate partner violence     Fear of current or ex partner: Not on file     Emotionally abused: Not on file     Physically abused: Not on file     Forced sexual activity: Not on file   Other Topics Concern   • Not on file   Social History Narrative   • Not on file     Allergies   Allergen Reactions   • Ativan      DELIRIUM, CONFUSION.   • Advair [Fluticasone-Salmeterol]    • Ambien [Zolpidem Tartrate]    • Erythromycin Vomiting   • Lipitor [Atorvastatin Calcium]    • Pcn [Penicillins] Hives   • Pravachol [Pravastatin Sodium]      ADVERSE REACTION.     Outpatient Encounter Medications as of 8/17/2020   Medication Sig Dispense Refill   • BD INSULIN SYRINGE U/F 31G X 5/16\" 0.3 ML Misc INJECT TWICE DAILY     • rosuvastatin (CRESTOR) 5 MG Tab TAKE ONE TABLET AT BEDTIME FOR CHOLESTEROL. TAKE THREE TIMES WEEKLY 30 Tab 5   • clobetasol (TEMOVATE) 0.05 % Cream      • glimepiride (AMARYL) 4 MG Tab TAKE 1 TABLET BY MOUTH TWICE A DAY     • furosemide (LASIX) 40 MG Tab Take 1 Tab by mouth every evening. 100 Tab 3   • potassium chloride (KLOR-CON) 8 MEQ tablet Take 1 Tab by mouth every evening. 100 Tab 3   • NS SOLN 60 mL with albuterol 2.5 mg/0.5 mL NEBU 5 mL 5 mg/hr by Nebulization route. Spring and fall     • losartan (COZAAR) 25 MG Tab Take 25 mg by mouth 2 Times a Day.     • ketoconazole (NIZORAL) 2 % Cream      • FREESTYLE LITE strip TEST BLOOD SUGAR 3 TIMES DAILY  12   • Cholecalciferol (VITAMIN D) " "2000 UNIT Tab Take 6,000 Units by mouth 3 times a week. M,W,F     • HYDROcodone-acetaminophen (NORCO) 7.5-325 MG per tablet TAKE 1/2 TO 1 TABLET BY MOUTH AS NEEDED  0   • fluticasone (FLONASE) 50 MCG/ACT nasal spray Spray 1 Spray in nose 1 time daily as needed.       No facility-administered encounter medications on file as of 8/17/2020.      Review of Systems   Respiratory: Negative for cough and shortness of breath.    Cardiovascular: Negative for chest pain and palpitations.   Musculoskeletal: Negative for myalgias.   Neurological: Negative for dizziness and loss of consciousness.        Objective:   /66 (BP Location: Left arm, Patient Position: Sitting, BP Cuff Size: Adult)   Pulse 78   Resp 12   Ht 1.702 m (5' 7\")   Wt 97.9 kg (215 lb 12.8 oz)   SpO2 98%   BMI 33.80 kg/m²     Physical Exam   Constitutional: She is oriented to person, place, and time. She appears well-developed and well-nourished. No distress.   Walks with the assistance of a cane.   Neck: No JVD present.   Cardiovascular: Normal rate, regular rhythm and intact distal pulses. Exam reveals no gallop and no friction rub.   Murmur heard.  Pulmonary/Chest: Effort normal and breath sounds normal. No respiratory distress. She has no wheezes. She has no rales.   Pulse generator.    Abdominal:   Protuberant.   Musculoskeletal:         General: No edema.   Neurological: She is alert and oriented to person, place, and time.   Skin: Skin is warm and dry.   Psychiatric: She has a normal mood and affect. Her behavior is normal.     11/05/2012 ECHOCARDIOGRAM  EF 70%.  Mild tricuspid regurgitation. Right ventricular systolic pressure is   estimated to be 30 mmHg.  Mitral annular calcification. Thickened mitral valve leaflets. Mild   mitral stenosis. Mild mitral regurgitation.  Thickened aortic valve leaflets. Aortic annular calcification. Mild   aortic stenosis.  Moderate tricuspid regurgitation. Right ventricular systolic pressure   is estimated " to be 57 mmhg.  No prior study available for comparison     09/28/2016 ECHOCARDIOGRAM  Normal left ventricular size and systolic function.  Severe concentric left ventricular hypertrophy.  Left ventricular ejection fraction is visually estimated to be 70%.  Diffuse severe mitral annular calcification.  Thickened mitral valve leaflets: Mean transvalvular gradient is 8  mmHg   at a heart rate of  75BPM; Mitral valve area 2.7 cm2.  Aortic sclerosis without stenosis.  Estimated right ventricular systolic pressure  is 55 mmHg.  Right heart pressures are consistent with moderate pulmonary   hypertension.  Mildly dilated right ventricle.  Normal right ventricular systolic function      10/25/2017 ECHOCARDIOGRAM  Normal left ventricular systolic function.  Left ventricular ejection fraction is visually estimated to be 65%.  Moderate concentric left ventricular hypertrophy.  Dense diffuse mitral annular calcification with thickened mitral valve   leaflets.  Mild to moderate mitral stenosis.  Right heart pressures are consistent with moderate pulmonary   hypertension.  Aortic sclerosis without stenosis.    ECHOCARDIOGRAM 05/24/2019  Normal left ventricular systolic function.  Left ventricular ejection fraction is visually estimated to be 65%.  Mild mitral stenosis.  Aortic sclerosis without stenosis.  Right heart pressures are consistent with moderate pulmonary   hypertension.  Pacer/ICD wire seen in right ventricle.    BRAIN MRI 07/26/2018  1. Age-related cerebral atrophy.  2. Minimal periventricular white matter changes consistent with chronic microvascular ischemic gliosis.    Assessment:     1. Mitral valve stenosis, unspecified etiology     2. Pulmonary hypertension (HCC)     3. Cardiac pacemaker     4. Essential hypertension, benign     5. Hyperlipidemia, mixed         Medical Decision Making:  Today's Assessment / Status / Plan:   Assessment  1.  Mitral stenosis.  2.  Diastolic and valvular CHF. Compensated. Diagnosed  2004.  3.  Permanent pacemaker.  11/18/2018  4.  Pulmonary hypertension.  5.  Left anterior fascicular block/right bundle branch block.  6.  Hypertension.  7.  Dyslipidemia.  8.  Diabetes mellitus.  Followed by Aman Guillen, endocrinologist.  9.  CKD.  10.  Adult situational stress syndrome.  Orthostatic dizziness  Sleep apnea. Untreated. Not on CPAP.  Infection times sinus infection ×2.  Balance problems. Significant.  Vertigo.  Syncope related to hypoglycemia 7/23/2016 at the [facility].  History of lightheadedness. Required previous discontinuation Lasix and losartan.  Dental extractions.  Weight loss.     Recommendation and Recommendation  1.  The patient is stable from a cardiac standpoint related to her mitral stenosis and pulmonary hypertension.  2.  Pacemaker interrogation from today shows pacemaker is functioning normally.  3.  Blood pressure is controlled.  4.  Reviewed lipid panel 2020 blood work.  5.  Continue current cardiac therapy.  6.  Follow-up 6 months.

## 2020-10-17 ENCOUNTER — HOSPITAL ENCOUNTER (OUTPATIENT)
Dept: LAB | Facility: MEDICAL CENTER | Age: 75
End: 2020-10-17
Attending: INTERNAL MEDICINE
Payer: MEDICARE

## 2020-10-17 LAB
ALBUMIN SERPL BCP-MCNC: 3.9 G/DL (ref 3.2–4.9)
ALBUMIN/GLOB SERPL: 1.4 G/DL
ALP SERPL-CCNC: 72 U/L (ref 30–99)
ALT SERPL-CCNC: 17 U/L (ref 2–50)
ANION GAP SERPL CALC-SCNC: 9 MMOL/L (ref 7–16)
AST SERPL-CCNC: 16 U/L (ref 12–45)
BILIRUB SERPL-MCNC: 0.4 MG/DL (ref 0.1–1.5)
BUN SERPL-MCNC: 16 MG/DL (ref 8–22)
CALCIUM SERPL-MCNC: 9.7 MG/DL (ref 8.5–10.5)
CHLORIDE SERPL-SCNC: 104 MMOL/L (ref 96–112)
CO2 SERPL-SCNC: 28 MMOL/L (ref 20–33)
CREAT SERPL-MCNC: 1.05 MG/DL (ref 0.5–1.4)
GLOBULIN SER CALC-MCNC: 2.8 G/DL (ref 1.9–3.5)
GLUCOSE SERPL-MCNC: 227 MG/DL (ref 65–99)
POTASSIUM SERPL-SCNC: 4.5 MMOL/L (ref 3.6–5.5)
PROT SERPL-MCNC: 6.7 G/DL (ref 6–8.2)
SODIUM SERPL-SCNC: 141 MMOL/L (ref 135–145)

## 2020-10-17 PROCEDURE — 80053 COMPREHEN METABOLIC PANEL: CPT

## 2020-10-17 PROCEDURE — 36415 COLL VENOUS BLD VENIPUNCTURE: CPT

## 2020-10-17 PROCEDURE — 83036 HEMOGLOBIN GLYCOSYLATED A1C: CPT

## 2020-10-18 LAB
EST. AVERAGE GLUCOSE BLD GHB EST-MCNC: 212 MG/DL
HBA1C MFR BLD: 9 % (ref 0–5.6)

## 2020-10-31 ENCOUNTER — HOSPITAL ENCOUNTER (OUTPATIENT)
Dept: LAB | Facility: MEDICAL CENTER | Age: 75
End: 2020-10-31
Attending: INTERNAL MEDICINE
Payer: MEDICARE

## 2020-10-31 LAB
25(OH)D3 SERPL-MCNC: 49 NG/ML (ref 30–100)
ALBUMIN SERPL BCP-MCNC: 4.1 G/DL (ref 3.2–4.9)
ALBUMIN/GLOB SERPL: 1.3 G/DL
ALP SERPL-CCNC: 80 U/L (ref 30–99)
ALT SERPL-CCNC: 13 U/L (ref 2–50)
ANION GAP SERPL CALC-SCNC: 10 MMOL/L (ref 7–16)
AST SERPL-CCNC: 18 U/L (ref 12–45)
BASOPHILS # BLD AUTO: 0.6 % (ref 0–1.8)
BASOPHILS # BLD: 0.04 K/UL (ref 0–0.12)
BILIRUB SERPL-MCNC: 0.4 MG/DL (ref 0.1–1.5)
BUN SERPL-MCNC: 20 MG/DL (ref 8–22)
CALCIUM SERPL-MCNC: 10.1 MG/DL (ref 8.5–10.5)
CHLORIDE SERPL-SCNC: 99 MMOL/L (ref 96–112)
CHOLEST SERPL-MCNC: 160 MG/DL (ref 100–199)
CO2 SERPL-SCNC: 29 MMOL/L (ref 20–33)
CREAT SERPL-MCNC: 1.26 MG/DL (ref 0.5–1.4)
EOSINOPHIL # BLD AUTO: 0.1 K/UL (ref 0–0.51)
EOSINOPHIL NFR BLD: 1.4 % (ref 0–6.9)
ERYTHROCYTE [DISTWIDTH] IN BLOOD BY AUTOMATED COUNT: 46.5 FL (ref 35.9–50)
EST. AVERAGE GLUCOSE BLD GHB EST-MCNC: 212 MG/DL
GLOBULIN SER CALC-MCNC: 3.1 G/DL (ref 1.9–3.5)
GLUCOSE SERPL-MCNC: 268 MG/DL (ref 65–99)
HBA1C MFR BLD: 9 % (ref 0–5.6)
HCT VFR BLD AUTO: 41.8 % (ref 37–47)
HDLC SERPL-MCNC: 52 MG/DL
HGB BLD-MCNC: 13.1 G/DL (ref 12–16)
IMM GRANULOCYTES # BLD AUTO: 0.03 K/UL (ref 0–0.11)
IMM GRANULOCYTES NFR BLD AUTO: 0.4 % (ref 0–0.9)
LDLC SERPL CALC-MCNC: 86 MG/DL
LYMPHOCYTES # BLD AUTO: 1.64 K/UL (ref 1–4.8)
LYMPHOCYTES NFR BLD: 23.5 % (ref 22–41)
MAGNESIUM SERPL-MCNC: 2.2 MG/DL (ref 1.5–2.5)
MCH RBC QN AUTO: 30.1 PG (ref 27–33)
MCHC RBC AUTO-ENTMCNC: 31.3 G/DL (ref 33.6–35)
MCV RBC AUTO: 96.1 FL (ref 81.4–97.8)
MONOCYTES # BLD AUTO: 0.51 K/UL (ref 0–0.85)
MONOCYTES NFR BLD AUTO: 7.3 % (ref 0–13.4)
NEUTROPHILS # BLD AUTO: 4.66 K/UL (ref 2–7.15)
NEUTROPHILS NFR BLD: 66.8 % (ref 44–72)
NRBC # BLD AUTO: 0 K/UL
NRBC BLD-RTO: 0 /100 WBC
PHOSPHATE SERPL-MCNC: 3.4 MG/DL (ref 2.5–4.5)
PLATELET # BLD AUTO: 180 K/UL (ref 164–446)
PMV BLD AUTO: 12.1 FL (ref 9–12.9)
POTASSIUM SERPL-SCNC: 4.3 MMOL/L (ref 3.6–5.5)
PROT SERPL-MCNC: 7.2 G/DL (ref 6–8.2)
PTH-INTACT SERPL-MCNC: 143 PG/ML (ref 14–72)
RBC # BLD AUTO: 4.35 M/UL (ref 4.2–5.4)
SODIUM SERPL-SCNC: 138 MMOL/L (ref 135–145)
TRIGL SERPL-MCNC: 112 MG/DL (ref 0–149)
URATE SERPL-MCNC: 7.8 MG/DL (ref 1.9–8.2)
WBC # BLD AUTO: 7 K/UL (ref 4.8–10.8)

## 2020-10-31 PROCEDURE — 84550 ASSAY OF BLOOD/URIC ACID: CPT

## 2020-10-31 PROCEDURE — 80053 COMPREHEN METABOLIC PANEL: CPT

## 2020-10-31 PROCEDURE — 83036 HEMOGLOBIN GLYCOSYLATED A1C: CPT

## 2020-10-31 PROCEDURE — 83970 ASSAY OF PARATHORMONE: CPT

## 2020-10-31 PROCEDURE — 85025 COMPLETE CBC W/AUTO DIFF WBC: CPT

## 2020-10-31 PROCEDURE — 82306 VITAMIN D 25 HYDROXY: CPT

## 2020-10-31 PROCEDURE — 36415 COLL VENOUS BLD VENIPUNCTURE: CPT

## 2020-10-31 PROCEDURE — 84100 ASSAY OF PHOSPHORUS: CPT

## 2020-10-31 PROCEDURE — 80061 LIPID PANEL: CPT

## 2020-10-31 PROCEDURE — 83735 ASSAY OF MAGNESIUM: CPT

## 2020-12-03 ENCOUNTER — OFFICE VISIT (OUTPATIENT)
Dept: MEDICAL GROUP | Facility: PHYSICIAN GROUP | Age: 75
End: 2020-12-03
Payer: MEDICARE

## 2020-12-03 VITALS
TEMPERATURE: 98.3 F | HEART RATE: 91 BPM | SYSTOLIC BLOOD PRESSURE: 132 MMHG | BODY MASS INDEX: 34.53 KG/M2 | HEIGHT: 67 IN | RESPIRATION RATE: 18 BRPM | WEIGHT: 220 LBS | OXYGEN SATURATION: 91 % | DIASTOLIC BLOOD PRESSURE: 66 MMHG

## 2020-12-03 DIAGNOSIS — R42 VERTIGO: ICD-10-CM

## 2020-12-03 DIAGNOSIS — G47.09 OTHER INSOMNIA: ICD-10-CM

## 2020-12-03 DIAGNOSIS — Z79.4 TYPE 2 DIABETES MELLITUS WITH STAGE 3B CHRONIC KIDNEY DISEASE, WITH LONG-TERM CURRENT USE OF INSULIN (HCC): Chronic | ICD-10-CM

## 2020-12-03 DIAGNOSIS — E11.22 TYPE 2 DIABETES MELLITUS WITH STAGE 3B CHRONIC KIDNEY DISEASE, WITH LONG-TERM CURRENT USE OF INSULIN (HCC): Chronic | ICD-10-CM

## 2020-12-03 DIAGNOSIS — H93.12 TINNITUS OF LEFT EAR: ICD-10-CM

## 2020-12-03 DIAGNOSIS — N18.32 TYPE 2 DIABETES MELLITUS WITH STAGE 3B CHRONIC KIDNEY DISEASE, WITH LONG-TERM CURRENT USE OF INSULIN (HCC): Chronic | ICD-10-CM

## 2020-12-03 PROBLEM — H93.13 TINNITUS OF BOTH EARS: Status: ACTIVE | Noted: 2020-12-03

## 2020-12-03 PROCEDURE — 69210 REMOVE IMPACTED EAR WAX UNI: CPT | Performed by: NURSE PRACTITIONER

## 2020-12-03 PROCEDURE — 99214 OFFICE O/P EST MOD 30 MIN: CPT | Mod: 25 | Performed by: NURSE PRACTITIONER

## 2020-12-03 ASSESSMENT — FIBROSIS 4 INDEX: FIB4 SCORE: 2.107860745655870664

## 2020-12-03 NOTE — ASSESSMENT & PLAN NOTE
Chronic medical problem. In the last 6 weeks this has increased. She has not checked her blood pressure. Her initial BP today was 160/90. She denies falls. She is using single point cane. She has had previous workup that was unremarkable.

## 2020-12-03 NOTE — ASSESSMENT & PLAN NOTE
Chronic medical problem. She is taking benadryl 12.5 mg but this is not keeping her asleep. She has used Norco to help in the past, this was previously prescribed her prior PCP. She does not practice sleep hygiene. She does nap during the day.

## 2020-12-03 NOTE — PROGRESS NOTES
Subjective:     CC: vertigo, ear ringing, sleeping problem    HPI:   Ophelia presents today with the following:    DM (diabetes mellitus) (HCC)  Chronic medical problem. She is followed by endocrinology. She is taking glimepiride 4 mg BID, NPH and regular insulin per her sliding scale. She states that she has discussed her treatment paln with endocrinology and will follow-up with them. Last labs:  Results for OPHELIA BAUER (MRN [id number]) as of 12/3/2020 13:47   Ref. Range 10/31/2020 08:19   Glycohemoglobin Latest Ref Range: 0.0 - 5.6 % 9.0 (H)       Vertigo  Chronic medical problem. In the last 6 weeks this has increased. She has not checked her blood pressure. Her initial BP today was 160/90. She denies falls. She is using single point cane. She has had previous workup that was unremarkable.     Other insomnia  Chronic medical problem. She is taking benadryl 12.5 mg but this is not keeping her asleep. She has used Norco to help in the past, this was previously prescribed her prior PCP. She does not practice sleep hygiene. She does nap during the day.    Tinnitus of left ear  Chronic medical problem. The problem is ongoing for years. She tries to keep her ears clean. She is using Flonase as needed that does help.       Past Medical History:   Diagnosis Date   • ASTHMA     patient denies   • Complete heart block (HCC) 11/16/2018   • Congestive heart failure (HCC)    • COPD    • COPD (chronic obstructive pulmonary disease) (HCC)    • Depressed    • Diabetes    • Diastolic dysfunction    • DM (diabetes mellitus) (HCC)    • Heart murmur    • HLD (hyperlipidemia)    • HTN (hypertension)    • Hypertension    • Obstructive sleep apnea    • Osteoarthritis    • Pulmonary hypertension (HCC)    • 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   • Sleep apnea        Social History     Tobacco Use   • Smoking status: Former Smoker     Packs/day: 1.50     Quit date: 11/2/2011     Years since  "quittin.0   • Smokeless tobacco: Never Used   Substance Use Topics   • Alcohol use: No   • Drug use: No       Current Outpatient Medications Ordered in Epic   Medication Sig Dispense Refill   • BD INSULIN SYRINGE U/F 31G X \" 0.3 ML Misc INJECT TWICE DAILY     • rosuvastatin (CRESTOR) 5 MG Tab TAKE ONE TABLET AT BEDTIME FOR CHOLESTEROL. TAKE THREE TIMES WEEKLY 30 Tab 5   • clobetasol (TEMOVATE) 0.05 % Cream      • glimepiride (AMARYL) 4 MG Tab TAKE 1 TABLET BY MOUTH TWICE A DAY     • furosemide (LASIX) 40 MG Tab Take 1 Tab by mouth every evening. 100 Tab 3   • potassium chloride (KLOR-CON) 8 MEQ tablet Take 1 Tab by mouth every evening. 100 Tab 3   • NS SOLN 60 mL with albuterol 2.5 mg/0.5 mL NEBU 5 mL 5 mg/hr by Nebulization route. Spring and      • losartan (COZAAR) 25 MG Tab Take 25 mg by mouth 2 Times a Day.     • ketoconazole (NIZORAL) 2 % Cream      • FREESTYLE LITE strip TEST BLOOD SUGAR 3 TIMES DAILY  12   • Cholecalciferol (VITAMIN D) 2000 UNIT Tab Take 6,000 Units by mouth 3 times a week. M,W,F     • HYDROcodone-acetaminophen (NORCO) 7.5-325 MG per tablet TAKE 1/2 TO 1 TABLET BY MOUTH AS NEEDED  0   • fluticasone (FLONASE) 50 MCG/ACT nasal spray Spray 1 Spray in nose 1 time daily as needed.     • mupirocin (BACTROBAN) 2 % Ointment APPLY TO AFFECTED AREA OF SKIN INFECTIONS THREE TIMES DAILY       No current [facility]-ordered facility-administered medications on file.        Allergies:  Ativan, Advair [fluticasone-salmeterol], Ambien [zolpidem tartrate], Erythromycin, Lipitor [atorvastatin calcium], Pcn [penicillins], and Pravachol [pravastatin sodium]    Health Maintenance: Due for AWV, PFT, zoster vaccine and influenza vaccine. Vaccines are unavailable today due to temperature issues. She will return for MA visit for IZ.     ROS:  Gen: no fevers/chills, no changes in weight  Eyes: no changes in vision  ENT: no sore throat, no hearing loss, no bloody nose  Pulm: no sob, no cough  CV: no chest pain, no " "palpitations  GI: no nausea/vomiting, no diarrhea  : no dysuria  MSk: no myalgias  Skin: no rash  Neuro: no headaches, no numbness/tingling  Heme/Lymph: no easy bruising      Objective:     Vital signs reviewed  Exam:  /66   Pulse 91   Temp 36.8 °C (98.3 °F) (Temporal)   Resp 18   Ht 1.702 m (5' 7\")   Wt 99.8 kg (220 lb)   SpO2 91%   BMI 34.46 kg/m²  Body mass index is 34.46 kg/m².    Gen: Alert and oriented, No apparent distress.  Eyes:   Lids normal. Glasses in place.   Right ear:  Ear canal clear, tympanic membrane pearly gray  Left ear:   Ear canal has cerumen present. Ear wax removed by me. Tympanic membrane pearly gray.   Neck: Neck is supple without lymphadenopathy.  Lungs: Normal effort, CTA bilaterally, no wheezes, rhonchi, or rales  CV: Regular rate and rhythm. No murmurs, rubs, or gallops.  Ext: No clubbing, cyanosis, edema. Using single point cane.         Assessment & Plan:     75 y.o. female with the following -     1. Type 2 diabetes mellitus with stage 3b chronic kidney disease, with long-term current use of insulin ([facility])  Chronic unstable medical problem. Continue follow-up with endocrinology. Continue NPH, insulin sliding scale and glimepiride. Monitor.     2. Vertigo  Chronic unstable medical problem. Discussed checking blood pressure when having symptoms. Discussed changing positions slowly. On repeat by me her BP was 132/66. Continue to monitor BP. Red flags discussed. Left ear cerumen removed from ear. Monitor and follow.     3. Other insomnia  Chronic unstable medical problem. Discussed that she may try benadryl 25 mg. Discussed that do not recommend Ambien, Norco, or alcohol for sleep. Discussed sleep hygiene. Avoid napping during day. Monitor and follow.     4. Tinnitus of left ear  Chronic unstable medical problem. Left ear cerumen removed by me today. Ear canal clear. Discussed taking daily Flonase. Monitor and follow.       Return in about 6 months (around " 6/3/2021).    Please note that this dictation was created using voice recognition software. I have made every reasonable attempt to correct obvious errors, but I expect that there are errors of grammar and possibly content that I did not discover before finalizing the note.

## 2020-12-03 NOTE — ASSESSMENT & PLAN NOTE
Chronic medical problem. She is followed by endocrinology. She is taking glimepiride 4 mg BID, NPH and regular insulin per her sliding scale. She states that she has discussed her treatment paln with endocrinology and will follow-up with them. Last labs:  Results for PAYAL BAUER (MRN [id number]) as of 12/3/2020 13:47   Ref. Range 10/31/2020 08:19   Glycohemoglobin Latest Ref Range: 0.0 - 5.6 % 9.0 (H)

## 2020-12-03 NOTE — ASSESSMENT & PLAN NOTE
Chronic medical problem. The problem is ongoing for years. She tries to keep her ears clean. She is using Flonase as needed that does help.

## 2020-12-24 ENCOUNTER — OFFICE VISIT (OUTPATIENT)
Dept: URGENT CARE | Facility: PHYSICIAN GROUP | Age: 75
End: 2020-12-24
Payer: MEDICARE

## 2020-12-24 VITALS
BODY MASS INDEX: 32.96 KG/M2 | RESPIRATION RATE: 20 BRPM | HEART RATE: 83 BPM | WEIGHT: 210 LBS | HEIGHT: 67 IN | TEMPERATURE: 97.1 F | OXYGEN SATURATION: 100 % | SYSTOLIC BLOOD PRESSURE: 138 MMHG | DIASTOLIC BLOOD PRESSURE: 58 MMHG

## 2020-12-24 DIAGNOSIS — Z79.4 TYPE 2 DIABETES MELLITUS WITH OTHER DIABETIC KIDNEY COMPLICATION, WITH LONG-TERM CURRENT USE OF INSULIN (HCC): ICD-10-CM

## 2020-12-24 DIAGNOSIS — E11.29 TYPE 2 DIABETES MELLITUS WITH OTHER DIABETIC KIDNEY COMPLICATION, WITH LONG-TERM CURRENT USE OF INSULIN (HCC): ICD-10-CM

## 2020-12-24 PROCEDURE — 99214 OFFICE O/P EST MOD 30 MIN: CPT | Performed by: PHYSICIAN ASSISTANT

## 2020-12-24 ASSESSMENT — ENCOUNTER SYMPTOMS
RESPIRATORY NEGATIVE: 1
CARDIOVASCULAR NEGATIVE: 1
DIABETIC ASSOCIATED SYMPTOMS: 0
NEUROLOGICAL NEGATIVE: 1
CONSTITUTIONAL NEGATIVE: 1
GASTROINTESTINAL NEGATIVE: 1
MUSCULOSKELETAL NEGATIVE: 1

## 2020-12-24 ASSESSMENT — FIBROSIS 4 INDEX: FIB4 SCORE: 2.107860745655870664

## 2020-12-24 NOTE — PROGRESS NOTES
"Subjective:      Ophelia Sosa is a 75 y.o. female who presents with Medication Refill (need Rx for INSULIN, see doctor at the end of JAN )            Patient here for refill of her Humulin/Novolin R.  She has been a diabetic for over 40 years.  She uses the sliding scale.  She has no concerns or complaints today.  She has been out of her insulin for 3 days.  Her PCP and endocrinologist are off until after the new year.  She states her sugars have been running between 200-2 50.  No adverse effects noted due to this.  Patient states she feels fine.    Diabetes Mellitus  She presents for her follow-up diabetic visit. She has type 2 diabetes mellitus. Her disease course has been stable. There are no hypoglycemic associated symptoms. There are no diabetic associated symptoms. There are no hypoglycemic complications. Symptoms are stable. Diabetic complications include nephropathy. Current diabetic treatment includes insulin injections. She is compliant with treatment all of the time. There is no change in her home blood glucose trend.       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  MEDS:   Current Outpatient Medications:   •  insulin regular (HUMULIN R) 100 Unit/mL Solution, Inject 2-10 Units under the skin 3 times a day before meals. Sliding scale as needed, Disp: 10 mL, Rfl: 0  •  mupirocin (BACTROBAN) 2 % Ointment, APPLY TO AFFECTED AREA OF SKIN INFECTIONS THREE TIMES DAILY, Disp: , Rfl:   •  BD INSULIN SYRINGE U/F 31G X 5/16\" 0.3 ML Misc, INJECT TWICE DAILY, Disp: , Rfl:   • "  rosuvastatin (CRESTOR) 5 MG Tab, TAKE ONE TABLET AT BEDTIME FOR CHOLESTEROL. TAKE THREE TIMES WEEKLY, Disp: 30 Tab, Rfl: 5  •  clobetasol (TEMOVATE) 0.05 % Cream, , Disp: , Rfl:   •  glimepiride (AMARYL) 4 MG Tab, TAKE 1 TABLET BY MOUTH TWICE A DAY, Disp: , Rfl:   •  furosemide (LASIX) 40 MG Tab, Take 1 Tab by mouth every evening., Disp: 100 Tab, Rfl: 3  •  potassium chloride (KLOR-CON) 8 MEQ tablet, Take 1 Tab by mouth every evening., Disp: 100 Tab, Rfl: 3  •  NS SOLN 60 mL with albuterol 2.5 mg/0.5 mL NEBU 5 mL, 5 mg/hr by Nebulization route. Spring and fall, Disp: , Rfl:   •  losartan (COZAAR) 25 MG Tab, Take 25 mg by mouth 2 Times a Day., Disp: , Rfl:   •  ketoconazole (NIZORAL) 2 % Cream, , Disp: , Rfl:   •  FREESTYLE LITE strip, TEST BLOOD SUGAR 3 TIMES DAILY, Disp: , Rfl: 12  •  Cholecalciferol (VITAMIN D) 2000 UNIT Tab, Take 6,000 Units by mouth 3 times a week. M,W,F, Disp: , Rfl:   •  HYDROcodone-acetaminophen (NORCO) 7.5-325 MG per tablet, TAKE 1/2 TO 1 TABLET BY MOUTH AS NEEDED, Disp: , Rfl: 0  •  fluticasone (FLONASE) 50 MCG/ACT nasal spray, Spray 1 Spray in nose 1 time daily as needed., Disp: , Rfl:   ALLERGIES:   Allergies   Allergen Reactions   • Ativan      DELIRIUM, CONFUSION.   • Advair [Fluticasone-Salmeterol]    • Ambien [Zolpidem Tartrate]    • Erythromycin Vomiting   • Lipitor [Atorvastatin Calcium]    • Pcn [Penicillins] Hives   • Pravachol [Pravastatin Sodium]      ADVERSE REACTION.     SURGHX:   Past Surgical History:   Procedure Laterality Date   • BREAST BIOPSY     • GYN SURGERY  hysterectomy   • MITRAL VALVE REPLACE     • OTHER ABDOMINAL SURGERY     • OTHER ORTHOPEDIC SURGERY     • TONSILLECTOMY Bilateral      SOCHX:  reports that she quit smoking about 9 years ago. She smoked 1.50 packs per day. She has never used smokeless tobacco. She reports that she does not drink alcohol or use drugs.  FH: family history includes Diabetes in her father, maternal grandmother, mother, and paternal  "grandfather; Heart Disease in her mother; Hypertension in her father and mother.    Review of Systems   Constitutional: Negative.    Respiratory: Negative.    Cardiovascular: Negative.    Gastrointestinal: Negative.    Musculoskeletal: Negative.    Neurological: Negative.        Medications, Allergies, and current problem list reviewed today in Epic     Objective:     /58   Pulse 83   Temp 36.2 °C (97.1 °F) (Temporal)   Resp 20   Ht 1.702 m (5' 7\")   Wt 95.3 kg (210 lb)   SpO2 100%   BMI 32.89 kg/m²      Physical Exam  Vitals signs and nursing note reviewed.   Constitutional:       General: She is not in acute distress.     Appearance: She is well-developed. She is not diaphoretic.   HENT:      Head: Normocephalic and atraumatic.   Eyes:      Conjunctiva/sclera: Conjunctivae normal.   Neck:      Musculoskeletal: Normal range of motion and neck supple.   Cardiovascular:      Rate and Rhythm: Normal rate and regular rhythm.      Heart sounds: Normal heart sounds.   Pulmonary:      Effort: Pulmonary effort is normal. No respiratory distress.      Breath sounds: Normal breath sounds. No wheezing, rhonchi or rales.   Musculoskeletal:      Right lower leg: No edema.      Left lower leg: No edema.   Skin:     General: Skin is warm and dry.   Neurological:      Mental Status: She is alert and oriented to person, place, and time.   Psychiatric:         Behavior: Behavior normal.         Thought Content: Thought content normal.         Judgment: Judgment normal.                 Assessment/Plan:         1. Type 2 diabetes mellitus with other diabetic kidney complication, with long-term current use of insulin (HCC)  insulin regular (HUMULIN R) 100 Unit/mL Solution     Refill of Humulin/Novolin R provided.  She has no concerns or complaints today.  She has been on the medication for 40 years without issue.  She is well versed in sliding scale.  Exam and vital signs normal.  No further questions or concerns " today.    Return to clinic or go to ED if symptoms worsen or persist. Indications for ED discussed at length. Patient/Parent/Guardian voices understanding. Follow-up with your primary care provider in 3-5 days. Red flag symptoms discussed. All side effects of medication discussed including allergic response, GI upset, tendon injury, rash, sedation etc.    Please note that this dictation was created using voice recognition software. I have made every reasonable attempt to correct obvious errors, but I expect that there are errors of grammar and possibly content that I did not discover before finalizing the note.

## 2021-01-11 DIAGNOSIS — Z23 NEED FOR VACCINATION: ICD-10-CM

## 2021-01-23 ENCOUNTER — HOSPITAL ENCOUNTER (OUTPATIENT)
Dept: LAB | Facility: MEDICAL CENTER | Age: 76
End: 2021-01-23
Attending: INTERNAL MEDICINE
Payer: MEDICARE

## 2021-01-23 LAB
ALBUMIN SERPL BCP-MCNC: 3.9 G/DL (ref 3.2–4.9)
ALBUMIN/GLOB SERPL: 1.4 G/DL
ALP SERPL-CCNC: 78 U/L (ref 30–99)
ALT SERPL-CCNC: 12 U/L (ref 2–50)
ANION GAP SERPL CALC-SCNC: 10 MMOL/L (ref 7–16)
AST SERPL-CCNC: 19 U/L (ref 12–45)
BILIRUB SERPL-MCNC: 0.3 MG/DL (ref 0.1–1.5)
BUN SERPL-MCNC: 16 MG/DL (ref 8–22)
CALCIUM SERPL-MCNC: 9.3 MG/DL (ref 8.5–10.5)
CHLORIDE SERPL-SCNC: 102 MMOL/L (ref 96–112)
CO2 SERPL-SCNC: 26 MMOL/L (ref 20–33)
CREAT SERPL-MCNC: 1.28 MG/DL (ref 0.5–1.4)
EST. AVERAGE GLUCOSE BLD GHB EST-MCNC: 240 MG/DL
FASTING STATUS PATIENT QL REPORTED: NORMAL
GLOBULIN SER CALC-MCNC: 2.7 G/DL (ref 1.9–3.5)
GLUCOSE SERPL-MCNC: 320 MG/DL (ref 65–99)
HBA1C MFR BLD: 10 % (ref 0–5.6)
POTASSIUM SERPL-SCNC: 3.9 MMOL/L (ref 3.6–5.5)
PROT SERPL-MCNC: 6.6 G/DL (ref 6–8.2)
SODIUM SERPL-SCNC: 138 MMOL/L (ref 135–145)

## 2021-01-23 PROCEDURE — 36415 COLL VENOUS BLD VENIPUNCTURE: CPT

## 2021-01-23 PROCEDURE — 83036 HEMOGLOBIN GLYCOSYLATED A1C: CPT

## 2021-01-23 PROCEDURE — 80053 COMPREHEN METABOLIC PANEL: CPT

## 2021-04-09 ENCOUNTER — PATIENT OUTREACH (OUTPATIENT)
Dept: HEALTH INFORMATION MANAGEMENT | Facility: OTHER | Age: 76
End: 2021-04-09

## 2021-04-09 NOTE — PROGRESS NOTES
Outcome: Declined for the momment ask to call her in month   Please transfer to Patient Outreach Team at [phone] when patient returns call.      Attempt # 1

## 2021-04-19 ENCOUNTER — TELEPHONE (OUTPATIENT)
Dept: CARDIOLOGY | Facility: MEDICAL CENTER | Age: 76
End: 2021-04-19

## 2021-04-19 DIAGNOSIS — I10 ESSENTIAL HYPERTENSION, BENIGN: ICD-10-CM

## 2021-04-19 NOTE — TELEPHONE ENCOUNTER
SW    Patient called to have their potassium chloride (KLOR-CON) 8 MEQ tablet and furosemide (LASIX) 40 MG Tab, sent to the [facility] on file.     Thank you,  Monet TREVINO

## 2021-04-20 RX ORDER — FUROSEMIDE 40 MG/1
40 TABLET ORAL EVERY EVENING
Qty: 90 TABLET | Refills: 0 | Status: SHIPPED | OUTPATIENT
Start: 2021-04-20 | End: 2021-07-15

## 2021-04-20 RX ORDER — POTASSIUM CHLORIDE 600 MG/1
8 TABLET, FILM COATED, EXTENDED RELEASE ORAL EVERY EVENING
Qty: 90 TABLET | Refills: 0 | Status: SHIPPED | OUTPATIENT
Start: 2021-04-20 | End: 2021-07-15

## 2021-05-24 ENCOUNTER — HOSPITAL ENCOUNTER (OUTPATIENT)
Dept: LAB | Facility: MEDICAL CENTER | Age: 76
End: 2021-05-24
Attending: INTERNAL MEDICINE
Payer: MEDICARE

## 2021-05-24 LAB
ALBUMIN SERPL BCP-MCNC: 3.8 G/DL (ref 3.2–4.9)
ALBUMIN/GLOB SERPL: 1.3 G/DL
ALP SERPL-CCNC: 87 U/L (ref 30–99)
ALT SERPL-CCNC: 15 U/L (ref 2–50)
ANION GAP SERPL CALC-SCNC: 8 MMOL/L (ref 7–16)
AST SERPL-CCNC: 16 U/L (ref 12–45)
BASOPHILS # BLD AUTO: 0.8 % (ref 0–1.8)
BASOPHILS # BLD: 0.05 K/UL (ref 0–0.12)
BILIRUB SERPL-MCNC: 0.4 MG/DL (ref 0.1–1.5)
BUN SERPL-MCNC: 16 MG/DL (ref 8–22)
CALCIUM SERPL-MCNC: 9.5 MG/DL (ref 8.5–10.5)
CHLORIDE SERPL-SCNC: 101 MMOL/L (ref 96–112)
CHOLEST SERPL-MCNC: 114 MG/DL (ref 100–199)
CO2 SERPL-SCNC: 28 MMOL/L (ref 20–33)
CREAT SERPL-MCNC: 1.22 MG/DL (ref 0.5–1.4)
EOSINOPHIL # BLD AUTO: 0.17 K/UL (ref 0–0.51)
EOSINOPHIL NFR BLD: 2.6 % (ref 0–6.9)
ERYTHROCYTE [DISTWIDTH] IN BLOOD BY AUTOMATED COUNT: 44.8 FL (ref 35.9–50)
EST. AVERAGE GLUCOSE BLD GHB EST-MCNC: 209 MG/DL
FASTING STATUS PATIENT QL REPORTED: NORMAL
FERRITIN SERPL-MCNC: 76.7 NG/ML (ref 10–291)
GLOBULIN SER CALC-MCNC: 2.9 G/DL (ref 1.9–3.5)
GLUCOSE SERPL-MCNC: 335 MG/DL (ref 65–99)
HBA1C MFR BLD: 8.9 % (ref 4–5.6)
HCT VFR BLD AUTO: 37.1 % (ref 37–47)
HDLC SERPL-MCNC: 38 MG/DL
HGB BLD-MCNC: 12.1 G/DL (ref 12–16)
IMM GRANULOCYTES # BLD AUTO: 0.02 K/UL (ref 0–0.11)
IMM GRANULOCYTES NFR BLD AUTO: 0.3 % (ref 0–0.9)
IRON SATN MFR SERPL: 22 % (ref 15–55)
IRON SERPL-MCNC: 50 UG/DL (ref 40–170)
LDLC SERPL CALC-MCNC: 47 MG/DL
LYMPHOCYTES # BLD AUTO: 1.37 K/UL (ref 1–4.8)
LYMPHOCYTES NFR BLD: 20.9 % (ref 22–41)
MAGNESIUM SERPL-MCNC: 2 MG/DL (ref 1.5–2.5)
MCH RBC QN AUTO: 30.6 PG (ref 27–33)
MCHC RBC AUTO-ENTMCNC: 32.6 G/DL (ref 33.6–35)
MCV RBC AUTO: 93.7 FL (ref 81.4–97.8)
MONOCYTES # BLD AUTO: 0.43 K/UL (ref 0–0.85)
MONOCYTES NFR BLD AUTO: 6.5 % (ref 0–13.4)
NEUTROPHILS # BLD AUTO: 4.53 K/UL (ref 2–7.15)
NEUTROPHILS NFR BLD: 68.9 % (ref 44–72)
NRBC # BLD AUTO: 0 K/UL
NRBC BLD-RTO: 0 /100 WBC
PHOSPHATE SERPL-MCNC: 3.3 MG/DL (ref 2.5–4.5)
PLATELET # BLD AUTO: 189 K/UL (ref 164–446)
PMV BLD AUTO: 12.1 FL (ref 9–12.9)
POTASSIUM SERPL-SCNC: 4 MMOL/L (ref 3.6–5.5)
PROT SERPL-MCNC: 6.7 G/DL (ref 6–8.2)
PTH-INTACT SERPL-MCNC: 144 PG/ML (ref 14–72)
RBC # BLD AUTO: 3.96 M/UL (ref 4.2–5.4)
SODIUM SERPL-SCNC: 137 MMOL/L (ref 135–145)
TIBC SERPL-MCNC: 224 UG/DL (ref 250–450)
TRIGL SERPL-MCNC: 147 MG/DL (ref 0–149)
UIBC SERPL-MCNC: 174 UG/DL (ref 110–370)
URATE SERPL-MCNC: 7.2 MG/DL (ref 1.9–8.2)
WBC # BLD AUTO: 6.6 K/UL (ref 4.8–10.8)

## 2021-05-24 PROCEDURE — 83735 ASSAY OF MAGNESIUM: CPT

## 2021-05-24 PROCEDURE — 80061 LIPID PANEL: CPT

## 2021-05-24 PROCEDURE — 84550 ASSAY OF BLOOD/URIC ACID: CPT

## 2021-05-24 PROCEDURE — 83550 IRON BINDING TEST: CPT

## 2021-05-24 PROCEDURE — 83036 HEMOGLOBIN GLYCOSYLATED A1C: CPT

## 2021-05-24 PROCEDURE — 84100 ASSAY OF PHOSPHORUS: CPT

## 2021-05-24 PROCEDURE — 82728 ASSAY OF FERRITIN: CPT

## 2021-05-24 PROCEDURE — 85025 COMPLETE CBC W/AUTO DIFF WBC: CPT

## 2021-05-24 PROCEDURE — 82306 VITAMIN D 25 HYDROXY: CPT

## 2021-05-24 PROCEDURE — 80053 COMPREHEN METABOLIC PANEL: CPT

## 2021-05-24 PROCEDURE — 83540 ASSAY OF IRON: CPT

## 2021-05-24 PROCEDURE — 83970 ASSAY OF PARATHORMONE: CPT

## 2021-05-24 PROCEDURE — 36415 COLL VENOUS BLD VENIPUNCTURE: CPT

## 2021-05-26 LAB — 25(OH)D3 SERPL-MCNC: 37 NG/ML (ref 30–80)

## 2021-06-09 ENCOUNTER — OFFICE VISIT (OUTPATIENT)
Dept: MEDICAL GROUP | Facility: PHYSICIAN GROUP | Age: 76
End: 2021-06-09
Payer: MEDICARE

## 2021-06-09 VITALS
TEMPERATURE: 98 F | DIASTOLIC BLOOD PRESSURE: 60 MMHG | HEIGHT: 67 IN | SYSTOLIC BLOOD PRESSURE: 132 MMHG | HEART RATE: 90 BPM | BODY MASS INDEX: 32.8 KG/M2 | OXYGEN SATURATION: 95 % | WEIGHT: 209 LBS | RESPIRATION RATE: 20 BRPM

## 2021-06-09 DIAGNOSIS — I10 ESSENTIAL HYPERTENSION, BENIGN: ICD-10-CM

## 2021-06-09 DIAGNOSIS — L30.9 ECZEMA, UNSPECIFIED TYPE: ICD-10-CM

## 2021-06-09 DIAGNOSIS — I38 DIASTOLIC CHF DUE TO VALVULAR DISEASE (HCC): ICD-10-CM

## 2021-06-09 DIAGNOSIS — B36.9 FUNGAL INFECTION OF SKIN: ICD-10-CM

## 2021-06-09 DIAGNOSIS — J44.9 CHRONIC OBSTRUCTIVE PULMONARY DISEASE, UNSPECIFIED COPD TYPE (HCC): ICD-10-CM

## 2021-06-09 DIAGNOSIS — I50.30 DIASTOLIC CHF DUE TO VALVULAR DISEASE (HCC): ICD-10-CM

## 2021-06-09 DIAGNOSIS — N18.32 TYPE 2 DIABETES MELLITUS WITH STAGE 3B CHRONIC KIDNEY DISEASE, WITH LONG-TERM CURRENT USE OF INSULIN (HCC): Chronic | ICD-10-CM

## 2021-06-09 DIAGNOSIS — H93.12 TINNITUS OF LEFT EAR: ICD-10-CM

## 2021-06-09 DIAGNOSIS — N18.32 STAGE 3B CHRONIC KIDNEY DISEASE: ICD-10-CM

## 2021-06-09 DIAGNOSIS — Z23 NEED FOR VACCINATION: ICD-10-CM

## 2021-06-09 DIAGNOSIS — E11.22 TYPE 2 DIABETES MELLITUS WITH STAGE 3B CHRONIC KIDNEY DISEASE, WITH LONG-TERM CURRENT USE OF INSULIN (HCC): Chronic | ICD-10-CM

## 2021-06-09 DIAGNOSIS — F32.1 CURRENT MODERATE EPISODE OF MAJOR DEPRESSIVE DISORDER WITHOUT PRIOR EPISODE (HCC): ICD-10-CM

## 2021-06-09 DIAGNOSIS — Z79.4 TYPE 2 DIABETES MELLITUS WITH STAGE 3B CHRONIC KIDNEY DISEASE, WITH LONG-TERM CURRENT USE OF INSULIN (HCC): Chronic | ICD-10-CM

## 2021-06-09 DIAGNOSIS — N25.81 SECONDARY HYPERPARATHYROIDISM OF RENAL ORIGIN (HCC): ICD-10-CM

## 2021-06-09 PROBLEM — R42 VERTIGO: Status: RESOLVED | Noted: 2019-07-05 | Resolved: 2021-06-09

## 2021-06-09 PROBLEM — R55 NEAR SYNCOPE: Status: RESOLVED | Noted: 2017-10-05 | Resolved: 2021-06-09

## 2021-06-09 PROBLEM — I44.2 COMPLETE HEART BLOCK (HCC): Chronic | Status: RESOLVED | Noted: 2018-11-16 | Resolved: 2021-06-09

## 2021-06-09 PROBLEM — F41.9 ANXIETY: Status: RESOLVED | Noted: 2018-11-17 | Resolved: 2021-06-09

## 2021-06-09 PROCEDURE — 99214 OFFICE O/P EST MOD 30 MIN: CPT | Mod: 25 | Performed by: NURSE PRACTITIONER

## 2021-06-09 PROCEDURE — 90750 HZV VACC RECOMBINANT IM: CPT | Performed by: NURSE PRACTITIONER

## 2021-06-09 PROCEDURE — 90471 IMMUNIZATION ADMIN: CPT | Performed by: NURSE PRACTITIONER

## 2021-06-09 RX ORDER — ALBUTEROL SULFATE 90 UG/1
2 AEROSOL, METERED RESPIRATORY (INHALATION) EVERY 6 HOURS PRN
Qty: 8.5 G | Refills: 2 | Status: SHIPPED | OUTPATIENT
Start: 2021-06-09 | End: 2022-11-02 | Stop reason: SDUPTHER

## 2021-06-09 RX ORDER — KETOCONAZOLE 20 MG/G
1 CREAM TOPICAL DAILY
Qty: 60 G | Refills: 1 | Status: SHIPPED | OUTPATIENT
Start: 2021-06-09 | End: 2022-05-12

## 2021-06-09 RX ORDER — CLOBETASOL PROPIONATE 0.5 MG/G
1 CREAM TOPICAL 2 TIMES DAILY
Qty: 60 G | Refills: 1 | Status: SHIPPED | OUTPATIENT
Start: 2021-06-09 | End: 2021-06-23

## 2021-06-09 ASSESSMENT — PATIENT HEALTH QUESTIONNAIRE - PHQ9
CLINICAL INTERPRETATION OF PHQ2 SCORE: 5
SUM OF ALL RESPONSES TO PHQ QUESTIONS 1-9: 10
5. POOR APPETITE OR OVEREATING: 1 - SEVERAL DAYS

## 2021-06-09 ASSESSMENT — FIBROSIS 4 INDEX: FIB4 SCORE: 1.66

## 2021-06-09 NOTE — ASSESSMENT & PLAN NOTE
Chronic medical problem. The left ear ringing started 1 night ago. She would like for me to check her ear. The ringing is intermittent.

## 2021-06-09 NOTE — PROGRESS NOTES
Annual Health Assessment Questions:    1.  Are you currently engaging in any exercise or physical activity? Yes walk around the house    2.  How would you describe your mood or emotional well-being today? fair    3.  Have you had any falls in the last year? No    4.  Have you noticed any problems with your balance or had difficulty walking? Yes    5.  In the last six months have you experienced any leakage of urine? No    6. DPA/Advanced Directive: Patient has Advanced Directive, but it is not on file. Instructed to bring in a copy to scan into their chart.

## 2021-06-09 NOTE — ASSESSMENT & PLAN NOTE
Chronic medical problem.  She is followed by her cardiologist Dr. Wilkinson. She has upcoming appointment in August. She is taking furosemide 40 mg every evening and potassium chloride 8 mEq every evening.  Her weight has been stable at home.  Denies any new leg swelling.

## 2021-06-09 NOTE — ASSESSMENT & PLAN NOTE
Chronic medical problem. Her intitial blood pressure today is 152/64.  She is taking losartan 25 mg twice daily.  She is not checking her blood pressure.  Denies any chest pain, shortness of breath, blurry vision, or headaches.

## 2021-06-09 NOTE — ASSESSMENT & PLAN NOTE
Chronic medical problem.  She is followed by Dr. Guillen with endocrinology. She has upcoming appointment in August 2021. She is taking NPH and regular insulin per her sliding scale and glimepiride 4 mg BID.  She denies any low blood sugars.  She had recent A1c:  Results for PAYAL BAUER (MRN [id number]) as of 6/9/2021 16:55   Ref. Range 5/24/2021 08:21   Glycohemoglobin Latest Ref Range: 4.0 - 5.6 % 8.9 (H)   Estim. Avg Glu Latest Units: mg/dL 209

## 2021-06-09 NOTE — ASSESSMENT & PLAN NOTE
Chronic medical problem. She is followed by Dr. Rosas with nephrology. She avoids NSAID's. She is on losartan.  She had recent labs. Last lab results:  Results for PAYAL BAUER (MRN [id number]) as of 6/9/2021 16:55   Ref. Range 5/24/2021 08:21   GFR If  Latest Ref Range: >60 mL/min/1.73 m 2 52 (A)   GFR If Non  Latest Ref Range: >60 mL/min/1.73 m 2 43 (A)

## 2021-06-09 NOTE — ASSESSMENT & PLAN NOTE
Chronic medical problem. She is using albuterol as needed.  She would like a medication refill today.  She uses her albuterol in the winter with cold weather.  She denies any chest pain, shortness of breath, or cough today.

## 2021-06-10 NOTE — ASSESSMENT & PLAN NOTE
Chronic medical problem.  She is using clobetasol as needed with flares.  She would like a medication refill today.  She tolerates the medication.

## 2021-06-10 NOTE — ASSESSMENT & PLAN NOTE
Chronic medical problem.  Patient reports that she gets intermittent fungal infections in her skin folds.  She is using ketoconazole cream with relief.  She is asking for medication refill today.

## 2021-06-10 NOTE — ASSESSMENT & PLAN NOTE
Acute medical problem.  She was due for her depression screening today.  Her PHQ score today is 10.  She states that she currently lives by herself with her cat.  She does enjoy reading and crocheting.  She is able to cook, clean, perform her own ADLs.  She does have difficulty sleeping at times but states that her cat will lay with her and this allows her to sleep.  She denies any self-harm or SI today.

## 2021-06-10 NOTE — ASSESSMENT & PLAN NOTE
Chronic medical problem.  She is followed by Dr. Rosas with nephrology.  She has upcoming appointment.  Last lab results:  Results for PAYAL BAUER (MRN [id number]) as of 6/9/2021 17:38   Ref. Range 5/24/2021 08:21   Pth, Intact Latest Ref Range: 14.0 - 72.0 pg/mL 144.0 (H)

## 2021-07-07 NOTE — PROGRESS NOTES
Outcome:     HIPAA Verified - Called member, declined scheduling CELESTE at the moment. Will call back when the weather is not so hot.      Attempt # 2

## 2021-07-15 DIAGNOSIS — I10 ESSENTIAL HYPERTENSION, BENIGN: ICD-10-CM

## 2021-07-19 RX ORDER — POTASSIUM CHLORIDE 600 MG/1
TABLET, FILM COATED, EXTENDED RELEASE ORAL
Qty: 100 TABLET | Refills: 0 | Status: SHIPPED | OUTPATIENT
Start: 2021-07-19 | End: 2021-10-18

## 2021-07-19 RX ORDER — FUROSEMIDE 40 MG/1
TABLET ORAL
Qty: 100 TABLET | Refills: 0 | Status: SHIPPED | OUTPATIENT
Start: 2021-07-19 | End: 2021-10-18

## 2021-07-30 ENCOUNTER — HOSPITAL ENCOUNTER (OUTPATIENT)
Dept: LAB | Facility: MEDICAL CENTER | Age: 76
End: 2021-07-30
Attending: INTERNAL MEDICINE
Payer: MEDICARE

## 2021-07-30 LAB
ALBUMIN SERPL BCP-MCNC: 4 G/DL (ref 3.2–4.9)
ALBUMIN/GLOB SERPL: 1.4 G/DL
ALP SERPL-CCNC: 67 U/L (ref 30–99)
ALT SERPL-CCNC: 11 U/L (ref 2–50)
ANION GAP SERPL CALC-SCNC: 12 MMOL/L (ref 7–16)
AST SERPL-CCNC: 19 U/L (ref 12–45)
BILIRUB SERPL-MCNC: 0.3 MG/DL (ref 0.1–1.5)
BUN SERPL-MCNC: 23 MG/DL (ref 8–22)
CALCIUM SERPL-MCNC: 9.2 MG/DL (ref 8.5–10.5)
CHLORIDE SERPL-SCNC: 100 MMOL/L (ref 96–112)
CO2 SERPL-SCNC: 25 MMOL/L (ref 20–33)
CREAT SERPL-MCNC: 1.29 MG/DL (ref 0.5–1.4)
EST. AVERAGE GLUCOSE BLD GHB EST-MCNC: 203 MG/DL
GLOBULIN SER CALC-MCNC: 2.8 G/DL (ref 1.9–3.5)
GLUCOSE SERPL-MCNC: 312 MG/DL (ref 65–99)
HBA1C MFR BLD: 8.7 % (ref 4–5.6)
POTASSIUM SERPL-SCNC: 4.3 MMOL/L (ref 3.6–5.5)
PROT SERPL-MCNC: 6.8 G/DL (ref 6–8.2)
SODIUM SERPL-SCNC: 137 MMOL/L (ref 135–145)

## 2021-07-30 PROCEDURE — 83036 HEMOGLOBIN GLYCOSYLATED A1C: CPT

## 2021-07-30 PROCEDURE — 36415 COLL VENOUS BLD VENIPUNCTURE: CPT

## 2021-07-30 PROCEDURE — 80053 COMPREHEN METABOLIC PANEL: CPT

## 2021-08-03 ENCOUNTER — TELEPHONE (OUTPATIENT)
Dept: MEDICAL GROUP | Facility: PHYSICIAN GROUP | Age: 76
End: 2021-08-03

## 2021-08-03 DIAGNOSIS — E11.22 TYPE 2 DIABETES MELLITUS WITH STAGE 3B CHRONIC KIDNEY DISEASE, WITH LONG-TERM CURRENT USE OF INSULIN (HCC): ICD-10-CM

## 2021-08-03 DIAGNOSIS — N18.32 TYPE 2 DIABETES MELLITUS WITH STAGE 3B CHRONIC KIDNEY DISEASE, WITH LONG-TERM CURRENT USE OF INSULIN (HCC): ICD-10-CM

## 2021-08-03 DIAGNOSIS — Z79.4 TYPE 2 DIABETES MELLITUS WITH STAGE 3B CHRONIC KIDNEY DISEASE, WITH LONG-TERM CURRENT USE OF INSULIN (HCC): ICD-10-CM

## 2021-08-03 NOTE — TELEPHONE ENCOUNTER
1. Caller Name: Ophelia Sosa                        Call Back Number: [phone] (home)       How would the patient prefer to be contacted with a response: Phone call OK to leave a detailed message    2. SPECIFIC Action To Be Taken: Referral pending, please sign.    3. Diagnosis/Clinical Reason for Request: Diabetes    4. Specialty & Provider Name/Lab/Imaging Location: Dr. Vinh Wade( Pt current Provider for Endocrinologist has retired)    5. Is appointment scheduled for requested order/referral: no    Patient was informed they will receive a return phone call from the office ONLY if there are any questions before processing their request. Advised to call back if they haven't received a call from the referral department in 5 days.

## 2021-08-05 NOTE — TELEPHONE ENCOUNTER
Will clarify if patient needs endocrinology referral or wait for upcoming 8/12/2021 appointment. Patient missed 8/4/2021 appointment.

## 2021-08-05 NOTE — TELEPHONE ENCOUNTER
Phone Number Called: [phone] (home)    Call outcome: Spoke to patient regarding message below.    Message: She would like to wait until her 8/12/21 to discuss this topic

## 2021-08-06 ENCOUNTER — TELEPHONE (OUTPATIENT)
Dept: MEDICAL GROUP | Facility: PHYSICIAN GROUP | Age: 76
End: 2021-08-06

## 2021-08-06 NOTE — TELEPHONE ENCOUNTER
Future Appointments       Provider Department Center    8/12/2021 2:30 PM [facility]SE ALEJANDRO [place] [facility]    9/29/2021 2:15 PM JOSE ALEJANDRO [facility]     10/19/2021 2:40 PM Aman Wilkinson M.D. [facility]     11/10/2021 1:00 PM JOSE ALEJANDR[facility] [place] [facility]        ESTABLISHED PATIENT PRE-VISIT PLANNING     Patient was NOT contacted to complete PVP.     Note: Patient will not be contacted if there is no indication to call.     1.  Reviewed notes from the last few office visits within the medical group: Yes, LOV 06/22/2020    2.  If any orders were placed at last visit or intended to be done for this visit (i.e. 6 mos follow-up), do we have Results/Consult Notes?         •  Labs - Labs were not ordered at last office visit.  Note: If patient appointment is for lab review and patient did not complete labs, check with provider if OK to reschedule patient until labs completed.       •  Imaging - Imaging was not ordered at last office visit.       •  Referrals - No referrals were ordered at last office visit.    3. Is this appointment scheduled as a Hospital Follow-Up? No    4.  Immunizations were updated in Epic using Reconcile Outside Information activity? Yes    5.  Patient is due for the following Health Maintenance Topics:   Health Maintenance Due   Topic Date Due   • Annual Wellness Visit  Never done   • Annual Pulmonary Function Test / Spirometry  Never done   • COVID-19 Vaccine (1) Never done   • RETINAL SCREENING  Never done   • BONE DENSITY  Never done   • DIABETES MONOFILAMENT / LE EXAM  01/21/2017   • URINE ACR / MICROALBUMIN  11/06/2020   • IMM ZOSTER VACCINES (2 of 2) 08/04/2021     6.  AHA (Pulse8) form printed for Provider? N/A

## 2021-08-20 ENCOUNTER — TELEPHONE (OUTPATIENT)
Dept: MEDICAL GROUP | Facility: PHYSICIAN GROUP | Age: 76
End: 2021-08-20

## 2021-08-20 NOTE — TELEPHONE ENCOUNTER
Future Appointments       Provider Department Center    8/26/2021 3:00 PM [facility] ALEJANDR[facility] [place] [facility]    9/29/2021 2:15 PM JOSE ALEJANDRO [facility]     10/19/2021 2:40 PM Aman Wilkinson M.D. [facility]     11/10/2021 1:00 PM JOSE ALEJANDR[facility] [place] [facility]        ESTABLISHED PATIENT PRE-VISIT PLANNING     Patient was NOT contacted to complete PVP.     Note: Patient will not be contacted if there is no indication to call.     1.  Reviewed notes from the last few office visits within the medical group: Yes, LOV 06/09/2021    2.  If any orders were placed at last visit or intended to be done for this visit (i.e. 6 mos follow-up), do we have Results/Consult Notes?         •  Labs - Labs were not ordered at last office visit.  Note: If patient appointment is for lab review and patient did not complete labs, check with provider if OK to reschedule patient until labs completed.       •  Imaging - Imaging was not ordered at last office visit.       •  Referrals - No referrals were ordered at last office visit.    3. Is this appointment scheduled as a Hospital Follow-Up? No    4.  Immunizations were updated in Epic using Reconcile Outside Information activity? Yes    5.  Patient is due for the following Health Maintenance Topics:   Health Maintenance Due   Topic Date Due   • Annual Wellness Visit  Never done   • Annual Pulmonary Function Test / Spirometry  Never done   • COVID-19 Vaccine (1) Never done   • RETINAL SCREENING  Never done   • BONE DENSITY  Never done   • DIABETES MONOFILAMENT / LE EXAM  01/21/2017   • URINE ACR / MICROALBUMIN  11/06/2020   • IMM ZOSTER VACCINES (2 of 2) 08/04/2021     6.  AHA (Pulse8) form printed for Provider? N/A

## 2021-08-26 ENCOUNTER — OFFICE VISIT (OUTPATIENT)
Dept: MEDICAL GROUP | Facility: PHYSICIAN GROUP | Age: 76
End: 2021-08-26
Payer: MEDICARE

## 2021-08-26 VITALS
WEIGHT: 212 LBS | TEMPERATURE: 98 F | DIASTOLIC BLOOD PRESSURE: 64 MMHG | OXYGEN SATURATION: 96 % | SYSTOLIC BLOOD PRESSURE: 166 MMHG | HEIGHT: 67 IN | HEART RATE: 95 BPM | BODY MASS INDEX: 33.27 KG/M2

## 2021-08-26 DIAGNOSIS — E11.65 TYPE 2 DIABETES MELLITUS WITH HYPERGLYCEMIA, WITH LONG-TERM CURRENT USE OF INSULIN (HCC): ICD-10-CM

## 2021-08-26 DIAGNOSIS — Z79.4 TYPE 2 DIABETES MELLITUS WITH DIABETIC POLYNEUROPATHY, WITH LONG-TERM CURRENT USE OF INSULIN (HCC): ICD-10-CM

## 2021-08-26 DIAGNOSIS — E11.42 TYPE 2 DIABETES MELLITUS WITH DIABETIC POLYNEUROPATHY, WITH LONG-TERM CURRENT USE OF INSULIN (HCC): ICD-10-CM

## 2021-08-26 DIAGNOSIS — N18.32 TYPE 2 DIABETES MELLITUS WITH STAGE 3B CHRONIC KIDNEY DISEASE, WITH LONG-TERM CURRENT USE OF INSULIN (HCC): ICD-10-CM

## 2021-08-26 DIAGNOSIS — Z79.4 TYPE 2 DIABETES MELLITUS WITH HYPERGLYCEMIA, WITH LONG-TERM CURRENT USE OF INSULIN (HCC): ICD-10-CM

## 2021-08-26 DIAGNOSIS — Z79.4 TYPE 2 DIABETES MELLITUS WITH STAGE 3B CHRONIC KIDNEY DISEASE, WITH LONG-TERM CURRENT USE OF INSULIN (HCC): ICD-10-CM

## 2021-08-26 DIAGNOSIS — E11.22 TYPE 2 DIABETES MELLITUS WITH STAGE 3B CHRONIC KIDNEY DISEASE, WITH LONG-TERM CURRENT USE OF INSULIN (HCC): ICD-10-CM

## 2021-08-26 PROCEDURE — 99213 OFFICE O/P EST LOW 20 MIN: CPT | Performed by: NURSE PRACTITIONER

## 2021-08-26 ASSESSMENT — FIBROSIS 4 INDEX: FIB4 SCORE: 2.33

## 2021-08-26 NOTE — LETTER
[facility]  JOSE ALEJANDRO [place]  [address]  [place] [address]  Fax: [phone]   Authorization for Release/Disclosure of   Protected Health Information   Name: PAYAL BAUER : 1945 SSN: [id number]   Address: [address] [place], [place] [address]  [place] [address] Phone:    [phone] (home)    I authorize the entity listed below to release/disclose the PHI below to:   [facility]/JOSE ALEJANDRO [place] and JOSE ALEJANDR[facility] [place]   Provider or Entity Name:  [facility]  Justin Henning   Address   [facility], [place]  [address] [place] [address] Phone:[phone]      Fax:     Reason for request: continuity of care   Information to be released:    [  ] LAST COLONOSCOPY,  including any PATH REPORT and follow-up  [  ] LAST FIT/COLOGUARD RESULT [  ] LAST DEXA  [  ] LAST MAMMOGRAM  [  ] LAST PAP  [  ] LAST LABS [x] RETINA EXAM REPORT  [  ] IMMUNIZATION RECORDS  [  ] Release all info      [  ] Check here and initial the line next to each item to release ALL health information INCLUDING  _____ Care and treatment for drug and / or alcohol abuse  _____ HIV testing, infection status, or AIDS  _____ Genetic Testing    DATES OF SERVICE OR TIME PERIOD TO BE DISCLOSED: _____________  I understand and acknowledge that:  * This Authorization may be revoked at any time by you in writing, except if your health information has already been used or disclosed.  * Your health information that will be used or disclosed as a result of you signing this authorization could be re-disclosed by the recipient. If this occurs, your re-disclosed health information may no longer be protected by State or Federal laws.  * You may refuse to sign this Authorization. Your refusal will not affect your ability to obtain treatment.  * This Authorization becomes effective upon signing and will  on (date) __________.      If no date is indicated, this Authorization will  one (1) year  from the signature date.    Name: Ophelia Sosa    Signature:   Date:     8/26/2021       PLEASE FAX REQUESTED RECORDS BACK TO: [phone]

## 2021-08-26 NOTE — ASSESSMENT & PLAN NOTE
Chronic medical problem. Her endocrinologist, Dr. Gallagher, recently retired. She is followed by nephrology, opthalmology and cardiology. She has been adjusting her own insulin for 20 years. She uses her own sliding scale for her NPH and Novolin. She is also taking glimepiride 4 mg BID. She is getting her Novolin insulin at [facility] for $25 a bottle. She buys her Relion test strips $17.50 for 100 at [facility]. She would like me to take over ordering her labs, health maintenance and glimepiride. She states that she can manage her own insulin. She does not want to go to an endocrinologist.

## 2021-08-26 NOTE — PROGRESS NOTES
Subjective:     CC: diabetes     HPI:   Ophelia presents today with the following:     Type 2 diabetes mellitus with stage 3b chronic kidney disease, with long-term current use of insulin ([facility])  Chronic medical problem. Her endocrinologist, Dr. Gallagher, recently retired. She is followed by nephrology, opthalmology and cardiology. She has been adjusting her own insulin for 20 years. She uses her own sliding scale for her NPH and Novolin. She is also taking glimepiride 4 mg BID. She is getting her Novolin insulin at [facility] for $25 a bottle. She buys her Relion test strips $17.50 for 100 at [facility]. She would like me to take over ordering her labs, health maintenance and glimepiride. She states that she can manage her own insulin. She does not want to go to an endocrinologist.       Past Medical History:   Diagnosis Date   • ASTHMA     patient denies   • Complete heart block (HCC) 2018   • Congestive heart failure (HCC)    • COPD    • COPD (chronic obstructive pulmonary disease) ([facility])    • Depressed    • Diabetes    • Diastolic dysfunction    • DM (diabetes mellitus) ([facility])    • Heart murmur    • HLD (hyperlipidemia)    • HTN (hypertension)    • Hypertension    • Obstructive sleep apnea    • Osteoarthritis    • Pulmonary hypertension ([facility])    • 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 2018   • Sleep apnea        Social History     Tobacco Use   • Smoking status: Former Smoker     Packs/day: 1.50     Quit date: 2011     Years since quittin.8   • Smokeless tobacco: Never Used   Vaping Use   • Vaping Use: Never used   Substance Use Topics   • Alcohol use: No   • Drug use: No       Current Outpatient Medications Ordered in Epic   Medication Sig Dispense Refill   • furosemide (LASIX) 40 MG Tab TAKE 1 TABLET BY MOUTH EVERY DAY IN THE EVENING 100 tablet 0   • KLOR-CON 8 MEQ tablet TAKE 1 TABLET BY MOUTH EVERY DAY IN THE EVENING 100 tablet 0   • B Complex Vitamins (VITAMIN B  "COMPLEX PO) Take  by mouth.     • ketoconazole (NIZORAL) 2 % Cream Apply 1 Application topically every day. 60 g 1   • albuterol 108 (90 Base) MCG/ACT Aero Soln inhalation aerosol Inhale 2 Puffs every 6 hours as needed for Shortness of Breath. 8.5 g 2   • insulin regular (HUMULIN R) 100 Unit/mL Solution Inject 2-10 Units under the skin 3 times a day before meals. Sliding scale as needed 10 mL 0   • mupirocin (BACTROBAN) 2 % Ointment APPLY TO AFFECTED AREA OF SKIN INFECTIONS THREE TIMES DAILY     • BD INSULIN SYRINGE U/F 31G X 5/16\" 0.3 ML Misc INJECT TWICE DAILY     • rosuvastatin (CRESTOR) 5 MG Tab TAKE ONE TABLET AT BEDTIME FOR CHOLESTEROL. TAKE THREE TIMES WEEKLY 30 Tab 5   • glimepiride (AMARYL) 4 MG Tab TAKE 1 TABLET BY MOUTH TWICE A DAY     • NS SOLN 60 mL with albuterol 2.5 mg/0.5 mL NEBU 5 mL 5 mg/hr by Nebulization route. Spring and fall     • losartan (COZAAR) 25 MG Tab Take 25 mg by mouth 2 Times a Day.     • FREESTYLE LITE strip TEST BLOOD SUGAR 3 TIMES DAILY  12   • Cholecalciferol (VITAMIN D) 2000 UNIT Tab Take 6,000 Units by mouth 3 times a week. M,W,F     • fluticasone (FLONASE) 50 MCG/ACT nasal spray Spray 1 Spray in nose 1 time daily as needed.       No current Epic-ordered facility-administered medications on file.       Allergies:  Ativan, Advair [fluticasone-salmeterol], Ambien [zolpidem tartrate], Erythromycin, Lipitor [atorvastatin calcium], Pcn [penicillins], and Pravachol [pravastatin sodium]    Health Maintenance: Reviewed today. We will request records for retinal screening.     ROS:  Gen: no fevers/chills, no changes in weight  Eyes: no changes in vision  Pulm: no shortness of breath  CV: no chest pain  GI: no nausea/vomiting  Skin: no rash  Neuro: no headaches, no dizziness    Objective:     Vital signs reviewed   Exam:  BP (!) 166/64 (BP Location: Left arm, Patient Position: Sitting, BP Cuff Size: Adult)   Pulse 95   Temp 36.7 °C (98 °F) (Temporal)   Ht 1.702 m (5' 7\")   Wt 96.2 kg " (212 lb)   SpO2 96%   BMI 33.20 kg/m²  Body mass index is 33.2 kg/m².    Gen: Alert and oriented, No apparent distress.  Eyes:   Lids normal. Glasses in place.   Lungs: Normal effort, CTA bilaterally, no wheezes, rhonchi, or rales  CV: Regular rate and rhythm with systolic murmurs. No rubs or gallops.  Ext: No clubbing, cyanosis, edema.      Assessment & Plan:     76 y.o. female with the following -     1. Type 2 diabetes mellitus with stage 3b chronic kidney disease, with long-term current use of insulin (HCC)  Chronic stable problem.  We discussed that given her chronic diabetes complications and use of insulin sliding scale highly recommend she be followed by endocrinologist.  Patient was upset that I would not agree to completely take overhaul of her diabetes medication management.  After discussion she was agreeable for me to place the referral.  She will be due for updated A1c around 11/1/2021.  Orders placed today.  Request records from her ophthalmologist for retinal exam.  She will continue with her glimepiride, NPH and Novolin.  - REFERRAL TO ENDOCRINOLOGY  - Comp Metabolic Panel; Future  - HEMOGLOBIN A1C; Future  - MICROALBUMIN CREAT RATIO URINE; Future    2. Type 2 diabetes mellitus with hyperglycemia, with long-term current use of insulin ([facility])  See #1 above see #1 above  - REFERRAL TO ENDOCRINOLOGY  - Comp Metabolic Panel; Future  - HEMOGLOBIN A1C; Future  - MICROALBUMIN CREAT RATIO URINE; Future    3. Type 2 diabetes mellitus with diabetic polyneuropathy, with long-term current use of insulin (HCC)  See #1 above  - REFERRAL TO ENDOCRINOLOGY        Return if symptoms worsen or fail to improve.    Please note that this dictation was created using voice recognition software. I have made every reasonable attempt to correct obvious errors, but I expect that there are errors of grammar and possibly content that I did not discover before finalizing the note.

## 2021-09-14 ENCOUNTER — TELEPHONE (OUTPATIENT)
Dept: HEALTH INFORMATION MANAGEMENT | Facility: OTHER | Age: 76
End: 2021-09-14

## 2021-09-14 NOTE — TELEPHONE ENCOUNTER
[employer] From:  [address]  To:  [address] [place]    On: 9/29   At: 1:45 pm.   Also requested [employer] From:  [address] [place]    To:[address]      On:  9/29  At: 3 PM/   Scheduled [employer]. ADV to wear mask

## 2021-09-22 ENCOUNTER — TELEPHONE (OUTPATIENT)
Dept: MEDICAL GROUP | Facility: PHYSICIAN GROUP | Age: 76
End: 2021-09-22

## 2021-09-23 NOTE — TELEPHONE ENCOUNTER
Reviewed MA's recent note.  I thank the patient for scheduling with endocrinology.  I can help her with her labs, health maintenance and medications until her establishing endocrinology appointment.

## 2021-09-23 NOTE — TELEPHONE ENCOUNTER
VOICEMAIL  1. Caller Name: Ophelia                      Call Back Number: [phone] (home)     2. Message: Patient wanted to give an update, she is scheduled with a diabetes doctor, Dr. Rea but does not have an appointment until Feb 1 2022.    3. Patient approves office to leave a detailed voicemail/MyChart message: N\A

## 2021-09-29 ENCOUNTER — NON-PROVIDER VISIT (OUTPATIENT)
Dept: CARDIOLOGY | Facility: MEDICAL CENTER | Age: 76
End: 2021-09-29
Payer: MEDICARE

## 2021-09-29 DIAGNOSIS — Z95.0 CARDIAC PACEMAKER: ICD-10-CM

## 2021-09-29 PROCEDURE — 93288 INTERROG EVL PM/LDLS PM IP: CPT | Performed by: NURSE PRACTITIONER

## 2021-10-16 DIAGNOSIS — I10 ESSENTIAL HYPERTENSION, BENIGN: ICD-10-CM

## 2021-10-19 ENCOUNTER — OFFICE VISIT (OUTPATIENT)
Dept: CARDIOLOGY | Facility: MEDICAL CENTER | Age: 76
End: 2021-10-19
Payer: MEDICARE

## 2021-10-19 VITALS
SYSTOLIC BLOOD PRESSURE: 140 MMHG | DIASTOLIC BLOOD PRESSURE: 60 MMHG | OXYGEN SATURATION: 96 % | BODY MASS INDEX: 33.27 KG/M2 | WEIGHT: 212 LBS | HEIGHT: 67 IN | HEART RATE: 100 BPM

## 2021-10-19 DIAGNOSIS — E78.2 MIXED HYPERLIPIDEMIA: ICD-10-CM

## 2021-10-19 DIAGNOSIS — I38 DIASTOLIC CHF DUE TO VALVULAR DISEASE (HCC): ICD-10-CM

## 2021-10-19 DIAGNOSIS — Z95.0 CARDIAC PACEMAKER: ICD-10-CM

## 2021-10-19 DIAGNOSIS — I50.30 DIASTOLIC CHF DUE TO VALVULAR DISEASE (HCC): ICD-10-CM

## 2021-10-19 DIAGNOSIS — I48.0 PAF (PAROXYSMAL ATRIAL FIBRILLATION) (HCC): ICD-10-CM

## 2021-10-19 DIAGNOSIS — I27.20 PULMONARY HYPERTENSION (HCC): Chronic | ICD-10-CM

## 2021-10-19 DIAGNOSIS — I05.0 MITRAL VALVE STENOSIS, UNSPECIFIED ETIOLOGY: ICD-10-CM

## 2021-10-19 DIAGNOSIS — I10 ESSENTIAL HYPERTENSION, BENIGN: ICD-10-CM

## 2021-10-19 PROCEDURE — 99214 OFFICE O/P EST MOD 30 MIN: CPT | Performed by: INTERNAL MEDICINE

## 2021-10-19 ASSESSMENT — ENCOUNTER SYMPTOMS
MYALGIAS: 0
SHORTNESS OF BREATH: 0
COUGH: 0
LOSS OF CONSCIOUSNESS: 0
DIZZINESS: 0
PALPITATIONS: 0

## 2021-10-19 ASSESSMENT — FIBROSIS 4 INDEX: FIB4 SCORE: 2.33

## 2021-10-19 NOTE — PROGRESS NOTES
Chief Complaint   Patient presents with   •  CHF related to mitral stenosis.            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    Since 2020 appointment patient has had no specific cardiac problems or symptoms.    Past Medical History:   Diagnosis Date   • ASTHMA     patient denies   • Complete heart block (HCC) 2018   • Congestive heart failure (HCC)    • COPD    • COPD (chronic obstructive pulmonary disease) (HCC)    • Depressed    • Diabetes    • Diastolic dysfunction    • DM (diabetes mellitus) (HCC)    • Heart murmur    • HLD (hyperlipidemia)    • HTN (hypertension)    • Hypertension    • Obstructive sleep apnea    • Osteoarthritis    • Pulmonary hypertension (HCC)    • 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 2018   • Sleep apnea      Past Surgical History:   Procedure Laterality Date   • BREAST BIOPSY     • GYN SURGERY  hysterectomy   • MITRAL VALVE REPLACE     • OTHER ABDOMINAL SURGERY     • OTHER ORTHOPEDIC SURGERY     • TONSILLECTOMY Bilateral      Family History   Problem Relation Age of Onset   • Hypertension Mother    • Heart Disease Mother    • Diabetes Mother    • Diabetes Father    • Hypertension Father    • Diabetes Maternal Grandmother    • Diabetes Paternal Grandfather      Social History     Socioeconomic History   • Marital status: Single     Spouse name: Not on file   • Number of children: Not on file   • Years of education: Not on file   • Highest education level: Not on file   Occupational History   • Not on file   Tobacco Use   • Smoking status: Former Smoker     Packs/day: 1.50     Quit date: 2011     Years since quittin.9   • Smokeless tobacco: Never Used   Vaping Use   • Vaping Use: Never used   Substance and Sexual Activity    • Alcohol use: No   • Drug use: No   • Sexual activity: Not on file   Other Topics Concern   • Not on file   Social History Narrative   • Not on file     Social Determinants of Health     Financial Resource Strain:    • Difficulty of Paying Living Expenses:    Food Insecurity:    • Worried About Running Out of Food in the Last Year:    • Ran Out of Food in the Last Year:    Transportation Needs:    • Lack of Transportation (Medical):    • Lack of Transportation (Non-Medical):    Physical Activity:    • Days of Exercise per Week:    • Minutes of Exercise per Session:    Stress:    • Feeling of Stress :    Social Connections:    • Frequency of Communication with Friends and Family:    • Frequency of Social Gatherings with Friends and Family:    • Attends Gn[employer] Services:    • Active Member of Clubs or Organizations:    • Attends Club or Organization Meetings:    • Marital Status:    Intimate Partner Violence:    • Fear of Current or Ex-Partner:    • Emotionally Abused:    • Physically Abused:    • Sexually Abused:      Allergies   Allergen Reactions   • Ativan      DELIRIUM, CONFUSION.   • Advair [Fluticasone-Salmeterol]    • Ambien [Zolpidem Tartrate]    • Erythromycin Vomiting   • Lipitor [Atorvastatin Calcium]    • Pcn [Penicillins] Hives   • Pravachol [Pravastatin Sodium]      ADVERSE REACTION.     Outpatient Encounter Medications as of 10/19/2021   Medication Sig Dispense Refill   • VITAMIN D, CHOLECALCIFEROL, PO Take 3,000 Units by mouth every day.     • apixaban (ELIQUIS) 5mg Tab Take 1 Tablet by mouth 2 times a day. 60 Tablet 11   • [DISCONTINUED] furosemide (LASIX) 40 MG Tab TAKE 1 TABLET BY MOUTH EVERY DAY IN THE EVENING 100 tablet 0   • [DISCONTINUED] KLOR-CON 8 MEQ tablet TAKE 1 TABLET BY MOUTH EVERY DAY IN THE EVENING 100 tablet 0   • B Complex Vitamins (VITAMIN B COMPLEX PO) Take  by mouth.     • ketoconazole (NIZORAL) 2 % Cream Apply 1 Application topically every day. 60 g 1   • albuterol 108 (90  "Base) MCG/ACT Aero Soln inhalation aerosol Inhale 2 Puffs every 6 hours as needed for Shortness of Breath. 8.5 g 2   • insulin regular (HUMULIN R) 100 Unit/mL Solution Inject 2-10 Units under the skin 3 times a day before meals. Sliding scale as needed 10 mL 0   • mupirocin (BACTROBAN) 2 % Ointment APPLY TO AFFECTED AREA OF SKIN INFECTIONS THREE TIMES DAILY     • rosuvastatin (CRESTOR) 5 MG Tab TAKE ONE TABLET AT BEDTIME FOR CHOLESTEROL. TAKE THREE TIMES WEEKLY 30 Tab 5   • glimepiride (AMARYL) 4 MG Tab TAKE 1 TABLET BY MOUTH TWICE A DAY     • NS SOLN 60 mL with albuterol 2.5 mg/0.5 mL NEBU 5 mL 5 mg/hr by Nebulization route. Spring and fall     • losartan (COZAAR) 25 MG Tab Take 25 mg by mouth 2 Times a Day.     • fluticasone (FLONASE) 50 MCG/ACT nasal spray Spray 1 Spray in nose 1 time daily as needed.     • BD INSULIN SYRINGE U/F 31G X 5/16\" 0.3 ML Misc INJECT TWICE DAILY     • FREESTYLE LITE strip TEST BLOOD SUGAR 3 TIMES DAILY  12   • [DISCONTINUED] Cholecalciferol (VITAMIN D) 2000 UNIT Tab Take 6,000 Units by mouth 3 times a week. M,W,F (Patient not taking: Reported on 10/19/2021)       No facility-administered encounter medications on file as of 10/19/2021.     Review of Systems   Respiratory: Negative for cough and shortness of breath.    Cardiovascular: Negative for chest pain and palpitations.   Musculoskeletal: Negative for myalgias.   Neurological: Negative for dizziness and loss of consciousness.        Objective:   /60 (BP Location: Left arm, Patient Position: Sitting)   Pulse 100   Ht 1.702 m (5' 7\")   Wt 96.2 kg (212 lb)   SpO2 96%   BMI 33.20 kg/m²     Physical Exam  Constitutional:       General: She is not in acute distress.     Appearance: She is well-developed.      Comments: Walks with the assistance of a cane.   Neck:      Vascular: No JVD.   Cardiovascular:      Rate and Rhythm: Normal rate and regular rhythm.      Heart sounds: Murmur heard.   No friction rub. No gallop.  "   Pulmonary:      Effort: Pulmonary effort is normal. No respiratory distress.      Breath sounds: Normal breath sounds. No wheezing or rales.   Abdominal:      Comments: Protuberant.   Skin:     General: Skin is warm and dry.   Neurological:      Mental Status: She is alert and oriented to person, place, and time.   Psychiatric:         Behavior: Behavior normal.       11/05/2012 ECHOCARDIOGRAM  EF 70%.  Mild tricuspid regurgitation. Right ventricular systolic pressure is   estimated to be 30 mmHg.  Mitral annular calcification. Thickened mitral valve leaflets. Mild   mitral stenosis. Mild mitral regurgitation.  Thickened aortic valve leaflets. Aortic annular calcification. Mild   aortic stenosis.  Moderate tricuspid regurgitation. Right ventricular systolic pressure   is estimated to be 57 mmhg.  No prior study available for comparison     09/28/2016 ECHOCARDIOGRAM  Normal left ventricular size and systolic function.  Severe concentric left ventricular hypertrophy.  Left ventricular ejection fraction is visually estimated to be 70%.  Diffuse severe mitral annular calcification.  Thickened mitral valve leaflets: Mean transvalvular gradient is 8  mmHg   at a heart rate of  75BPM; Mitral valve area 2.7 cm2.  Aortic sclerosis without stenosis.  Estimated right ventricular systolic pressure  is 55 mmHg.  Right heart pressures are consistent with moderate pulmonary   hypertension.  Mildly dilated right ventricle.  Normal right ventricular systolic function      10/25/2017 ECHOCARDIOGRAM  Normal left ventricular systolic function.  Left ventricular ejection fraction is visually estimated to be 65%.  Moderate concentric left ventricular hypertrophy.  Dense diffuse mitral annular calcification with thickened mitral valve   leaflets.  Mild to moderate mitral stenosis.  Right heart pressures are consistent with moderate pulmonary   hypertension.  Aortic sclerosis without stenosis.    ECHOCARDIOGRAM 05/24/2019  Normal left  ventricular systolic function.  Left ventricular ejection fraction is visually estimated to be 65%.  Mild mitral stenosis.  Aortic sclerosis without stenosis.  Right heart pressures are consistent with moderate pulmonary   hypertension.  Pacer/ICD wire seen in right ventricle.    BRAIN MRI 07/26/2018  1. Age-related cerebral atrophy.  2. Minimal periventricular white matter changes consistent with chronic microvascular ischemic gliosis.    Assessment:     1. PAF (paroxysmal atrial fibrillation) ([facility])     2. Mitral valve stenosis, unspecified etiology     3. Diastolic CHF due to valvular disease ([facility])     4. Essential hypertension, benign     5. Hyperlipidemia, mixed     6. Pulmonary hypertension ([facility])     7. Cardiac pacemaker         Medical Decision Making:  Today's Assessment / Status / Plan:   Assessment  1.  Mitral stenosis.  2.  Diastolic and valvular CHF. Compensated. Diagnosed 2004.  3.  Permanent pacemaker.  11/18/2018.  High-grade heart block.  4.  Pulmonary hypertension.  5.  Left anterior fascicular block/right bundle branch block.  6.  Hypertension.  7.  Dyslipidemia.  8.  Diabetes mellitus.    9.  CKD.  10.  Adult situational stress syndrome.  11.  PAF on pacemaker monitoring.    Recommendation and Recommendation  1.  The patient is stable from a cardiac standpoint related to her mitral stenosis and pulmonary hypertension.  2.  Pacemaker interrogation from today shows pacemaker is functioning normally but with PAF.  3.  Start Eliquis 5 mg twice daily  4.  RTC 6 months.

## 2021-10-20 PROBLEM — I48.0 PAF (PAROXYSMAL ATRIAL FIBRILLATION) (HCC): Status: ACTIVE | Noted: 2021-10-20

## 2021-10-20 RX ORDER — FUROSEMIDE 40 MG/1
TABLET ORAL
Qty: 90 TABLET | Refills: 3 | Status: SHIPPED | OUTPATIENT
Start: 2021-10-20 | End: 2022-08-17

## 2021-10-20 RX ORDER — POTASSIUM CHLORIDE 600 MG/1
TABLET, FILM COATED, EXTENDED RELEASE ORAL
Qty: 90 TABLET | Refills: 3 | Status: SHIPPED | OUTPATIENT
Start: 2021-10-20 | End: 2022-08-17

## 2021-11-05 ENCOUNTER — TELEPHONE (OUTPATIENT)
Dept: MEDICAL GROUP | Facility: PHYSICIAN GROUP | Age: 76
End: 2021-11-05

## 2021-11-05 NOTE — TELEPHONE ENCOUNTER
VOICEMAIL  1. Caller Name: Ophelia                      Call Back Number: [phone] (home)     2. Message: Patient wanted to update Joana that she has seen cardiology since the last time she saw her, and did have her pacemaker checked. Just in case Joana would like to review chart notes.    3. Patient approves office to leave a detailed voicemail/MyChart message: no

## 2021-11-19 ENCOUNTER — OFFICE VISIT (OUTPATIENT)
Dept: MEDICAL GROUP | Facility: PHYSICIAN GROUP | Age: 76
End: 2021-11-19
Payer: MEDICARE

## 2021-11-19 VITALS
TEMPERATURE: 98.2 F | BODY MASS INDEX: 34.06 KG/M2 | OXYGEN SATURATION: 94 % | HEIGHT: 67 IN | DIASTOLIC BLOOD PRESSURE: 58 MMHG | SYSTOLIC BLOOD PRESSURE: 138 MMHG | HEART RATE: 89 BPM | WEIGHT: 217 LBS

## 2021-11-19 DIAGNOSIS — Z78.0 ENCOUNTER FOR OSTEOPOROSIS SCREENING IN ASYMPTOMATIC POSTMENOPAUSAL PATIENT: ICD-10-CM

## 2021-11-19 DIAGNOSIS — Z23 NEED FOR VACCINATION: ICD-10-CM

## 2021-11-19 DIAGNOSIS — N18.32 TYPE 2 DIABETES MELLITUS WITH STAGE 3B CHRONIC KIDNEY DISEASE, WITH LONG-TERM CURRENT USE OF INSULIN (HCC): ICD-10-CM

## 2021-11-19 DIAGNOSIS — E11.22 TYPE 2 DIABETES MELLITUS WITH STAGE 3B CHRONIC KIDNEY DISEASE, WITH LONG-TERM CURRENT USE OF INSULIN (HCC): ICD-10-CM

## 2021-11-19 DIAGNOSIS — Z79.4 TYPE 2 DIABETES MELLITUS WITH STAGE 3B CHRONIC KIDNEY DISEASE, WITH LONG-TERM CURRENT USE OF INSULIN (HCC): ICD-10-CM

## 2021-11-19 DIAGNOSIS — Z13.820 ENCOUNTER FOR OSTEOPOROSIS SCREENING IN ASYMPTOMATIC POSTMENOPAUSAL PATIENT: ICD-10-CM

## 2021-11-19 PROCEDURE — 90750 HZV VACC RECOMBINANT IM: CPT | Performed by: NURSE PRACTITIONER

## 2021-11-19 PROCEDURE — 90471 IMMUNIZATION ADMIN: CPT | Performed by: NURSE PRACTITIONER

## 2021-11-19 PROCEDURE — 99214 OFFICE O/P EST MOD 30 MIN: CPT | Mod: 25 | Performed by: NURSE PRACTITIONER

## 2021-11-19 ASSESSMENT — FIBROSIS 4 INDEX: FIB4 SCORE: 2.33

## 2021-11-19 NOTE — PROGRESS NOTES
Subjective:     CC: Medication management and immunizations    HPI:   Ophelia presents today with the following:    Type 2 diabetes mellitus with stage 3b chronic kidney disease, with long-term current use of insulin (HCC)  Chronic medical problem. She continues glimepiride 4 mg BID and Novolin (70/30) 46 units in morning and 3-10 units in evening, and regular insulin 3-6 units daily per her own sliding scale. She can not get her Novolin at [facility] right now. She is asking for a refill to [facility] if [facility] does not have a supply.  She has an appoint with endocrinology on 2/1/2022.  She continues to verbalize that she is not thrilled about having to go see a new endocrinologist.      Past Medical History:   Diagnosis Date   • ASTHMA     patient denies   • Complete heart block (HCC) 11/16/2018   • Congestive heart failure (HCC)    • COPD    • COPD (chronic obstructive pulmonary disease) ([facility])    • Depressed    • Diabetes    • Diastolic dysfunction    • DM (diabetes mellitus) ([facility])    • Heart murmur    • HLD (hyperlipidemia)    • HTN (hypertension)    • Hypertension    • Obstructive sleep apnea    • Osteoarthritis    • Pulmonary hypertension ([facility])    • 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   • Sleep apnea        Social History     Tobacco Use   • Smoking status: Former Smoker     Packs/day: 1.50     Quit date: 11/2/2011     Years since quitting: 10.0   • Smokeless tobacco: Never Used   Vaping Use   • Vaping Use: Never used   Substance Use Topics   • Alcohol use: No   • Drug use: No       Current Outpatient Medications Ordered in Epic   Medication Sig Dispense Refill   • insulin 70/30 (HUMULIN 70/30/NOVOLIN 70/30) (70-30) 100 UNIT/ML Suspension 46 units in the morning and 3-10 units in the evening per sliding scale 10 mL 1   • insulin regular (HUMULIN R) 100 Unit/mL Solution Inject 3-6 units subcutaneously daily in the morning. 10 mL 1   • mupirocin (BACTROBAN) 2 % Ointment APPLY TO  "AFFECTED AREA OF SKIN INFECTIONS THREE TIMES DAILY 22 g 1   • KLOR-CON 8 MEQ tablet TAKE 1 TABLET BY MOUTH EVERY DAY IN THE EVENING 90 Tablet 3   • furosemide (LASIX) 40 MG Tab TAKE 1 TABLET BY MOUTH EVERY DAY IN THE EVENING 90 Tablet 3   • VITAMIN D, CHOLECALCIFEROL, PO Take 3,000 Units by mouth every day.     • apixaban (ELIQUIS) 5mg Tab Take 1 Tablet by mouth 2 times a day. 60 Tablet 11   • B Complex Vitamins (VITAMIN B COMPLEX PO) Take  by mouth.     • ketoconazole (NIZORAL) 2 % Cream Apply 1 Application topically every day. 60 g 1   • albuterol 108 (90 Base) MCG/ACT Aero Soln inhalation aerosol Inhale 2 Puffs every 6 hours as needed for Shortness of Breath. 8.5 g 2   • BD INSULIN SYRINGE U/F 31G X 5/16\" 0.3 ML Misc INJECT TWICE DAILY     • rosuvastatin (CRESTOR) 5 MG Tab TAKE ONE TABLET AT BEDTIME FOR CHOLESTEROL. TAKE THREE TIMES WEEKLY 30 Tab 5   • glimepiride (AMARYL) 4 MG Tab TAKE 1 TABLET BY MOUTH TWICE A DAY     • NS SOLN 60 mL with albuterol 2.5 mg/0.5 mL NEBU 5 mL 5 mg/hr by Nebulization route. Spring and fall     • losartan (COZAAR) 25 MG Tab Take 25 mg by mouth 2 Times a Day.     • FREESTYLE LITE strip TEST BLOOD SUGAR 3 TIMES DAILY  12   • fluticasone (FLONASE) 50 MCG/ACT nasal spray Spray 1 Spray in nose 1 time daily as needed.       No current Epic-ordered facility-administered medications on file.       Allergies:  Ativan, Advair [fluticasone-salmeterol], Ambien [zolpidem tartrate], Erythromycin, Lipitor [atorvastatin calcium], Pcn [penicillins], and Pravachol [pravastatin sodium]    Health Maintenance: Due for annual wellness visit, pulmonary function test, Covid vaccine, bone density, urine microalbumin, zoster vaccine, influenza, and monofilament exam.    Objective:     Vital signs reviewed  Exam:  /58 (BP Location: Right arm, Patient Position: Sitting, BP Cuff Size: Large adult)   Pulse 89   Temp 36.8 °C (98.2 °F) (Temporal)   Ht 1.702 m (5' 7\")   Wt 98.4 kg (217 lb)   SpO2 94%   " BMI 33.99 kg/m²  Body mass index is 33.99 kg/m².    Gen: Alert and oriented, No apparent distress.  Eyes:   Lids normal. Glasses in place.   Lungs: Normal effort, no audible wheezes  CV: Skin pink, warm and dry.  Ext: No clubbing, cyanosis, edema.      Monofilament testing with a 10 gram force: sensation intact: decreased bilaterally  Visual Inspection: Feet with fissures, dry peeling skin and several scattered old scabs to bilateral plantar surface.  Pedal pulses: decreased bilaterally      Assessment & Plan:     76 y.o. female with the following -     1. Type 2 diabetes mellitus with stage 3b chronic kidney disease, with long-term current use of insulin ([facility])  Chronic stable problem.  I encouraged her to keep her upcoming appoint with endocrinology as we previously discussed.  She is asking for refill on her Novolin 70/30 and her regular insulin.  I provided her refill prescriptions with her current sliding scale that she does continue to manage on her own per her own choice.  She states that she has been diabetic since her late 30s and continues to manage her on diabetes.  We discussed completing updated labs in January 2022 before her upcoming appoint with endocrinology.  Monofilament exam completed today.  We discussed continuing to check her feet daily, wear shoes when outside, moisturize.  She declines referral to podiatry today.  - insulin 70/30 (HUMULIN 70/30/NOVOLIN 70/30) (70-30) 100 UNIT/ML Suspension; 46 units in the morning and 3-10 units in the evening per sliding scale  Dispense: 10 mL; Refill: 1  - insulin regular (HUMULIN R) 100 Unit/mL Solution; Inject 3-6 units subcutaneously daily in the morning.  Dispense: 10 mL; Refill: 1  - Diabetic Monofilament Lower Extremity Exam  - Lipid Profile; Future    2. Need for vaccination  Acute uncomplicated problem.  She would like her shingles dose today. I have placed the below orders and discussed them with an approved delegating provider.  The MA is  performing the below orders under the direction of Dr. Ayon.  - Shingrix Vaccine    3. Encounter for osteoporosis screening in asymptomatic postmenopausal patient  Acute uncomplicated problem.  She is due for bone density scan, she is agreement for me to order.  - DS-BONE DENSITY STUDY (DEXA); Future      Return in about 3 months (around 2/19/2022) for Diabetes follow-up.    Please note that this dictation was created using voice recognition software. I have made every reasonable attempt to correct obvious errors, but I expect that there are errors of grammar and possibly content that I did not discover before finalizing the note.

## 2021-11-19 NOTE — ASSESSMENT & PLAN NOTE
Chronic medical problem. She continues glimepiride 4 mg BID and Novolin (70/30) 46 units in morning and 3-10 units in evening, and regular insulin 3-6 units daily per her own sliding scale. She can not get her Novolin at [facility] right now. She is asking for a refill to [facility] if [facility] does not have a supply.  She has an appoint with endocrinology on 2/1/2022.  She continues to verbalize that she is not thrilled about having to go see a new endocrinologist.

## 2021-11-29 DIAGNOSIS — N18.32 TYPE 2 DIABETES MELLITUS WITH STAGE 3B CHRONIC KIDNEY DISEASE, WITH LONG-TERM CURRENT USE OF INSULIN (HCC): ICD-10-CM

## 2021-11-29 DIAGNOSIS — Z79.4 TYPE 2 DIABETES MELLITUS WITH STAGE 3B CHRONIC KIDNEY DISEASE, WITH LONG-TERM CURRENT USE OF INSULIN (HCC): ICD-10-CM

## 2021-11-29 DIAGNOSIS — E11.22 TYPE 2 DIABETES MELLITUS WITH STAGE 3B CHRONIC KIDNEY DISEASE, WITH LONG-TERM CURRENT USE OF INSULIN (HCC): ICD-10-CM

## 2022-02-28 ENCOUNTER — APPOINTMENT (OUTPATIENT)
Dept: MEDICAL GROUP | Facility: PHYSICIAN GROUP | Age: 77
End: 2022-02-28
Payer: MEDICARE

## 2022-03-23 ENCOUNTER — TELEPHONE (OUTPATIENT)
Dept: MEDICAL GROUP | Facility: PHYSICIAN GROUP | Age: 77
End: 2022-03-23

## 2022-03-23 ENCOUNTER — HOSPITAL ENCOUNTER (OUTPATIENT)
Dept: LAB | Facility: MEDICAL CENTER | Age: 77
End: 2022-03-23
Attending: NURSE PRACTITIONER
Payer: MEDICARE

## 2022-03-23 DIAGNOSIS — Z79.4 TYPE 2 DIABETES MELLITUS WITH HYPERGLYCEMIA, WITH LONG-TERM CURRENT USE OF INSULIN (HCC): ICD-10-CM

## 2022-03-23 DIAGNOSIS — Z79.4 TYPE 2 DIABETES MELLITUS WITH STAGE 3B CHRONIC KIDNEY DISEASE, WITH LONG-TERM CURRENT USE OF INSULIN (HCC): ICD-10-CM

## 2022-03-23 DIAGNOSIS — N18.32 TYPE 2 DIABETES MELLITUS WITH STAGE 3B CHRONIC KIDNEY DISEASE, WITH LONG-TERM CURRENT USE OF INSULIN (HCC): ICD-10-CM

## 2022-03-23 DIAGNOSIS — E11.22 TYPE 2 DIABETES MELLITUS WITH STAGE 3B CHRONIC KIDNEY DISEASE, WITH LONG-TERM CURRENT USE OF INSULIN (HCC): ICD-10-CM

## 2022-03-23 DIAGNOSIS — E11.65 TYPE 2 DIABETES MELLITUS WITH HYPERGLYCEMIA, WITH LONG-TERM CURRENT USE OF INSULIN (HCC): ICD-10-CM

## 2022-03-23 LAB
ALBUMIN SERPL BCP-MCNC: 4.4 G/DL (ref 3.2–4.9)
ALBUMIN/GLOB SERPL: 1.9 G/DL
ALP SERPL-CCNC: 77 U/L (ref 30–99)
ALT SERPL-CCNC: 16 U/L (ref 2–50)
ANION GAP SERPL CALC-SCNC: 15 MMOL/L (ref 7–16)
AST SERPL-CCNC: 15 U/L (ref 12–45)
BILIRUB SERPL-MCNC: 0.4 MG/DL (ref 0.1–1.5)
BUN SERPL-MCNC: 18 MG/DL (ref 8–22)
CALCIUM SERPL-MCNC: 10.2 MG/DL (ref 8.5–10.5)
CHLORIDE SERPL-SCNC: 100 MMOL/L (ref 96–112)
CHOLEST SERPL-MCNC: 126 MG/DL (ref 100–199)
CO2 SERPL-SCNC: 24 MMOL/L (ref 20–33)
CREAT SERPL-MCNC: 1.23 MG/DL (ref 0.5–1.4)
EST. AVERAGE GLUCOSE BLD GHB EST-MCNC: 203 MG/DL
FASTING STATUS PATIENT QL REPORTED: NORMAL
GFR SERPLBLD CREATININE-BSD FMLA CKD-EPI: 45 ML/MIN/1.73 M 2
GLOBULIN SER CALC-MCNC: 2.3 G/DL (ref 1.9–3.5)
GLUCOSE SERPL-MCNC: 301 MG/DL (ref 65–99)
HBA1C MFR BLD: 8.7 % (ref 4–5.6)
HDLC SERPL-MCNC: 41 MG/DL
LDLC SERPL CALC-MCNC: 47 MG/DL
POTASSIUM SERPL-SCNC: 4.3 MMOL/L (ref 3.6–5.5)
PROT SERPL-MCNC: 6.7 G/DL (ref 6–8.2)
SODIUM SERPL-SCNC: 139 MMOL/L (ref 135–145)
TRIGL SERPL-MCNC: 189 MG/DL (ref 0–149)

## 2022-03-23 PROCEDURE — 83036 HEMOGLOBIN GLYCOSYLATED A1C: CPT

## 2022-03-23 PROCEDURE — 36415 COLL VENOUS BLD VENIPUNCTURE: CPT

## 2022-03-23 PROCEDURE — 80061 LIPID PANEL: CPT

## 2022-03-23 PROCEDURE — 80053 COMPREHEN METABOLIC PANEL: CPT

## 2022-03-23 NOTE — TELEPHONE ENCOUNTER
Future Appointments       Provider Department Center    3/29/2022 11:30 AM [facility]SE ALE[facility]ANDRO [place] [facility]    4/13/2022 1:45 PM PACER CHECK-CAM B [facility]     4/13/2022 2:20 PM Aman Wilkinson M.D. [facility]     5/4/2022 1:30 PM JOSE ALEJANDRO [place] [facility]        ESTABLISHED PATIENT PRE-VISIT PLANNING     Patient was contacted to complete PVP.     Note: Patient will not be contacted if there is no indication to call.     1.  Reviewed notes from the last few office visits within the medical group: Yes, LOV 11/19/2021    2.  If any orders were placed at last visit or intended to be done for this visit (i.e. 6 mos follow-up), do we have Results/Consult Notes?         •  Labs - Labs ordered, completed on 03/23/2022 and results are in chart.  Note: If patient appointment is for lab review and patient did not complete labs, check with provider if OK to reschedule patient until labs completed.       •  Imaging - Imaging ordered, NOT completed. Patient advised to complete prior to next appointment. Pt informed me that she is very claustrophobic and will discuss the DEXA Scan with Joana.         •  Referrals - No referrals were ordered at last office visit.    3. Is this appointment scheduled as a Hospital Follow-Up? No    4.  Immunizations were updated in Epic using Reconcile Outside Information activity? Yes    5.  Patient is due for the following Health Maintenance Topics:   Health Maintenance Due   Topic Date Due   • Annual Wellness Visit  Never done   • COVID-19 Vaccine (1) Never done   • Annual Pulmonary Function Test / Spirometry  Never done   • BONE DENSITY  Never done   • URINE ACR / MICROALBUMIN  11/06/2020   • IMM INFLUENZA (1) 09/01/2021   • RETINAL SCREENING  01/05/2022     6.  AHA (Pulse8) form printed for Provider? N/A

## 2022-03-29 ENCOUNTER — OFFICE VISIT (OUTPATIENT)
Dept: MEDICAL GROUP | Facility: PHYSICIAN GROUP | Age: 77
End: 2022-03-29
Payer: MEDICARE

## 2022-03-29 ENCOUNTER — TELEPHONE (OUTPATIENT)
Dept: MEDICAL GROUP | Facility: PHYSICIAN GROUP | Age: 77
End: 2022-03-29

## 2022-03-29 VITALS
DIASTOLIC BLOOD PRESSURE: 62 MMHG | SYSTOLIC BLOOD PRESSURE: 132 MMHG | HEART RATE: 88 BPM | TEMPERATURE: 97.5 F | HEIGHT: 67 IN | OXYGEN SATURATION: 95 % | BODY MASS INDEX: 33.43 KG/M2 | WEIGHT: 213 LBS

## 2022-03-29 DIAGNOSIS — F32.4 MAJOR DEPRESSIVE DISORDER WITH SINGLE EPISODE, IN PARTIAL REMISSION (HCC): ICD-10-CM

## 2022-03-29 DIAGNOSIS — E11.22 TYPE 2 DIABETES MELLITUS WITH STAGE 3B CHRONIC KIDNEY DISEASE, WITH LONG-TERM CURRENT USE OF INSULIN (HCC): ICD-10-CM

## 2022-03-29 DIAGNOSIS — J44.9 CHRONIC OBSTRUCTIVE PULMONARY DISEASE, UNSPECIFIED COPD TYPE (HCC): ICD-10-CM

## 2022-03-29 DIAGNOSIS — I50.30 DIASTOLIC CHF DUE TO VALVULAR DISEASE (HCC): ICD-10-CM

## 2022-03-29 DIAGNOSIS — Z79.4 TYPE 2 DIABETES MELLITUS WITH HYPERGLYCEMIA, WITH LONG-TERM CURRENT USE OF INSULIN (HCC): ICD-10-CM

## 2022-03-29 DIAGNOSIS — L30.9 ECZEMA, UNSPECIFIED TYPE: ICD-10-CM

## 2022-03-29 DIAGNOSIS — E11.65 TYPE 2 DIABETES MELLITUS WITH HYPERGLYCEMIA, WITH LONG-TERM CURRENT USE OF INSULIN (HCC): ICD-10-CM

## 2022-03-29 DIAGNOSIS — Z79.4 TYPE 2 DIABETES MELLITUS WITH STAGE 3B CHRONIC KIDNEY DISEASE, WITH LONG-TERM CURRENT USE OF INSULIN (HCC): ICD-10-CM

## 2022-03-29 DIAGNOSIS — I48.0 PAF (PAROXYSMAL ATRIAL FIBRILLATION) (HCC): ICD-10-CM

## 2022-03-29 DIAGNOSIS — E11.42 TYPE 2 DIABETES MELLITUS WITH DIABETIC POLYNEUROPATHY, WITH LONG-TERM CURRENT USE OF INSULIN (HCC): ICD-10-CM

## 2022-03-29 DIAGNOSIS — N25.81 SECONDARY HYPERPARATHYROIDISM OF RENAL ORIGIN (HCC): ICD-10-CM

## 2022-03-29 DIAGNOSIS — Z79.4 TYPE 2 DIABETES MELLITUS WITH DIABETIC POLYNEUROPATHY, WITH LONG-TERM CURRENT USE OF INSULIN (HCC): ICD-10-CM

## 2022-03-29 DIAGNOSIS — N18.32 TYPE 2 DIABETES MELLITUS WITH STAGE 3B CHRONIC KIDNEY DISEASE, WITH LONG-TERM CURRENT USE OF INSULIN (HCC): ICD-10-CM

## 2022-03-29 DIAGNOSIS — I38 DIASTOLIC CHF DUE TO VALVULAR DISEASE (HCC): ICD-10-CM

## 2022-03-29 DIAGNOSIS — E11.3553 STABLE PROLIFERATIVE DIABETIC RETINOPATHY OF BOTH EYES ASSOCIATED WITH TYPE 2 DIABETES MELLITUS (HCC): ICD-10-CM

## 2022-03-29 DIAGNOSIS — E55.9 VITAMIN D DEFICIENCY: ICD-10-CM

## 2022-03-29 PROCEDURE — 99215 OFFICE O/P EST HI 40 MIN: CPT | Performed by: NURSE PRACTITIONER

## 2022-03-29 RX ORDER — MUPIROCIN CALCIUM 20 MG/G
1 CREAM TOPICAL 2 TIMES DAILY
COMMUNITY
End: 2022-03-29 | Stop reason: SDUPTHER

## 2022-03-29 RX ORDER — CLOBETASOL PROPIONATE 0.5 MG/G
1 CREAM TOPICAL 2 TIMES DAILY PRN
Qty: 60 G | Refills: 1 | Status: SHIPPED | OUTPATIENT
Start: 2022-03-29 | End: 2023-02-27

## 2022-03-29 RX ORDER — MUPIROCIN CALCIUM 20 MG/G
1 CREAM TOPICAL 2 TIMES DAILY
Qty: 15 G | Refills: 2 | Status: SHIPPED | OUTPATIENT
Start: 2022-03-29 | End: 2022-03-29

## 2022-03-29 RX ORDER — CLOBETASOL PROPIONATE 0.5 MG/G
CREAM TOPICAL 2 TIMES DAILY
COMMUNITY
End: 2022-03-29 | Stop reason: SDUPTHER

## 2022-03-29 ASSESSMENT — PATIENT HEALTH QUESTIONNAIRE - PHQ9: CLINICAL INTERPRETATION OF PHQ2 SCORE: 0

## 2022-03-29 ASSESSMENT — FIBROSIS 4 INDEX: FIB4 SCORE: 1.527777777777777778

## 2022-03-29 NOTE — TELEPHONE ENCOUNTER
Received request via: Pharmacy    Was the patient seen in the last year in this department? Yes    Does the patient have an active prescription (recently filled or refills available) for medication(s) requested? Pharmacy states cream not covered; Alternative Requested:CREAM NOT COVERED. CAN WE GET A RX FOR THE LARA?

## 2022-03-29 NOTE — ASSESSMENT & PLAN NOTE
She has seen [facility] on 10/14/2022. She was found to have Proliferative diabetic retinopathy found bilateraly, recommended observation.

## 2022-03-29 NOTE — ASSESSMENT & PLAN NOTE
"She had recent labs completed that she would like to review today.  Her A1c is stable at 8.7%.  She continues with her glimepiride 4 mg twice daily, Novolin (70/30) 46 units in the morning and 3-10 units in the evening on sliding scale (in the last 6 weeks her lowest dose was 4 units and the highest has been 16 units), and regular insulin 4-8 units daily in the morning per her own sliding scale. Last night before bedtime her blood sugar was 225, took 5 units NPH, and her morning blood sugar was 235, at 0300 her blood sugar was 306, at 0630 she was 235. She does rotate her injection sites and notes that she does not injection into firm spots. She states her sugars \"have flipped on her\". Her morning blood sugars are ranging 200-230.  She has not seen endocrinology, review of chart shows she has no showed 2 appointment. She stats that she was never informed of her appointment times. Her sugar today before the appointment was 187. Her meals and foods have stayed the same.  "

## 2022-03-29 NOTE — ASSESSMENT & PLAN NOTE
She is followed by cardiology. Has pacemaker in place, interrogation completed. She is taking Eliquis 5 mg twice daily

## 2022-03-29 NOTE — TELEPHONE ENCOUNTER
VOICEMAIL  1. Caller Name: Ophelia                      Call Back Number: [phone] (home)     2. Message: Patient called informing Joana that she got an appointment with Endocrinology June 1st. She would like blood work before the appointment.    3. Patient approves office to leave a detailed voicemail/MyChart message: N\A

## 2022-03-29 NOTE — PROGRESS NOTES
"Subjective:     CC: lab results and medication refill     HPI:   Ophelia presents today with the following:     Type 2 diabetes mellitus with stage 3b chronic kidney disease, with long-term current use of insulin ([facility])  She had recent labs completed that she would like to review today.  Her A1c is stable at 8.7%.  She continues with her glimepiride 4 mg twice daily, Novolin (70/30) 46 units in the morning and 3-10 units in the evening on sliding scale (in the last 6 weeks her lowest dose was 4 units and the highest has been 16 units), and regular insulin 4-8 units daily in the morning per her own sliding scale. Last night before bedtime her blood sugar was 225, took 5 units NPH, and her morning blood sugar was 235, at 0300 her blood sugar was 306, at 0630 she was 235. She does rotate her injection sites and notes that she does not injection into firm spots. She states her sugars \"have flipped on her\". Her morning blood sugars are ranging 200-230.  She has not seen endocrinology, review of chart shows she has no showed 2 appointment. She stats that she was never informed of her appointment times. Her sugar today before the appointment was 187. Her meals and foods have stayed the same.    Stable proliferative diabetic retinopathy of both eyes associated with type 2 diabetes mellitus ([facility])  She has seen [facility] on 10/14/2022. She was found to have Proliferative diabetic retinopathy found bilateraly, recommended observation.    Current moderate episode of major depressive disorder without prior episode ([facility])  Her PHQ score today is zero. She does not take any medications.     PAF (paroxysmal atrial fibrillation) ([facility])  She is followed by cardiology. Has pacemaker in place, interrogation completed. She is taking Eliquis 5 mg twice daily    Secondary hyperparathyroidism of renal origin ([facility])  She is followed by her nephrologist Dr. Rosas.     COPD (chronic obstructive pulmonary disease) ([facility])  No recent " PFT's. She continues with albuterol inhaler as needed.     Diastolic CHF due to valvular disease (HCC)  She is followed by cardiology. She continues with furosemide 40 mg daily and KLOR-CON 8 mg in the evening. Weight has been stable. No new chest pain, shortness of breath of leg swelling.    Eczema  She is using clobetasol as needed with flares. Asking for medication refill today.           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 15 g 2   • clobetasol (TEMOVATE) 0.05 % Cream Apply 1 Application topically 2 times a day as needed (eczema). 60 g 1   • insulin NPH (HUMULIN/NOVOLIN) 100 UNIT/ML Suspension 46 units in the morning and 3-10 units in the evening per sliding scale 10 mL 1   • insulin regular (HUMULIN R) 100 Unit/mL Solution Inject 3-6 units subcutaneously daily in the morning. 10 mL 1   • KLOR-CON 8 MEQ tablet TAKE 1 TABLET BY MOUTH EVERY DAY IN THE EVENING 90 Tablet 3   • furosemide (LASIX) 40 MG Tab TAKE 1  "TABLET BY MOUTH EVERY DAY IN THE EVENING 90 Tablet 3   • VITAMIN D, CHOLECALCIFEROL, PO Take 3,000 Units by mouth every day.     • B Complex Vitamins (VITAMIN B COMPLEX PO) Take  by mouth.     • ketoconazole (NIZORAL) 2 % Cream Apply 1 Application topically every day. 60 g 1   • albuterol 108 (90 Base) MCG/ACT Aero Soln inhalation aerosol Inhale 2 Puffs every 6 hours as needed for Shortness of Breath. 8.5 g 2   • BD INSULIN SYRINGE U/F 31G X 5/16\" 0.3 ML Misc INJECT TWICE DAILY     • rosuvastatin (CRESTOR) 5 MG Tab TAKE ONE TABLET AT BEDTIME FOR CHOLESTEROL. TAKE THREE TIMES WEEKLY 30 Tab 5   • glimepiride (AMARYL) 4 MG Tab TAKE 1 TABLET BY MOUTH TWICE A DAY     • NS SOLN 60 mL with albuterol 2.5 mg/0.5 mL NEBU 5 mL 5 mg/hr by Nebulization route. Spring and fall     • losartan (COZAAR) 25 MG Tab Take 25 mg by mouth 2 Times a Day.     • FREESTYLE LITE strip TEST BLOOD SUGAR 3 TIMES DAILY  12   • fluticasone (FLONASE) 50 MCG/ACT nasal spray Spray 1 Spray in nose 1 time daily as needed.     • apixaban (ELIQUIS) 5mg Tab Take 1 Tablet by mouth 2 times a day. 60 Tablet 11     No current [facility]-ordered facility-administered medications on file.       Allergies:  Ativan, Advair [fluticasone-salmeterol], Ambien [zolpidem tartrate], Erythromycin, Lipitor [atorvastatin calcium], Pcn [penicillins], and Pravachol [pravastatin sodium]    Health Maintenance: Reviewed.     Objective:     Vital signs reviewed   Exam:  /62 (BP Location: Right arm, Patient Position: Sitting, BP Cuff Size: Adult)   Pulse 88   Temp 36.4 °C (97.5 °F) (Temporal)   Ht 1.702 m (5' 7\")   Wt 96.6 kg (213 lb)   SpO2 95%   BMI 33.36 kg/m²  Body mass index is 33.36 kg/m².    Gen: Alert and oriented, No apparent distress.  Eyes:   Lids normal. Glasses in place.   Lungs: Normal effort, CTA bilaterally, no wheezes, rhonchi, or rales  CV: Regular rate and rhythm with murmur. No rubs or gallops.  Ext: No clubbing, cyanosis, edema. Using cane with " ambulation.     Assessment & Plan:     76 y.o. female with the following -     1. Type 2 diabetes mellitus with stage 3b chronic kidney disease, with long-term current use of insulin ([facility])  Chronic stable problem. Discussed and reviewed her recent lab results.  GFR stable, recommended continue follow-up with nephrology.  Continue to avoid NSAIDs.  Blood pressure control.  - Referral to Endocrinology    2. Type 2 diabetes mellitus with diabetic polyneuropathy, with long-term current use of insulin ([facility])  Chronic stable problem.  Continue with keeping A1c controlled.  Continue home glucose monitoring.  New referral placed endocrinology today stat.  She states that she did not receive notification from her last to schedule cardiology that she did no-show for.  Encouraged her to schedule endocrinology and keep that scheduled appointment.  - Referral to Endocrinology    3. Type 2 diabetes mellitus with hyperglycemia, with long-term current use of insulin ([facility])  Chronic stable problem. Discussed and reviewed her recent lab results.  She will continue with her glimepiride 4 mg twice daily, Novolin (70/30) 46 units in the morning and 3-10 units in the evening on her own sliding scale, and regular insulin 4-8 units daily in the morning per her own sliding scale.  In regards to her elevated morning blood sugars we discussed increasing her nightly NPH dosing which she does not want to do at this time.  We discussed even going up by 1 each night and evaluating her morning blood sugars.  She states that she continue to manage.  She states that she may reach out to her previous retired endocrinologist that she has his contact information.  We reviewed compliance which she is in.  - Referral to Endocrinology    4. Stable proliferative diabetic retinopathy of both eyes associated with type 2 diabetes mellitus (HCC)  Chronic stable problem.  Continue annual follow-up with [facility].  Continue to control A1c.  Continue  to monitor.    5. PAF (paroxysmal atrial fibrillation) ([facility])  Chronic stable problem.  Continue with Eliquis 5 mg twice daily.  Continue with cardiology follow-ups and pacemaker checks.  No acute complaints today.    6. Secondary hyperparathyroidism of renal origin (HCC)  Chronic stable problem.  Continue follow-up with nephrology.  Discussed reviewed her recent lab results.  Blood pressure control.     7. Chronic obstructive pulmonary disease, unspecified COPD type (HCC)  Chronic stable problem.  Continue with albuterol as needed.  No acute complaints today.    8. Major depressive disorder with single episode, in partial remission (HCC)  Chronic stable problem.  PHQ score is 0 today.  Continue to monitor.    9. Diastolic CHF due to valvular disease (HCC)  Chronic stable problem.  Continue follow-up with cardiology.  Continue with furosemide 40 mg and Klor-Con 8 mEq daily.  No acute complaints today.    10. Eczema, unspecified type  Chronic stable problem.  Continue with clobetasol as needed.  Continue to hydrate skin.  - clobetasol (TEMOVATE) 0.05 % Cream; Apply 1 Application topically 2 times a day as needed (eczema).  Dispense: 60 g; Refill: 1    My total time spent caring for the patient on the day of the encounter was 54 minutes.   This does not include time spent on separately billable procedures/tests.      Return in about 3 months (around 6/29/2022) for Diabetes, A1C.    Please note that this dictation was created using voice recognition software. I have made every reasonable attempt to correct obvious errors, but I expect that there are errors of grammar and possibly content that I did not discover before finalizing the note.

## 2022-03-30 NOTE — TELEPHONE ENCOUNTER
Patient has endocrinology appointment scheduled for 6/1/2022 and she is requesting labs. Next A1C will be due after 6/23/2022.

## 2022-03-30 NOTE — TELEPHONE ENCOUNTER
Requested Prescriptions     Signed Prescriptions Disp Refills   • mupirocin (BACTROBAN) 2 % Ointment 30 g 0     Sig: Apply 1 Application topically 2 times a day.     Authorizing Provider: FELIZ MCKENNA A.P.R.N.

## 2022-03-31 NOTE — TELEPHONE ENCOUNTER
Phone Number Called: [phone] (home)     Call outcome: Spoke to patient regarding message below.    Message: Informed patient of lab orders

## 2022-04-25 DIAGNOSIS — Z79.4 TYPE 2 DIABETES MELLITUS WITH STAGE 3B CHRONIC KIDNEY DISEASE, WITH LONG-TERM CURRENT USE OF INSULIN (HCC): ICD-10-CM

## 2022-04-25 DIAGNOSIS — E11.22 TYPE 2 DIABETES MELLITUS WITH STAGE 3B CHRONIC KIDNEY DISEASE, WITH LONG-TERM CURRENT USE OF INSULIN (HCC): ICD-10-CM

## 2022-04-25 DIAGNOSIS — N18.32 TYPE 2 DIABETES MELLITUS WITH STAGE 3B CHRONIC KIDNEY DISEASE, WITH LONG-TERM CURRENT USE OF INSULIN (HCC): ICD-10-CM

## 2022-04-25 RX ORDER — PEN NEEDLE, DIABETIC 29 G X1/2"
NEEDLE, DISPOSABLE MISCELLANEOUS
Qty: 10 EACH | Refills: 5 | Status: SHIPPED | OUTPATIENT
Start: 2022-04-25 | End: 2023-09-30

## 2022-04-25 NOTE — TELEPHONE ENCOUNTER
"Requested Prescriptions     Signed Prescriptions Disp Refills   • BD INSULIN SYRINGE U/F 31G X 5/16\" 0.3 ML Misc 10 Each 5     Sig: INJECT TWICE DAILY     Authorizing Provider: FELIZ MCKENNA A.P.R.N.   "

## 2022-04-27 ENCOUNTER — TELEPHONE (OUTPATIENT)
Dept: MEDICAL GROUP | Facility: PHYSICIAN GROUP | Age: 77
End: 2022-04-27
Payer: MEDICARE

## 2022-04-27 NOTE — TELEPHONE ENCOUNTER
Future Appointments       Provider Department Center    5/4/2022 1:30 PM LEILA [place]. [facility]    5/12/2022 2:15 PM PACER CHECK-[facility] B [facility]     5/12/2022 2:40 PM Aman Wilkinson M.D. [facility]     6/1/2022 2:00 PM Jess Ray R.N.; JOSE ALEJANDRO [facility] - Endocrinology [facility]        ANNUAL WELLNESS VISIT PRE-VISIT PLANNING    1.  Reviewed notes from the last office visit: Yes, LOV 03/29/2022    2.  If any orders were ordered or intended to be done prior to visit (i.e. 6 mos follow-up), do we have results/consult notes or has patient scheduled?        •  Labs - Labs ordered, but not to be completed until 06/29/22.  Note: If patient appointment is for lab review and patient did not complete labs, check with provider if OK to reschedule patient until labs completed.       •  Imaging - Imaging was not ordered at last office visit.       •  Referrals - Referral ordered, patient has NOT been seen. Pt has appt with Endocrinologist     3.  Immunizations were updated in Epic using Reconcile Outside Information activity? Yes       •  Is patient due for Tdap? NO       •  Is patient due for Shingrix? NO    4.  Patient is due for the following Health Maintenance Topics:   Health Maintenance Due   Topic Date Due   • Annual Wellness Visit  Never done   • COVID-19 Vaccine (1) Never done   • Annual Pulmonary Function Test / Spirometry  Never done   • BONE DENSITY  Never done   • IMM PNEUMOCOCCAL VACCINE: 65+ Years (2 - PCV) 11/05/2011   • URINE ACR / MICROALBUMIN  11/06/2020   • RETINAL SCREENING  01/05/2022     5.  Reviewed/Updated the following with patient:       •   Preferred Pharmacy? Yes       •   Preferred Lab? Yes       •   Preferred Communication? Yes       •   Allergies? Yes       •   Medications? YES. Was Abstract Encounter opened and chart  updated? YES       •   Social History? Yes       •   Family History (document living status of immediate family members and if + hx of  cancer, diabetes, hypertension, hyperlipidemia, heart attack, stroke) Yes    6.  Care Team Updated:       •   DME Company (gait device, O2, CPAP, etc.): NO       •   Other Specialists (eye doctor, derm, GYN, cardiology, endo, etc): NO    7.  Patient was advised: “This is a free wellness visit. The provider will screen for medical conditions to help you stay healthy. If you have other concerns to address you may be asked to discuss these at a separate visit or there may be an additional fee.”     8.  AHA (Puls8) form printed for Provider? N/A

## 2022-05-04 ENCOUNTER — OFFICE VISIT (OUTPATIENT)
Dept: MEDICAL GROUP | Facility: PHYSICIAN GROUP | Age: 77
End: 2022-05-04
Payer: MEDICARE

## 2022-05-04 VITALS
DIASTOLIC BLOOD PRESSURE: 60 MMHG | OXYGEN SATURATION: 94 % | HEART RATE: 87 BPM | BODY MASS INDEX: 34.21 KG/M2 | TEMPERATURE: 97.9 F | HEIGHT: 67 IN | WEIGHT: 218 LBS | SYSTOLIC BLOOD PRESSURE: 132 MMHG

## 2022-05-04 DIAGNOSIS — I27.20 PULMONARY HYPERTENSION (HCC): Chronic | ICD-10-CM

## 2022-05-04 DIAGNOSIS — Z95.0 CARDIAC PACEMAKER: ICD-10-CM

## 2022-05-04 DIAGNOSIS — E11.3553 STABLE PROLIFERATIVE DIABETIC RETINOPATHY OF BOTH EYES ASSOCIATED WITH TYPE 2 DIABETES MELLITUS (HCC): ICD-10-CM

## 2022-05-04 DIAGNOSIS — I10 ESSENTIAL HYPERTENSION, BENIGN: ICD-10-CM

## 2022-05-04 DIAGNOSIS — E78.2 MIXED HYPERLIPIDEMIA: ICD-10-CM

## 2022-05-04 DIAGNOSIS — N25.81 SECONDARY HYPERPARATHYROIDISM OF RENAL ORIGIN (HCC): ICD-10-CM

## 2022-05-04 DIAGNOSIS — Z79.4 TYPE 2 DIABETES MELLITUS WITH STAGE 3B CHRONIC KIDNEY DISEASE, WITH LONG-TERM CURRENT USE OF INSULIN (HCC): ICD-10-CM

## 2022-05-04 DIAGNOSIS — J44.9 CHRONIC OBSTRUCTIVE PULMONARY DISEASE, UNSPECIFIED COPD TYPE (HCC): ICD-10-CM

## 2022-05-04 DIAGNOSIS — Z23 NEED FOR VACCINATION: ICD-10-CM

## 2022-05-04 DIAGNOSIS — I48.0 PAF (PAROXYSMAL ATRIAL FIBRILLATION) (HCC): ICD-10-CM

## 2022-05-04 DIAGNOSIS — I38 DIASTOLIC CHF DUE TO VALVULAR DISEASE (HCC): ICD-10-CM

## 2022-05-04 DIAGNOSIS — I50.30 DIASTOLIC CHF DUE TO VALVULAR DISEASE (HCC): ICD-10-CM

## 2022-05-04 DIAGNOSIS — E11.22 TYPE 2 DIABETES MELLITUS WITH STAGE 3B CHRONIC KIDNEY DISEASE, WITH LONG-TERM CURRENT USE OF INSULIN (HCC): ICD-10-CM

## 2022-05-04 DIAGNOSIS — N18.32 TYPE 2 DIABETES MELLITUS WITH STAGE 3B CHRONIC KIDNEY DISEASE, WITH LONG-TERM CURRENT USE OF INSULIN (HCC): ICD-10-CM

## 2022-05-04 DIAGNOSIS — Z00.00 MEDICARE ANNUAL WELLNESS VISIT, INITIAL: Primary | ICD-10-CM

## 2022-05-04 PROCEDURE — G0009 ADMIN PNEUMOCOCCAL VACCINE: HCPCS | Performed by: NURSE PRACTITIONER

## 2022-05-04 PROCEDURE — G0438 PPPS, INITIAL VISIT: HCPCS | Performed by: NURSE PRACTITIONER

## 2022-05-04 PROCEDURE — 90732 PPSV23 VACC 2 YRS+ SUBQ/IM: CPT | Performed by: NURSE PRACTITIONER

## 2022-05-04 ASSESSMENT — ENCOUNTER SYMPTOMS: GENERAL WELL-BEING: GOOD

## 2022-05-04 ASSESSMENT — FIBROSIS 4 INDEX: FIB4 SCORE: 1.527777777777777778

## 2022-05-04 ASSESSMENT — PATIENT HEALTH QUESTIONNAIRE - PHQ9: CLINICAL INTERPRETATION OF PHQ2 SCORE: 0

## 2022-05-04 ASSESSMENT — ACTIVITIES OF DAILY LIVING (ADL): BATHING_REQUIRES_ASSISTANCE: 0

## 2022-05-04 NOTE — PROGRESS NOTES
"Chief Complaint   Patient presents with   • Annual Wellness Visit       HPI:  Ophelia Sosa is a 76 y.o. here for Medicare Annual Wellness Visit     Patient Active Problem List    Diagnosis Date Noted   • Stable proliferative diabetic retinopathy of both eyes associated with type 2 diabetes mellitus ([facility]) 03/29/2022   • PAF (paroxysmal atrial fibrillation) ([facility]) 10/20/2021   • Type 2 diabetes mellitus with hyperglycemia, with long-term current use of insulin ([facility]) 08/26/2021   • Fungal infection of skin 06/09/2021   • Major depressive disorder with single episode, in partial remission ([facility]) 06/09/2021   • Secondary hyperparathyroidism of renal origin ([facility]) 06/09/2021   • Tinnitus of left ear 12/03/2020   • COPD (chronic obstructive pulmonary disease) ([facility]) 01/09/2020   • Other insomnia 01/09/2020   • Cardiac pacemaker 03/11/2019   • 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   • Diastolic CHF due to valvular disease ([facility]) 09/05/2018   • Mitral stenosis 10/05/2017   • Hyperlipidemia, mixed 08/18/2016   • Obesity 01/21/2016   • Eczema 01/21/2016   • Type 2 diabetes mellitus with diabetic polyneuropathy, with long-term current use of insulin ([facility]) 01/20/2016   • Systolic murmur 10/06/2015   • Essential hypertension, benign 10/06/2015   • Sleep apnea    • Type 2 diabetes mellitus with stage 3b chronic kidney disease, with long-term current use of insulin ([facility])    • Osteoarthritis    • Pulmonary hypertension ([facility])        Current Outpatient Medications   Medication Sig Dispense Refill   • BD INSULIN SYRINGE U/F 31G X 5/16\" 0.3 ML Misc INJECT TWICE DAILY 10 Each 5   • NON SPECIFIED CBD oil     • clobetasol (TEMOVATE) 0.05 % Cream Apply 1 Application topically 2 times a day as needed (eczema). 60 g 1   • mupirocin (BACTROBAN) 2 % Ointment Apply 1 Application topically 2 times a day. 30 g 0   • insulin NPH (HUMULIN/NOVOLIN) 100 UNIT/ML Suspension 46 units in the morning and 3-10 units in " the evening per sliding scale 10 mL 1   • insulin regular (HUMULIN R) 100 Unit/mL Solution Inject 3-6 units subcutaneously daily in the morning. 10 mL 1   • KLOR-CON 8 MEQ tablet TAKE 1 TABLET BY MOUTH EVERY DAY IN THE EVENING 90 Tablet 3   • furosemide (LASIX) 40 MG Tab TAKE 1 TABLET BY MOUTH EVERY DAY IN THE EVENING 90 Tablet 3   • VITAMIN D, CHOLECALCIFEROL, PO Take 3,000 Units by mouth every day.     • apixaban (ELIQUIS) 5mg Tab Take 1 Tablet by mouth 2 times a day. 60 Tablet 11   • B Complex Vitamins (VITAMIN B COMPLEX PO) Take  by mouth.     • ketoconazole (NIZORAL) 2 % Cream Apply 1 Application topically every day. 60 g 1   • albuterol 108 (90 Base) MCG/ACT Aero Soln inhalation aerosol Inhale 2 Puffs every 6 hours as needed for Shortness of Breath. 8.5 g 2   • rosuvastatin (CRESTOR) 5 MG Tab TAKE ONE TABLET AT BEDTIME FOR CHOLESTEROL. TAKE THREE TIMES WEEKLY 30 Tab 5   • glimepiride (AMARYL) 4 MG Tab TAKE 1 TABLET BY MOUTH TWICE A DAY     • NS SOLN 60 mL with albuterol 2.5 mg/0.5 mL NEBU 5 mL 5 mg/hr by Nebulization route. Spring and fall     • losartan (COZAAR) 25 MG Tab Take 25 mg by mouth 2 Times a Day.     • FREESTYLE LITE strip TEST BLOOD SUGAR 3 TIMES DAILY  12   • fluticasone (FLONASE) 50 MCG/ACT nasal spray Spray 1 Spray in nose 1 time daily as needed.       No current facility-administered medications for this visit.          Current supplements as per medication list.     Allergies: Ativan, Advair [fluticasone-salmeterol], Ambien [zolpidem tartrate], Erythromycin, Lipitor [atorvastatin calcium], Pcn [penicillins], and Pravachol [pravastatin sodium]    Current social contact/activities: Talk to people on phone, and friends come over.    She  reports that she quit smoking about 10 years ago. She smoked 1.50 packs per day. She has never used smokeless tobacco. She reports current alcohol use. She reports that she does not use drugs.  Counseling given: Yes      DPA/Advanced Directive:  Patient has  Advanced Directive on file.     ROS:    Gait: Uses a cane  Ostomy: No  Other tubes: No  Amputations: No  Chronic oxygen use: No  Last eye exam: 5/3/22. Will need to request records.   Wears hearing aids: No   : Denies any urinary leakage during the last 6 months    Screening:  Discussed and reviewed   Depression Screening  Little interest or pleasure in doing things?  0 - not at all  Feeling down, depressed , or hopeless? 0 - not at all  Patient Health Questionnaire Score: 0     If depressive symptoms identified deferred to follow up visit unless specifically addressed in assessment and plan.    Interpretation of PHQ-9 Total Score   Score Severity   1-4 No Depression   5-9 Mild Depression   10-14 Moderate Depression   15-19 Moderately Severe Depression   20-27 Severe Depression    Screening for Cognitive Impairment  Three Minute Recall (daughter, heaven, mountain) 3/3    Juan clock face with all 12 numbers and set the hands to show 10 past 11.  Yes    Cognitive concerns identified deferred for follow up unless specifically addressed in assessment and plan.    Fall Risk Assessment  Has the patient had two or more falls in the last year or any fall with injury in the last year?  No Lives in one story.     Safety Assessment  Throw rugs on floor.  No  Handrails on all stairs.  Yes  Good lighting in all hallways.  Yes  Difficulty hearing.  No  Patient counseled about all safety risks that were identified.    Functional Assessment ADLs  Are there any barriers preventing you from cooking for yourself or meeting nutritional needs?  No.    Are there any barriers preventing you from driving safely or obtaining transportation?  No.    Are there any barriers preventing you from using a telephone or calling for help?  No.    Are there any barriers preventing you from shopping?  No.    Are there any barriers preventing you from taking care of your own finances?  No.    Are there any barriers preventing you from managing your  medications?  No.    Are there any barriers preventing you from showering, bathing or dressing yourself?  No.    Are you currently engaging in any exercise or physical activity?  No.     What is your perception of your health?  Good.      Health Maintenance Summary          Overdue - COVID-19 Vaccine (1) Overdue - never done    No completion history exists for this topic.          Ordered - BONE DENSITY (Every 5 Years) Ordered on 11/19/2021    No completion history exists for this topic.          Ordered - URINE ACR / MICROALBUMIN (Yearly) Ordered on 3/30/2022    11/06/2019  PROTEIN/CREAT RATIO URINE    08/15/2019  URINE PROTEIN    04/13/2019  URINE PROTEIN    01/12/2019  URINE PROTEIN    09/22/2018  PROTEIN/CREAT RATIO URINE    Only the first 5 history entries have been loaded, but more history exists.          Overdue - RETINAL SCREENING (Yearly) Overdue since 1/5/2022 01/05/2021  Done          Postponed - IMM INFLUENZA (Season Ended) Postponed until 3/29/2023    01/09/2020  Imm Admin: Influenza Vaccine Adult HD    11/05/2012  Imm Admin: INFLUENZA TIV (IM)          Postponed - Annual Pulmonary Function Test / Spirometry (Yearly) Postponed until 5/4/2023    No completion history exists for this topic.          A1C SCREENING (Every 6 Months) Next due on 9/23/2022 03/23/2022  HEMOGLOBIN A1C    07/30/2021  HEMOGLOBIN A1C    05/24/2021  HEMOGLOBIN A1C    01/23/2021  HEMOGLOBIN A1C    10/31/2020  HEMOGLOBIN A1C    Only the first 5 history entries have been loaded, but more history exists.          DIABETES MONOFILAMENT / LE EXAM (Yearly) Next due on 11/19/2022 11/19/2021  Diabetic Monofilament Lower Extremity Exam    11/19/2021  [employer]: WORKFLOW - DIABETES - DIABETIC FOOT EXAM PERFORMED    01/21/2016  [employer]: FINDINGS - TESTS - NEUROLOGY - MONOFILAMENT TEST - RIGHT FOOT - NUMBER OF SITES TESTED    01/21/2016  [employer]: FINDINGS - TESTS - NEUROLOGY - MONOFILAMENT TEST - LEFT FOOT - NUMBER OF SITES TESTED           FASTING LIPID PROFILE (Yearly) Next due on 3/23/2023    03/23/2022  Lipid Profile    05/24/2021  Lipid Profile    10/31/2020  Lipid Profile    11/06/2019  Lipid Profile    04/13/2019  Lipid Profile    Only the first 5 history entries have been loaded, but more history exists.          Ordered - SERUM CREATININE (Yearly) Ordered on 3/30/2022    03/23/2022  Comp Metabolic Panel    07/30/2021  Comp Metabolic Panel    05/24/2021  Comp Metabolic Panel    01/23/2021  Comp Metabolic Panel    10/31/2020  Comp Metabolic Panel    Only the first 5 history entries have been loaded, but more history exists.          IMM PNEUMOCOCCAL VACCINE: 65+ Years (2 - PCV) Next due on 5/4/2023 05/04/2022  Imm Admin: Pneumococcal polysaccharide vaccine (PPSV-23)    11/05/2010  Imm Admin: Pneumococcal polysaccharide vaccine (PPSV-23)          IMM DTaP/Tdap/Td Vaccine (2 - Td or Tdap) Next due on 3/31/2030    03/31/2020  Imm Admin: Tdap Vaccine          IMM ZOSTER VACCINES (Series Information) Completed    11/19/2021  Imm Admin: Zoster Vaccine Recombinant (RZV) ([employer])    06/09/2021  Imm Admin: Zoster Vaccine Recombinant (RZV) ([employer])          Annual Wellness Visit  Completed    05/04/2022  Visit Dx: Medicare annual wellness visit, initial    05/04/2022  Level of Service: INITIAL ANNUAL WELLNESS VISIT-INCLUDES PPPS          IMM HEP B VACCINE (Series Information) Aged Out    No completion history exists for this topic.          IMM MENINGOCOCCAL VACCINE (MCV4) (Series Information) Aged Out    No completion history exists for this topic.          Discontinued - MAMMOGRAM  Discontinued    09/22/2008  MA-SCREENING DIGITAL MAMMO    09/22/2008  MA-CAD SCREENING-MAMMO          Discontinued - PAP SMEAR  Discontinued    No completion history exists for this topic.          Discontinued - COLORECTAL CANCER SCREENING  Discontinued    No completion history exists for this topic.                Patient Care Team:  Joana Romano,  "JOSE ALEJANDRO as PCP - General (Nurse Practitioner)  Dani Neumann M.D. as PCP - [facility] Paneled  [facility] (Inactive) as [facility]   Aman Wilkinson M.D. (Cardiovascular Disease (Cardiology))    Previously with endocrinology and has new establishing appointment on 6/1/2022.       Social History     Tobacco Use   • Smoking status: Former Smoker     Packs/day: 1.50     Quit date: 11/2/2011     Years since quitting: 10.5   • Smokeless tobacco: Never Used   Vaping Use   • Vaping Use: Never used   Substance Use Topics   • Alcohol use: Yes     Comment: 1 drink every 6 months   • Drug use: No     Family History   Problem Relation Age of Onset   • Hypertension Mother    • Heart Disease Mother    • Diabetes Mother    • Diabetes Father    • Hypertension Father    • Diabetes Maternal Grandmother    • Diabetes Paternal Grandfather      She  has a past medical history of ASTHMA, Complete heart block (HCC) (11/16/2018), Congestive heart failure (HCC), COPD, COPD (chronic obstructive pulmonary disease) (HCC), Depressed, Diabetes, Diastolic dysfunction, DM (diabetes mellitus) (HCC), Heart murmur, HLD (hyperlipidemia), HTN (hypertension), Hypertension, Obstructive sleep apnea, Osteoarthritis, Pulmonary hypertension (HCC), 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 (11/16/2018), and Sleep apnea.   Past Surgical History:   Procedure Laterality Date   • BREAST BIOPSY     • GYN SURGERY  hysterectomy   • MITRAL VALVE REPLACE     • OTHER ABDOMINAL SURGERY     • OTHER ORTHOPEDIC SURGERY     • TONSILLECTOMY Bilateral        Exam:   Vital signs reviewed   /60 (BP Location: Left arm, Patient Position: Sitting, BP Cuff Size: Adult)   Pulse 87   Temp 36.6 °C (97.9 °F) (Temporal)   Ht 1.702 m (5' 7\")   Wt 98.9 kg (218 lb)   SpO2 94%  Body mass index is 34.14 kg/m².    Hearing good.    Dentition upper and lower dentures. Reports fit well. No dental home.   Alert, " oriented in no acute distress.  Eye contact is good, speech goal directed, affect calm. Wearing glasses. Using 3-point cane with ambulation.     Assessment and Plan. The following treatment and monitoring plan is recommended:      1. Medicare annual wellness visit, initial   Acute uncomplicated problem. Annual wellness exam completed today.      2. Need for vaccination  Acute uncomplicated problem. She is interested in pneumococcal vaccine. I have placed the below orders and discussed them with an approved delegating provider.  The MA is performing the below orders under the direction of Ollie.      Pneumococal Polysaccharide Vaccine 23-Valent =>1YO SQ/IM   3. Pulmonary hypertension (HCC)     4. Type 2 diabetes mellitus with stage 3b chronic kidney disease, with long-term current use of insulin ([facility])     5. Essential hypertension, benign     6. Hyperlipidemia, mixed     7. Diastolic CHF due to valvular disease ([facility])     8. Cardiac pacemaker     9. Chronic obstructive pulmonary disease, unspecified COPD type ([facility])     10. Secondary hyperparathyroidism of renal origin ([facility])     11. PAF (paroxysmal atrial fibrillation) ([facility])     12. Stable proliferative diabetic retinopathy of both eyes associated with type 2 diabetes mellitus (HCC)           Pulmonary hypertension  Last echocardiogram in 2019 shows a left ejection fraction of 65% with moderate pulmonary hypertension.  She is followed by cardiology.    Type 2 diabetes mellitus with stage 3b chronic kidney disease, with long-term current use of insulin ([facility])  She has upcoming endocrinology appointment scheduled for 6/1/2022.  Encouraged her to keep her upcoming appointment.  She will continue with her glimepiride 4 mg twice daily, Novolin (70/30) 46 units in the morning and 3-10 units in the evening on her sliding scale, and regular insulin 4-8 units daily in the morning per her own sliding scale.    Essential hypertension, benign  Her blood pressure is controlled  today.  She continues losartan 25 mg twice daily.    Hyperlipidemia, mixed  Cholesterol is controlled on rosuvastatin 5 mg every evening 3 times a week.  Continue.    Diastolic CHF due to valvular disease ([facility])  Continue follow-up with cardiology.  She continues with her furosemide 40 mg daily and Klor-Con 8 mill equivalents in the evening.    Cardiac pacemaker  Keep upcoming pacemaker check appointment and continue follow-up with cardiology.    COPD (chronic obstructive pulmonary disease) ([facility])  Recommend PFTs, she declines today.  She will continue with her albuterol inhaler as needed.    Secondary hyperparathyroidism of renal origin ([facility])  Continue follow-up with Dr. Rosas with nephrology.    PAF (paroxysmal atrial fibrillation) ([facility])  Keep upcoming pacemaker interrogation appointment.  Continue follow-up with cardiology.  Continue with apixaban 5 mg twice daily.    Stable proliferative diabetic retinopathy of both eyes associated with type 2 diabetes mellitus ([facility])  She had recent eye exam 1 day ago.  Awaiting new updated results.          Services suggested: No services needed at this time  Health Care Screening: Age-appropriate preventive services recommended by USPTF and ACIP covered by Medicare were discussed today. Services ordered if indicated and agreed upon by the patient.  Referrals offered: Community-based lifestyle interventions to reduce health risks and promote self-management and wellness, fall prevention, nutrition, physical activity, tobacco-use cessation, weight loss, and mental health services as per orders if indicated.    Discussion today about general wellness and lifestyle habits:    · Prevent falls and reduce trip hazards; Cautioned about securing or removing rugs.  · Have a working fire alarm and carbon monoxide detector;   · Engage in regular physical activity and social activities     Follow-up: Return in about 6 months (around 11/4/2022) for 6 month follow-up.       Please note that  this dictation was created using voice recognition software. I have made every reasonable attempt to correct obvious errors, but I expect that there are errors of grammar and possibly content that I did not discover before finalizing the note.

## 2022-05-04 NOTE — ASSESSMENT & PLAN NOTE
Continue follow-up with cardiology.  She continues with her furosemide 40 mg daily and Klor-Con 8 mill equivalents in the evening.

## 2022-05-04 NOTE — ASSESSMENT & PLAN NOTE
Last echocardiogram in 2019 shows a left ejection fraction of 65% with moderate pulmonary hypertension.  She is followed by cardiology.

## 2022-05-04 NOTE — ASSESSMENT & PLAN NOTE
She has upcoming endocrinology appointment scheduled for 6/1/2022.  Encouraged her to keep her upcoming appointment.  She will continue with her glimepiride 4 mg twice daily, Novolin (70/30) 46 units in the morning and 3-10 units in the evening on her sliding scale, and regular insulin 4-8 units daily in the morning per her own sliding scale.

## 2022-05-04 NOTE — ASSESSMENT & PLAN NOTE
Keep upcoming pacemaker interrogation appointment.  Continue follow-up with cardiology.  Continue with apixaban 5 mg twice daily.

## 2022-05-09 ENCOUNTER — TELEPHONE (OUTPATIENT)
Dept: HEALTH INFORMATION MANAGEMENT | Facility: OTHER | Age: 77
End: 2022-05-09
Payer: MEDICARE

## 2022-05-12 ENCOUNTER — OFFICE VISIT (OUTPATIENT)
Dept: CARDIOLOGY | Facility: MEDICAL CENTER | Age: 77
End: 2022-05-12

## 2022-05-12 ENCOUNTER — NON-PROVIDER VISIT (OUTPATIENT)
Dept: CARDIOLOGY | Facility: MEDICAL CENTER | Age: 77
End: 2022-05-12
Payer: MEDICARE

## 2022-05-12 VITALS
WEIGHT: 214.6 LBS | SYSTOLIC BLOOD PRESSURE: 118 MMHG | DIASTOLIC BLOOD PRESSURE: 58 MMHG | HEIGHT: 67 IN | HEART RATE: 85 BPM | BODY MASS INDEX: 33.68 KG/M2 | RESPIRATION RATE: 18 BRPM | OXYGEN SATURATION: 96 %

## 2022-05-12 DIAGNOSIS — I38 DIASTOLIC CHF DUE TO VALVULAR DISEASE (HCC): ICD-10-CM

## 2022-05-12 DIAGNOSIS — I48.0 PAF (PAROXYSMAL ATRIAL FIBRILLATION) (HCC): ICD-10-CM

## 2022-05-12 DIAGNOSIS — Z95.0 CARDIAC PACEMAKER: ICD-10-CM

## 2022-05-12 DIAGNOSIS — I27.20 PULMONARY HYPERTENSION (HCC): Chronic | ICD-10-CM

## 2022-05-12 DIAGNOSIS — I49.5 SSS (SICK SINUS SYNDROME) (HCC): ICD-10-CM

## 2022-05-12 DIAGNOSIS — I50.30 DIASTOLIC CHF DUE TO VALVULAR DISEASE (HCC): ICD-10-CM

## 2022-05-12 DIAGNOSIS — I05.0 MITRAL VALVE STENOSIS, UNSPECIFIED ETIOLOGY: ICD-10-CM

## 2022-05-12 DIAGNOSIS — Z79.01 ANTICOAGULATED: ICD-10-CM

## 2022-05-12 DIAGNOSIS — I10 ESSENTIAL HYPERTENSION, BENIGN: ICD-10-CM

## 2022-05-12 DIAGNOSIS — E78.2 MIXED HYPERLIPIDEMIA: ICD-10-CM

## 2022-05-12 PROCEDURE — 93280 PM DEVICE PROGR EVAL DUAL: CPT | Performed by: INTERNAL MEDICINE

## 2022-05-12 PROCEDURE — 99214 OFFICE O/P EST MOD 30 MIN: CPT | Performed by: INTERNAL MEDICINE

## 2022-05-12 ASSESSMENT — ENCOUNTER SYMPTOMS
DIZZINESS: 0
MYALGIAS: 0
LOSS OF CONSCIOUSNESS: 0
PALPITATIONS: 0
COUGH: 0
SHORTNESS OF BREATH: 0

## 2022-05-12 ASSESSMENT — FIBROSIS 4 INDEX: FIB4 SCORE: 1.527777777777777778

## 2022-05-12 NOTE — PROGRESS NOTES
Chief Complaint   Patient presents with   • Atrial Fibrillation     F/V Dx: PAF (paroxysmal atrial fibrillation) ([facility])     • Heart Murmur     F/V Dx: Systolic murmur    • Hyperlipidemia     F/V Dx: Hyperlipidemia, mixed        Subjective     Ophelia Sosa is a 76 y.o. 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     Since 10/19/2021 appointment patient has had no specific cardiac problems or symptoms.  Admits to dietary indiscretion.       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   • Sleep apnea      Past Surgical History:   Procedure Laterality Date   • BREAST BIOPSY     • GYN SURGERY  hysterectomy   • MITRAL VALVE REPLACE     • OTHER ABDOMINAL SURGERY     • OTHER ORTHOPEDIC SURGERY     • TONSILLECTOMY Bilateral      Family History   Problem Relation Age of Onset   • Hypertension Mother    • Heart Disease Mother    • Diabetes Mother    • Diabetes Father    • Hypertension Father    • Diabetes Maternal Grandmother    • Diabetes Paternal Grandfather      Social History     Socioeconomic History   • Marital status: Single     Spouse name: Not on file   • Number of children: Not on file   • Years of education: Not on file   • Highest education level: Not on file   Occupational History   • Not on file   Tobacco Use   • Smoking status: Former Smoker     " Packs/day: 1.50     Quit date: 11/2/2011     Years since quitting: 10.5   • Smokeless tobacco: Never Used   Vaping Use   • Vaping Use: Never used   Substance and Sexual Activity   • Alcohol use: Yes     Comment: 1 drink every 6 months   • Drug use: No   • Sexual activity: Not on file   Other Topics Concern   • Not on file   Social History Narrative   • Not on file     Social Determinants of Health     Financial Resource Strain: Not on file   Food Insecurity: Not on file   Transportation Needs: Not on file   Physical Activity: Not on file   Stress: Not on file   Social Connections: Not on file   Intimate Partner Violence: Not on file   Housing Stability: Not on file     Allergies   Allergen Reactions   • Ativan      DELIRIUM, CONFUSION.   • Advair [Fluticasone-Salmeterol]    • Ambien [Zolpidem Tartrate]    • Erythromycin Vomiting   • Ibuprofen    • Lipitor [Atorvastatin Calcium]    • Pcn [Penicillins] Hives   • Pravachol [Pravastatin Sodium]      ADVERSE REACTION.     Outpatient Encounter Medications as of 5/12/2022   Medication Sig Dispense Refill   • diphenhydrAMINE HCl (BENADRYL ALLERGY PO) Take  by mouth as needed.     • BD INSULIN SYRINGE U/F 31G X 5/16\" 0.3 ML Misc INJECT TWICE DAILY 10 Each 5   • NON SPECIFIED as needed. CBD oil     • clobetasol (TEMOVATE) 0.05 % Cream Apply 1 Application topically 2 times a day as needed (eczema). 60 g 1   • mupirocin (BACTROBAN) 2 % Ointment Apply 1 Application topically 2 times a day. 30 g 0   • insulin NPH (HUMULIN/NOVOLIN) 100 UNIT/ML Suspension 46 units in the morning and 3-10 units in the evening per sliding scale 10 mL 1   • insulin regular (HUMULIN R) 100 Unit/mL Solution Inject 3-6 units subcutaneously daily in the morning. 10 mL 1   • KLOR-CON 8 MEQ tablet TAKE 1 TABLET BY MOUTH EVERY DAY IN THE EVENING (Patient taking differently: Take 8 mEq by mouth every day.) 90 Tablet 3   • furosemide (LASIX) 40 MG Tab TAKE 1 TABLET BY MOUTH EVERY DAY IN THE EVENING (Patient " "taking differently: Take 40 mg by mouth every day.) 90 Tablet 3   • VITAMIN D, CHOLECALCIFEROL, PO Take 3,000 Units by mouth every day.     • apixaban (ELIQUIS) 5mg Tab Take 1 Tablet by mouth 2 times a day. 60 Tablet 11   • B Complex Vitamins (VITAMIN B COMPLEX PO) Take  by mouth every day.     • albuterol 108 (90 Base) MCG/ACT Aero Soln inhalation aerosol Inhale 2 Puffs every 6 hours as needed for Shortness of Breath. 8.5 g 2   • rosuvastatin (CRESTOR) 5 MG Tab TAKE ONE TABLET AT BEDTIME FOR CHOLESTEROL. TAKE THREE TIMES WEEKLY 30 Tab 5   • glimepiride (AMARYL) 4 MG Tab TAKE 1 TABLET BY MOUTH TWICE A DAY     • NS SOLN 60 mL with albuterol 2.5 mg/0.5 mL NEBU 5 mL 5 mg/hr by Nebulization route. Spring and fall     • losartan (COZAAR) 25 MG Tab Take 25 mg by mouth 2 Times a Day.     • FREESTYLE LITE strip TEST BLOOD SUGAR 3 TIMES DAILY  12   • fluticasone (FLONASE) 50 MCG/ACT nasal spray Spray 1 Spray in nose 1 time daily as needed.     • ketoconazole (NIZORAL) 2 % Cream Apply 1 Application topically every day. (Patient not taking: Reported on 5/12/2022) 60 g 1     No facility-administered encounter medications on file as of 5/12/2022.     Review of Systems   Respiratory: Negative for cough and shortness of breath.    Cardiovascular: Negative for chest pain and palpitations.   Musculoskeletal: Negative for myalgias.   Neurological: Negative for dizziness and loss of consciousness.              Objective     /58 (BP Location: Left arm, Patient Position: Sitting, BP Cuff Size: Adult)   Pulse 85   Resp 18   Ht 1.702 m (5' 7\")   Wt 97.3 kg (214 lb 9.6 oz)   SpO2 96%   BMI 33.61 kg/m²     Physical Exam  Constitutional:       Appearance: She is well-developed.      Comments: Uses a cane for ambulatory assistance.   Eyes:      Conjunctiva/sclera: Conjunctivae normal.      Pupils: Pupils are equal, round, and reactive to light.   Neck:      Vascular: No JVD.   Cardiovascular:      Rate and Rhythm: Normal rate and " regular rhythm.      Heart sounds: Normal heart sounds.      Comments: Pacemaker generator  Pulmonary:      Effort: Pulmonary effort is normal. No accessory muscle usage or respiratory distress.      Breath sounds: Normal breath sounds. No wheezing or rales.   Musculoskeletal:      Cervical back: Normal range of motion and neck supple.      Right lower leg: No edema.      Left lower leg: No edema.   Skin:     General: Skin is warm and dry.      Findings: No rash.      Nails: There is no clubbing.   Neurological:      Mental Status: She is alert and oriented to person, place, and time.   Psychiatric:         Behavior: Behavior normal.                11/05/2012 ECHOCARDIOGRAM  EF 70%.  Mild tricuspid regurgitation. Right ventricular systolic pressure is   estimated to be 30 mmHg.  Mitral annular calcification. Thickened mitral valve leaflets. Mild   mitral stenosis. Mild mitral regurgitation.  Thickened aortic valve leaflets. Aortic annular calcification. Mild   aortic stenosis.  Moderate tricuspid regurgitation. Right ventricular systolic pressure   is estimated to be 57 mmhg.  No prior study available for comparison     09/28/2016 ECHOCARDIOGRAM  Normal left ventricular size and systolic function.  Severe concentric left ventricular hypertrophy.  Left ventricular ejection fraction is visually estimated to be 70%.  Diffuse severe mitral annular calcification.  Thickened mitral valve leaflets: Mean transvalvular gradient is 8  mmHg   at a heart rate of  75BPM; Mitral valve area 2.7 cm2.  Aortic sclerosis without stenosis.  Estimated right ventricular systolic pressure  is 55 mmHg.  Right heart pressures are consistent with moderate pulmonary   hypertension.  Mildly dilated right ventricle.  Normal right ventricular systolic function      10/25/2017 ECHOCARDIOGRAM  Normal left ventricular systolic function.  Left ventricular ejection fraction is visually estimated to be 65%.  Moderate concentric left ventricular  hypertrophy.  Dense diffuse mitral annular calcification with thickened mitral valve   leaflets.  Mild to moderate mitral stenosis.  Right heart pressures are consistent with moderate pulmonary   hypertension.  Aortic sclerosis without stenosis.     ECHOCARDIOGRAM 05/24/2019  Normal left ventricular systolic function.  Left ventricular ejection fraction is visually estimated to be 65%.  Mild mitral stenosis.  Aortic sclerosis without stenosis.  Right heart pressures are consistent with moderate pulmonary   hypertension.  Pacer/ICD wire seen in right ventricle.     BRAIN MRI 07/26/2018  1. Age-related cerebral atrophy.  2. Minimal periventricular white matter changes consistent with chronic microvascular ischemic gliosis.    Assessment & Plan     1. PAF (paroxysmal atrial fibrillation) (HCC)     2. Mitral valve stenosis, unspecified etiology     3. Anticoagulated     4. 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 Essential hypertension, benign     6. Hyperlipidemia, mixed     7. Pulmonary hypertension ([facility])     8. Cardiac pacemaker         Medical Decision Making: Today's Assessment/Status/Plan:   Assessment  1.  PAF.  2.   Anticoagulation, apixaban.  3.  Mitral stenosis.  4.  Diastolic and valvular CHF, diagnosed 2004.  5.  Permanent pacemaker.  11/18/2018.  High-grade heart block.  6.  Pulmonary hypertension.  7.  Left anterior fascicular block/right bundle branch block.  8.  Hypertension.  9.  Dyslipidemia.  10.  Diabetes mellitus.    11.  CKD.  12 Adult situational stress syndrome.     Recommendation and Recommendation  1.  PAF: Clinically stable, minimal symptoms, continue apixaban, continue to monitor.  2.  Mitral stenosis: Asymptomatic, continue to monitor clinically and echocardiogram as needed.  3.  Hypertension: BP normal, at goal, continue furosemide, losartan.  4.  Dyslipidemia: LDL 47, at goal, continue rosuvastatin.  5.  PPM: Functioning normally, continue surveillance.  6.  RTC 7 months.

## 2022-07-26 ENCOUNTER — OFFICE VISIT (OUTPATIENT)
Dept: MEDICAL GROUP | Facility: PHYSICIAN GROUP | Age: 77
End: 2022-07-26
Payer: MEDICARE

## 2022-07-26 VITALS
HEIGHT: 67 IN | WEIGHT: 212 LBS | SYSTOLIC BLOOD PRESSURE: 124 MMHG | DIASTOLIC BLOOD PRESSURE: 60 MMHG | BODY MASS INDEX: 33.27 KG/M2 | TEMPERATURE: 97.4 F | OXYGEN SATURATION: 99 % | HEART RATE: 86 BPM

## 2022-07-26 DIAGNOSIS — Z02.89 ENCOUNTER FOR COMPLETION OF FORM WITH PATIENT: ICD-10-CM

## 2022-07-26 PROCEDURE — 99212 OFFICE O/P EST SF 10 MIN: CPT | Performed by: NURSE PRACTITIONER

## 2022-07-26 ASSESSMENT — FIBROSIS 4 INDEX: FIB4 SCORE: 1.527777777777777778

## 2022-07-26 NOTE — PROGRESS NOTES
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 She has seen her eye doctor has signed off on the eye exam.  She is not taking any new medications, no new issues.        Past Medical History:   Diagnosis Date   • ASTHMA     patient denies   • Complete heart block (HCC) 11/16/2018   • Congestive heart failure (HCC)    • COPD    • COPD (chronic obstructive pulmonary disease) (HCC)    • Depressed    • Diabetes    • Diastolic dysfunction    • DM (diabetes mellitus) (HCC)    • Heart murmur    • HLD (hyperlipidemia)    • HTN (hypertension)    • Hypertension    • Obstructive sleep apnea    • Osteoarthritis    • Pulmonary hypertension (HCC)    • 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   • Sleep apnea        Social History     Tobacco Use   • Smoking status: Former Smoker     Packs/day: 1.50     Quit date: 11/2/2011     Years since quitting: 10.7   • Smokeless tobacco: Never Used   Vaping Use   • Vaping Use: Never used   Substance Use Topics   • Alcohol use: Yes     Comment: 1 drink every 6 months   • Drug use: No       Current Outpatient Medications Ordered in Epic   Medication Sig Dispense Refill   • diphenhydrAMINE HCl (BENADRYL ALLERGY PO) Take  by mouth as needed.     • BD INSULIN SYRINGE U/F 31G X 5/16\" 0.3 ML Misc INJECT TWICE DAILY 10 Each 5   • NON SPECIFIED as needed. CBD oil     • clobetasol (TEMOVATE) 0.05 % Cream Apply 1 Application topically 2 times a day as needed (eczema). 60 g 1   • mupirocin (BACTROBAN) 2 % Ointment Apply 1 Application topically 2 times a day. 30 g 0   • insulin NPH (HUMULIN/NOVOLIN) 100 UNIT/ML Suspension 46 units in the morning and 3-10 units in the evening per sliding scale 10 mL 1   • insulin regular (HUMULIN R) 100 Unit/mL Solution Inject 3-6 units subcutaneously daily in the morning. 10 mL 1   • KLOR-CON 8 MEQ tablet TAKE 1 TABLET BY MOUTH EVERY DAY IN " "THE EVENING 90 Tablet 3   • furosemide (LASIX) 40 MG Tab TAKE 1 TABLET BY MOUTH EVERY DAY IN THE EVENING 90 Tablet 3   • VITAMIN D, CHOLECALCIFEROL, PO Take 3,000 Units by mouth every day.     • apixaban (ELIQUIS) 5mg Tab Take 1 Tablet by mouth 2 times a day. 60 Tablet 11   • B Complex Vitamins (VITAMIN B COMPLEX PO) Take  by mouth every day.     • albuterol 108 (90 Base) MCG/ACT Aero Soln inhalation aerosol Inhale 2 Puffs every 6 hours as needed for Shortness of Breath. 8.5 g 2   • rosuvastatin (CRESTOR) 5 MG Tab TAKE ONE TABLET AT BEDTIME FOR CHOLESTEROL. TAKE THREE TIMES WEEKLY 30 Tab 5   • glimepiride (AMARYL) 4 MG Tab TAKE 1 TABLET BY MOUTH TWICE A DAY     • NS SOLN 60 mL with albuterol 2.5 mg/0.5 mL NEBU 5 mL 5 mg/hr by Nebulization route. Spring and fall     • losartan (COZAAR) 25 MG Tab Take 25 mg by mouth 2 Times a Day.     • FREESTYLE LITE strip TEST BLOOD SUGAR 3 TIMES DAILY  12   • fluticasone (FLONASE) 50 MCG/ACT nasal spray Spray 1 Spray in nose 1 time daily as needed.       No current Epic-ordered facility-administered medications on file.       Allergies:  Ativan, Advair [fluticasone-salmeterol], Ambien [zolpidem tartrate], Erythromycin, Ibuprofen, Lipitor [atorvastatin calcium], Pcn [penicillins], and Pravachol [pravastatin sodium]    Health Maintenance: Reviewed      Objective:     Vital signs reviewed  Exam:  /60 (BP Location: Left arm, Patient Position: Sitting, BP Cuff Size: Adult)   Pulse 86   Temp 36.3 °C (97.4 °F) (Temporal)   Ht 1.702 m (5' 7\")   Wt 96.2 kg (212 lb)   SpO2 99%   BMI 33.20 kg/m²  Body mass index is 33.2 kg/m².    Gen: Alert and oriented, No apparent distress.  Eyes:   Lids normal. Glasses in place.   Lungs: Normal effort, CTA bilaterally, no wheezes, rhonchi, or rales  CV: Regular rate and rhythm with systolic murmur.  No rubs or gallops.  Ext: No clubbing, cyanosis, edema.  Using 4-point cane with ambulation.      Assessment & Plan:     76 y.o. female with the " following -     1. Encounter for completion of form with patient  Acute uncomplicated problem.  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  Copy scanned into chart.  Patient was requesting a copy of her form which we did provide her along with her original.      Return if symptoms worsen or fail to improve.    Please note that this dictation was created using voice recognition software. I have made every reasonable attempt to correct obvious errors, but I expect that there are errors of grammar and possibly content that I did not discover before finalizing the note.

## 2022-08-15 DIAGNOSIS — I10 ESSENTIAL HYPERTENSION, BENIGN: ICD-10-CM

## 2022-08-16 NOTE — TELEPHONE ENCOUNTER
Is the patient due for a refill? Yes  Plan requires a 100 day supply. Thank you      Was the patient seen the past year? Yes    Date of last office visit: 5/12/2022    Does the patient have an upcoming appointment?  Yes   If yes, When? 11/8/2022    Provider to refill: DARION     Does the patients insurance require a 100 day supply?  Yes

## 2022-08-17 RX ORDER — POTASSIUM CHLORIDE 600 MG/1
TABLET, FILM COATED, EXTENDED RELEASE ORAL
Qty: 100 TABLET | Refills: 2 | Status: SHIPPED | OUTPATIENT
Start: 2022-08-17 | End: 2023-09-14

## 2022-08-17 RX ORDER — FUROSEMIDE 40 MG/1
TABLET ORAL
Qty: 100 TABLET | Refills: 2 | Status: SHIPPED | OUTPATIENT
Start: 2022-08-17 | End: 2023-09-14

## 2022-08-18 DIAGNOSIS — E11.22 TYPE 2 DIABETES MELLITUS WITH STAGE 3B CHRONIC KIDNEY DISEASE, WITH LONG-TERM CURRENT USE OF INSULIN (HCC): ICD-10-CM

## 2022-08-18 DIAGNOSIS — N18.32 TYPE 2 DIABETES MELLITUS WITH STAGE 3B CHRONIC KIDNEY DISEASE, WITH LONG-TERM CURRENT USE OF INSULIN (HCC): ICD-10-CM

## 2022-08-18 DIAGNOSIS — Z79.4 TYPE 2 DIABETES MELLITUS WITH STAGE 3B CHRONIC KIDNEY DISEASE, WITH LONG-TERM CURRENT USE OF INSULIN (HCC): ICD-10-CM

## 2022-08-18 RX ORDER — GLIMEPIRIDE 4 MG/1
4 TABLET ORAL 2 TIMES DAILY
Qty: 180 TABLET | Refills: 1 | Status: SHIPPED | OUTPATIENT
Start: 2022-08-18 | End: 2023-02-27

## 2022-08-19 NOTE — TELEPHONE ENCOUNTER
Requested Prescriptions     Signed Prescriptions Disp Refills    glimepiride (AMARYL) 4 MG Tab 180 Tablet 1     Sig: Take 1 Tablet by mouth 2 times a day.     Authorizing Provider: FELIZ MCKENNA A.P.R.N.

## 2022-08-24 ENCOUNTER — OFFICE VISIT (OUTPATIENT)
Dept: ENDOCRINOLOGY | Facility: MEDICAL CENTER | Age: 77
End: 2022-08-24
Payer: MEDICARE

## 2022-08-24 VITALS
OXYGEN SATURATION: 100 % | WEIGHT: 212.8 LBS | HEIGHT: 67 IN | BODY MASS INDEX: 33.4 KG/M2 | HEART RATE: 90 BPM | RESPIRATION RATE: 16 BRPM | DIASTOLIC BLOOD PRESSURE: 58 MMHG | SYSTOLIC BLOOD PRESSURE: 132 MMHG

## 2022-08-24 DIAGNOSIS — E11.22 TYPE 2 DIABETES MELLITUS WITH STAGE 3A CHRONIC KIDNEY DISEASE, WITH LONG-TERM CURRENT USE OF INSULIN (HCC): ICD-10-CM

## 2022-08-24 DIAGNOSIS — N18.31 TYPE 2 DIABETES MELLITUS WITH STAGE 3A CHRONIC KIDNEY DISEASE, WITH LONG-TERM CURRENT USE OF INSULIN (HCC): ICD-10-CM

## 2022-08-24 DIAGNOSIS — Z79.4 TYPE 2 DIABETES MELLITUS WITH STAGE 3A CHRONIC KIDNEY DISEASE, WITH LONG-TERM CURRENT USE OF INSULIN (HCC): ICD-10-CM

## 2022-08-24 DIAGNOSIS — E78.49 OTHER HYPERLIPIDEMIA: ICD-10-CM

## 2022-08-24 DIAGNOSIS — R80.9 MICROALBUMINURIA: ICD-10-CM

## 2022-08-24 DIAGNOSIS — N18.31 CHRONIC KIDNEY DISEASE, STAGE 3A: ICD-10-CM

## 2022-08-24 LAB
HBA1C MFR BLD: 8.7 % (ref 0–5.6)
INT CON NEG: ABNORMAL
INT CON POS: ABNORMAL

## 2022-08-24 PROCEDURE — 83036 HEMOGLOBIN GLYCOSYLATED A1C: CPT

## 2022-08-24 PROCEDURE — 99214 OFFICE O/P EST MOD 30 MIN: CPT

## 2022-08-24 PROCEDURE — 99212 OFFICE O/P EST SF 10 MIN: CPT

## 2022-08-24 ASSESSMENT — FIBROSIS 4 INDEX: FIB4 SCORE: 1.547619047619047619

## 2022-08-24 NOTE — PROGRESS NOTES
"Chief Complaint:  Consult requested by JOSE ALEJANDRO Boston for initial evaluation of the following conditions  HPI:   Ophelia Sosa is a 77 y.o. female     Type 2 diabetes mellitus with stage 3a chronic kidney disease, with long-term current use of insulin:  Diangosed when she was 37.    She denies hospitalizations for DKA in the past.    She monitors blood glucose  4 times per day.   Blood glucose values range:Fastin-200s, Lunch: 200s, Dinner: 250s, and Bed: high            Medications regimens:  NPH 4-6 units in the am, 3-20 at night  Regular 4-8 units in the am    She reports hypoglycemic episodes occurring, 3 in the last 3 weeks, mild. In the am  She  denies hypoglycemic unawareness.   She denies episodes of severe hypoglycemia requiring third party assistance.    She  is not wearing a medical alert bracelet or necklace.    She does not a glucagon emergency kit.    She reports attending diabetes education classes, a few years ago  Diet: \"its is ok\" she eats the same with some variation  Exercise: no.    Diabetes Complications   She  denies history of retinopathy.    She denies laser eye surgery.   Last eye exam: 2022, She can not remember his optometry's name.   She reports history of peripheral sensory neuropathy.    She reports numbness, tingling in both feet.    She denies history of foot sores.   She denies history of kidney damage.    She is not on ACE inhibitor or ARB.   She denies history of coronary artery disease.    She  denies history of stroke and denies TIA.    She denies history of PAD.  She reports history of hyperlipidemia.      2.  Chronic kidney disease stage 3a:   FV by Neprology Dr. Rosas    Latest Reference Range & Units 3/23/22 07:26   GFR (CKD-EPI) >60 mL/min/1.73 m 2 45 !       3.Microalbuminuria:  Follow by Dr. Ralph poe    Latest Reference Range & Units 9/29/15 10:18   Micro Alb Creat Ratio 0 - 30 mg/g 79 (H)     4.  Other " hyperlipidemia:  Rosuvastatin 5mg three times/week    Latest Reference Range & Units 3/23/22 07:26   Cholesterol,Tot 100 - 199 mg/dL 126   Triglycerides 0 - 149 mg/dL 189 (H)   HDL >=40 mg/dL 41   LDL <100 mg/dL 47         ROS:     CONS:     No fever, no chills, no weight loss, no fatigue   EYES:      No diplopia, no blurry vision, no redness of eyes, no swelling of eyelids   ENT:    No hearing loss, No ear pain, No sore throat, no dysphagia, no neck swelling   CV:     No chest pain, no palpitations, no claudication, no orthopnea, no PND   PULM:    No SOB, no cough, no hemoptysis, no wheezing    GI:   No nausea, no vomiting, no diarrhea, no constipation, no bloody stools   :  Passing urine well, no dysuria, no hematuria   ENDO:   No polyuria, no polydipsia, no heat intolerance, no cold intolerance   NEURO: No headaches, no dizziness, no convulsions, no tremors   MUSC:  No joint swellings, no arthralgias, no myalgias, no weakness   SKIN:   No rash, no ulcers, no dry skin   PSYCH:   No depression, no anxiety, no difficulty sleeping       Past Medical History:  Patient Active Problem List    Diagnosis Date Noted    Anticoagulated 05/12/2022    Stable proliferative diabetic retinopathy of both eyes associated with type 2 diabetes mellitus ([facility]) 03/29/2022    PAF (paroxysmal atrial fibrillation) ([facility]) 10/20/2021    Type 2 diabetes mellitus with hyperglycemia, with long-term current use of insulin ([facility]) 08/26/2021    Fungal infection of skin 06/09/2021    Major depressive disorder with single episode, in partial remission ([facility]) 06/09/2021    Secondary hyperparathyroidism of renal origin ([facility]) 06/09/2021    Tinnitus of left ear 12/03/2020    COPD (chronic obstructive pulmonary disease) ([facility]) 01/09/2020    Other insomnia 01/09/2020    Cardiac pacemaker 03/11/2019    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    Diastolic CHF due to valvular disease ([facility]) 09/05/2018    Mitral stenosis  "10/05/2017    Hyperlipidemia, mixed 08/18/2016    Obesity 01/21/2016    Eczema 01/21/2016    Type 2 diabetes mellitus with diabetic polyneuropathy, with long-term current use of insulin (HCC) 01/20/2016    Systolic murmur 10/06/2015    Essential hypertension, benign 10/06/2015    Sleep apnea     Type 2 diabetes mellitus with stage 3b chronic kidney disease, with long-term current use of insulin (HCC)     Osteoarthritis     Pulmonary hypertension (HCC)        Past Surgical History:  Past Surgical History:   Procedure Laterality Date    BREAST BIOPSY      GYN SURGERY  hysterectomy    MITRAL VALVE REPLACE      OTHER ABDOMINAL SURGERY      OTHER ORTHOPEDIC SURGERY      TONSILLECTOMY Bilateral         Allergies:  Ativan, Advair [fluticasone-salmeterol], Ambien [zolpidem tartrate], Erythromycin, Ibuprofen, Lipitor [atorvastatin calcium], Pcn [penicillins], and Pravachol [pravastatin sodium]     Current Medications:    Current Outpatient Medications:     glimepiride (AMARYL) 4 MG Tab, Take 1 Tablet by mouth 2 times a day., Disp: 180 Tablet, Rfl: 1    KLOR-CON 8 MEQ tablet, TAKE 1 TABLET BY MOUTH EVERY DAY IN THE EVENING, Disp: 100 Tablet, Rfl: 2    furosemide (LASIX) 40 MG Tab, TAKE 1 TABLET BY MOUTH EVERY DAY IN THE EVENING, Disp: 100 Tablet, Rfl: 2    diphenhydrAMINE HCl (BENADRYL ALLERGY PO), Take  by mouth as needed., Disp: , Rfl:     BD INSULIN SYRINGE U/F 31G X 5/16\" 0.3 ML Misc, INJECT TWICE DAILY, Disp: 10 Each, Rfl: 5    NON SPECIFIED, as needed. CBD oil, Disp: , Rfl:     clobetasol (TEMOVATE) 0.05 % Cream, Apply 1 Application topically 2 times a day as needed (eczema)., Disp: 60 g, Rfl: 1    mupirocin (BACTROBAN) 2 % Ointment, Apply 1 Application topically 2 times a day., Disp: 30 g, Rfl: 0    insulin NPH (HUMULIN/NOVOLIN) 100 UNIT/ML Suspension, 46 units in the morning and 3-10 units in the evening per sliding scale, Disp: 10 mL, Rfl: 1    insulin regular (HUMULIN R) 100 Unit/mL Solution, Inject 3-6 units " subcutaneously daily in the morning., Disp: 10 mL, Rfl: 1    VITAMIN D, CHOLECALCIFEROL, PO, Take 3,000 Units by mouth every day., Disp: , Rfl:     apixaban (ELIQUIS) 5mg Tab, Take 1 Tablet by mouth 2 times a day., Disp: 60 Tablet, Rfl: 11    B Complex Vitamins (VITAMIN B COMPLEX PO), Take  by mouth every day., Disp: , Rfl:     albuterol 108 (90 Base) MCG/ACT Aero Soln inhalation aerosol, Inhale 2 Puffs every 6 hours as needed for Shortness of Breath., Disp: 8.5 g, Rfl: 2    rosuvastatin (CRESTOR) 5 MG Tab, TAKE ONE TABLET AT BEDTIME FOR CHOLESTEROL. TAKE THREE TIMES WEEKLY, Disp: 30 Tab, Rfl: 5    NS SOLN 60 mL with albuterol 2.5 mg/0.5 mL NEBU 5 mL, 5 mg/hr by Nebulization route. Spring and fall, Disp: , Rfl:     losartan (COZAAR) 25 MG Tab, Take 25 mg by mouth 2 Times a Day., Disp: , Rfl:     FREESTYLE LITE strip, TEST BLOOD SUGAR 3 TIMES DAILY, Disp: , Rfl: 12    fluticasone (FLONASE) 50 MCG/ACT nasal spray, Spray 1 Spray in nose 1 time daily as needed., Disp: , Rfl:     Social History:  Social History     Socioeconomic History    Marital status: Single     Spouse name: Not on file    Number of children: Not on file    Years of education: Not on file    Highest education level: Not on file   Occupational History    Not on file   Tobacco Use    Smoking status: Former     Packs/day: 1.50     Types: Cigarettes     Quit date: 11/2/2011     Years since quitting: 10.8    Smokeless tobacco: Never   Vaping Use    Vaping Use: Never used   Substance and Sexual Activity    Alcohol use: Yes     Comment: 1 drink every 6 months    Drug use: No    Sexual activity: Not on file   Other Topics Concern    Not on file   Social History Narrative    Not on file     Social Determinants of Health     Financial Resource Strain: Not on file   Food Insecurity: Not on file   Transportation Needs: Not on file   Physical Activity: Not on file   Stress: Not on file   Social Connections: Not on file   Intimate Partner Violence: Not on file  "  Housing Stability: Not on file        Family History:   Family History   Problem Relation Age of Onset    Hypertension Mother     Heart Disease Mother     Diabetes Mother     Diabetes Father     Hypertension Father     Diabetes Maternal Grandmother     Diabetes Paternal Grandfather        PHYSICAL EXAM:   Vital signs: /58 (BP Location: Left arm, Patient Position: Sitting, BP Cuff Size: Large adult)   Pulse 90   Resp 16   Ht 1.702 m (5' 7\")   Wt 96.5 kg (212 lb 12.8 oz)   SpO2 100%   BMI 33.33 kg/m²   GENERAL: Well-developed, well-nourished  in no apparent distress.   EYE: No ocular and eyelid asymmetry, Anicteric sclerae,  PERRL, No exophthalmos or lidlag  HENT: Hearing grossly intact, Normocephalic, atraumatic.   NECK: Supple. Trachea midline. thyroid is normal in size without nodules or tenderness  CARDIOVASCULAR: Regular rate and rhythm. No murmurs, rubs, or gallops.   LUNGS: Clear to auscultation bilaterally   ABDOMEN: Soft, nontender with positive bowel sounds.   EXTREMITIES: No clubbing, cyanosis, or edema.   NEUROLOGICAL: Cranial nerves II-XII are grossly intact   Symmetric reflexes at the patella no proximal muscle weakness, No visible tremor with both outstretched hands  LYMPH: No cervical, supraclavicular,  adenopathy palpated.   SKIN: No rashes, lesions. Turgor is normal.  FOOT: Normal sensation to monofilament testing, normal pulses, no ulcers.  Normal Vibration quantitative sensation test.    Labs:  Lab Results   Component Value Date/Time    HBA1C 8.7 (H) 03/23/2022 0726    AVGLUC 203 03/23/2022 0726     Lab Results   Component Value Date/Time    MALBCRT 79 (H) 09/29/2015 10:18 AM    MICROALBUR 6.8 09/29/2015 10:18 AM        Lab Results   Component Value Date/Time    TSHULTRASEN 1.420 04/18/2020 0755       ASSESSMENT/PLAN:   1. Type 2 diabetes mellitus with stage 3b chronic kidney disease, with long-term current use of insulin (HCC)  Unstable with an A1c of 8.6%    Discussed with patient " "that she might benefit from a different type of insulin-patient stating that she does not want to change her insulin regimen, she has been using the same insulin and insulin regimen for the past 40 years and \" she does not want to fix what is not broken\"    Discussed with patient that she could benefit from a dose adjustment to improve her A1c-patient refused, stating that she does not want an adjustment on her medication regimen    Discussed with patient if I could check her antibody levels and determine if she has  type I/type II/1.5 diabetes to see if she could benefit from an oral medication-patient refused getting her antibody levels checked, stating that she would like to continue with the same insulin type and Medication regimen that she has been using for the past 40 years    Discussed with patient that I would like to check her thyroid hormone levels as having an autoimmune disease puts her at risk for other autoimmune diseases-Pt refused, stating that she has been checked for that in the past     - POCT Hemoglobin A1C    2.  Chronic kidney disease, stage IIIa:  Unstable  Followed by nephrology    3. Microalbuminuria  Unstable   FV by nephrology     4. Other hyperlipidemia  Unstable  Discussed with pt that she could benefit from an increase on her statin-pt refused, stating that her levels were really low in the past and she does not want her to increase her statin from what she is currently taking    I discussed with patient  that since we will be just monitoring, she could be managed by primary care-patient stating today that her primary care communicated with her not feeling comfortable ordering insulin    Discussion: Make an appointment to follow-up in 6 months      Thank you kindly for allowing me to participate in the diabetes care plan for this patient.    ZACHARY Cerna.P.R.N.  08/24/22    CC:   ANETA BostonPLatishaRLatishaN.  "

## 2022-08-24 NOTE — PROGRESS NOTES
"RN-CDE Note    Subjective:   Endocrinology Clinic Progress Note  PCP: JOSE ALEJANDRO Boston    HPI:  Ophelia Sosa is a 77 y.o. old patient who is seen today for review of her type 2 diabetes requiring long term use of insulin    DM:   Last A1c:   Lab Results   Component Value Date/Time    HBA1C 8.7 (A) 08/24/2022 01:14 PM      Previous A1c was 8.7 on 3/23/22  A1C GOAL: < 7    Diabetes Medications:   NPH taking 46 units in the morning and between 3-10 depending on  her evening blood sugars   Humalin/Novolin U-100 insulin usually only takes in the morning , between 4-8 units  Glimepiride 4 mg bid      Exercise: no regular exercise, sedentary  Diet: \"healthy\" diet  in general  Patient's body mass index is 33.33 kg/m². Exercise and nutrition counseling were performed at this visit.    Glucose monitoring frequency: states she is testing 4 times per day    Hypoglycemic episodes: yes - states she has had a couple of them this past week (symptomatic) but not below 70.  States she did have a 59 last week.     Last Retinal Exam: on file and up-to-date  Foot Exam:  Monofilament:  current, due 11/19/22  Lab Results   Component Value Date/Time    MALBCRT 79 (H) 09/29/2015 10:18 AM    MICROALBUR 6.8 09/29/2015 10:18 AM        ACR Albumin/Creatinine Ratio goal <30     HTN:   Blood pressure goal <140/<80 at goal.   Currently Rx ACE/ARB: Yes  Losartan 25 mg daily    Dyslipidemia:    Lab Results   Component Value Date/Time    CHOLSTRLTOT 126 03/23/2022 07:26 AM    LDL 47 03/23/2022 07:26 AM    HDL 41 03/23/2022 07:26 AM    TRIGLYCERIDE 189 (H) 03/23/2022 07:26 AM         Currently Rx Statin: Yes  Rosuvastatin 5 mg daily    She  reports that she quit smoking about 10 years ago. She smoked an average of 1.5 packs per day. She has never used smokeless tobacco.      Plan:     Discussed and educated on:   - All medications, side effects and compliance (discussed carefully)  - Annual eye examinations at Ophthalmology  - HbA1C: " target  - Home glucose monitoring emphasized  - Weight control and daily exercise

## 2022-10-26 ENCOUNTER — TELEPHONE (OUTPATIENT)
Dept: MEDICAL GROUP | Facility: PHYSICIAN GROUP | Age: 77
End: 2022-10-26
Payer: MEDICARE

## 2022-10-26 NOTE — TELEPHONE ENCOUNTER
Future Appointments         Provider Department Center    11/2/2022 1:00 PM (Arrive by 12:45 PM) LEILA Boston. [facility]    11/8/2022 12:45 PM PACER CHECK-CAM B [facility]     11/8/2022 1:00 PM Aman Wilkinson M.D. [facility]     [facility]20[facility] 8[facility]15 [facility]     3/3/2023 12:20 PM Alisha Hill R.N.; Lance Cormier M.D. [facility]          ESTABLISHED PATIENT PRE-VISIT PLANNING     Patient was contacted to complete PVP.     Note: Patient will not be contacted if there is no indication to call.     1.  Reviewed notes from the last few office visits within the medical group: Yes, LOV 07/26/2022    2.  If any orders were placed at last visit or intended to be done for this visit (i.e. 6 mos follow-up), do we have Results/Consult Notes?           Labs - Labs ordered, NOT completed. Patient advised to complete prior to next appointment.  Note: If patient appointment is for lab review and patient did not complete labs, check with provider if OK to reschedule patient until labs completed.         Imaging - Imaging ordered, NOT completed. Patient advised to complete prior to next appointment.         Referrals - No referrals were ordered at last office visit.    3. Is this appointment scheduled as a Hospital Follow-Up? No    4.  Immunizations were updated in Epic using Reconcile Outside Information activity? Yes    5.  Patient is due for the following Health Maintenance Topics:   Health Maintenance Due   Topic Date Due    BONE DENSITY  Never done    IMM HEP B VACCINE (1 of 3 - 3-dose series) Never done    COVID-19 Vaccine (1) Never done    URINE ACR / MICROALBUMIN  11/06/2020    RETINAL SCREENING  04/14/2022    IMM INFLUENZA (1) 09/01/2022    DIABETES MONOFILAMENT / LE EXAM  11/19/2022       - Patient has completed Retinal Eye Exam and HMR Letter(s) faxed to:   [facility]    6.  AHA (Pulse8) form printed for Provider? N/A

## 2022-10-26 NOTE — LETTER
[facility]  DOMINIQUE BostonRLatishaN.  [address]  [place] [address]  Fax: [phone]   Authorization for Release/Disclosure of   Protected Health Information   Name: OPHELIA SOSA : 1945 SSN: [id number]   Address: [address] [place] [address] [address]  [place] [address] Phone:    [phone] (home)    I authorize the entity listed below to release/disclose the PHI below to:   [facility]/ANETA BostonP.RMELODY and JOSE ALEJANDRO Boston   Provider or Entity Name: Dr. De Jesus   Address   [facility], [facility], [facility]   Phone:      Fax:[phone]     Reason for request: continuity of care   Information to be released:    [  ] LAST COLONOSCOPY,  including any PATH REPORT and follow-up  [  ] LAST FIT/COLOGUARD RESULT [  ] LAST DEXA  [  ] LAST MAMMOGRAM  [  ] LAST PAP  [  ] LAST LABS [XXX] RETINA EXAM REPORT  [  ] IMMUNIZATION RECORDS  [  ] Release all info      [  ] Check here and initial the line next to each item to release ALL health information INCLUDING  _____ Care and treatment for drug and / or alcohol abuse  _____ HIV testing, infection status, or AIDS  _____ Genetic Testing    DATES OF SERVICE OR TIME PERIOD TO BE DISCLOSED: _____________  I understand and acknowledge that:  * This Authorization may be revoked at any time by you in writing, except if your health information has already been used or disclosed.  * Your health information that will be used or disclosed as a result of you signing this authorization could be re-disclosed by the recipient. If this occurs, your re-disclosed health information may no longer be protected by State or Federal laws.  * You may refuse to sign this Authorization. Your refusal will not affect your ability to obtain treatment.  * This Authorization becomes effective upon signing and will  on (date) __________.      If no date is indicated, this Authorization will  one (1) year from the signature date.    Name: Ophelia Sosa    Signature:  Continuity of Care   Date:     10/26/2022       PLEASE FAX REQUESTED RECORDS BACK TO: [phone]

## 2022-11-02 ENCOUNTER — OFFICE VISIT (OUTPATIENT)
Dept: MEDICAL GROUP | Facility: PHYSICIAN GROUP | Age: 77
End: 2022-11-02
Payer: MEDICARE

## 2022-11-02 VITALS
SYSTOLIC BLOOD PRESSURE: 124 MMHG | BODY MASS INDEX: 34.06 KG/M2 | WEIGHT: 217 LBS | TEMPERATURE: 98.6 F | DIASTOLIC BLOOD PRESSURE: 60 MMHG | OXYGEN SATURATION: 96 % | HEART RATE: 86 BPM | HEIGHT: 67 IN

## 2022-11-02 DIAGNOSIS — K59.09 OTHER CONSTIPATION: ICD-10-CM

## 2022-11-02 DIAGNOSIS — R42 VERTIGO: ICD-10-CM

## 2022-11-02 DIAGNOSIS — Z23 NEED FOR VACCINATION: ICD-10-CM

## 2022-11-02 DIAGNOSIS — F32.4 MAJOR DEPRESSIVE DISORDER WITH SINGLE EPISODE, IN PARTIAL REMISSION (HCC): ICD-10-CM

## 2022-11-02 DIAGNOSIS — J44.9 CHRONIC OBSTRUCTIVE PULMONARY DISEASE, UNSPECIFIED COPD TYPE (HCC): ICD-10-CM

## 2022-11-02 PROCEDURE — 90662 IIV NO PRSV INCREASED AG IM: CPT | Performed by: NURSE PRACTITIONER

## 2022-11-02 PROCEDURE — 99214 OFFICE O/P EST MOD 30 MIN: CPT | Mod: 25 | Performed by: NURSE PRACTITIONER

## 2022-11-02 PROCEDURE — G0008 ADMIN INFLUENZA VIRUS VAC: HCPCS | Performed by: NURSE PRACTITIONER

## 2022-11-02 RX ORDER — ALBUTEROL SULFATE 90 UG/1
2 AEROSOL, METERED RESPIRATORY (INHALATION) EVERY 6 HOURS PRN
Qty: 8.5 G | Refills: 2 | Status: SHIPPED | OUTPATIENT
Start: 2022-11-02

## 2022-11-02 ASSESSMENT — PATIENT HEALTH QUESTIONNAIRE - PHQ9
6. FEELING BAD ABOUT YOURSELF - OR THAT YOU ARE A FAILURE OR HAVE LET YOURSELF OR YOUR FAMILY DOWN: NEARLY EVERY DAY
CLINICAL INTERPRETATION OF PHQ2 SCORE: 0
SUM OF ALL RESPONSES TO PHQ QUESTIONS 1-9: 18
1. LITTLE INTEREST OR PLEASURE IN DOING THINGS: SEVERAL DAYS
2. FEELING DOWN, DEPRESSED, IRRITABLE, OR HOPELESS: NOT AT ALL
5. POOR APPETITE OR OVEREATING: SEVERAL DAYS
SUM OF ALL RESPONSES TO PHQ9 QUESTIONS 1 AND 2: 1
3. TROUBLE FALLING OR STAYING ASLEEP OR SLEEPING TOO MUCH: NEARLY EVERY DAY
5. POOR APPETITE OR OVEREATING: 1 - SEVERAL DAYS
9. THOUGHTS THAT YOU WOULD BE BETTER OFF DEAD, OR OF HURTING YOURSELF: NEARLY EVERY DAY
7. TROUBLE CONCENTRATING ON THINGS, SUCH AS READING THE NEWSPAPER OR WATCHING TELEVISION: MORE THAN HALF THE DAYS
8. MOVING OR SPEAKING SO SLOWLY THAT OTHER PEOPLE COULD HAVE NOTICED. OR THE OPPOSITE, BEING SO FIGETY OR RESTLESS THAT YOU HAVE BEEN MOVING AROUND A LOT MORE THAN USUAL: NEARLY EVERY DAY
4. FEELING TIRED OR HAVING LITTLE ENERGY: MORE THAN HALF THE DAYS

## 2022-11-02 ASSESSMENT — FIBROSIS 4 INDEX: FIB4 SCORE: 1.547619047619047619

## 2022-11-02 NOTE — ASSESSMENT & PLAN NOTE
Initially she felt her PHQ score with positive screening for depression and self-harm.  After clarifying with patient she states that she completed the form incorrect.  I did go over each question of her PHQ with her and she answered never to the first and second questions.  PHQ score today is 0.  She states that she reads and she naps to help cope.  She also uses cannabis oil as needed.  She declines any medications or referrals today.  Denies any self-harm or SI today.

## 2022-11-02 NOTE — LETTER
[facility]  DOMINIQUE BostonRLatishaN.  [address]  [place] [address]  Fax: [phone]   Authorization for Release/Disclosure of   Protected Health Information   Name: OPHELIA SOSA : 1945 SSN: [id number]   Address: [address] [place] [address]  [address]  [place] [address] Phone:    [phone] (home)    I authorize the entity listed below to release/disclose the PHI below to:   [facility]/ANETA BostonPLatishaRMELODY and JOSE ALEJANDRO Boston   Provider or Entity Name:  Dr. Justin Henning MD   Address   City, State, [facility]   Phone:      Fax:     Reason for request: continuity of care   Information to be released:    [  ] LAST COLONOSCOPY,  including any PATH REPORT and follow-up  [  ] LAST FIT/COLOGUARD RESULT [  ] LAST DEXA  [  ] LAST MAMMOGRAM  [  ] LAST PAP  [  ] LAST LABS [  ] RETINA EXAM REPORT  [  ] IMMUNIZATION RECORDS  [  ] Release all info      [  ] Check here and initial the line next to each item to release ALL health information INCLUDING  _____ Care and treatment for drug and / or alcohol abuse  _____ HIV testing, infection status, or AIDS  _____ Genetic Testing    DATES OF SERVICE OR TIME PERIOD TO BE DISCLOSED: _____________  I understand and acknowledge that:  * This Authorization may be revoked at any time by you in writing, except if your health information has already been used or disclosed.  * Your health information that will be used or disclosed as a result of you signing this authorization could be re-disclosed by the recipient. If this occurs, your re-disclosed health information may no longer be protected by State or Federal laws.  * You may refuse to sign this Authorization. Your refusal will not affect your ability to obtain treatment.  * This Authorization becomes effective upon signing and will  on (date) __________.      If no date is indicated, this Authorization will  one (1) year from the signature date.    Name: Ophelia Sosa    Signature:   Date:      11/2/2022       PLEASE FAX REQUESTED RECORDS BACK TO: [phone]

## 2022-11-02 NOTE — ASSESSMENT & PLAN NOTE
Reports ongoing history x 20 years. The vertigo is intermittent. She manages with decreasing her movements. She states that at home if she falls she can fall into something stable. She has not had any falls and denies any recent falls. She has not seen PT. Aggravating factors include when it impairs her ability to drive to stores. It does not affect her ADL's. She declines referrals or medications today. Denies chest pain, shortness of breath with her symptoms. She states that she can use shopping carts when at the grocery store and [employer]. She states that there's no place else she wants to go to.

## 2022-11-02 NOTE — LETTER
[facility]  DOMINIQUE BostonRLatishaN.  [address]  [place] [address]  Fax: [phone]   Authorization for Release/Disclosure of   Protected Health Information   Name: OPHELIA SOSA : 1945 SSN: [id number]   Address: [address] [place] [address] [address]  [place] [address] Phone:    [phone] (home)    I authorize the entity listed below to release/disclose the PHI below to:   [facility]/ANETA BostonP.RMELODY and JOSE ALEJANDRO Boston   Provider or Entity Name:  Dr. Jesus   Address   City, State, [facility]   Phone:      Fax:     Reason for request: continuity of care   Information to be released:    [  ] LAST COLONOSCOPY,  including any PATH REPORT and follow-up  [  ] LAST FIT/COLOGUARD RESULT [  ] LAST DEXA  [  ] LAST MAMMOGRAM  [  ] LAST PAP  [  ] LAST LABS [x] RETINA EXAM REPORT  [  ] IMMUNIZATION RECORDS  [  ] Release all info      [  ] Check here and initial the line next to each item to release ALL health information INCLUDING  _____ Care and treatment for drug and / or alcohol abuse  _____ HIV testing, infection status, or AIDS  _____ Genetic Testing    DATES OF SERVICE OR TIME PERIOD TO BE DISCLOSED: _____________  I understand and acknowledge that:  * This Authorization may be revoked at any time by you in writing, except if your health information has already been used or disclosed.  * Your health information that will be used or disclosed as a result of you signing this authorization could be re-disclosed by the recipient. If this occurs, your re-disclosed health information may no longer be protected by State or Federal laws.  * You may refuse to sign this Authorization. Your refusal will not affect your ability to obtain treatment.  * This Authorization becomes effective upon signing and will  on (date) __________.      If no date is indicated, this Authorization will  one (1) year from the signature date.    Name: Ophelia Sosa    Signature:   Date:      11/2/2022       PLEASE FAX REQUESTED RECORDS BACK TO: [phone]

## 2022-11-02 NOTE — PROGRESS NOTES
Subjective:     CC: Vertigo    HPI:   Ophelia presents today with the following:    Irregular BM  She started taking metamucil tablets. Her BM are small and infrequent occurring every 2-3 days. She does drink plenty of water.  Her appetite is decreased. What she does eat does not have lots of fiber. She does wear dentures and her taste is decreased.       Vertigo  Reports ongoing history x 20 years. The vertigo is intermittent. She manages with decreasing her movements. She states that at home if she falls she can fall into something stable. She has not had any falls and denies any recent falls. She has not seen PT. Aggravating factors include when it impairs her ability to drive to stores. It does not affect her ADL's. She declines referrals or medications today. Denies chest pain, shortness of breath with her symptoms. She states that she can use shopping carts when at the grocery store and [place]. She states that there's no place else she wants to go to.     Major depressive disorder with single episode, in partial remission (HCC)  Initially she felt her PHQ score with positive screening for depression and self-harm.  After clarifying with patient she states that she completed the form incorrect.  I did go over each question of her PHQ with her and she answered never to the first and second questions.  PHQ score today is 0.  She states that she reads and she naps to help cope.  She also uses cannabis oil as needed.  She declines any medications or referrals today.  Denies any self-harm or SI today.      Past Medical History:   Diagnosis Date    ASTHMA     patient denies    Complete heart block (HCC) 11/16/2018    Congestive heart failure (HCC)     COPD     COPD (chronic obstructive pulmonary disease) (HCC)     Depressed     Diabetes     Diastolic dysfunction     DM (diabetes mellitus) (HCC)     Heart murmur     HLD (hyperlipidemia)     HTN (hypertension)     Hypertension     Obstructive sleep apnea      "Osteoarthritis     Pulmonary hypertension (HCC)     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 2018    Sleep apnea        Social History     Tobacco Use    Smoking status: Former     Packs/day: 1.50     Types: Cigarettes     Quit date: 2011     Years since quittin.0    Smokeless tobacco: Never   Vaping Use    Vaping Use: Never used   Substance Use Topics    Alcohol use: Yes     Comment: 1 drink every 6 months    Drug use: No       Current Outpatient Medications Ordered in Epic   Medication Sig Dispense Refill    albuterol 108 (90 Base) MCG/ACT Aero Soln inhalation aerosol Inhale 2 Puffs every 6 hours as needed for Shortness of Breath. 8.5 g 2    glimepiride (AMARYL) 4 MG Tab Take 1 Tablet by mouth 2 times a day. 180 Tablet 1    furosemide (LASIX) 40 MG Tab TAKE 1 TABLET BY MOUTH EVERY DAY IN THE EVENING 100 Tablet 2    diphenhydrAMINE HCl (BENADRYL ALLERGY PO) Take  by mouth as needed.      BD INSULIN SYRINGE U/F 31G X \" 0.3 ML Misc INJECT TWICE DAILY 10 Each 5    NON SPECIFIED as needed. CBD oil      clobetasol (TEMOVATE) 0.05 % Cream Apply 1 Application topically 2 times a day as needed (eczema). 60 g 1    mupirocin (BACTROBAN) 2 % Ointment Apply 1 Application topically 2 times a day. 30 g 0    insulin NPH (HUMULIN/NOVOLIN) 100 UNIT/ML Suspension 46 units in the morning and 3-10 units in the evening per sliding scale (Patient taking differently: 46 units in the morning and 3-20 units in the evening per sliding scale) 10 mL 1    insulin regular (HUMULIN R) 100 Unit/mL Solution Inject 3-6 units subcutaneously daily in the morning. 10 mL 1    VITAMIN D, CHOLECALCIFEROL, PO Take 3,000 Units by mouth every day.      apixaban (ELIQUIS) 5mg Tab Take 1 Tablet by mouth 2 times a day. 60 Tablet 11    B Complex Vitamins (VITAMIN B COMPLEX PO) Take  by mouth every day.      rosuvastatin (CRESTOR) 5 MG Tab TAKE ONE TABLET AT BEDTIME FOR CHOLESTEROL. TAKE THREE TIMES WEEKLY 30 Tab 5 " "   NS SOLN 60 mL with albuterol 2.5 mg/0.5 mL NEBU 5 mL 5 mg/hr by Nebulization route. Spring and fall      losartan (COZAAR) 25 MG Tab Take 25 mg by mouth 2 Times a Day.      FREESTYLE LITE strip TEST BLOOD SUGAR 3 TIMES DAILY  12    fluticasone (FLONASE) 50 MCG/ACT nasal spray Spray 1 Spray in nose 1 time daily as needed.      KLOR-CON 8 MEQ tablet TAKE 1 TABLET BY MOUTH EVERY DAY IN THE EVENING (Patient not taking: Reported on 11/2/2022) 100 Tablet 2     No current Epic-ordered facility-administered medications on file.       Allergies:  Ativan, Advair [fluticasone-salmeterol], Ambien [zolpidem tartrate], Erythromycin, Ibuprofen, Lipitor [atorvastatin calcium], Pcn [penicillins], and Pravachol [pravastatin sodium]    Health Maintenance: We are requesting retinal records. She would like her influenza vaccine today.     Objective:     Vital signs reviewed  Exam:  /60 (BP Location: Left arm, Patient Position: Sitting, BP Cuff Size: Adult)   Pulse 86   Temp 37 °C (98.6 °F) (Temporal)   Ht 1.702 m (5' 7\")   Wt 98.4 kg (217 lb)   SpO2 96%   BMI 33.99 kg/m²  Body mass index is 33.99 kg/m².    Gen: Alert and oriented, No apparent distress.  Eyes:   Lids normal. Glasses in place.   Ears:    Bilateral ear canals are clear and bilateral TM pearly gray.  Lungs: Normal effort, CTA bilaterally, no wheezes, rhonchi, or rales.  CV: Regular rate and rhythm with systolic murmurs. No rubs or gallops.  Ext: No clubbing, cyanosis, edema.      Assessment & Plan:     77 y.o. female with the following -     1. Vertigo  Chronic exacerbated problem.  We did discuss referral to physical therapy for vestibular therapy as well as meclizine to see if this helps improves.  She declines any medications or referrals today.  She states that she will continue to monitor.    2. Other constipation  Chronic exacerbated problem.  Discussed to prevent constipation she is to eat at least 30 g of fiber daily and drink plenty of water.  We " discussed that she can take a daily fiber supplement until she gets more regular and then work on decreasing the dose.  She verbalized understanding.    3. Major depressive disorder with single episode, in partial remission (HCC)  Chronic stable problem.  Clarified her PHQ score today which is 0.  She denies any self-harm or SI today.  Continue to monitor.    4. Chronic obstructive pulmonary disease, unspecified COPD type (HCC)  Chronic stable problem.  She like a refill on her butyryl today.  No acute complaints today.  - albuterol 108 (90 Base) MCG/ACT Aero Soln inhalation aerosol; Inhale 2 Puffs every 6 hours as needed for Shortness of Breath.  Dispense: 8.5 g; Refill: 2    5. Need for vaccination  Acute uncomplicated problem.  She would like her influenza vaccine today. I have placed the below orders and discussed them with an approved delegating provider. The MA is performing the below orders under the direction of Dr. Gan.  - INFLUENZA VACCINE, HIGH DOSE (65+ ONLY)      Return in about 6 months (around 5/2/2023).    Please note that this dictation was created using voice recognition software. I have made every reasonable attempt to correct obvious errors, but I expect that there are errors of grammar and possibly content that I did not discover before finalizing the note.

## 2022-11-16 DIAGNOSIS — I10 ESSENTIAL HYPERTENSION, BENIGN: ICD-10-CM

## 2022-11-17 NOTE — TELEPHONE ENCOUNTER
Is the patient due for a refill? Yes    Was the patient seen the past year? Yes    Date of last office visit: 5/12/22    Does the patient have an upcoming appointment?  Yes   If yes, When? 1/30/22    Provider to refill:DARION    Does the patients insurance require a 100 day supply?  Yes

## 2022-11-18 RX ORDER — APIXABAN 5 MG/1
TABLET, FILM COATED ORAL
Qty: 60 TABLET | Refills: 11 | Status: SHIPPED | OUTPATIENT
Start: 2022-11-18 | End: 2023-11-28

## 2022-11-28 ENCOUNTER — OFFICE VISIT (OUTPATIENT)
Dept: MEDICAL GROUP | Facility: PHYSICIAN GROUP | Age: 77
End: 2022-11-28
Payer: MEDICARE

## 2022-11-28 VITALS
OXYGEN SATURATION: 96 % | SYSTOLIC BLOOD PRESSURE: 116 MMHG | WEIGHT: 210 LBS | HEIGHT: 67 IN | BODY MASS INDEX: 32.96 KG/M2 | DIASTOLIC BLOOD PRESSURE: 60 MMHG | HEART RATE: 89 BPM | TEMPERATURE: 98.2 F

## 2022-11-28 DIAGNOSIS — H61.21 IMPACTED CERUMEN OF RIGHT EAR: ICD-10-CM

## 2022-11-28 PROCEDURE — 99213 OFFICE O/P EST LOW 20 MIN: CPT | Performed by: NURSE PRACTITIONER

## 2022-11-28 ASSESSMENT — FIBROSIS 4 INDEX: FIB4 SCORE: 1.547619047619047619

## 2022-11-28 NOTE — PROGRESS NOTES
Subjective:     CC: ear fullness    HPI:   Ophelia presents today with the following:    Ear fullness  The right ear fullness started 6 days ago.  She has associated decreased hearing, ear crackling and old bloody discharge when she inserted a moist Q-tip into the right ear.  Aggravating factors include none. Alleviating factors include none. Interventions tried include cleaning with Q-tip. 2 days ago her hearing improved. Denies fever, chills, sore throat, ear pain, sinus pain, cough, chest pain, shortness of breath, new itchy eyes, new watery eyes, loss of taste or loss of smell.      Past Medical History:   Diagnosis Date    ASTHMA     patient denies    Complete heart block (HCC) 2018    Congestive heart failure (HCC)     COPD     COPD (chronic obstructive pulmonary disease) ([facility])     Depressed     Diabetes     Diastolic dysfunction     DM (diabetes mellitus) ([facility])     Heart murmur     HLD (hyperlipidemia)     HTN (hypertension)     Hypertension     Obstructive sleep apnea     Osteoarthritis     Pulmonary hypertension ([facility])     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 2018    Sleep apnea        Social History     Tobacco Use    Smoking status: Former     Packs/day: 1.50     Types: Cigarettes     Quit date: 2011     Years since quittin.0    Smokeless tobacco: Never   Vaping Use    Vaping Use: Never used   Substance Use Topics    Alcohol use: Yes     Comment: 1 drink every 6 months    Drug use: No     Comment: thc oil prn       Current Outpatient Medications Ordered in Epic   Medication Sig Dispense Refill    FIBER PO Take  by mouth.      carbamide peroxide (DEBROX) 6.5 % Solution Administer 6 Drops into the right ear 2 times a day. Administer drops in both ears. 15 mL 0    ELIQUIS 5 MG Tab TAKE 1 TABLET BY MOUTH TWICE A DAY 60 Tablet 11    albuterol 108 (90 Base) MCG/ACT Aero Soln inhalation aerosol Inhale 2 Puffs every 6 hours as needed for Shortness of Breath. 8.5 g  "2    glimepiride (AMARYL) 4 MG Tab Take 1 Tablet by mouth 2 times a day. 180 Tablet 1    KLOR-CON 8 MEQ tablet TAKE 1 TABLET BY MOUTH EVERY DAY IN THE EVENING 100 Tablet 2    furosemide (LASIX) 40 MG Tab TAKE 1 TABLET BY MOUTH EVERY DAY IN THE EVENING 100 Tablet 2    diphenhydrAMINE HCl (BENADRYL ALLERGY PO) Take  by mouth as needed.      BD INSULIN SYRINGE U/F 31G X 5/16\" 0.3 ML Misc INJECT TWICE DAILY 10 Each 5    NON SPECIFIED as needed. CBD oil      clobetasol (TEMOVATE) 0.05 % Cream Apply 1 Application topically 2 times a day as needed (eczema). 60 g 1    mupirocin (BACTROBAN) 2 % Ointment Apply 1 Application topically 2 times a day. 30 g 0    insulin NPH (HUMULIN/NOVOLIN) 100 UNIT/ML Suspension 46 units in the morning and 3-10 units in the evening per sliding scale (Patient taking differently: 46 units in the morning and 3-20 units in the evening per sliding scale) 10 mL 1    insulin regular (HUMULIN R) 100 Unit/mL Solution Inject 3-6 units subcutaneously daily in the morning. 10 mL 1    VITAMIN D, CHOLECALCIFEROL, PO Take 3,000 Units by mouth every day.      B Complex Vitamins (VITAMIN B COMPLEX PO) Take  by mouth every day.      rosuvastatin (CRESTOR) 5 MG Tab TAKE ONE TABLET AT BEDTIME FOR CHOLESTEROL. TAKE THREE TIMES WEEKLY 30 Tab 5    NS SOLN 60 mL with albuterol 2.5 mg/0.5 mL NEBU 5 mL 5 mg/hr by Nebulization route. Spring and fall      losartan (COZAAR) 25 MG Tab Take 25 mg by mouth 2 Times a Day.      FREESTYLE LITE strip TEST BLOOD SUGAR 3 TIMES DAILY  12    fluticasone (FLONASE) 50 MCG/ACT nasal spray Spray 1 Spray in nose 1 time daily as needed.       No current Epic-ordered facility-administered medications on file.       Allergies:  Ativan, Advair [fluticasone-salmeterol], Ambien [zolpidem tartrate], Erythromycin, Ibuprofen, Lipitor [atorvastatin calcium], Pcn [penicillins], and Pravachol [pravastatin sodium]    Health Maintenance: Deferred      Objective:     Vital signs reviewed  Exam:  BP " "116/60 (BP Location: Right arm, Patient Position: Sitting, BP Cuff Size: Adult)   Pulse 89   Temp 36.8 °C (98.2 °F) (Temporal)   Ht 1.702 m (5' 7\")   Wt 95.3 kg (210 lb)   SpO2 96%   BMI 32.89 kg/m²  Body mass index is 32.89 kg/m².    General: Normal appearing. No distress.  HENT: Normocephalic. Ears normal shape and contour, left ear canal is clear and TM pearly gray, right ear canal with impacted cerumen and unable to visualize TM, nasal mucosa benign, oropharynx is without erythema, edema or exudates. No pain on palpation to maxillary and frontal sinuses.   Eyes: Eyes conjunctiva clear lids without ptosis, lids normal. Glasses in place.   Pulmonary: Clear to ausculation.  Normal effort. No rales, ronchi, or wheezing.  Cardiovascular: Regular rate and rhythm with systolic murmur.   Lymph: No cervical or supraclavicular lymph nodes are palpable.  Skin: Warm and dry.  No obvious lesions.  Psych: Normal mood and affect. Alert and oriented x3. Judgment and insight is normal.    Assessment & Plan:     77 y.o. female with the following -     1. Impacted cerumen of right ear  Acute uncomplicated problem. Patient was not able to tolerate ear irrigation due to dizziness after several squeezes of irrigation. Ear irrigation was stopped.  We will start her on Debrox twice a day to the right ear.  We did discuss that I am comfortable with her waiting several days until she receives her prescription as she does get it shipped to her house from her pharmacy.  She is not having any pain, fevers or chills today.  We will have her return in 2 weeks for follow-up.  We also discussed to not insert the Q-tip into her ear at this time.  She verbalized understanding.  - Ear Irrigation (MA Only); Future  - carbamide peroxide (DEBROX) 6.5 % Solution; Administer 6 Drops into the right ear 2 times a day. Administer drops in both ears.  Dispense: 15 mL; Refill: 0        Return for 2 weeks, impacted ear cerumen.    Please note that this " dictation was created using voice recognition software. I have made every reasonable attempt to correct obvious errors, but I expect that there are errors of grammar and possibly content that I did not discover before finalizing the note.

## 2022-12-19 ENCOUNTER — OFFICE VISIT (OUTPATIENT)
Dept: MEDICAL GROUP | Facility: PHYSICIAN GROUP | Age: 77
End: 2022-12-19
Payer: MEDICARE

## 2022-12-19 VITALS
DIASTOLIC BLOOD PRESSURE: 62 MMHG | TEMPERATURE: 97.3 F | OXYGEN SATURATION: 97 % | SYSTOLIC BLOOD PRESSURE: 142 MMHG | HEIGHT: 68 IN | WEIGHT: 216 LBS | BODY MASS INDEX: 32.74 KG/M2 | HEART RATE: 98 BPM

## 2022-12-19 DIAGNOSIS — H61.21 IMPACTED CERUMEN, RIGHT EAR: ICD-10-CM

## 2022-12-19 PROCEDURE — 99213 OFFICE O/P EST LOW 20 MIN: CPT | Performed by: NURSE PRACTITIONER

## 2022-12-19 ASSESSMENT — FIBROSIS 4 INDEX: FIB4 SCORE: 1.547619047619047619

## 2022-12-19 NOTE — PROGRESS NOTES
Subjective:     CC: Cerumen impaction    HPI:   Ophelia presents today with the following:      Impacted cerumen, right ear  Debrox was ordered on 22. She states that she was informed by pharmacy that this was an over-the-counter medication but the pharmacy was out of the over-the-counter medication.  Patient states that she asked the pharmacy what medication was prescribed and the pharmacy told her to contact her primary care provider.  Patient reports that she called multiple times to our office and left a message informing us that she was unable to get the medication.  This has upset her.  MA checked her call log and there are no calls from patient.  Patient states that she did call and leave a message for my MA.  We will provide her with the name again of the over-the-counter medication, Debrox, and she will  at the pharmacy.  I also provided her with my MAs direct line for future reference.      Past Medical History:   Diagnosis Date    ASTHMA     patient denies    Complete heart block (HCC) 2018    Congestive heart failure (HCC)     COPD     COPD (chronic obstructive pulmonary disease) (HCC)     Depressed     Diabetes     Diastolic dysfunction     DM (diabetes mellitus) (HCC)     Heart murmur     HLD (hyperlipidemia)     HTN (hypertension)     Hypertension     Obstructive sleep apnea     Osteoarthritis     Pulmonary hypertension (HCC)     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 2018    Sleep apnea        Social History     Tobacco Use    Smoking status: Former     Packs/day: 1.50     Types: Cigarettes     Quit date: 2011     Years since quittin.1    Smokeless tobacco: Never   Vaping Use    Vaping Use: Never used   Substance Use Topics    Alcohol use: Yes     Comment: 1 drink every 6 months    Drug use: No     Comment: thc oil prn       Current Outpatient Medications Ordered in Epic   Medication Sig Dispense Refill    FIBER PO Take  by mouth.       "carbamide peroxide (DEBROX) 6.5 % Solution Administer 6 Drops into the right ear 2 times a day. Administer drops in both ears. 15 mL 0    ELIQUIS 5 MG Tab TAKE 1 TABLET BY MOUTH TWICE A DAY 60 Tablet 11    albuterol 108 (90 Base) MCG/ACT Aero Soln inhalation aerosol Inhale 2 Puffs every 6 hours as needed for Shortness of Breath. 8.5 g 2    glimepiride (AMARYL) 4 MG Tab Take 1 Tablet by mouth 2 times a day. 180 Tablet 1    KLOR-CON 8 MEQ tablet TAKE 1 TABLET BY MOUTH EVERY DAY IN THE EVENING 100 Tablet 2    furosemide (LASIX) 40 MG Tab TAKE 1 TABLET BY MOUTH EVERY DAY IN THE EVENING 100 Tablet 2    diphenhydrAMINE HCl (BENADRYL ALLERGY PO) Take  by mouth as needed.      BD INSULIN SYRINGE U/F 31G X 5/16\" 0.3 ML Misc INJECT TWICE DAILY 10 Each 5    NON SPECIFIED as needed. CBD oil      clobetasol (TEMOVATE) 0.05 % Cream Apply 1 Application topically 2 times a day as needed (eczema). 60 g 1    mupirocin (BACTROBAN) 2 % Ointment Apply 1 Application topically 2 times a day. 30 g 0    insulin NPH (HUMULIN/NOVOLIN) 100 UNIT/ML Suspension 46 units in the morning and 3-10 units in the evening per sliding scale (Patient taking differently: 46 units in the morning and 3-20 units in the evening per sliding scale) 10 mL 1    insulin regular (HUMULIN R) 100 Unit/mL Solution Inject 3-6 units subcutaneously daily in the morning. 10 mL 1    VITAMIN D, CHOLECALCIFEROL, PO Take 3,000 Units by mouth every day.      B Complex Vitamins (VITAMIN B COMPLEX PO) Take  by mouth every day.      rosuvastatin (CRESTOR) 5 MG Tab TAKE ONE TABLET AT BEDTIME FOR CHOLESTEROL. TAKE THREE TIMES WEEKLY 30 Tab 5    NS SOLN 60 mL with albuterol 2.5 mg/0.5 mL NEBU 5 mL 5 mg/hr by Nebulization route. Spring and fall      losartan (COZAAR) 25 MG Tab Take 25 mg by mouth 2 Times a Day.      FREESTYLE LITE strip TEST BLOOD SUGAR 3 TIMES DAILY  12    fluticasone (FLONASE) 50 MCG/ACT nasal spray Spray 1 Spray in nose 1 time daily as needed.       No current " "Epic-ordered facility-administered medications on file.       Allergies:  Ativan, Advair [fluticasone-salmeterol], Ambien [zolpidem tartrate], Erythromycin, Ibuprofen, Lipitor [atorvastatin calcium], Pcn [penicillins], and Pravachol [pravastatin sodium]    Health Maintenance: Reviewed.      Objective:     Vital signs reviewed  Exam:  BP (!) 142/62 (BP Location: Right arm, Patient Position: Sitting, BP Cuff Size: Adult)   Pulse 98   Temp 36.3 °C (97.3 °F) (Temporal)   Ht 1.715 m (5' 7.5\")   Wt 98 kg (216 lb)   SpO2 97%   BMI 33.33 kg/m²  Body mass index is 33.33 kg/m².    Gen: Alert and oriented, No apparent distress.  Eyes:   Lids normal. Glasses in place.   Ears:    Left ear canal is clear and tympanic membrane is pearly gray.  Right ear canal with impacted cerumen.  The cerumen is hard and I am unable to clear with lighted curette today.  Offered ear irrigation today and she declines states that she does not wish to feel dizzy.  Lungs: Normal effort, no audible wheezes  CV: Skin pink, warm and dry.  Ext: No clubbing, cyanosis, edema.      Assessment & Plan:     77 y.o. female with the following -     1. Impacted cerumen, right ear  Chronic exacerbated problem.  I apologized to the patient for not receiving a return call back from our office.  I did provide her with my [facility] direct line for future reference.  I printed out her AVS today with the instructions for Debrox over-the-counter and if they are out of Debrox at the pharmacy noted that she can use all of oil 2-3 drops in her ear nightly to help soften.      Return if symptoms worsen or fail to improve.    Please note that this dictation was created using voice recognition software. I have made every reasonable attempt to correct obvious errors, but I expect that there are errors of grammar and possibly content that I did not discover before finalizing the note.        "

## 2022-12-19 NOTE — PATIENT INSTRUCTIONS
Debrox 6 drops in right ear twice a day.     Or can use 2-3 drops olive oil nightly to help soften.

## 2022-12-19 NOTE — ASSESSMENT & PLAN NOTE
Debrox was ordered on 11/28/22. She states that she was informed by pharmacy that this was an over-the-counter medication but the pharmacy was out of the over-the-counter medication.  Patient states that she asked the pharmacy what medication was prescribed and the pharmacy told her to contact her primary care provider.  Patient reports that she called multiple times to our office and left a message informing us that she was unable to get the medication.  This has upset her.  MA checked her call log and there are no calls from patient.  Patient states that she did call and leave a message for my MA.  We will provide her with the name again of the over-the-counter medication, Debrox, and she will  at the pharmacy.  I also provided her with my MAs direct line for future reference.

## 2023-01-30 ENCOUNTER — APPOINTMENT (OUTPATIENT)
Dept: CARDIOLOGY | Facility: MEDICAL CENTER | Age: 78
End: 2023-01-30
Payer: MEDICARE

## 2023-02-22 ENCOUNTER — OFFICE VISIT (OUTPATIENT)
Dept: MEDICAL GROUP | Facility: PHYSICIAN GROUP | Age: 78
End: 2023-02-22
Payer: MEDICARE

## 2023-02-22 VITALS
BODY MASS INDEX: 34.21 KG/M2 | DIASTOLIC BLOOD PRESSURE: 50 MMHG | SYSTOLIC BLOOD PRESSURE: 134 MMHG | HEART RATE: 92 BPM | HEIGHT: 67 IN | OXYGEN SATURATION: 97 % | TEMPERATURE: 96.7 F | WEIGHT: 218 LBS

## 2023-02-22 DIAGNOSIS — E11.22 TYPE 2 DIABETES MELLITUS WITH STAGE 3B CHRONIC KIDNEY DISEASE, WITH LONG-TERM CURRENT USE OF INSULIN (HCC): ICD-10-CM

## 2023-02-22 DIAGNOSIS — E11.65 TYPE 2 DIABETES MELLITUS WITH HYPERGLYCEMIA, WITH LONG-TERM CURRENT USE OF INSULIN (HCC): ICD-10-CM

## 2023-02-22 DIAGNOSIS — Z79.4 TYPE 2 DIABETES MELLITUS WITH HYPERGLYCEMIA, WITH LONG-TERM CURRENT USE OF INSULIN (HCC): ICD-10-CM

## 2023-02-22 DIAGNOSIS — Z79.4 TYPE 2 DIABETES MELLITUS WITH DIABETIC POLYNEUROPATHY, WITH LONG-TERM CURRENT USE OF INSULIN (HCC): ICD-10-CM

## 2023-02-22 DIAGNOSIS — J44.9 CHRONIC OBSTRUCTIVE PULMONARY DISEASE, UNSPECIFIED COPD TYPE (HCC): ICD-10-CM

## 2023-02-22 DIAGNOSIS — N18.32 TYPE 2 DIABETES MELLITUS WITH STAGE 3B CHRONIC KIDNEY DISEASE, WITH LONG-TERM CURRENT USE OF INSULIN (HCC): ICD-10-CM

## 2023-02-22 DIAGNOSIS — E11.42 TYPE 2 DIABETES MELLITUS WITH DIABETIC POLYNEUROPATHY, WITH LONG-TERM CURRENT USE OF INSULIN (HCC): ICD-10-CM

## 2023-02-22 DIAGNOSIS — E11.3553 STABLE PROLIFERATIVE DIABETIC RETINOPATHY OF BOTH EYES ASSOCIATED WITH TYPE 2 DIABETES MELLITUS (HCC): ICD-10-CM

## 2023-02-22 DIAGNOSIS — I48.0 PAF (PAROXYSMAL ATRIAL FIBRILLATION) (HCC): ICD-10-CM

## 2023-02-22 DIAGNOSIS — N25.81 SECONDARY HYPERPARATHYROIDISM OF RENAL ORIGIN (HCC): ICD-10-CM

## 2023-02-22 DIAGNOSIS — Z95.0 CARDIAC PACEMAKER: ICD-10-CM

## 2023-02-22 DIAGNOSIS — Z79.4 TYPE 2 DIABETES MELLITUS WITH STAGE 3B CHRONIC KIDNEY DISEASE, WITH LONG-TERM CURRENT USE OF INSULIN (HCC): ICD-10-CM

## 2023-02-22 DIAGNOSIS — H61.23 EXCESSIVE CERUMEN IN EAR CANAL, BILATERAL: ICD-10-CM

## 2023-02-22 PROBLEM — H61.21 IMPACTED CERUMEN, RIGHT EAR: Status: RESOLVED | Noted: 2022-12-19 | Resolved: 2023-02-22

## 2023-02-22 PROBLEM — H93.8X1 SENSATION OF FULLNESS IN RIGHT EAR: Status: ACTIVE | Noted: 2023-02-22

## 2023-02-22 PROBLEM — H93.12 TINNITUS OF LEFT EAR: Status: RESOLVED | Noted: 2020-12-03 | Resolved: 2023-02-22

## 2023-02-22 PROCEDURE — 99214 OFFICE O/P EST MOD 30 MIN: CPT | Performed by: NURSE PRACTITIONER

## 2023-02-22 ASSESSMENT — FIBROSIS 4 INDEX: FIB4 SCORE: 1.547619047619047619

## 2023-02-22 ASSESSMENT — PATIENT HEALTH QUESTIONNAIRE - PHQ9: CLINICAL INTERPRETATION OF PHQ2 SCORE: 0

## 2023-02-22 NOTE — PROGRESS NOTES
Subjective:     CC: ear fullness    HPI:   Ophelia presents today with the following:    Stable proliferative diabetic retinopathy of both eyes associated with type 2 diabetes mellitus ([facility])  She is followed by [facility]. She does have an upcoming appointment scheduled.  Will defer management to ophthalmology.  Has endocrinology appointment scheduled for 3/24/2023.    Sensation of fullness in right ear  For the last 6 days she has had ear fullness to the right ear.  She started using Debrox and cleaning with a Q-tip.  The Q-tip has a black color when using. She was not able to hear the TV set but is able to use the phone.     COPD (chronic obstructive pulmonary disease) ([facility])  She is using albuterol inhaler as needed.  She has flares that worsen during the spring and fall seasons and will use albuterol inhaler more.  Declines PFTs.      PAF (paroxysmal atrial fibrillation) ([facility])  She has an April cardiology appointment scheduled.  She continues with Eliquis 5 mg twice daily.      Type 2 diabetes mellitus with stage 3b chronic kidney disease, with long-term current use of insulin ([facility])  Followed by nephrology, Dr. Rosas, has upcoming appointment Friday 3/3/2023.  She is not taking any NSAIDs.  Currently taking losartan 25 mg twice daily.  Blood pressure today is 134/50.    Cardiac pacemaker  Followed by Dr. Wilkinson with cardiology.  Has upcoming pacemaker check scheduled for 4/28/2023.    Secondary hyperparathyroidism of renal origin ([facility])  Continues follow-up with Dr. Rsoas with nephrology.  Has upcoming appointment next week.    Type 2 diabetes mellitus with diabetic polyneuropathy, with long-term current use of insulin ([facility])  Last A1c 8.7%.  Has upcoming endocrinology appointment.  Using a cane with ambulation.    Type 2 diabetes mellitus with hyperglycemia, with long-term current use of insulin ([facility])  Last A1c elevated 8.7%. She continues with glimepiride 4 mg twice daily, Novolin (70/30)  "46 units in the morning and 3-10 units in the evening on her own sliding scale, and regular insulin 4-8 units daily in the morning per her own sliding scale. Has upcoming endocrinology appointment.       Past Medical History:   Diagnosis Date    ASTHMA     patient denies    Complete heart block (HCC) 2018    Congestive heart failure (HCC)     COPD     COPD (chronic obstructive pulmonary disease) (HCC)     Depressed     Diabetes     Diastolic dysfunction     DM (diabetes mellitus) ([facility])     Heart murmur     HLD (hyperlipidemia)     HTN (hypertension)     Hypertension     Obstructive sleep apnea     Osteoarthritis     Pulmonary hypertension ([facility])     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 2018    Sleep apnea        Social History     Tobacco Use    Smoking status: Former     Packs/day: 1.50     Types: Cigarettes     Quit date: 2011     Years since quittin.3    Smokeless tobacco: Never   Vaping Use    Vaping Use: Never used   Substance Use Topics    Alcohol use: Yes     Comment: 1 drink every 6 months    Drug use: No     Comment: thc oil prn       Current Outpatient Medications Ordered in Epic   Medication Sig Dispense Refill    FIBER PO Take  by mouth.      carbamide peroxide (DEBROX) 6.5 % Solution Administer 6 Drops into the right ear 2 times a day. Administer drops in both ears. 15 mL 0    ELIQUIS 5 MG Tab TAKE 1 TABLET BY MOUTH TWICE A DAY 60 Tablet 11    albuterol 108 (90 Base) MCG/ACT Aero Soln inhalation aerosol Inhale 2 Puffs every 6 hours as needed for Shortness of Breath. 8.5 g 2    glimepiride (AMARYL) 4 MG Tab Take 1 Tablet by mouth 2 times a day. 180 Tablet 1    KLOR-CON 8 MEQ tablet TAKE 1 TABLET BY MOUTH EVERY DAY IN THE EVENING 100 Tablet 2    furosemide (LASIX) 40 MG Tab TAKE 1 TABLET BY MOUTH EVERY DAY IN THE EVENING 100 Tablet 2    diphenhydrAMINE HCl (BENADRYL ALLERGY PO) Take  by mouth as needed.      BD INSULIN SYRINGE U/F 31G X \" 0.3 ML Misc " "INJECT TWICE DAILY 10 Each 5    NON SPECIFIED as needed. CBD oil      clobetasol (TEMOVATE) 0.05 % Cream Apply 1 Application topically 2 times a day as needed (eczema). 60 g 1    mupirocin (BACTROBAN) 2 % Ointment Apply 1 Application topically 2 times a day. 30 g 0    insulin NPH (HUMULIN/NOVOLIN) 100 UNIT/ML Suspension 46 units in the morning and 3-10 units in the evening per sliding scale (Patient taking differently: 46 units in the morning and 3-20 units in the evening per sliding scale) 10 mL 1    insulin regular (HUMULIN R) 100 Unit/mL Solution Inject 3-6 units subcutaneously daily in the morning. 10 mL 1    VITAMIN D, CHOLECALCIFEROL, PO Take 3,000 Units by mouth every day.      B Complex Vitamins (VITAMIN B COMPLEX PO) Take  by mouth every day.      rosuvastatin (CRESTOR) 5 MG Tab TAKE ONE TABLET AT BEDTIME FOR CHOLESTEROL. TAKE THREE TIMES WEEKLY 30 Tab 5    NS SOLN 60 mL with albuterol 2.5 mg/0.5 mL NEBU 5 mL 5 mg/hr by Nebulization route. Spring and fall      losartan (COZAAR) 25 MG Tab Take 25 mg by mouth 2 Times a Day.      FREESTYLE LITE strip TEST BLOOD SUGAR 3 TIMES DAILY  12    fluticasone (FLONASE) 50 MCG/ACT nasal spray Spray 1 Spray in nose 1 time daily as needed.       No current Epic-ordered facility-administered medications on file.       Allergies:  Ativan, Advair [fluticasone-salmeterol], Ambien [zolpidem tartrate], Erythromycin, Ibuprofen, Lipitor [atorvastatin calcium], Pcn [penicillins], and Pravachol [pravastatin sodium]    Health Maintenance: Reviewed       Objective:     Vital signs reviewed  Exam:  /50 (BP Location: Left arm, Patient Position: Sitting, BP Cuff Size: Adult)   Pulse 92   Temp 35.9 °C (96.7 °F) (Temporal)   Ht 1.702 m (5' 7\")   Wt 98.9 kg (218 lb)   SpO2 97%   BMI 34.14 kg/m²  Body mass index is 34.14 kg/m².    Gen: Alert and oriented, No apparent distress.  Ears:    Left ear canal with excessive cerumen and left TM pearly gray.  I attempted to remove the left " ear cerumen with a lighted curette but was not able to as the cerumen was hard we will have MA irrigate.  Right ear canal did have excessive dark cerumen near the TM, right TM partially visualized and is pearly gray.  We will have MA irrigate right ear as well.  Lungs: Normal effort, no audible wheezes  CV: Skin pink, warm and dry.  Ext: No clubbing, cyanosis, edema.    Ear irrigation: bilateral ear canals are clear and bilateral TM pearly gray.       Assessment & Plan:     77 y.o. female with the following -     1. Excessive cerumen in ear canal, bilateral  Acute uncomplicated problem.  The MA was able to irrigate both ear canals and cerumen.  - Ear Irrigation (MA Only); Future    2. Stable proliferative diabetic retinopathy of both eyes associated with type 2 diabetes mellitus (HCC)  Chronic stable problem.  Continue follow-up with [facility].  Keep upcoming endocrinology appointment.    3. Chronic obstructive pulmonary disease, unspecified COPD type ([facility])  Chronic stable problem.  Continue with albuterol Hailer as needed.    4. PAF (paroxysmal atrial fibrillation) ([facility])  Chronic stable problem.  Continue with Eliquis 5 mg twice daily.  Continue follow-up with cardiology and with pacemaker checks.    5. Type 2 diabetes mellitus with stage 3b chronic kidney disease, with long-term current use of insulin ([facility])  Chronic stable problem.  Keep upcoming nephrology appointment next week.  Continue to avoid NSAIDs.  Continue losartan 25 mg twice daily.    6. Cardiac pacemaker  Chronic stable problem.  Continue follow-up with cardiology continue with pacemaker checks.    7. Secondary hyperparathyroidism of renal origin (HCC)  Chronic stable problem.  Continue follow-up with nephrology.    8. Type 2 diabetes mellitus with diabetic polyneuropathy, with long-term current use of insulin (HCC)  Chronic stable problem.  Continue using cane with ambulation.  Keep upcoming endocrinology appointment.    9. Type 2  diabetes mellitus with hyperglycemia, with long-term current use of insulin (HCC)  Chronic stable problem.  Keep upcoming endocrinology appointment. Continue glimepiride 4 mg twice daily, Novolin (70/30) 46 units in the morning and 3-10 units in the evening on her own sliding scale, and regular insulin 4-8 units daily in the morning per her own sliding scale.       Return if symptoms worsen or fail to improve, for keep upcoming 5/3/2023 appointment .    Please note that this dictation was created using voice recognition software. I have made every reasonable attempt to correct obvious errors, but I expect that there are errors of grammar and possibly content that I did not discover before finalizing the note.

## 2023-02-22 NOTE — ASSESSMENT & PLAN NOTE
She has an April cardiology appointment scheduled.  She continues with Eliquis 5 mg twice daily.

## 2023-02-22 NOTE — ASSESSMENT & PLAN NOTE
Followed by nephrology, Dr. Rosas, has upcoming appointment Friday 3/3/2023.  She is not taking any NSAIDs.  Currently taking losartan 25 mg twice daily.  Blood pressure today is 134/50.

## 2023-02-22 NOTE — ASSESSMENT & PLAN NOTE
She is followed by [facility]. She does have an upcoming appointment scheduled.  Will defer management to ophthalmology.  Has endocrinology appointment scheduled for 3/24/2023.

## 2023-02-22 NOTE — ASSESSMENT & PLAN NOTE
She is using albuterol inhaler as needed.  She has flares that worsen during the spring and fall seasons and will use albuterol inhaler more.  Declines PFTs.

## 2023-02-22 NOTE — ASSESSMENT & PLAN NOTE
For the last 6 days she has had ear fullness to the right ear.  She started using Debrox and cleaning with a Q-tip.  The Q-tip has a black color when using. She was not able to hear the TV set but is able to use the phone.

## 2023-02-23 NOTE — ASSESSMENT & PLAN NOTE
Last A1c elevated 8.7%. She continues with glimepiride 4 mg twice daily, Novolin (70/30) 46 units in the morning and 3-10 units in the evening on her own sliding scale, and regular insulin 4-8 units daily in the morning per her own sliding scale. Has upcoming endocrinology appointment.

## 2023-02-24 DIAGNOSIS — E11.22 TYPE 2 DIABETES MELLITUS WITH STAGE 3B CHRONIC KIDNEY DISEASE, WITH LONG-TERM CURRENT USE OF INSULIN (HCC): ICD-10-CM

## 2023-02-24 DIAGNOSIS — L30.9 ECZEMA, UNSPECIFIED TYPE: ICD-10-CM

## 2023-02-24 DIAGNOSIS — Z79.4 TYPE 2 DIABETES MELLITUS WITH STAGE 3B CHRONIC KIDNEY DISEASE, WITH LONG-TERM CURRENT USE OF INSULIN (HCC): ICD-10-CM

## 2023-02-24 DIAGNOSIS — N18.32 TYPE 2 DIABETES MELLITUS WITH STAGE 3B CHRONIC KIDNEY DISEASE, WITH LONG-TERM CURRENT USE OF INSULIN (HCC): ICD-10-CM

## 2023-02-27 RX ORDER — CLOBETASOL PROPIONATE 0.5 MG/G
1 CREAM TOPICAL 2 TIMES DAILY PRN
Qty: 60 G | Refills: 0 | Status: SHIPPED | OUTPATIENT
Start: 2023-02-27

## 2023-02-27 RX ORDER — GLIMEPIRIDE 4 MG/1
TABLET ORAL
Qty: 200 TABLET | Refills: 1 | Status: SHIPPED | OUTPATIENT
Start: 2023-02-27 | End: 2023-07-17

## 2023-02-28 NOTE — TELEPHONE ENCOUNTER
Requested Prescriptions     Signed Prescriptions Disp Refills    glimepiride (AMARYL) 4 MG Tab 200 Tablet 1     Sig: TAKE 1 TABLET BY MOUTH TWICE A DAY     Authorizing Provider: FELIZ MCKENNA    clobetasol (TEMOVATE) 0.05 % Cream 60 g 0     Sig: APPLY 1 APPLICATION TOPICALLY 2 TIMES A DAY AS NEEDED (ECZEMA).     Authorizing Provider: FELIZ MCKENNA A.P.R.N.

## 2023-04-19 ENCOUNTER — APPOINTMENT (OUTPATIENT)
Dept: ENDOCRINOLOGY | Facility: MEDICAL CENTER | Age: 78
End: 2023-04-19
Attending: INTERNAL MEDICINE
Payer: MEDICARE

## 2023-04-28 ENCOUNTER — APPOINTMENT (OUTPATIENT)
Dept: CARDIOLOGY | Facility: MEDICAL CENTER | Age: 78
End: 2023-04-28
Payer: MEDICARE

## 2023-04-28 ENCOUNTER — TELEPHONE (OUTPATIENT)
Dept: HEALTH INFORMATION MANAGEMENT | Facility: OTHER | Age: 78
End: 2023-04-28

## 2023-04-28 ENCOUNTER — TELEPHONE (OUTPATIENT)
Dept: MEDICAL GROUP | Facility: PHYSICIAN GROUP | Age: 78
End: 2023-04-28

## 2023-04-28 NOTE — TELEPHONE ENCOUNTER
Future Appointments         Provider Department Center    5/3/2023 1:20 PM (Arrive by 1:05 PM) LEILA [place]. [facility] [place] [facility]    5/25/2023 8:15 AM LAB [facility]     7/7/2023 11:00 AM PACER CHECK-[facility]     7/7/2023 11:20 AM Aman Wilkinson M.D. [facility]     8/25/2023 2:00 PM Alisha Hill R.N.; [facility]          ESTABLISHED PATIENT PRE-VISIT PLANNING     Patient was NOT contacted to complete PVP.     Note: Patient will not be contacted if there is no indication to call.     1.  Reviewed notes from the last few office visits within the medical group: Yes, LOV 02/22/2023    2.  If any orders were placed at last visit or intended to be done for this visit (i.e. 6 mos follow-up), do we have Results/Consult Notes?           Labs - Labs were not ordered at last office visit.  Note: If patient appointment is for lab review and patient did not complete labs, check with provider if OK to reschedule patient until labs completed.         Imaging - Imaging was not ordered at last office visit.         Referrals - Referral ordered, patient has NOT been seen.    3. Is this appointment scheduled as a Hospital Follow-Up? No    4.  Immunizations were updated in Epic using Reconcile Outside Information activity? Yes    5.  Patient is due for the following Health Maintenance Topics:   Health Maintenance Due   Topic Date Due    BONE DENSITY  Never done    HEPATITIS C SCREENING  Never done    COVID-19 Vaccine (1) Never done    URINE ACR / MICROALBUMIN  11/06/2020    RETINAL SCREENING  04/14/2022    DIABETES MONOFILAMENT / LE EXAM  11/19/2022    A1C SCREENING  02/24/2023    FASTING LIPID PROFILE  03/23/2023    SERUM CREATININE  03/23/2023    IMM PNEUMOCOCCAL VACCINE: 65+ Years (2 - PCV) 05/04/2023     6.  AHA (Pulse8) form printed for Provider? N/A

## 2023-05-25 ENCOUNTER — HOSPITAL ENCOUNTER (OUTPATIENT)
Dept: LAB | Facility: MEDICAL CENTER | Age: 78
End: 2023-05-25
Attending: INTERNAL MEDICINE
Payer: MEDICARE

## 2023-05-25 LAB
25(OH)D3 SERPL-MCNC: 43 NG/ML (ref 30–100)
ALBUMIN SERPL BCP-MCNC: 4.1 G/DL (ref 3.2–4.9)
ALBUMIN/GLOB SERPL: 1.4 G/DL
ALP SERPL-CCNC: 68 U/L (ref 30–99)
ALT SERPL-CCNC: 15 U/L (ref 2–50)
ANION GAP SERPL CALC-SCNC: 11 MMOL/L (ref 7–16)
AST SERPL-CCNC: 22 U/L (ref 12–45)
BASOPHILS # BLD AUTO: 0.8 % (ref 0–1.8)
BASOPHILS # BLD: 0.05 K/UL (ref 0–0.12)
BILIRUB SERPL-MCNC: 0.4 MG/DL (ref 0.1–1.5)
BUN SERPL-MCNC: 17 MG/DL (ref 8–22)
CALCIUM ALBUM COR SERPL-MCNC: 10 MG/DL (ref 8.5–10.5)
CALCIUM SERPL-MCNC: 10.1 MG/DL (ref 8.5–10.5)
CHLORIDE SERPL-SCNC: 102 MMOL/L (ref 96–112)
CO2 SERPL-SCNC: 26 MMOL/L (ref 20–33)
CREAT SERPL-MCNC: 1.29 MG/DL (ref 0.5–1.4)
EOSINOPHIL # BLD AUTO: 0.16 K/UL (ref 0–0.51)
EOSINOPHIL NFR BLD: 2.6 % (ref 0–6.9)
ERYTHROCYTE [DISTWIDTH] IN BLOOD BY AUTOMATED COUNT: 44.5 FL (ref 35.9–50)
EST. AVERAGE GLUCOSE BLD GHB EST-MCNC: 209 MG/DL
GFR SERPLBLD CREATININE-BSD FMLA CKD-EPI: 42 ML/MIN/1.73 M 2
GLOBULIN SER CALC-MCNC: 2.9 G/DL (ref 1.9–3.5)
GLUCOSE SERPL-MCNC: 268 MG/DL (ref 65–99)
HBA1C MFR BLD: 8.9 % (ref 4–5.6)
HCT VFR BLD AUTO: 38.5 % (ref 37–47)
HGB BLD-MCNC: 12.4 G/DL (ref 12–16)
IMM GRANULOCYTES # BLD AUTO: 0.02 K/UL (ref 0–0.11)
IMM GRANULOCYTES NFR BLD AUTO: 0.3 % (ref 0–0.9)
LYMPHOCYTES # BLD AUTO: 1.61 K/UL (ref 1–4.8)
LYMPHOCYTES NFR BLD: 26 % (ref 22–41)
MAGNESIUM SERPL-MCNC: 2.2 MG/DL (ref 1.5–2.5)
MCH RBC QN AUTO: 30.2 PG (ref 27–33)
MCHC RBC AUTO-ENTMCNC: 32.2 G/DL (ref 32.2–35.5)
MCV RBC AUTO: 93.9 FL (ref 81.4–97.8)
MONOCYTES # BLD AUTO: 0.53 K/UL (ref 0–0.85)
MONOCYTES NFR BLD AUTO: 8.6 % (ref 0–13.4)
NEUTROPHILS # BLD AUTO: 3.82 K/UL (ref 1.82–7.42)
NEUTROPHILS NFR BLD: 61.7 % (ref 44–72)
NRBC # BLD AUTO: 0 K/UL
NRBC BLD-RTO: 0 /100 WBC (ref 0–0.2)
PHOSPHATE SERPL-MCNC: 3.3 MG/DL (ref 2.5–4.5)
PLATELET # BLD AUTO: 182 K/UL (ref 164–446)
PMV BLD AUTO: 12.8 FL (ref 9–12.9)
POTASSIUM SERPL-SCNC: 4.6 MMOL/L (ref 3.6–5.5)
PROT SERPL-MCNC: 7 G/DL (ref 6–8.2)
RBC # BLD AUTO: 4.1 M/UL (ref 4.2–5.4)
SODIUM SERPL-SCNC: 139 MMOL/L (ref 135–145)
URATE SERPL-MCNC: 8.5 MG/DL (ref 1.9–8.2)
WBC # BLD AUTO: 6.2 K/UL (ref 4.8–10.8)

## 2023-05-25 PROCEDURE — 80053 COMPREHEN METABOLIC PANEL: CPT

## 2023-05-25 PROCEDURE — 36415 COLL VENOUS BLD VENIPUNCTURE: CPT

## 2023-05-25 PROCEDURE — 83735 ASSAY OF MAGNESIUM: CPT

## 2023-05-25 PROCEDURE — 82306 VITAMIN D 25 HYDROXY: CPT

## 2023-05-25 PROCEDURE — 84550 ASSAY OF BLOOD/URIC ACID: CPT

## 2023-05-25 PROCEDURE — 83036 HEMOGLOBIN GLYCOSYLATED A1C: CPT

## 2023-05-25 PROCEDURE — 84100 ASSAY OF PHOSPHORUS: CPT

## 2023-05-25 PROCEDURE — 85025 COMPLETE CBC W/AUTO DIFF WBC: CPT

## 2023-07-07 ENCOUNTER — APPOINTMENT (OUTPATIENT)
Dept: CARDIOLOGY | Facility: MEDICAL CENTER | Age: 78
End: 2023-07-07
Attending: INTERNAL MEDICINE
Payer: MEDICARE

## 2023-07-17 DIAGNOSIS — E11.22 TYPE 2 DIABETES MELLITUS WITH STAGE 3B CHRONIC KIDNEY DISEASE, WITH LONG-TERM CURRENT USE OF INSULIN (HCC): ICD-10-CM

## 2023-07-17 DIAGNOSIS — N18.32 TYPE 2 DIABETES MELLITUS WITH STAGE 3B CHRONIC KIDNEY DISEASE, WITH LONG-TERM CURRENT USE OF INSULIN (HCC): ICD-10-CM

## 2023-07-17 DIAGNOSIS — Z79.4 TYPE 2 DIABETES MELLITUS WITH STAGE 3B CHRONIC KIDNEY DISEASE, WITH LONG-TERM CURRENT USE OF INSULIN (HCC): ICD-10-CM

## 2023-07-17 RX ORDER — GLIMEPIRIDE 4 MG/1
TABLET ORAL
Qty: 200 TABLET | Refills: 1 | Status: SHIPPED | OUTPATIENT
Start: 2023-07-17 | End: 2023-08-24

## 2023-07-18 NOTE — TELEPHONE ENCOUNTER
Requested Prescriptions     Signed Prescriptions Disp Refills    glimepiride (AMARYL) 4 MG Tab 200 Tablet 1     Sig: TAKE 1 TABLET BY MOUTH TWICE A DAY     Authorizing Provider: FELIZ MCKENNA A.P.R.N.

## 2023-07-25 ENCOUNTER — NON-PROVIDER VISIT (OUTPATIENT)
Dept: CARDIOLOGY | Facility: MEDICAL CENTER | Age: 78
End: 2023-07-25
Attending: INTERNAL MEDICINE
Payer: MEDICARE

## 2023-07-25 ENCOUNTER — NON-PROVIDER VISIT (OUTPATIENT)
Dept: CARDIOLOGY | Facility: MEDICAL CENTER | Age: 78
End: 2023-07-25

## 2023-07-25 DIAGNOSIS — Z95.0 CARDIAC PACEMAKER: ICD-10-CM

## 2023-07-25 DIAGNOSIS — I49.5 SSS (SICK SINUS SYNDROME) (HCC): ICD-10-CM

## 2023-07-25 PROCEDURE — 93280 PM DEVICE PROGR EVAL DUAL: CPT | Mod: 26 | Performed by: INTERNAL MEDICINE

## 2023-07-25 PROCEDURE — 93280 PM DEVICE PROGR EVAL DUAL: CPT | Performed by: INTERNAL MEDICINE

## 2023-07-25 NOTE — CARDIAC REMOTE MONITOR - SCAN
Device transmission reviewed. Device demonstrated appropriate function.       Electronically Signed by: Marshal Cook M.D.    7/31/2023  7:38 AM

## 2023-08-24 ENCOUNTER — OFFICE VISIT (OUTPATIENT)
Dept: ENDOCRINOLOGY | Facility: MEDICAL CENTER | Age: 78
End: 2023-08-24
Attending: INTERNAL MEDICINE
Payer: MEDICARE

## 2023-08-24 VITALS
SYSTOLIC BLOOD PRESSURE: 150 MMHG | BODY MASS INDEX: 34.53 KG/M2 | HEART RATE: 98 BPM | DIASTOLIC BLOOD PRESSURE: 68 MMHG | HEIGHT: 67 IN | OXYGEN SATURATION: 91 % | WEIGHT: 220 LBS

## 2023-08-24 DIAGNOSIS — E55.9 VITAMIN D DEFICIENCY: ICD-10-CM

## 2023-08-24 DIAGNOSIS — N18.31 TYPE 2 DIABETES MELLITUS WITH STAGE 3A CHRONIC KIDNEY DISEASE, WITH LONG-TERM CURRENT USE OF INSULIN (HCC): ICD-10-CM

## 2023-08-24 DIAGNOSIS — E78.5 DYSLIPIDEMIA: ICD-10-CM

## 2023-08-24 DIAGNOSIS — Z79.4 LONG-TERM INSULIN USE (HCC): ICD-10-CM

## 2023-08-24 DIAGNOSIS — N18.31 CHRONIC KIDNEY DISEASE, STAGE 3A: ICD-10-CM

## 2023-08-24 DIAGNOSIS — E11.22 TYPE 2 DIABETES MELLITUS WITH STAGE 3A CHRONIC KIDNEY DISEASE, WITH LONG-TERM CURRENT USE OF INSULIN (HCC): ICD-10-CM

## 2023-08-24 DIAGNOSIS — Z79.4 TYPE 2 DIABETES MELLITUS WITH STAGE 3A CHRONIC KIDNEY DISEASE, WITH LONG-TERM CURRENT USE OF INSULIN (HCC): ICD-10-CM

## 2023-08-24 LAB
HBA1C MFR BLD: 8.1 % (ref ?–5.8)
POCT INT CON NEG: NEGATIVE
POCT INT CON POS: POSITIVE

## 2023-08-24 PROCEDURE — 99212 OFFICE O/P EST SF 10 MIN: CPT | Performed by: INTERNAL MEDICINE

## 2023-08-24 PROCEDURE — 3078F DIAST BP <80 MM HG: CPT | Performed by: INTERNAL MEDICINE

## 2023-08-24 PROCEDURE — 3077F SYST BP >= 140 MM HG: CPT | Performed by: INTERNAL MEDICINE

## 2023-08-24 PROCEDURE — 83036 HEMOGLOBIN GLYCOSYLATED A1C: CPT | Performed by: INTERNAL MEDICINE

## 2023-08-24 PROCEDURE — 99215 OFFICE O/P EST HI 40 MIN: CPT | Performed by: INTERNAL MEDICINE

## 2023-08-24 ASSESSMENT — FIBROSIS 4 INDEX: FIB4 SCORE: 2.47

## 2023-08-24 NOTE — PROGRESS NOTES
CHIEF COMPLAINT: Patient is here for follow up of Type 2 Diabetes Mellitus    HPI:     Ophelia Sosa is a 78 y.o. female with Type 2 Diabetes Mellitus here for follow up.    Labs from  8/24/2023 Hba1c is 8.1%  Labs from 5/25/2023 HbA1c is 8.9%      She was previously seen by nurse practitioner Edgar Reyes on 8/2022    She has uncontrolled type 2 diabetes   She is resistant to medication changes  She is refusing changes to her medications and feels that she is doing well despite evidence of elevated a1c levels and proliferative diabetic retinopathy      She claims that Dr. Guillen told her to keep her BG between 150-250      She takes NPH 46-50u am   She takes regular insulin 4-6 am only    She did not bring her glucose meter      She has hyperlipidemia and is on Crestor 5 mg daily  LDL cholesterol was 47 on March 2022     we do not have an updated urine microalbumin\      She reportedly had an eye exam done on June 18, 2023 showing diabetic retinopathy      BG Diary:08/24/23  Patient forgot meter and logbook      Weight has been stable    Diabetes Complications   Retinopathy: No known retinopathy.  Last eye exam: 1/18/2023  Neuropathy: Denies paresthesias or numbness in hands or feet. Denies any foot wounds.  Exercise: Minimal.  Diet: Fair.  Patient's medications, allergies, and social histories were reviewed and updated as appropriate.    ROS:     CONS:     No fever, no chills   EYES:     No diplopia, no blurry vision   CV:           No chest pain, no palpitations   PULM:     No SOB, no cough, no hemoptysis.   GI:            No nausea, no vomiting, no diarrhea, no constipation   ENDO:     No polyuria, no polydipsia, no heat intolerance, no cold intolerance       Past Medical History:  Problem List:  2023-02: Sensation of fullness in right ear  2022-12: Impacted cerumen, right ear  2022-05: Anticoagulated  2022-03: Stable proliferative diabetic retinopathy of both eyes   associated with type 2 diabetes  mellitus ([facility])  2021-10: PAF (paroxysmal atrial fibrillation) ([facility])  2021-08: Type 2 diabetes mellitus with hyperglycemia, with long-term   current use of insulin ([facility])  2021-06: Fungal infection of skin  2021-06: Major depressive disorder with single episode, in partial   remission ([facility])  2021-06: Secondary hyperparathyroidism of renal origin ([facility])  2020-12: Tinnitus of left ear  2020-01: COPD (chronic obstructive pulmonary disease) ([facility])  2020-01: Stage 3b chronic kidney disease ([facility])  2020-01: Other insomnia  2019-07: Vertigo  2019-03: Cardiac pacemaker  2018-11: Anxiety  2018-11: Complete heart block ([facility])  2018-11: Right bundle branch block (RBBB) plus left anterior (LA)   hemiblock  2018-11: Stage 3 acute kidney injury ([facility])  2018-09: Diastolic CHF due to valvular disease ([facility])  2017-10: Near syncope  2017-10: Mitral stenosis  2016-08: Hyperlipidemia, mixed  2016-08: Diastolic CHF, chronic ([facility])  2016-08: Syncope and collapse  2016-08: Balance problems  2016-08: Aortic stenosis  2016-01: Obesity  2016-01: Eczema  2016-01: Diabetic foot ulcer ([facility])  2016-01: Type 2 diabetes mellitus with diabetic polyneuropathy, with   long-term current use of insulin ([facility])  2016-01: Charcot foot due to diabetes mellitus ([facility])  2015-10: Systolic murmur  2015-10: Essential hypertension, benign  2015-08: Fever  2012-11: Asthma with bronchitis  Sleep apnea  Type 2 diabetes mellitus with stage 3b chronic kidney disease, with   long-term current use of insulin ([facility])  Diastolic dysfunction  HLD (hyperlipidemia)  HTN (hypertension)  Osteoarthritis  Pulmonary hypertension ([facility])      Past Surgical History:  Past Surgical History:   Procedure Laterality Date    BREAST BIOPSY      GYN SURGERY  hysterectomy    MITRAL VALVE REPLACE      OTHER ABDOMINAL SURGERY      OTHER ORTHOPEDIC SURGERY      TONSILLECTOMY Bilateral         Allergies:  Ativan, Advair [fluticasone-salmeterol], Ambien [zolpidem tartrate], Erythromycin, Ibuprofen, Lipitor  "[atorvastatin calcium], Pcn [penicillins], and Pravachol [pravastatin sodium]     Social History:  Social History     Tobacco Use    Smoking status: Former     Current packs/day: 0.00     Types: Cigarettes     Quit date: 2011     Years since quittin.8    Smokeless tobacco: Never   Vaping Use    Vaping Use: Never used   Substance Use Topics    Alcohol use: Yes     Comment: 1 drink every 6 months    Drug use: No     Comment: thc oil prn        Family History:   family history includes Diabetes in her father, maternal grandmother, mother, and paternal grandfather; Heart Disease in her mother; Hypertension in her father and mother.      PHYSICAL EXAM:   OBJECTIVE:  Vital signs: BP (!) 150/68 (BP Location: Left arm, Patient Position: Sitting, BP Cuff Size: Large adult)   Pulse 98   Ht 1.702 m (5' 7\")   Wt 99.8 kg (220 lb)   SpO2 91%   BMI 34.46 kg/m²   GENERAL: Well-developed, well-nourished in no apparent distress.   EYE:  No ocular asymmetry, PERRLA  HENT: Pink, moist mucous membranes.    NECK: No thyromegaly.   CARDIOVASCULAR:  No murmurs  LUNGS: Clear breath sounds  ABDOMEN: Soft, nontender   EXTREMITIES: No clubbing, cyanosis, or edema.   NEUROLOGICAL: No gross focal motor abnormalities   LYMPH: No cervical adenopathy palpated.   SKIN: No rashes, lesions.     Labs:  Lab Results   Component Value Date/Time    HBA1C 8.9 (H) 2023 08:13 AM        Lab Results   Component Value Date/Time    WBC 6.2 2023 08:13 AM    RBC 4.10 (L) 2023 08:13 AM    HEMOGLOBIN 12.4 2023 08:13 AM    MCV 93.9 2023 08:13 AM    MCH 30.2 2023 08:13 AM    MCHC 32.2 2023 08:13 AM    RDW 44.5 2023 08:13 AM    MPV 12.8 2023 08:13 AM       Lab Results   Component Value Date/Time    SODIUM 139 2023 08:13 AM    POTASSIUM 4.6 2023 08:13 AM    CHLORIDE 102 2023 08:13 AM    CO2 26 2023 08:13 AM    ANION 11.0 2023 08:13 AM    GLUCOSE 268 (H) 2023 08:13 AM " "   BUN 17 05/25/2023 08:13 AM    CREATININE 1.29 05/25/2023 08:13 AM    CALCIUM 10.1 05/25/2023 08:13 AM    ASTSGOT 22 05/25/2023 08:13 AM    ALTSGPT 15 05/25/2023 08:13 AM    TBILIRUBIN 0.4 05/25/2023 08:13 AM    ALBUMIN 4.1 05/25/2023 08:13 AM    ALBUMIN 4.13 06/10/2011 10:17 AM    TOTPROTEIN 7.0 05/25/2023 08:13 AM    TOTPROTEIN 7.20 06/10/2011 10:17 AM    GLOBULIN 2.9 05/25/2023 08:13 AM    AGRATIO 1.4 05/25/2023 08:13 AM       Lab Results   Component Value Date/Time    CHOLSTRLTOT 126 03/23/2022 0726    TRIGLYCERIDE 189 (H) 03/23/2022 0726    HDL 41 03/23/2022 0726    LDL 47 03/23/2022 0726       Lab Results   Component Value Date/Time    MALBCRT 79 (H) 09/29/2015 10:18 AM    MICROALBUR 6.8 09/29/2015 10:18 AM        Lab Results   Component Value Date/Time    TSHULTRASEN 1.420 04/18/2020 0755     No results found for: \"FREEDIR\"  No results found for: \"FREET3\"  No results found for: \"THYSTIMIG\"        ASSESSMENT/PLAN:     1. Type 2 diabetes mellitus with stage 3a chronic kidney disease, with long-term current use of insulin (HCC)  Uncontrolled secondary to hyperglycemia  Patient is resistant to medication changes even if beneficial for cardiovascular and renal reasons  Patient prefers to stay on N and R insulin  Recommend NPH 50 units in the morning 10 units at dinner with titration based on parameters  Regular insulin 6 units with breakfast 6 to 12 units with dinner with titration based on parameters provided  Recommend she complete labs   pharmacy clinic follow-up in 1  week      2. Chronic kidney disease, stage 3a (HCC)  Stable  Recommend complete labs for urine albumin    3. Dyslipidemia  Stable  Recommend that she complete labs for lipids    4. Vitamin D deficiency  We will check vitamin D    5. Long-term insulin use (HCC)  Patient is on long-term basal therapy with NPH and regular insulin        Return in about 1 week (around 8/31/2023).    Total time spent on day of service was over 60 minutes which " included obtaining a detailed history and physical exam, ordering labs, coordinating care and scheduling future follow-up    Thank you kindly for allowing me to participate in the diabetes care plan for this patient.    Lance Cormier MD, [facility], [facility]  08/24/23    CC:   JOSE ALEJANDRO [place]

## 2023-08-31 ENCOUNTER — HOSPITAL ENCOUNTER (OUTPATIENT)
Dept: LAB | Facility: MEDICAL CENTER | Age: 78
End: 2023-08-31
Attending: INTERNAL MEDICINE
Payer: MEDICARE

## 2023-08-31 DIAGNOSIS — N18.31 CHRONIC KIDNEY DISEASE, STAGE 3A: ICD-10-CM

## 2023-08-31 DIAGNOSIS — E11.22 TYPE 2 DIABETES MELLITUS WITH STAGE 3A CHRONIC KIDNEY DISEASE, WITH LONG-TERM CURRENT USE OF INSULIN (HCC): ICD-10-CM

## 2023-08-31 DIAGNOSIS — N18.31 TYPE 2 DIABETES MELLITUS WITH STAGE 3A CHRONIC KIDNEY DISEASE, WITH LONG-TERM CURRENT USE OF INSULIN (HCC): ICD-10-CM

## 2023-08-31 DIAGNOSIS — E78.5 DYSLIPIDEMIA: ICD-10-CM

## 2023-08-31 DIAGNOSIS — E55.9 VITAMIN D DEFICIENCY: ICD-10-CM

## 2023-08-31 DIAGNOSIS — Z79.4 LONG-TERM INSULIN USE (HCC): ICD-10-CM

## 2023-08-31 DIAGNOSIS — Z79.4 TYPE 2 DIABETES MELLITUS WITH STAGE 3A CHRONIC KIDNEY DISEASE, WITH LONG-TERM CURRENT USE OF INSULIN (HCC): ICD-10-CM

## 2023-08-31 LAB
25(OH)D3 SERPL-MCNC: 44 NG/ML (ref 30–100)
25(OH)D3 SERPL-MCNC: 46 NG/ML (ref 30–100)
ALBUMIN SERPL BCP-MCNC: 4.3 G/DL (ref 3.2–4.9)
ALBUMIN SERPL BCP-MCNC: 4.3 G/DL (ref 3.2–4.9)
ALBUMIN/GLOB SERPL: 1.7 G/DL
ALBUMIN/GLOB SERPL: 1.7 G/DL
ALP SERPL-CCNC: 70 U/L (ref 30–99)
ALP SERPL-CCNC: 71 U/L (ref 30–99)
ALT SERPL-CCNC: 10 U/L (ref 2–50)
ALT SERPL-CCNC: 11 U/L (ref 2–50)
ANION GAP SERPL CALC-SCNC: 12 MMOL/L (ref 7–16)
ANION GAP SERPL CALC-SCNC: 13 MMOL/L (ref 7–16)
AST SERPL-CCNC: 16 U/L (ref 12–45)
AST SERPL-CCNC: 16 U/L (ref 12–45)
BASOPHILS # BLD AUTO: 0.8 % (ref 0–1.8)
BASOPHILS # BLD: 0.05 K/UL (ref 0–0.12)
BILIRUB SERPL-MCNC: 0.5 MG/DL (ref 0.1–1.5)
BILIRUB SERPL-MCNC: 0.5 MG/DL (ref 0.1–1.5)
BUN SERPL-MCNC: 14 MG/DL (ref 8–22)
BUN SERPL-MCNC: 14 MG/DL (ref 8–22)
CALCIUM ALBUM COR SERPL-MCNC: 9.5 MG/DL (ref 8.5–10.5)
CALCIUM ALBUM COR SERPL-MCNC: 9.7 MG/DL (ref 8.5–10.5)
CALCIUM SERPL-MCNC: 9.7 MG/DL (ref 8.5–10.5)
CALCIUM SERPL-MCNC: 9.9 MG/DL (ref 8.5–10.5)
CHLORIDE SERPL-SCNC: 100 MMOL/L (ref 96–112)
CHLORIDE SERPL-SCNC: 101 MMOL/L (ref 96–112)
CHOLEST SERPL-MCNC: 106 MG/DL (ref 100–199)
CO2 SERPL-SCNC: 24 MMOL/L (ref 20–33)
CO2 SERPL-SCNC: 24 MMOL/L (ref 20–33)
CREAT SERPL-MCNC: 1.2 MG/DL (ref 0.5–1.4)
CREAT SERPL-MCNC: 1.21 MG/DL (ref 0.5–1.4)
EOSINOPHIL # BLD AUTO: 0.12 K/UL (ref 0–0.51)
EOSINOPHIL NFR BLD: 1.8 % (ref 0–6.9)
ERYTHROCYTE [DISTWIDTH] IN BLOOD BY AUTOMATED COUNT: 48.5 FL (ref 35.9–50)
EST. AVERAGE GLUCOSE BLD GHB EST-MCNC: 209 MG/DL
FASTING STATUS PATIENT QL REPORTED: NORMAL
GFR SERPLBLD CREATININE-BSD FMLA CKD-EPI: 46 ML/MIN/1.73 M 2
GFR SERPLBLD CREATININE-BSD FMLA CKD-EPI: 46 ML/MIN/1.73 M 2
GLOBULIN SER CALC-MCNC: 2.6 G/DL (ref 1.9–3.5)
GLOBULIN SER CALC-MCNC: 2.6 G/DL (ref 1.9–3.5)
GLUCOSE SERPL-MCNC: 327 MG/DL (ref 65–99)
GLUCOSE SERPL-MCNC: 327 MG/DL (ref 65–99)
HBA1C MFR BLD: 8.9 % (ref 4–5.6)
HCT VFR BLD AUTO: 37.7 % (ref 37–47)
HDLC SERPL-MCNC: 41 MG/DL
HGB BLD-MCNC: 11.9 G/DL (ref 12–16)
IMM GRANULOCYTES # BLD AUTO: 0.04 K/UL (ref 0–0.11)
IMM GRANULOCYTES NFR BLD AUTO: 0.6 % (ref 0–0.9)
LDLC SERPL CALC-MCNC: 44 MG/DL
LYMPHOCYTES # BLD AUTO: 1.38 K/UL (ref 1–4.8)
LYMPHOCYTES NFR BLD: 20.8 % (ref 22–41)
MAGNESIUM SERPL-MCNC: 2.2 MG/DL (ref 1.5–2.5)
MCH RBC QN AUTO: 30.4 PG (ref 27–33)
MCHC RBC AUTO-ENTMCNC: 31.6 G/DL (ref 32.2–35.5)
MCV RBC AUTO: 96.2 FL (ref 81.4–97.8)
MONOCYTES # BLD AUTO: 0.46 K/UL (ref 0–0.85)
MONOCYTES NFR BLD AUTO: 6.9 % (ref 0–13.4)
NEUTROPHILS # BLD AUTO: 4.59 K/UL (ref 1.82–7.42)
NEUTROPHILS NFR BLD: 69.1 % (ref 44–72)
NRBC # BLD AUTO: 0 K/UL
NRBC BLD-RTO: 0 /100 WBC (ref 0–0.2)
PLATELET # BLD AUTO: 214 K/UL (ref 164–446)
PMV BLD AUTO: 11.7 FL (ref 9–12.9)
POTASSIUM SERPL-SCNC: 4.1 MMOL/L (ref 3.6–5.5)
POTASSIUM SERPL-SCNC: 4.1 MMOL/L (ref 3.6–5.5)
PROT SERPL-MCNC: 6.9 G/DL (ref 6–8.2)
PROT SERPL-MCNC: 6.9 G/DL (ref 6–8.2)
RBC # BLD AUTO: 3.92 M/UL (ref 4.2–5.4)
SODIUM SERPL-SCNC: 137 MMOL/L (ref 135–145)
SODIUM SERPL-SCNC: 137 MMOL/L (ref 135–145)
T4 FREE SERPL-MCNC: 1.52 NG/DL (ref 0.93–1.7)
TRIGL SERPL-MCNC: 107 MG/DL (ref 0–149)
TSH SERPL DL<=0.005 MIU/L-ACNC: 1.54 UIU/ML (ref 0.38–5.33)
WBC # BLD AUTO: 6.6 K/UL (ref 4.8–10.8)

## 2023-08-31 PROCEDURE — 85025 COMPLETE CBC W/AUTO DIFF WBC: CPT

## 2023-08-31 PROCEDURE — 82306 VITAMIN D 25 HYDROXY: CPT | Mod: 91

## 2023-08-31 PROCEDURE — 80053 COMPREHEN METABOLIC PANEL: CPT

## 2023-08-31 PROCEDURE — 84439 ASSAY OF FREE THYROXINE: CPT

## 2023-08-31 PROCEDURE — 82306 VITAMIN D 25 HYDROXY: CPT

## 2023-08-31 PROCEDURE — 84443 ASSAY THYROID STIM HORMONE: CPT

## 2023-08-31 PROCEDURE — 83036 HEMOGLOBIN GLYCOSYLATED A1C: CPT

## 2023-08-31 PROCEDURE — 36415 COLL VENOUS BLD VENIPUNCTURE: CPT

## 2023-08-31 PROCEDURE — 83735 ASSAY OF MAGNESIUM: CPT

## 2023-08-31 PROCEDURE — 80053 COMPREHEN METABOLIC PANEL: CPT | Mod: 91

## 2023-08-31 PROCEDURE — 80061 LIPID PANEL: CPT

## 2023-09-01 ENCOUNTER — APPOINTMENT (OUTPATIENT)
Dept: ENDOCRINOLOGY | Facility: MEDICAL CENTER | Age: 78
End: 2023-09-01
Attending: INTERNAL MEDICINE
Payer: MEDICARE

## 2023-09-07 ENCOUNTER — TELEPHONE (OUTPATIENT)
Dept: HEALTH INFORMATION MANAGEMENT | Facility: OTHER | Age: 78
End: 2023-09-07
Payer: MEDICARE

## 2023-09-13 DIAGNOSIS — I10 ESSENTIAL HYPERTENSION, BENIGN: ICD-10-CM

## 2023-09-14 RX ORDER — FUROSEMIDE 40 MG/1
TABLET ORAL
Qty: 100 TABLET | Refills: 1 | Status: SHIPPED | OUTPATIENT
Start: 2023-09-14 | End: 2024-02-02

## 2023-09-14 RX ORDER — POTASSIUM CHLORIDE 600 MG/1
TABLET, FILM COATED, EXTENDED RELEASE ORAL
Qty: 100 TABLET | Refills: 1 | Status: SHIPPED | OUTPATIENT
Start: 2023-09-14 | End: 2024-02-02

## 2023-09-14 NOTE — TELEPHONE ENCOUNTER
Reviewed chart, pt had recent CMP and mag level done 8/31/23 wnl. Pt also scheduled for f/u appt with SW on 11/2/23. Courtesy refills sent.

## 2023-09-15 ENCOUNTER — OFFICE VISIT (OUTPATIENT)
Dept: ENDOCRINOLOGY | Facility: MEDICAL CENTER | Age: 78
End: 2023-09-15
Attending: INTERNAL MEDICINE
Payer: MEDICARE

## 2023-09-15 VITALS — BODY MASS INDEX: 34.46 KG/M2 | WEIGHT: 220 LBS

## 2023-09-15 DIAGNOSIS — Z79.4 TYPE 2 DIABETES MELLITUS WITH STAGE 3B CHRONIC KIDNEY DISEASE, WITH LONG-TERM CURRENT USE OF INSULIN (HCC): ICD-10-CM

## 2023-09-15 DIAGNOSIS — N18.32 TYPE 2 DIABETES MELLITUS WITH STAGE 3B CHRONIC KIDNEY DISEASE, WITH LONG-TERM CURRENT USE OF INSULIN (HCC): ICD-10-CM

## 2023-09-15 DIAGNOSIS — E11.22 TYPE 2 DIABETES MELLITUS WITH STAGE 3B CHRONIC KIDNEY DISEASE, WITH LONG-TERM CURRENT USE OF INSULIN (HCC): ICD-10-CM

## 2023-09-15 PROCEDURE — 99213 OFFICE O/P EST LOW 20 MIN: CPT | Performed by: STUDENT IN AN ORGANIZED HEALTH CARE EDUCATION/TRAINING PROGRAM

## 2023-09-15 RX ORDER — METFORMIN HYDROCHLORIDE 500 MG/1
500 TABLET, EXTENDED RELEASE ORAL 2 TIMES DAILY
Qty: 60 TABLET | Refills: 11 | Status: SHIPPED | OUTPATIENT
Start: 2023-09-15 | End: 2023-09-30

## 2023-09-15 ASSESSMENT — FIBROSIS 4 INDEX: FIB4 SCORE: 1.78

## 2023-09-15 NOTE — PROGRESS NOTES
"Patient Consult Note    Primary care physician: JOSE ALEJANDRO [place]    Reason for consult: Management of Uncontrolled Type 2 Diabetes    HPI:  Ophelia Sosa is a 78 y.o. old patient who comes in today for evaluation of above stated problem.    Most Recent HbA1c and POCT glucose:   Lab Results   Component Value Date/Time    HBA1C 8.9 (H) 08/31/2023 10:01 AM            Most Recent Scr:  Lab Results   Component Value Date/Time    CREATININE 1.21 08/31/2023 10:01 AM        Current Diabetes Medication Regimen  Basal Insulin: Insulin NPH 50 units AM and 10 units PM per SS  Prandial Insulin: Insulin R 6-10 units in the AM and 12 units PM    Previous Diabetes Medications and Reason for Discontinuation  Glimepiride - Dc'd per endocrinology    Pt has home glucometer and proper testing technique - Yes  Discussed BG Goals: FBG 80 - 130, 2hPP < 180, A1c < 7%    Pt reports blood sugars:   Before Breakfast: typically 200-250, 266 this AM, has been as low as 123  Before Bedtime: 200-250's    Hypoglycemia awareness - Yes  Nocturnal hypoglycemia- Denies  Hypoglycemia:  None    Pt's treatment of Hypoglycemia - Counseled  - 15:15 Rule    Current Exercise - Pt acknowledges that she is very static. Ambulates w/ cane. Also c/o equilibrium/balance issues.  Exercise Goal - Pt walks around her home thrice daily. Works in the yard. Counseled to increase as much as tolerated.    Dietary: Pt has dentures - diet limited by these. Attempts portion size moderation.  Breakfast - Banana (every day for the last 20 yrs), 1/2-1 cup cheerios w/ 1 cup milk, frozen peaches  Lunch - Cup of yogurt (Yoplait), corn dogs, mac and cheese bites, pizza squares - pt states she only will eat 2 pieces (if that) of each of the prepared \"finger foods\". Also enjoys cucumbers ,tomatoes, artichokes, asparagus, carrots.   Dinner - Rice-a-tab w/ protein, salad. Applebees salads  Snacks - 12 pringles, cocktail crackers  Desserts - Sugar free candy  Drinks - " Diet Pepsi, [place] water    Foot Exam:  Monofilament exam - Completed 8/2023    Preventative Management  BP regimen (ACE/ARB) - Losartan 25 mg BID  Statin - Rosuvastatin 5 mg once daily  Last Retinal Scan - 6/2023  Last Microalbumin/Cr - Due. Orders in place.  Last A1c -   Lab Results   Component Value Date/Time    HBA1C 8.9 (H) 08/31/2023 10:01 AM          Past Medical History:  Patient Active Problem List    Diagnosis Date Noted    Sensation of fullness in right ear 02/22/2023    Anticoagulated 05/12/2022    Stable proliferative diabetic retinopathy of both eyes associated with type 2 diabetes mellitus ([facility]) 03/29/2022    PAF (paroxysmal atrial fibrillation) ([facility]) 10/20/2021    Type 2 diabetes mellitus with hyperglycemia, with long-term current use of insulin ([facility]) 08/26/2021    Fungal infection of skin 06/09/2021    Major depressive disorder with single episode, in partial remission ([facility]) 06/09/2021    Secondary hyperparathyroidism of renal origin ([facility]) 06/09/2021    COPD (chronic obstructive pulmonary disease) ([facility]) 01/09/2020    Other insomnia 01/09/2020    Vertigo 07/05/2019    Cardiac pacemaker 03/11/2019    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    Diastolic CHF due to valvular disease ([facility]) 09/05/2018    Mitral stenosis 10/05/2017    Hyperlipidemia, mixed 08/18/2016    Obesity 01/21/2016    Eczema 01/21/2016    Type 2 diabetes mellitus with diabetic polyneuropathy, with long-term current use of insulin ([facility]) 01/20/2016    Systolic murmur 10/06/2015    Essential hypertension, benign 10/06/2015    Sleep apnea     Type 2 diabetes mellitus with stage 3b chronic kidney disease, with long-term current use of insulin ([facility])     Osteoarthritis     Pulmonary hypertension ([facility])        Past Surgical History:  Past Surgical History:   Procedure Laterality Date    BREAST BIOPSY      GYN SURGERY  hysterectomy    MITRAL VALVE REPLACE      OTHER ABDOMINAL SURGERY      OTHER ORTHOPEDIC  SURGERY      TONSILLECTOMY Bilateral        Allergies:  Ativan, Advair [fluticasone-salmeterol], Ambien [zolpidem tartrate], Erythromycin, Ibuprofen, Lipitor [atorvastatin calcium], Pcn [penicillins], and Pravachol [pravastatin sodium]    Social History:  Social History     Socioeconomic History    Marital status: Single     Spouse name: Not on file    Number of children: Not on file    Years of education: Not on file    Highest education level: Not on file   Occupational History    Not on file   Tobacco Use    Smoking status: Former     Current packs/day: 0.00     Types: Cigarettes     Quit date: 2011     Years since quittin.8    Smokeless tobacco: Never   Vaping Use    Vaping Use: Never used   Substance and Sexual Activity    Alcohol use: Yes     Comment: 1 drink every 6 months    Drug use: No     Comment: thc oil prn    Sexual activity: Not on file   Other Topics Concern    Not on file   Social History Narrative    Not on file     Social Determinants of Health     Financial Resource Strain: Not on file   Food Insecurity: Not on file   Transportation Needs: Not on file   Physical Activity: Not on file   Stress: Not on file   Social Connections: Not on file   Intimate Partner Violence: Not on file   Housing Stability: Not on file       Family History:  Family History   Problem Relation Age of Onset    Hypertension Mother     Heart Disease Mother     Diabetes Mother     Diabetes Father     Hypertension Father     Diabetes Maternal Grandmother     Diabetes Paternal Grandfather        Medications:    Current Outpatient Medications:     clobetasol (TEMOVATE) 0.05 % Cream, APPLY 1 APPLICATION TOPICALLY 2 TIMES A DAY AS NEEDED (ECZEMA)., Disp: 60 g, Rfl: 0    furosemide (LASIX) 40 MG Tab, TAKE 1 TABLET BY MOUTH EVERY DAY IN THE EVENING, Disp: 100 Tablet, Rfl: 1    potassium chloride (KLOR-CON) 8 MEQ tablet, TAKE 1 TABLET BY MOUTH EVERY DAY IN THE EVENING, Disp: 100 Tablet, Rfl: 1    FIBER PO, Take  by mouth.,  "Disp: , Rfl:     carbamide peroxide (DEBROX) 6.5 % Solution, Administer 6 Drops into the right ear 2 times a day. Administer drops in both ears., Disp: 15 mL, Rfl: 0    ELIQUIS 5 MG Tab, TAKE 1 TABLET BY MOUTH TWICE A DAY, Disp: 60 Tablet, Rfl: 11    albuterol 108 (90 Base) MCG/ACT Aero Soln inhalation aerosol, Inhale 2 Puffs every 6 hours as needed for Shortness of Breath., Disp: 8.5 g, Rfl: 2    diphenhydrAMINE HCl (BENADRYL ALLERGY PO), Take  by mouth as needed., Disp: , Rfl:     BD INSULIN SYRINGE U/F 31G X 5/16\" 0.3 ML Misc, INJECT TWICE DAILY, Disp: 10 Each, Rfl: 5    NON SPECIFIED, as needed. CBD oil, Disp: , Rfl:     mupirocin (BACTROBAN) 2 % Ointment, Apply 1 Application topically 2 times a day., Disp: 30 g, Rfl: 0    insulin NPH (HUMULIN/NOVOLIN) 100 UNIT/ML Suspension, 46 units in the morning and 3-10 units in the evening per sliding scale (Patient taking differently: 46 units in the morning and 3-20 units in the evening per sliding scale), Disp: 10 mL, Rfl: 1    insulin regular (HUMULIN R) 100 Unit/mL Solution, Inject 3-6 units subcutaneously daily in the morning., Disp: 10 mL, Rfl: 1    VITAMIN D, CHOLECALCIFEROL, PO, Take 3,000 Units by mouth every day., Disp: , Rfl:     B Complex Vitamins (VITAMIN B COMPLEX PO), Take  by mouth every day., Disp: , Rfl:     rosuvastatin (CRESTOR) 5 MG Tab, TAKE ONE TABLET AT BEDTIME FOR CHOLESTEROL. TAKE THREE TIMES WEEKLY, Disp: 30 Tab, Rfl: 5    NS SOLN 60 mL with albuterol 2.5 mg/0.5 mL NEBU 5 mL, 5 mg/hr by Nebulization route. Spring and fall, Disp: , Rfl:     losartan (COZAAR) 25 MG Tab, Take 25 mg by mouth 2 Times a Day., Disp: , Rfl:     FREESTYLE LITE strip, TEST BLOOD SUGAR 3 TIMES DAILY, Disp: , Rfl: 12    fluticasone (FLONASE) 50 MCG/ACT nasal spray, Spray 1 Spray in nose 1 time daily as needed., Disp: , Rfl:     Labs: Reviewed    Physical Examination:  Vital signs: There were no vitals taken for this visit. There is no height or weight on file to calculate " BMI.    Assessment and Plan:    1. DM2  Basic physiology of DMII was explained to patient as well as microvascular/macrovascular complications. The importance of increasing physical activity to improve diabetes control was discussed with the patient. Patient was also educated on changing diet and making better choices to help control blood sugar.   Discussed Goals: FBG 80 - 130, 2hPP < 180, a1c < 7%   Pt most recent A1c above goal at 8.9% (8/2023)  Pt reported FBG are also significantly above goal at this time.  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 She acknowledges that she is very resistant to change given her age and having been on insulin for 20+ yrs. She's established on both normal and regular insulin. Pt does use these insulins at varying doses pending her BG readings. See media for logs.  Would like to eventually transition pt to long acting basal insulin and metformin + [id number]/[id number]. Pt resistant to change, so will continue to address/modify at future f/u visits.    - Medication changes:  START metformin  mg BID  Counseled pt regarding MOA, SE, and administration  Provided pt w/ titration schedule  Continue insulin NPH 50 units AM and 10 units PM  Continue regular insulin 6-10 units AM and 12 units PM    - Lifestyle changes:  Diet: Discussed in great detail today w/ pt. She will work to add more protein and cut down/out on simple sugars. Recommended she maximize lean proteins and veggies as much as possible as well.   Exercise: Increase as tolerated    Follow Up:  1 months    David Swain, Radha, BCACP    CC:   JOSE ALEJANDRO [place]

## 2023-09-15 NOTE — PATIENT INSTRUCTIONS
David Swain, PharmD, BCACP  [facility] [place]  Ph: [phone]    Start metformin 500 mg w/ dinner x 5-7 days and then increase to 500 mg w/ breakfast and 500 mg w/ dinner x 5-7 days

## 2023-09-28 DIAGNOSIS — N18.32 TYPE 2 DIABETES MELLITUS WITH STAGE 3B CHRONIC KIDNEY DISEASE, WITH LONG-TERM CURRENT USE OF INSULIN (HCC): ICD-10-CM

## 2023-09-28 DIAGNOSIS — E11.22 TYPE 2 DIABETES MELLITUS WITH STAGE 3B CHRONIC KIDNEY DISEASE, WITH LONG-TERM CURRENT USE OF INSULIN (HCC): ICD-10-CM

## 2023-09-28 DIAGNOSIS — Z79.4 TYPE 2 DIABETES MELLITUS WITH STAGE 3B CHRONIC KIDNEY DISEASE, WITH LONG-TERM CURRENT USE OF INSULIN (HCC): ICD-10-CM

## 2023-09-29 DIAGNOSIS — E11.22 TYPE 2 DIABETES MELLITUS WITH STAGE 3B CHRONIC KIDNEY DISEASE, WITH LONG-TERM CURRENT USE OF INSULIN (HCC): ICD-10-CM

## 2023-09-29 DIAGNOSIS — N18.32 TYPE 2 DIABETES MELLITUS WITH STAGE 3B CHRONIC KIDNEY DISEASE, WITH LONG-TERM CURRENT USE OF INSULIN (HCC): ICD-10-CM

## 2023-09-29 DIAGNOSIS — Z79.4 TYPE 2 DIABETES MELLITUS WITH STAGE 3B CHRONIC KIDNEY DISEASE, WITH LONG-TERM CURRENT USE OF INSULIN (HCC): ICD-10-CM

## 2023-09-30 DIAGNOSIS — N18.32 TYPE 2 DIABETES MELLITUS WITH STAGE 3B CHRONIC KIDNEY DISEASE, WITH LONG-TERM CURRENT USE OF INSULIN (HCC): ICD-10-CM

## 2023-09-30 DIAGNOSIS — E11.22 TYPE 2 DIABETES MELLITUS WITH STAGE 3B CHRONIC KIDNEY DISEASE, WITH LONG-TERM CURRENT USE OF INSULIN (HCC): ICD-10-CM

## 2023-09-30 DIAGNOSIS — Z79.4 TYPE 2 DIABETES MELLITUS WITH STAGE 3B CHRONIC KIDNEY DISEASE, WITH LONG-TERM CURRENT USE OF INSULIN (HCC): ICD-10-CM

## 2023-09-30 RX ORDER — METFORMIN HYDROCHLORIDE 500 MG/1
500 TABLET, EXTENDED RELEASE ORAL 2 TIMES DAILY
Qty: 180 TABLET | Refills: 4 | Status: SHIPPED | OUTPATIENT
Start: 2023-09-30

## 2023-09-30 RX ORDER — PEN NEEDLE, DIABETIC 29 G X1/2"
NEEDLE, DISPOSABLE MISCELLANEOUS
Qty: 10 EACH | Refills: 5 | Status: SHIPPED | OUTPATIENT
Start: 2023-09-30 | End: 2023-10-02

## 2023-10-02 RX ORDER — SYRINGE-NEEDLE,INSULIN,0.5 ML 27GX1/2"
1 SYRINGE, EMPTY DISPOSABLE MISCELLANEOUS 2 TIMES DAILY
Qty: 60 EACH | Refills: 0 | Status: SHIPPED | OUTPATIENT
Start: 2023-10-02

## 2023-10-02 NOTE — TELEPHONE ENCOUNTER
"Requested Prescriptions     Pending Prescriptions Disp Refills   • Insulin Syringe-Needle U-100 (BD INSULIN SYRINGE U/F) 31G X 5/16\" 0.5 ML Misc [Pharmacy Med Name: BD INS SYRNG UF 0.5 ML [id number]] 60 Each 0     Sig: Inject 1 Each under the skin 2 times a day.       DOMINIQUE BegumRLatishaN.   "

## 2023-10-13 ENCOUNTER — APPOINTMENT (OUTPATIENT)
Dept: ENDOCRINOLOGY | Facility: MEDICAL CENTER | Age: 78
End: 2023-10-13
Attending: STUDENT IN AN ORGANIZED HEALTH CARE EDUCATION/TRAINING PROGRAM
Payer: MEDICARE

## 2023-11-02 ENCOUNTER — OFFICE VISIT (OUTPATIENT)
Dept: CARDIOLOGY | Facility: MEDICAL CENTER | Age: 78
End: 2023-11-02
Attending: INTERNAL MEDICINE
Payer: MEDICARE

## 2023-11-02 VITALS
OXYGEN SATURATION: 94 % | HEIGHT: 67 IN | WEIGHT: 214 LBS | BODY MASS INDEX: 33.59 KG/M2 | HEART RATE: 99 BPM | SYSTOLIC BLOOD PRESSURE: 160 MMHG | RESPIRATION RATE: 16 BRPM | DIASTOLIC BLOOD PRESSURE: 70 MMHG

## 2023-11-02 DIAGNOSIS — I05.0 MITRAL VALVE STENOSIS, UNSPECIFIED ETIOLOGY: ICD-10-CM

## 2023-11-02 DIAGNOSIS — I10 ESSENTIAL HYPERTENSION, BENIGN: ICD-10-CM

## 2023-11-02 DIAGNOSIS — Z79.01 ANTICOAGULATED: ICD-10-CM

## 2023-11-02 DIAGNOSIS — D68.69 SECONDARY HYPERCOAGULABLE STATE (HCC): ICD-10-CM

## 2023-11-02 DIAGNOSIS — I48.0 PAF (PAROXYSMAL ATRIAL FIBRILLATION) (HCC): ICD-10-CM

## 2023-11-02 DIAGNOSIS — I27.20 PULMONARY HYPERTENSION (HCC): Chronic | ICD-10-CM

## 2023-11-02 DIAGNOSIS — Z95.0 CARDIAC PACEMAKER: ICD-10-CM

## 2023-11-02 LAB — EKG IMPRESSION: NORMAL

## 2023-11-02 PROCEDURE — 3078F DIAST BP <80 MM HG: CPT | Performed by: INTERNAL MEDICINE

## 2023-11-02 PROCEDURE — 99213 OFFICE O/P EST LOW 20 MIN: CPT | Performed by: INTERNAL MEDICINE

## 2023-11-02 PROCEDURE — 93005 ELECTROCARDIOGRAM TRACING: CPT | Performed by: INTERNAL MEDICINE

## 2023-11-02 PROCEDURE — 3077F SYST BP >= 140 MM HG: CPT | Performed by: INTERNAL MEDICINE

## 2023-11-02 PROCEDURE — 93010 ELECTROCARDIOGRAM REPORT: CPT | Performed by: INTERNAL MEDICINE

## 2023-11-02 PROCEDURE — 99214 OFFICE O/P EST MOD 30 MIN: CPT | Mod: 25 | Performed by: INTERNAL MEDICINE

## 2023-11-02 ASSESSMENT — ENCOUNTER SYMPTOMS
SHORTNESS OF BREATH: 0
PALPITATIONS: 0
DIZZINESS: 0
LOSS OF CONSCIOUSNESS: 0
COUGH: 0
MYALGIAS: 0

## 2023-11-02 ASSESSMENT — FIBROSIS 4 INDEX: FIB4 SCORE: 1.78

## 2023-11-02 NOTE — PROGRESS NOTES
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    The patient has PAF, OAC on apixaban, mitral stenosis, pulmonary hypertension, permanent pacemaker with a history of untreated sleep apnea, diabetes mellitus, hypertension hyperlipidemia.    Since 5/12/2022 appointment the patient has had no cardiac symptoms including chest pain, or shortness of breath.  She has early morning palpitations upon awakening that lasts 2 to 3 minutes and resolve.  These episodes are acceptable her.  Does not regularly monitor blood pressure but when she has its been in the 140s.  She discounts office blood pressures because she has had \"whitecoat syndrome for 30 years\".  Changing endocrinology to the same practice as her nephrologist with Phan Rosas MD.          Past Medical History:   Diagnosis Date    ASTHMA     patient denies    Complete heart block (HCC) 11/16/2018    Congestive heart failure (HCC)     COPD     COPD (chronic obstructive pulmonary disease) (HCC)     Depressed     Diabetes     Diastolic dysfunction     DM (diabetes mellitus) (HCC)     Heart murmur     HLD (hyperlipidemia)     HTN (hypertension)     Hypertension     Obstructive sleep apnea     Osteoarthritis     Pulmonary hypertension (HCC)     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    Sleep apnea      Past Surgical History:   Procedure Laterality Date    BREAST BIOPSY      GYN SURGERY  hysterectomy    MITRAL VALVE REPLACE      OTHER ABDOMINAL SURGERY      OTHER ORTHOPEDIC SURGERY      TONSILLECTOMY Bilateral      Family History   Problem Relation Age of Onset    Hypertension Mother     Heart Disease Mother     Diabetes Mother     Diabetes Father     Hypertension Father     Diabetes Maternal Grandmother  " "   Diabetes Paternal Grandfather      Social History     Socioeconomic History    Marital status: Single     Spouse name: Not on file    Number of children: Not on file    Years of education: Not on file    Highest education level: Not on file   Occupational History    Not on file   Tobacco Use    Smoking status: Former     Current packs/day: 0.00     Types: Cigarettes     Quit date: 2011     Years since quittin.0    Smokeless tobacco: Never   Vaping Use    Vaping Use: Never used   Substance and Sexual Activity    Alcohol use: Yes     Comment: 1 drink every 6 months    Drug use: No     Comment: thc oil prn    Sexual activity: Not on file   Other Topics Concern    Not on file   Social History Narrative    Not on file     Social Determinants of Health     Financial Resource Strain: Not on file   Food Insecurity: Not on file   Transportation Needs: Not on file   Physical Activity: Not on file   Stress: Not on file   Social Connections: Not on file   Intimate Partner Violence: Not on file   Housing Stability: Not on file     Allergies   Allergen Reactions    Ativan      DELIRIUM, CONFUSION.    Advair [Fluticasone-Salmeterol]     Ambien [Zolpidem Tartrate]     Erythromycin Vomiting    Ibuprofen     Lipitor [Atorvastatin Calcium]     Pcn [Penicillins] Hives    Pravachol [Pravastatin Sodium]      ADVERSE REACTION.     Outpatient Encounter Medications as of 2023   Medication Sig Dispense Refill    Insulin Syringe-Needle U-100 (BD INSULIN SYRINGE U/F) 31G X \" 0.5 ML Misc Inject 1 Each under the skin 2 times a day. 60 Each 0    metFORMIN ER (GLUCOPHAGE XR) 500 MG TABLET SR 24 HR TAKE 1 TABLET BY MOUTH TWICE A  Tablet 4    furosemide (LASIX) 40 MG Tab TAKE 1 TABLET BY MOUTH EVERY DAY IN THE EVENING 100 Tablet 1    potassium chloride (KLOR-CON) 8 MEQ tablet TAKE 1 TABLET BY MOUTH EVERY DAY IN THE EVENING 100 Tablet 1    clobetasol (TEMOVATE) 0.05 % Cream APPLY 1 APPLICATION TOPICALLY 2 TIMES A DAY AS " NEEDED (ECZEMA). 60 g 0    FIBER PO Take  by mouth.      carbamide peroxide (DEBROX) 6.5 % Solution Administer 6 Drops into the right ear 2 times a day. Administer drops in both ears. 15 mL 0    ELIQUIS 5 MG Tab TAKE 1 TABLET BY MOUTH TWICE A DAY 60 Tablet 11    albuterol 108 (90 Base) MCG/ACT Aero Soln inhalation aerosol Inhale 2 Puffs every 6 hours as needed for Shortness of Breath. 8.5 g 2    diphenhydrAMINE HCl (BENADRYL ALLERGY PO) Take  by mouth as needed.      NON SPECIFIED as needed. CBD oil      mupirocin (BACTROBAN) 2 % Ointment Apply 1 Application topically 2 times a day. 30 g 0    insulin NPH (HUMULIN/NOVOLIN) 100 UNIT/ML Suspension 46 units in the morning and 3-10 units in the evening per sliding scale (Patient taking differently: 46 units in the morning and 3-20 units in the evening per sliding scale) 10 mL 1    insulin regular (HUMULIN R) 100 Unit/mL Solution Inject 3-6 units subcutaneously daily in the morning. 10 mL 1    VITAMIN D, CHOLECALCIFEROL, PO Take 3,000 Units by mouth every day.      B Complex Vitamins (VITAMIN B COMPLEX PO) Take  by mouth every day.      rosuvastatin (CRESTOR) 5 MG Tab TAKE ONE TABLET AT BEDTIME FOR CHOLESTEROL. TAKE THREE TIMES WEEKLY 30 Tab 5    NS SOLN 60 mL with albuterol 2.5 mg/0.5 mL NEBU 5 mL 5 mg/hr by Nebulization route. Spring and fall      losartan (COZAAR) 25 MG Tab Take 25 mg by mouth 2 Times a Day.      FREESTYLE LITE strip TEST BLOOD SUGAR 3 TIMES DAILY  12    fluticasone (FLONASE) 50 MCG/ACT nasal spray Spray 1 Spray in nose 1 time daily as needed.       No facility-administered encounter medications on file as of 11/2/2023.     Review of Systems   Respiratory:  Negative for cough and shortness of breath.    Cardiovascular:  Negative for chest pain and palpitations.   Musculoskeletal:  Negative for myalgias.   Neurological:  Negative for dizziness and loss of consciousness.              Objective     BP (!) 160/70 (BP Location: Left arm, Patient Position:  "Sitting, BP Cuff Size: Adult)   Pulse 99   Resp 16   Ht 1.702 m (5' 7\")   Wt 97.1 kg (214 lb)   SpO2 94%   BMI 33.52 kg/m²     Physical Exam  Constitutional:       Appearance: She is well-developed.      Comments: Uses a cane for ambulatory assistance.   Eyes:      Conjunctiva/sclera: Conjunctivae normal.      Pupils: Pupils are equal, round, and reactive to light.   Neck:      Vascular: No JVD.   Cardiovascular:      Rate and Rhythm: Normal rate and regular rhythm.      Heart sounds: Normal heart sounds.      Comments: Pacemaker generator  Pulmonary:      Effort: Pulmonary effort is normal. No accessory muscle usage or respiratory distress.      Breath sounds: Normal breath sounds. No wheezing or rales.   Musculoskeletal:      Cervical back: Normal range of motion and neck supple.      Right lower leg: No edema.      Left lower leg: No edema.   Skin:     General: Skin is warm and dry.      Findings: No rash.      Nails: There is no clubbing.   Neurological:      Mental Status: She is alert and oriented to person, place, and time.   Psychiatric:         Behavior: Behavior normal.                11/05/2012 ECHOCARDIOGRAM  EF 70%.  Mild tricuspid regurgitation. Right ventricular systolic pressure is   estimated to be 30 mmHg.  Mitral annular calcification. Thickened mitral valve leaflets. Mild   mitral stenosis. Mild mitral regurgitation.  Thickened aortic valve leaflets. Aortic annular calcification. Mild   aortic stenosis.  Moderate tricuspid regurgitation. Right ventricular systolic pressure   is estimated to be 57 mmhg.  No prior study available for comparison     09/28/2016 ECHOCARDIOGRAM  Normal left ventricular size and systolic function.  Severe concentric left ventricular hypertrophy.  Left ventricular ejection fraction is visually estimated to be 70%.  Diffuse severe mitral annular calcification.  Thickened mitral valve leaflets: Mean transvalvular gradient is 8  mmHg   at a heart rate of  75BPM; " Mitral valve area 2.7 cm2.  Aortic sclerosis without stenosis.  Estimated right ventricular systolic pressure  is 55 mmHg.  Right heart pressures are consistent with moderate pulmonary   hypertension.  Mildly dilated right ventricle.  Normal right ventricular systolic function      10/25/2017 ECHOCARDIOGRAM  Normal left ventricular systolic function.  Left ventricular ejection fraction is visually estimated to be 65%.  Moderate concentric left ventricular hypertrophy.  Dense diffuse mitral annular calcification with thickened mitral valve   leaflets.  Mild to moderate mitral stenosis.  Right heart pressures are consistent with moderate pulmonary   hypertension.  Aortic sclerosis without stenosis.     ECHOCARDIOGRAM 05/24/2019  Normal left ventricular systolic function.  Left ventricular ejection fraction is visually estimated to be 65%.  Mild mitral stenosis.  Aortic sclerosis without stenosis.  Right heart pressures are consistent with moderate pulmonary   hypertension.  Pacer/ICD wire seen in right ventricle.     BRAIN MRI 07/26/2018  1. Age-related cerebral atrophy.  2. Minimal periventricular white matter changes consistent with chronic microvascular ischemic gliosis.    EKG 11/2/2023 atrial sensed, V paced, personally interpreted    Assessment & Plan     1. PAF (paroxysmal atrial fibrillation) ([facility])  EKG      2. Secondary hypercoagulable state (HCC)        3. Anticoagulated        4. Cardiac pacemaker        5. Essential hypertension, benign        6. Mitral valve stenosis, unspecified etiology        7. Pulmonary hypertension (HCC)            Medical Decision Making: Today's Assessment/Status/Plan:   Assessment  PAF.  Anticoagulation, apixaban.  Mitral stenosis.  Diastolic and valvular CHF, diagnosed 2004.  Permanent pacemaker.  11/18/2018.  High-grade heart block.  Pulmonary hypertension.  Left anterior fascicular block/right bundle branch block.  Hypertension.  Dyslipidemia.  Diabetes mellitus.     CKD.  Adult situational stress syndrome.     Recommendation and Recommendation  PAF: Early morning palpitations probably related to brief episodes of Afib, not interested in any further treatment, mutually agreed to monitor and if they worsen she will let me know, continue apixaban.  Mitral stenosis: Asymptomatic, continue to monitor clinically and echocardiogram as needed.  Hypertension: BP elevated, instructed patient to monitor BP daily and if remains in the 140s she will contact me for further treatment otherwise continue furosemide, losartan.  Dyslipidemia: LDL 44, at goal, continue rosuvastatin.  PPM: Functioning normally, continue surveillance, mode switching less than 1%.  RTC 6 months.

## 2023-11-27 DIAGNOSIS — I10 ESSENTIAL HYPERTENSION, BENIGN: ICD-10-CM

## 2023-11-28 RX ORDER — APIXABAN 5 MG/1
TABLET, FILM COATED ORAL
Qty: 60 TABLET | Refills: 11 | Status: SHIPPED | OUTPATIENT
Start: 2023-11-28

## 2023-12-04 ENCOUNTER — TELEPHONE (OUTPATIENT)
Dept: CARDIOLOGY | Facility: MEDICAL CENTER | Age: 78
End: 2023-12-04
Payer: MEDICARE

## 2023-12-04 DIAGNOSIS — I48.0 PAF (PAROXYSMAL ATRIAL FIBRILLATION) (HCC): ICD-10-CM

## 2023-12-04 NOTE — TELEPHONE ENCOUNTER
DARION            Caller: Ophelia Sosa      Topic/issue: Patient was asking for a call back to relay her report about her AFIB and she was asking for a call back to go over it      Callback Number: [phone]    Thank you      -Franky MCCARTY

## 2023-12-05 NOTE — TELEPHONE ENCOUNTER
"Returned patient call. Patient reports she has kept track of her afib for the past month since seeing SW. She says it follows a pattern. Every am she wakes up at 6am and gets out of bed around 630a. Just before getting out of bed she feels the afib and it lasts for 3-5 mins. She states once she gets up and moving she doesn't feel it and says \"it doesn't stick around\". She also felt this after waking up from a nap a few times. BP numbers from past month are 130-140's/50's HR unavailable.   Patient reports dizziness and lightheadedness as baseline and states she has \"ear issues\" that affect equilibrium and are unrelated to her afib. Patient denies denies any other cardiac symptoms. Patient takes all medications as prescribed. Patient wants SW to be aware of this afib pattern. ER precautions given for worsening or continued symptoms.     SW: Please review above and advise if any new recommendations. Thank you.    "

## 2023-12-06 ENCOUNTER — NON-PROVIDER VISIT (OUTPATIENT)
Dept: CARDIOLOGY | Facility: MEDICAL CENTER | Age: 78
End: 2023-12-06
Attending: INTERNAL MEDICINE
Payer: MEDICARE

## 2023-12-06 DIAGNOSIS — I47.10 SVT (SUPRAVENTRICULAR TACHYCARDIA) (HCC): ICD-10-CM

## 2023-12-06 DIAGNOSIS — I49.3 PVC'S (PREMATURE VENTRICULAR CONTRACTIONS): ICD-10-CM

## 2023-12-06 DIAGNOSIS — I49.1 APC (ATRIAL PREMATURE CONTRACTIONS): ICD-10-CM

## 2023-12-06 DIAGNOSIS — I48.0 PAF (PAROXYSMAL ATRIAL FIBRILLATION) (HCC): ICD-10-CM

## 2023-12-06 NOTE — TELEPHONE ENCOUNTER
If the symptoms which are felt to be atrial fibrillation are occurring on a daily basis it would be worthwhile to document that it is in fact atrial fibrillation and if so to try to measure the amount or quantitate how much it is occurring.  If she is agreeable I would recommend a 7-day Zio patch cardiac monitor

## 2023-12-06 NOTE — PROGRESS NOTES
Home enrollment completed in the 7 day Zio XT Holter monitor per Aman Wilkinson MD.  Monitor will be shipped to patient via "[employer]". Pending EOS.

## 2023-12-06 NOTE — TELEPHONE ENCOUNTER
S/W pt, relayed feedback per SW. PT agrees to 7 day Trinoo, order placed. She asks that someone call her tomorrow to set this up. 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 She is also asking that it be mailed to her, transportation to our office is sometimes difficult for her.

## 2023-12-06 NOTE — TELEPHONE ENCOUNTER
Annette GUERRA  S/w patient, she is enrolled for a 7 day Zio XT heart monitor. Also gave her my direct line for any questions.

## 2023-12-26 ENCOUNTER — TELEPHONE (OUTPATIENT)
Dept: CARDIOLOGY | Facility: MEDICAL CENTER | Age: 78
End: 2023-12-26
Payer: MEDICARE

## 2023-12-26 DIAGNOSIS — I48.0 PAF (PAROXYSMAL ATRIAL FIBRILLATION) (HCC): ICD-10-CM

## 2023-12-26 PROCEDURE — 93244 EXT ECG>48HR<7D REV&INTERPJ: CPT | Performed by: INTERNAL MEDICINE

## 2023-12-28 ENCOUNTER — TELEPHONE (OUTPATIENT)
Dept: CARDIOLOGY | Facility: MEDICAL CENTER | Age: 78
End: 2023-12-28
Payer: MEDICARE

## 2023-12-28 DIAGNOSIS — I05.0 MITRAL VALVE STENOSIS, UNSPECIFIED ETIOLOGY: ICD-10-CM

## 2023-12-28 DIAGNOSIS — I27.20 PULMONARY HYPERTENSION (HCC): ICD-10-CM

## 2023-12-28 NOTE — TELEPHONE ENCOUNTER
----- Message from MARIFER Domínguez sent at 12/28/2023 11:22 AM PST -----  Monitor showed sinus rhythm with no AFib.  She did have some brief SVT episodes (up to 11 beats) and a 3.3% PAC rate.  If she is really symptomatic, could add some low dose Metoprolol 25mg PRN palpitations.  Otherwise, monitor was unremarkable.  Thanks, AB

## 2023-12-28 NOTE — TELEPHONE ENCOUNTER
S/W pt, relayed HM findings and feedback as stated by AB. She has palpitations mostly in the mornings, as noted by SW at last OV. She states she is feeling very tired over the last month or so. Denies SOB. Last echo done in 2019 showing mild mitral stenosis. Pt staying hydrated, no vitals to report, says she checks her blood sugar daily and they are running high. She will reach out to her PCP for appt, we reviewed some of her recent lab results with abnormal notations on fbs and CBC.     Pt wants to hold off on metoprolol for now, asking that I send msg to SW. She is aware he is out of office this week. She wants to know if he has feedback on why she is feeling very tired. I went over her HM findings in detail and she wants to talk to SW about the results directly when he is back in office.

## 2023-12-29 NOTE — TELEPHONE ENCOUNTER
S/W pt, relayed feedback as stated by SW. She agrees to proceed with echocardiogram, does not want to do the OPO at this time. She says she has done this test before and it was not helpful. She also sleeps with a cat that will not allow her to keep the device on her finger. We also discussed again that she can reach out to her PCP to go over her recent labs and her fatigue to get guidance outside of cardiac standpoint. Pt says she has some dietary changes she wants to make to see if this makes a difference.

## 2023-12-29 NOTE — TELEPHONE ENCOUNTER
Unfortunately there can be numerous individual causes or various combination of causes that can lead to feeling very tired.  From a heart standpoint we evaluated the rhythm with the heart monitor and fortunately it did not show any concerning abnormality   I would be reasonable to get an echocardiogram the last one being down 4 years ago to evaluate the heart valve issue, mitral stenosis   It also may be reasonable to check the night time oxygen level   I will put the order in for an echocardiogram and for an overnight oxygen test  Maybe this will give some answers

## 2024-01-01 ENCOUNTER — APPOINTMENT (OUTPATIENT)
Dept: RADIOLOGY | Facility: MEDICAL CENTER | Age: 79
DRG: 637 | End: 2024-01-01
Attending: HOSPITALIST
Payer: MEDICARE

## 2024-01-01 ENCOUNTER — APPOINTMENT (OUTPATIENT)
Dept: RADIOLOGY | Facility: MEDICAL CENTER | Age: 79
DRG: 637 | End: 2024-01-01
Attending: STUDENT IN AN ORGANIZED HEALTH CARE EDUCATION/TRAINING PROGRAM
Payer: MEDICARE

## 2024-01-01 ENCOUNTER — HOSPITAL ENCOUNTER (INPATIENT)
Facility: MEDICAL CENTER | Age: 79
LOS: 6 days | DRG: 637 | End: 2024-07-17
Attending: EMERGENCY MEDICINE | Admitting: STUDENT IN AN ORGANIZED HEALTH CARE EDUCATION/TRAINING PROGRAM
Payer: MEDICARE

## 2024-01-01 ENCOUNTER — APPOINTMENT (OUTPATIENT)
Dept: RADIOLOGY | Facility: MEDICAL CENTER | Age: 79
DRG: 637 | End: 2024-01-01
Attending: EMERGENCY MEDICINE
Payer: MEDICARE

## 2024-01-01 ENCOUNTER — TELEPHONE (OUTPATIENT)
Dept: CARDIOLOGY | Facility: MEDICAL CENTER | Age: 79
End: 2024-01-01
Payer: MEDICARE

## 2024-01-01 ENCOUNTER — OFFICE VISIT (OUTPATIENT)
Dept: CARDIOLOGY | Facility: MEDICAL CENTER | Age: 79
DRG: 637 | End: 2024-01-01
Attending: INTERNAL MEDICINE
Payer: MEDICARE

## 2024-01-01 ENCOUNTER — TELEPHONE (OUTPATIENT)
Dept: CARDIOLOGY | Facility: MEDICAL CENTER | Age: 79
End: 2024-01-01

## 2024-01-01 ENCOUNTER — TELEPHONE (OUTPATIENT)
Dept: HEALTH INFORMATION MANAGEMENT | Facility: OTHER | Age: 79
End: 2024-01-01

## 2024-01-01 VITALS
BODY MASS INDEX: 33.29 KG/M2 | OXYGEN SATURATION: 100 % | RESPIRATION RATE: 16 BRPM | HEART RATE: 103 BPM | WEIGHT: 212.08 LBS | DIASTOLIC BLOOD PRESSURE: 52 MMHG | SYSTOLIC BLOOD PRESSURE: 122 MMHG | HEIGHT: 67 IN

## 2024-01-01 VITALS
WEIGHT: 201.72 LBS | HEART RATE: 104 BPM | HEIGHT: 67 IN | RESPIRATION RATE: 17 BRPM | BODY MASS INDEX: 31.66 KG/M2 | SYSTOLIC BLOOD PRESSURE: 116 MMHG | TEMPERATURE: 97.8 F | DIASTOLIC BLOOD PRESSURE: 44 MMHG | OXYGEN SATURATION: 83 %

## 2024-01-01 DIAGNOSIS — R41.82 ALTERED MENTAL STATUS, UNSPECIFIED ALTERED MENTAL STATUS TYPE: ICD-10-CM

## 2024-01-01 DIAGNOSIS — R53.1 WEAKNESS: ICD-10-CM

## 2024-01-01 DIAGNOSIS — E11.65 TYPE 2 DIABETES MELLITUS WITH HYPERGLYCEMIA, UNSPECIFIED WHETHER LONG TERM INSULIN USE (HCC): ICD-10-CM

## 2024-01-01 DIAGNOSIS — R73.9 HYPERGLYCEMIA: ICD-10-CM

## 2024-01-01 DIAGNOSIS — E86.0 DEHYDRATION: ICD-10-CM

## 2024-01-01 DIAGNOSIS — I48.91 ATRIAL FIBRILLATION, UNSPECIFIED TYPE (HCC): ICD-10-CM

## 2024-01-01 DIAGNOSIS — I27.20 PULMONARY HYPERTENSION (HCC): Chronic | ICD-10-CM

## 2024-01-01 LAB
ALBUMIN SERPL BCP-MCNC: 3.4 G/DL (ref 3.2–4.9)
ALBUMIN SERPL BCP-MCNC: 3.4 G/DL (ref 3.2–4.9)
ALBUMIN SERPL BCP-MCNC: 3.9 G/DL (ref 3.2–4.9)
ALBUMIN/GLOB SERPL: 1.4 G/DL
ALBUMIN/GLOB SERPL: 1.4 G/DL
ALP SERPL-CCNC: 52 U/L (ref 30–99)
ALP SERPL-CCNC: 61 U/L (ref 30–99)
ALT SERPL-CCNC: 24 U/L (ref 2–50)
ALT SERPL-CCNC: 25 U/L (ref 2–50)
ANION GAP SERPL CALC-SCNC: 13 MMOL/L (ref 7–16)
ANION GAP SERPL CALC-SCNC: 15 MMOL/L (ref 7–16)
APPEARANCE UR: CLEAR
AST SERPL-CCNC: 19 U/L (ref 12–45)
AST SERPL-CCNC: 20 U/L (ref 12–45)
BACTERIA #/AREA URNS HPF: NEGATIVE /HPF
BACTERIA BLD CULT: NORMAL
BACTERIA BLD CULT: NORMAL
BACTERIA UR CULT: NORMAL
BACTERIA WND AEROBE CULT: NORMAL
BASOPHILS # BLD AUTO: 0.4 % (ref 0–1.8)
BASOPHILS # BLD AUTO: 0.6 % (ref 0–1.8)
BASOPHILS # BLD: 0.02 K/UL (ref 0–0.12)
BASOPHILS # BLD: 0.03 K/UL (ref 0–0.12)
BILIRUB SERPL-MCNC: 0.4 MG/DL (ref 0.1–1.5)
BILIRUB SERPL-MCNC: 0.4 MG/DL (ref 0.1–1.5)
BILIRUB UR QL STRIP.AUTO: NEGATIVE
BUN SERPL-MCNC: 26 MG/DL (ref 8–22)
BUN SERPL-MCNC: 38 MG/DL (ref 8–22)
BUN SERPL-MCNC: 45 MG/DL (ref 8–22)
CALCIUM ALBUM COR SERPL-MCNC: 10.4 MG/DL (ref 8.5–10.5)
CALCIUM ALBUM COR SERPL-MCNC: 10.6 MG/DL (ref 8.5–10.5)
CALCIUM ALBUM COR SERPL-MCNC: 10.7 MG/DL (ref 8.5–10.5)
CALCIUM SERPL-MCNC: 10.1 MG/DL (ref 8.5–10.5)
CALCIUM SERPL-MCNC: 10.6 MG/DL (ref 8.5–10.5)
CALCIUM SERPL-MCNC: 9.9 MG/DL (ref 8.5–10.5)
CHLORIDE SERPL-SCNC: 100 MMOL/L (ref 96–112)
CHLORIDE SERPL-SCNC: 102 MMOL/L (ref 96–112)
CHLORIDE SERPL-SCNC: 105 MMOL/L (ref 96–112)
CO2 SERPL-SCNC: 19 MMOL/L (ref 20–33)
CO2 SERPL-SCNC: 21 MMOL/L (ref 20–33)
CO2 SERPL-SCNC: 21 MMOL/L (ref 20–33)
COLOR UR: YELLOW
CREAT SERPL-MCNC: 0.95 MG/DL (ref 0.5–1.4)
CREAT SERPL-MCNC: 1.2 MG/DL (ref 0.5–1.4)
CREAT SERPL-MCNC: 1.47 MG/DL (ref 0.5–1.4)
EOSINOPHIL # BLD AUTO: 0.25 K/UL (ref 0–0.51)
EOSINOPHIL # BLD AUTO: 0.34 K/UL (ref 0–0.51)
EOSINOPHIL NFR BLD: 4.6 % (ref 0–6.9)
EOSINOPHIL NFR BLD: 7.5 % (ref 0–6.9)
EPI CELLS #/AREA URNS HPF: ABNORMAL /HPF
ERYTHROCYTE [DISTWIDTH] IN BLOOD BY AUTOMATED COUNT: 59.5 FL (ref 35.9–50)
ERYTHROCYTE [DISTWIDTH] IN BLOOD BY AUTOMATED COUNT: 60.4 FL (ref 35.9–50)
ERYTHROCYTE [DISTWIDTH] IN BLOOD BY AUTOMATED COUNT: 61.3 FL (ref 35.9–50)
ERYTHROCYTE [DISTWIDTH] IN BLOOD BY AUTOMATED COUNT: 62.5 FL (ref 35.9–50)
FLUAV RNA SPEC QL NAA+PROBE: NEGATIVE
FLUBV RNA SPEC QL NAA+PROBE: NEGATIVE
GAMMA INTERFERON BACKGROUND BLD IA-ACNC: 0.02 IU/ML
GFR SERPLBLD CREATININE-BSD FMLA CKD-EPI: 36 ML/MIN/1.73 M 2
GFR SERPLBLD CREATININE-BSD FMLA CKD-EPI: 46 ML/MIN/1.73 M 2
GFR SERPLBLD CREATININE-BSD FMLA CKD-EPI: 61 ML/MIN/1.73 M 2
GLOBULIN SER CALC-MCNC: 2.5 G/DL (ref 1.9–3.5)
GLOBULIN SER CALC-MCNC: 2.7 G/DL (ref 1.9–3.5)
GLUCOSE BLD STRIP.AUTO-MCNC: 148 MG/DL (ref 65–99)
GLUCOSE BLD STRIP.AUTO-MCNC: 162 MG/DL (ref 65–99)
GLUCOSE BLD STRIP.AUTO-MCNC: 176 MG/DL (ref 65–99)
GLUCOSE BLD STRIP.AUTO-MCNC: 186 MG/DL (ref 65–99)
GLUCOSE BLD STRIP.AUTO-MCNC: 190 MG/DL (ref 65–99)
GLUCOSE BLD STRIP.AUTO-MCNC: 195 MG/DL (ref 65–99)
GLUCOSE BLD STRIP.AUTO-MCNC: 198 MG/DL (ref 65–99)
GLUCOSE BLD STRIP.AUTO-MCNC: 200 MG/DL (ref 65–99)
GLUCOSE BLD STRIP.AUTO-MCNC: 205 MG/DL (ref 65–99)
GLUCOSE BLD STRIP.AUTO-MCNC: 216 MG/DL (ref 65–99)
GLUCOSE BLD STRIP.AUTO-MCNC: 243 MG/DL (ref 65–99)
GLUCOSE BLD-MCNC: 323 MG/DL (ref 65–99)
GLUCOSE SERPL-MCNC: 136 MG/DL (ref 65–99)
GLUCOSE SERPL-MCNC: 166 MG/DL (ref 65–99)
GLUCOSE SERPL-MCNC: 294 MG/DL (ref 65–99)
GLUCOSE UR STRIP.AUTO-MCNC: NEGATIVE MG/DL
GRAM STN SPEC: NORMAL
GRAM STN SPEC: NORMAL
HCT VFR BLD AUTO: 23 % (ref 37–47)
HCT VFR BLD AUTO: 24.8 % (ref 37–47)
HCT VFR BLD AUTO: 25.6 % (ref 37–47)
HCT VFR BLD AUTO: 26.4 % (ref 37–47)
HGB BLD-MCNC: 7 G/DL (ref 12–16)
HGB BLD-MCNC: 7.6 G/DL (ref 12–16)
HGB BLD-MCNC: 8.1 G/DL (ref 12–16)
HGB BLD-MCNC: 8.3 G/DL (ref 12–16)
HYALINE CASTS #/AREA URNS LPF: ABNORMAL /LPF
IMM GRANULOCYTES # BLD AUTO: 0.01 K/UL (ref 0–0.11)
IMM GRANULOCYTES # BLD AUTO: 0.02 K/UL (ref 0–0.11)
IMM GRANULOCYTES NFR BLD AUTO: 0.2 % (ref 0–0.9)
IMM GRANULOCYTES NFR BLD AUTO: 0.4 % (ref 0–0.9)
KETONES UR STRIP.AUTO-MCNC: ABNORMAL MG/DL
LACTATE SERPL-SCNC: 1.7 MMOL/L (ref 0.5–2)
LEUKOCYTE ESTERASE UR QL STRIP.AUTO: ABNORMAL
LYMPHOCYTES # BLD AUTO: 0.88 K/UL (ref 1–4.8)
LYMPHOCYTES # BLD AUTO: 1.16 K/UL (ref 1–4.8)
LYMPHOCYTES NFR BLD: 16.2 % (ref 22–41)
LYMPHOCYTES NFR BLD: 25.4 % (ref 22–41)
M TB IFN-G BLD-IMP: NEGATIVE
M TB IFN-G CD4+ BCKGRND COR BLD-ACNC: 0 IU/ML
MAGNESIUM SERPL-MCNC: 2.5 MG/DL (ref 1.5–2.5)
MCH RBC QN AUTO: 30.2 PG (ref 27–33)
MCH RBC QN AUTO: 30.5 PG (ref 27–33)
MCH RBC QN AUTO: 30.5 PG (ref 27–33)
MCH RBC QN AUTO: 30.6 PG (ref 27–33)
MCHC RBC AUTO-ENTMCNC: 30.4 G/DL (ref 32.2–35.5)
MCHC RBC AUTO-ENTMCNC: 30.6 G/DL (ref 32.2–35.5)
MCHC RBC AUTO-ENTMCNC: 31.4 G/DL (ref 32.2–35.5)
MCHC RBC AUTO-ENTMCNC: 31.6 G/DL (ref 32.2–35.5)
MCV RBC AUTO: 100.4 FL (ref 81.4–97.8)
MCV RBC AUTO: 95.5 FL (ref 81.4–97.8)
MCV RBC AUTO: 97.1 FL (ref 81.4–97.8)
MCV RBC AUTO: 99.6 FL (ref 81.4–97.8)
MICRO URNS: ABNORMAL
MITOGEN IGNF BCKGRD COR BLD-ACNC: 4.15 IU/ML
MONOCYTES # BLD AUTO: 0.37 K/UL (ref 0–0.85)
MONOCYTES # BLD AUTO: 0.43 K/UL (ref 0–0.85)
MONOCYTES NFR BLD AUTO: 6.8 % (ref 0–13.4)
MONOCYTES NFR BLD AUTO: 9.4 % (ref 0–13.4)
NEUTROPHILS # BLD AUTO: 2.6 K/UL (ref 1.82–7.42)
NEUTROPHILS # BLD AUTO: 3.88 K/UL (ref 1.82–7.42)
NEUTROPHILS NFR BLD: 57.1 % (ref 44–72)
NEUTROPHILS NFR BLD: 71.4 % (ref 44–72)
NITRITE UR QL STRIP.AUTO: NEGATIVE
NRBC # BLD AUTO: 0 K/UL
NRBC # BLD AUTO: 0 K/UL
NRBC BLD-RTO: 0 /100 WBC (ref 0–0.2)
NRBC BLD-RTO: 0 /100 WBC (ref 0–0.2)
PH UR STRIP.AUTO: 5.5 [PH] (ref 5–8)
PHOSPHATE SERPL-MCNC: 2.5 MG/DL (ref 2.5–4.5)
PLATELET # BLD AUTO: 143 K/UL (ref 164–446)
PLATELET # BLD AUTO: 169 K/UL (ref 164–446)
PLATELET # BLD AUTO: 169 K/UL (ref 164–446)
PLATELET # BLD AUTO: 170 K/UL (ref 164–446)
PMV BLD AUTO: 10.3 FL (ref 9–12.9)
PMV BLD AUTO: 10.3 FL (ref 9–12.9)
PMV BLD AUTO: 10.5 FL (ref 9–12.9)
PMV BLD AUTO: 10.6 FL (ref 9–12.9)
POTASSIUM SERPL-SCNC: 4.1 MMOL/L (ref 3.6–5.5)
POTASSIUM SERPL-SCNC: 4.3 MMOL/L (ref 3.6–5.5)
POTASSIUM SERPL-SCNC: 4.6 MMOL/L (ref 3.6–5.5)
PROCALCITONIN SERPL-MCNC: 0.09 NG/ML
PROT SERPL-MCNC: 5.9 G/DL (ref 6–8.2)
PROT SERPL-MCNC: 6.6 G/DL (ref 6–8.2)
PROT UR QL STRIP: NEGATIVE MG/DL
QFT TB2 - NIL TBQ2: 0 IU/ML
RBC # BLD AUTO: 2.29 M/UL (ref 4.2–5.4)
RBC # BLD AUTO: 2.49 M/UL (ref 4.2–5.4)
RBC # BLD AUTO: 2.68 M/UL (ref 4.2–5.4)
RBC # BLD AUTO: 2.72 M/UL (ref 4.2–5.4)
RBC # URNS HPF: ABNORMAL /HPF
RBC UR QL AUTO: NEGATIVE
RSV RNA SPEC QL NAA+PROBE: NEGATIVE
SARS-COV-2 RNA RESP QL NAA+PROBE: DETECTED
SIGNIFICANT IND 70042: NORMAL
SITE SITE: NORMAL
SODIUM SERPL-SCNC: 135 MMOL/L (ref 135–145)
SODIUM SERPL-SCNC: 136 MMOL/L (ref 135–145)
SODIUM SERPL-SCNC: 139 MMOL/L (ref 135–145)
SOURCE SOURCE: NORMAL
SP GR UR STRIP.AUTO: 1.02
SPECIMEN SOURCE: ABNORMAL
TROPONIN T SERPL-MCNC: 72 NG/L (ref 6–19)
TSH SERPL DL<=0.005 MIU/L-ACNC: 1.79 UIU/ML (ref 0.38–5.33)
UROBILINOGEN UR STRIP.AUTO-MCNC: 0.2 MG/DL
WBC # BLD AUTO: 4.6 K/UL (ref 4.8–10.8)
WBC # BLD AUTO: 5.4 K/UL (ref 4.8–10.8)
WBC # BLD AUTO: 6.2 K/UL (ref 4.8–10.8)
WBC # BLD AUTO: 7.4 K/UL (ref 4.8–10.8)
WBC #/AREA URNS HPF: ABNORMAL /HPF

## 2024-01-01 PROCEDURE — 700102 HCHG RX REV CODE 250 W/ 637 OVERRIDE(OP): Performed by: STUDENT IN AN ORGANIZED HEALTH CARE EDUCATION/TRAINING PROGRAM

## 2024-01-01 PROCEDURE — 700102 HCHG RX REV CODE 250 W/ 637 OVERRIDE(OP)

## 2024-01-01 PROCEDURE — 3078F DIAST BP <80 MM HG: CPT | Performed by: INTERNAL MEDICINE

## 2024-01-01 PROCEDURE — 770004 HCHG ROOM/CARE - ONCOLOGY PRIVATE *

## 2024-01-01 PROCEDURE — 700101 HCHG RX REV CODE 250: Performed by: HOSPITALIST

## 2024-01-01 PROCEDURE — 81001 URINALYSIS AUTO W/SCOPE: CPT

## 2024-01-01 PROCEDURE — A9270 NON-COVERED ITEM OR SERVICE: HCPCS

## 2024-01-01 PROCEDURE — 700111 HCHG RX REV CODE 636 W/ 250 OVERRIDE (IP): Mod: JZ

## 2024-01-01 PROCEDURE — A9270 NON-COVERED ITEM OR SERVICE: HCPCS | Performed by: INTERNAL MEDICINE

## 2024-01-01 PROCEDURE — 99233 SBSQ HOSP IP/OBS HIGH 50: CPT | Performed by: HOSPITALIST

## 2024-01-01 PROCEDURE — 87086 URINE CULTURE/COLONY COUNT: CPT

## 2024-01-01 PROCEDURE — 94669 MECHANICAL CHEST WALL OSCILL: CPT

## 2024-01-01 PROCEDURE — 36415 COLL VENOUS BLD VENIPUNCTURE: CPT

## 2024-01-01 PROCEDURE — 85027 COMPLETE CBC AUTOMATED: CPT

## 2024-01-01 PROCEDURE — A9270 NON-COVERED ITEM OR SERVICE: HCPCS | Performed by: FAMILY MEDICINE

## 2024-01-01 PROCEDURE — 80053 COMPREHEN METABOLIC PANEL: CPT

## 2024-01-01 PROCEDURE — 8E0ZXY6 ISOLATION: ICD-10-PCS | Performed by: INTERNAL MEDICINE

## 2024-01-01 PROCEDURE — 70450 CT HEAD/BRAIN W/O DYE: CPT

## 2024-01-01 PROCEDURE — 73620 X-RAY EXAM OF FOOT: CPT | Mod: RT

## 2024-01-01 PROCEDURE — 87040 BLOOD CULTURE FOR BACTERIA: CPT

## 2024-01-01 PROCEDURE — 700102 HCHG RX REV CODE 250 W/ 637 OVERRIDE(OP): Performed by: INTERNAL MEDICINE

## 2024-01-01 PROCEDURE — 700111 HCHG RX REV CODE 636 W/ 250 OVERRIDE (IP): Mod: JZ | Performed by: INTERNAL MEDICINE

## 2024-01-01 PROCEDURE — 770001 HCHG ROOM/CARE - MED/SURG/GYN PRIV*

## 2024-01-01 PROCEDURE — 770020 HCHG ROOM/CARE - TELE (206)

## 2024-01-01 PROCEDURE — 99285 EMERGENCY DEPT VISIT HI MDM: CPT

## 2024-01-01 PROCEDURE — 84443 ASSAY THYROID STIM HORMONE: CPT

## 2024-01-01 PROCEDURE — 85025 COMPLETE CBC W/AUTO DIFF WBC: CPT

## 2024-01-01 PROCEDURE — 700102 HCHG RX REV CODE 250 W/ 637 OVERRIDE(OP): Performed by: FAMILY MEDICINE

## 2024-01-01 PROCEDURE — 99233 SBSQ HOSP IP/OBS HIGH 50: CPT | Performed by: INTERNAL MEDICINE

## 2024-01-01 PROCEDURE — 80069 RENAL FUNCTION PANEL: CPT

## 2024-01-01 PROCEDURE — 99233 SBSQ HOSP IP/OBS HIGH 50: CPT | Mod: 25 | Performed by: HOSPITALIST

## 2024-01-01 PROCEDURE — 83605 ASSAY OF LACTIC ACID: CPT

## 2024-01-01 PROCEDURE — 3074F SYST BP LT 130 MM HG: CPT | Performed by: INTERNAL MEDICINE

## 2024-01-01 PROCEDURE — 71045 X-RAY EXAM CHEST 1 VIEW: CPT

## 2024-01-01 PROCEDURE — 84484 ASSAY OF TROPONIN QUANT: CPT

## 2024-01-01 PROCEDURE — 99497 ADVNCD CARE PLAN 30 MIN: CPT | Performed by: HOSPITALIST

## 2024-01-01 PROCEDURE — 84145 PROCALCITONIN (PCT): CPT

## 2024-01-01 PROCEDURE — 700105 HCHG RX REV CODE 258

## 2024-01-01 PROCEDURE — 86480 TB TEST CELL IMMUN MEASURE: CPT

## 2024-01-01 PROCEDURE — 87070 CULTURE OTHR SPECIMN AEROBIC: CPT

## 2024-01-01 PROCEDURE — 99231 SBSQ HOSP IP/OBS SF/LOW 25: CPT | Performed by: FAMILY MEDICINE

## 2024-01-01 PROCEDURE — 0241U HCHG SARS-COV-2 COVID-19 NFCT DS RESP RNA 4 TRGT MIC: CPT

## 2024-01-01 PROCEDURE — 82962 GLUCOSE BLOOD TEST: CPT | Performed by: INTERNAL MEDICINE

## 2024-01-01 PROCEDURE — 99215 OFFICE O/P EST HI 40 MIN: CPT | Performed by: INTERNAL MEDICINE

## 2024-01-01 PROCEDURE — 87205 SMEAR GRAM STAIN: CPT

## 2024-01-01 PROCEDURE — A9270 NON-COVERED ITEM OR SERVICE: HCPCS | Performed by: HOSPITALIST

## 2024-01-01 PROCEDURE — 82962 GLUCOSE BLOOD TEST: CPT

## 2024-01-01 PROCEDURE — 82962 GLUCOSE BLOOD TEST: CPT | Mod: 91

## 2024-01-01 PROCEDURE — 700102 HCHG RX REV CODE 250 W/ 637 OVERRIDE(OP): Performed by: HOSPITALIST

## 2024-01-01 PROCEDURE — 700105 HCHG RX REV CODE 258: Performed by: EMERGENCY MEDICINE

## 2024-01-01 PROCEDURE — 99223 1ST HOSP IP/OBS HIGH 75: CPT | Mod: AI,GC | Performed by: STUDENT IN AN ORGANIZED HEALTH CARE EDUCATION/TRAINING PROGRAM

## 2024-01-01 PROCEDURE — 83735 ASSAY OF MAGNESIUM: CPT

## 2024-01-01 RX ORDER — ACETAMINOPHEN 500 MG
1000 TABLET ORAL EVERY 6 HOURS
Status: DISCONTINUED | OUTPATIENT
Start: 2024-01-01 | End: 2024-01-01

## 2024-01-01 RX ORDER — GINSENG 100 MG
50 CAPSULE ORAL DAILY
COMMUNITY

## 2024-01-01 RX ORDER — HYDROXYZINE HYDROCHLORIDE 25 MG/1
25 TABLET, FILM COATED ORAL 3 TIMES DAILY PRN
Status: DISCONTINUED | OUTPATIENT
Start: 2024-01-01 | End: 2024-01-01

## 2024-01-01 RX ORDER — ACETAMINOPHEN 500 MG
1000 TABLET ORAL EVERY 6 HOURS PRN
Status: DISCONTINUED | OUTPATIENT
Start: 2024-01-01 | End: 2024-01-01

## 2024-01-01 RX ORDER — INSULIN LISPRO 100 [IU]/ML
2-9 INJECTION, SOLUTION INTRAVENOUS; SUBCUTANEOUS
Status: DISCONTINUED | OUTPATIENT
Start: 2024-01-01 | End: 2024-01-01

## 2024-01-01 RX ORDER — DIPHENHYDRAMINE HYDROCHLORIDE 50 MG/ML
25 INJECTION INTRAMUSCULAR; INTRAVENOUS EVERY 6 HOURS PRN
Status: DISCONTINUED | OUTPATIENT
Start: 2024-01-01 | End: 2024-01-01

## 2024-01-01 RX ORDER — DEXTROSE MONOHYDRATE 25 G/50ML
25 INJECTION, SOLUTION INTRAVENOUS
Status: DISCONTINUED | OUTPATIENT
Start: 2024-01-01 | End: 2024-01-01

## 2024-01-01 RX ORDER — FLUTICASONE PROPIONATE 50 MCG
1 SPRAY, SUSPENSION (ML) NASAL DAILY
Status: DISCONTINUED | OUTPATIENT
Start: 2024-01-01 | End: 2024-01-01

## 2024-01-01 RX ORDER — B-COMPLEX WITH VITAMIN C
1 TABLET ORAL DAILY
COMMUNITY

## 2024-01-01 RX ORDER — HALOPERIDOL 5 MG/ML
2 INJECTION INTRAMUSCULAR EVERY 4 HOURS PRN
Status: DISCONTINUED | OUTPATIENT
Start: 2024-01-01 | End: 2024-01-01

## 2024-01-01 RX ORDER — OXYCODONE HYDROCHLORIDE 5 MG/1
5 TABLET ORAL EVERY 6 HOURS PRN
Status: DISCONTINUED | OUTPATIENT
Start: 2024-01-01 | End: 2024-01-01

## 2024-01-01 RX ORDER — SERTRALINE HYDROCHLORIDE 25 MG/1
25 TABLET, FILM COATED ORAL DAILY
COMMUNITY

## 2024-01-01 RX ORDER — HYDROMORPHONE HYDROCHLORIDE 1 MG/ML
0.25 INJECTION, SOLUTION INTRAMUSCULAR; INTRAVENOUS; SUBCUTANEOUS
Status: DISCONTINUED | OUTPATIENT
Start: 2024-01-01 | End: 2024-01-01

## 2024-01-01 RX ORDER — ATROPINE SULFATE 10 MG/ML
2 SOLUTION/ DROPS OPHTHALMIC EVERY 4 HOURS PRN
Status: DISCONTINUED | OUTPATIENT
Start: 2024-01-01 | End: 2024-01-01 | Stop reason: HOSPADM

## 2024-01-01 RX ORDER — ACETAMINOPHEN 325 MG/1
325-650 TABLET ORAL EVERY 4 HOURS PRN
COMMUNITY

## 2024-01-01 RX ORDER — QUETIAPINE FUMARATE 25 MG/1
25 TABLET, FILM COATED ORAL 3 TIMES DAILY
Status: DISCONTINUED | OUTPATIENT
Start: 2024-01-01 | End: 2024-01-01

## 2024-01-01 RX ORDER — GLIPIZIDE 10 MG/1
10 TABLET ORAL
COMMUNITY

## 2024-01-01 RX ORDER — ALPRAZOLAM 0.5 MG
0.5 TABLET ORAL 4 TIMES DAILY PRN
Status: DISCONTINUED | OUTPATIENT
Start: 2024-01-01 | End: 2024-01-01

## 2024-01-01 RX ORDER — SERTRALINE HYDROCHLORIDE 25 MG/1
25 TABLET, FILM COATED ORAL DAILY
Status: DISCONTINUED | OUTPATIENT
Start: 2024-01-01 | End: 2024-01-01

## 2024-01-01 RX ORDER — HALOPERIDOL 5 MG/ML
5 INJECTION INTRAMUSCULAR EVERY 4 HOURS PRN
Status: DISCONTINUED | OUTPATIENT
Start: 2024-01-01 | End: 2024-01-01 | Stop reason: HOSPADM

## 2024-01-01 RX ORDER — ACETAMINOPHEN 500 MG
1000 TABLET ORAL EVERY 6 HOURS PRN
Status: DISCONTINUED | OUTPATIENT
Start: 2024-07-24 | End: 2024-01-01 | Stop reason: HOSPADM

## 2024-01-01 RX ORDER — POTASSIUM CHLORIDE 750 MG/1
10 TABLET, EXTENDED RELEASE ORAL DAILY
COMMUNITY

## 2024-01-01 RX ORDER — INSULIN LISPRO 100 [IU]/ML
2-9 INJECTION, SOLUTION INTRAVENOUS; SUBCUTANEOUS EVERY 6 HOURS
Status: DISCONTINUED | OUTPATIENT
Start: 2024-01-01 | End: 2024-01-01

## 2024-01-01 RX ORDER — M-VIT,TX,IRON,MINS/CALC/FOLIC 27MG-0.4MG
1 TABLET ORAL DAILY
COMMUNITY

## 2024-01-01 RX ORDER — BUSPIRONE HYDROCHLORIDE 10 MG/1
5 TABLET ORAL 2 TIMES DAILY
Status: DISCONTINUED | OUTPATIENT
Start: 2024-01-01 | End: 2024-01-01 | Stop reason: HOSPADM

## 2024-01-01 RX ORDER — MORPHINE SULFATE 100 MG/5ML
20 SOLUTION ORAL
Status: DISCONTINUED | OUTPATIENT
Start: 2024-01-01 | End: 2024-01-01 | Stop reason: HOSPADM

## 2024-01-01 RX ORDER — CLONIDINE HYDROCHLORIDE 0.1 MG/1
0.1 TABLET ORAL EVERY 6 HOURS PRN
COMMUNITY

## 2024-01-01 RX ORDER — SODIUM CHLORIDE, SODIUM LACTATE, POTASSIUM CHLORIDE, CALCIUM CHLORIDE 600; 310; 30; 20 MG/100ML; MG/100ML; MG/100ML; MG/100ML
1000 INJECTION, SOLUTION INTRAVENOUS ONCE
Status: COMPLETED | OUTPATIENT
Start: 2024-01-01 | End: 2024-01-01

## 2024-01-01 RX ORDER — MORPHINE SULFATE 100 MG/5ML
10 SOLUTION ORAL
Status: DISCONTINUED | OUTPATIENT
Start: 2024-01-01 | End: 2024-01-01

## 2024-01-01 RX ORDER — QUETIAPINE FUMARATE 25 MG/1
25 TABLET, FILM COATED ORAL 2 TIMES DAILY
Status: DISCONTINUED | OUTPATIENT
Start: 2024-01-01 | End: 2024-01-01

## 2024-01-01 RX ORDER — LINEZOLID 600 MG/1
600 TABLET, FILM COATED ORAL 2 TIMES DAILY
COMMUNITY

## 2024-01-01 RX ORDER — ACETAMINOPHEN 500 MG
1000 TABLET ORAL EVERY 6 HOURS PRN
Status: DISCONTINUED | OUTPATIENT
Start: 2024-01-01 | End: 2024-01-01 | Stop reason: HOSPADM

## 2024-01-01 RX ORDER — OXYCODONE HYDROCHLORIDE 5 MG/1
2.5 TABLET ORAL EVERY 6 HOURS PRN
Status: DISCONTINUED | OUTPATIENT
Start: 2024-01-01 | End: 2024-01-01

## 2024-01-01 RX ORDER — ALPRAZOLAM 0.25 MG
0.25 TABLET ORAL EVERY 8 HOURS PRN
COMMUNITY

## 2024-01-01 RX ORDER — GLIPIZIDE 5 MG/1
5 TABLET ORAL
COMMUNITY

## 2024-01-01 RX ORDER — LORAZEPAM 2 MG/ML
0.5 CONCENTRATE ORAL
Status: DISCONTINUED | OUTPATIENT
Start: 2024-01-01 | End: 2024-01-01 | Stop reason: HOSPADM

## 2024-01-01 RX ORDER — SODIUM CHLORIDE, SODIUM LACTATE, POTASSIUM CHLORIDE, CALCIUM CHLORIDE 600; 310; 30; 20 MG/100ML; MG/100ML; MG/100ML; MG/100ML
INJECTION, SOLUTION INTRAVENOUS CONTINUOUS
Status: DISCONTINUED | OUTPATIENT
Start: 2024-01-01 | End: 2024-01-01

## 2024-01-01 RX ORDER — ASCORBIC ACID 500 MG
500 TABLET ORAL DAILY
COMMUNITY

## 2024-01-01 RX ORDER — ATORVASTATIN CALCIUM 10 MG/1
10 TABLET, FILM COATED ORAL EVERY EVENING
Status: DISCONTINUED | OUTPATIENT
Start: 2024-01-01 | End: 2024-01-01

## 2024-01-01 RX ORDER — ALBUTEROL SULFATE 90 UG/1
2 AEROSOL, METERED RESPIRATORY (INHALATION) EVERY 6 HOURS PRN
Status: DISCONTINUED | OUTPATIENT
Start: 2024-01-01 | End: 2024-01-01 | Stop reason: HOSPADM

## 2024-01-01 RX ORDER — ATORVASTATIN CALCIUM 10 MG/1
10 TABLET, FILM COATED ORAL DAILY
COMMUNITY

## 2024-01-01 RX ORDER — MEGESTROL ACETATE 40 MG/ML
400 SUSPENSION ORAL 2 TIMES DAILY
COMMUNITY

## 2024-01-01 RX ORDER — INSULIN LISPRO 100 [IU]/ML
2-10 INJECTION, SOLUTION INTRAVENOUS; SUBCUTANEOUS
COMMUNITY

## 2024-01-01 RX ADMIN — DIPHENHYDRAMINE HYDROCHLORIDE 25 MG: 50 INJECTION, SOLUTION INTRAMUSCULAR; INTRAVENOUS at 05:03

## 2024-01-01 RX ADMIN — QUETIAPINE FUMARATE 25 MG: 25 TABLET ORAL at 12:05

## 2024-01-01 RX ADMIN — ACETAMINOPHEN 1000 MG: 500 TABLET ORAL at 21:58

## 2024-01-01 RX ADMIN — MORPHINE SULFATE 10 MG: 100 SOLUTION ORAL at 17:55

## 2024-01-01 RX ADMIN — OXYCODONE 5 MG: 5 TABLET ORAL at 05:57

## 2024-01-01 RX ADMIN — MORPHINE SULFATE 10 MG: 10 INJECTION INTRAVENOUS at 13:27

## 2024-01-01 RX ADMIN — MORPHINE SULFATE 10 MG: 10 INJECTION INTRAVENOUS at 11:20

## 2024-01-01 RX ADMIN — MORPHINE SULFATE 20 MG: 100 SOLUTION ORAL at 00:58

## 2024-01-01 RX ADMIN — ATROPINE SULFATE 2 DROP: 10 SOLUTION/ DROPS OPHTHALMIC at 17:32

## 2024-01-01 RX ADMIN — HYDROXYZINE HYDROCHLORIDE 25 MG: 25 TABLET, FILM COATED ORAL at 22:08

## 2024-01-01 RX ADMIN — HALOPERIDOL LACTATE 5 MG: 5 INJECTION, SOLUTION INTRAMUSCULAR at 16:13

## 2024-01-01 RX ADMIN — OXYCODONE 2.5 MG: 5 TABLET ORAL at 14:54

## 2024-01-01 RX ADMIN — INSULIN GLARGINE-YFGN 10 UNITS: 100 INJECTION, SOLUTION SUBCUTANEOUS at 18:28

## 2024-01-01 RX ADMIN — MORPHINE SULFATE 10 MG: 10 INJECTION INTRAVENOUS at 14:46

## 2024-01-01 RX ADMIN — SODIUM CHLORIDE, POTASSIUM CHLORIDE, SODIUM LACTATE AND CALCIUM CHLORIDE: 600; 310; 30; 20 INJECTION, SOLUTION INTRAVENOUS at 20:32

## 2024-01-01 RX ADMIN — MORPHINE SULFATE 10 MG: 10 INJECTION INTRAVENOUS at 20:45

## 2024-01-01 RX ADMIN — LORAZEPAM 0.5 MG: 2 LIQUID ORAL at 14:45

## 2024-01-01 RX ADMIN — HYDROXYZINE HYDROCHLORIDE 25 MG: 25 TABLET, FILM COATED ORAL at 07:18

## 2024-01-01 RX ADMIN — MORPHINE SULFATE 10 MG: 100 SOLUTION ORAL at 22:08

## 2024-01-01 RX ADMIN — DIPHENHYDRAMINE HYDROCHLORIDE 25 MG: 50 INJECTION, SOLUTION INTRAMUSCULAR; INTRAVENOUS at 00:58

## 2024-01-01 RX ADMIN — Medication 5 MG: at 21:58

## 2024-01-01 RX ADMIN — ACETAMINOPHEN 1000 MG: 500 TABLET ORAL at 23:46

## 2024-01-01 RX ADMIN — SODIUM CHLORIDE, POTASSIUM CHLORIDE, SODIUM LACTATE AND CALCIUM CHLORIDE: 600; 310; 30; 20 INJECTION, SOLUTION INTRAVENOUS at 03:39

## 2024-01-01 RX ADMIN — ACETAMINOPHEN 1000 MG: 500 TABLET ORAL at 17:55

## 2024-01-01 RX ADMIN — SODIUM CHLORIDE, POTASSIUM CHLORIDE, SODIUM LACTATE AND CALCIUM CHLORIDE 1000 ML: 600; 310; 30; 20 INJECTION, SOLUTION INTRAVENOUS at 17:00

## 2024-01-01 RX ADMIN — MORPHINE SULFATE 20 MG: 100 SOLUTION ORAL at 10:25

## 2024-01-01 RX ADMIN — HALOPERIDOL LACTATE 5 MG: 5 INJECTION, SOLUTION INTRAMUSCULAR at 05:34

## 2024-01-01 RX ADMIN — MORPHINE SULFATE 10 MG: 10 INJECTION INTRAVENOUS at 21:36

## 2024-01-01 RX ADMIN — HALOPERIDOL LACTATE 2 MG: 5 INJECTION, SOLUTION INTRAMUSCULAR at 10:03

## 2024-01-01 RX ADMIN — DIPHENHYDRAMINE HYDROCHLORIDE 25 MG: 50 INJECTION, SOLUTION INTRAMUSCULAR; INTRAVENOUS at 08:08

## 2024-01-01 RX ADMIN — ATORVASTATIN CALCIUM 10 MG: 10 TABLET, FILM COATED ORAL at 17:53

## 2024-01-01 RX ADMIN — MORPHINE SULFATE 10 MG: 10 INJECTION INTRAVENOUS at 14:58

## 2024-01-01 RX ADMIN — INSULIN LISPRO 2 UNITS: 100 INJECTION, SOLUTION INTRAVENOUS; SUBCUTANEOUS at 18:27

## 2024-01-01 RX ADMIN — HYDROXYZINE HYDROCHLORIDE 25 MG: 25 TABLET, FILM COATED ORAL at 10:13

## 2024-01-01 RX ADMIN — INSULIN LISPRO 2 UNITS: 100 INJECTION, SOLUTION INTRAVENOUS; SUBCUTANEOUS at 10:05

## 2024-01-01 RX ADMIN — DIPHENHYDRAMINE HYDROCHLORIDE 25 MG: 50 INJECTION, SOLUTION INTRAMUSCULAR; INTRAVENOUS at 13:30

## 2024-01-01 RX ADMIN — MORPHINE SULFATE 10 MG: 10 INJECTION INTRAVENOUS at 08:09

## 2024-01-01 RX ADMIN — QUETIAPINE FUMARATE 25 MG: 25 TABLET ORAL at 19:56

## 2024-01-01 RX ADMIN — ALPRAZOLAM 0.5 MG: 0.5 TABLET ORAL at 05:20

## 2024-01-01 RX ADMIN — LORAZEPAM 0.5 MG: 2 LIQUID ORAL at 10:46

## 2024-01-01 RX ADMIN — ACETAMINOPHEN 1000 MG: 500 TABLET ORAL at 17:53

## 2024-01-01 RX ADMIN — MORPHINE SULFATE 10 MG: 10 INJECTION INTRAVENOUS at 22:40

## 2024-01-01 RX ADMIN — DIPHENHYDRAMINE HYDROCHLORIDE 25 MG: 50 INJECTION, SOLUTION INTRAMUSCULAR; INTRAVENOUS at 20:51

## 2024-01-01 RX ADMIN — SERTRALINE 25 MG: 50 TABLET, FILM COATED ORAL at 05:22

## 2024-01-01 RX ADMIN — ACETAMINOPHEN 1000 MG: 500 TABLET ORAL at 05:22

## 2024-01-01 RX ADMIN — HALOPERIDOL LACTATE 5 MG: 5 INJECTION, SOLUTION INTRAMUSCULAR at 09:47

## 2024-01-01 RX ADMIN — MORPHINE SULFATE 10 MG: 100 SOLUTION ORAL at 18:55

## 2024-01-01 RX ADMIN — QUETIAPINE FUMARATE 25 MG: 25 TABLET ORAL at 12:45

## 2024-01-01 RX ADMIN — MORPHINE SULFATE 10 MG: 100 SOLUTION ORAL at 15:47

## 2024-01-01 RX ADMIN — INSULIN LISPRO 3 UNITS: 100 INJECTION, SOLUTION INTRAVENOUS; SUBCUTANEOUS at 14:44

## 2024-01-01 RX ADMIN — INSULIN LISPRO 2 UNITS: 100 INJECTION, SOLUTION INTRAVENOUS; SUBCUTANEOUS at 10:53

## 2024-01-01 RX ADMIN — MORPHINE SULFATE 20 MG: 100 SOLUTION ORAL at 09:12

## 2024-01-01 RX ADMIN — ACETAMINOPHEN 1000 MG: 500 TABLET ORAL at 14:42

## 2024-01-01 RX ADMIN — SERTRALINE 25 MG: 50 TABLET, FILM COATED ORAL at 05:00

## 2024-01-01 RX ADMIN — OXYCODONE 5 MG: 5 TABLET ORAL at 21:58

## 2024-01-01 RX ADMIN — OXYCODONE 2.5 MG: 5 TABLET ORAL at 21:05

## 2024-01-01 RX ADMIN — DIPHENHYDRAMINE HYDROCHLORIDE 25 MG: 50 INJECTION, SOLUTION INTRAMUSCULAR; INTRAVENOUS at 22:06

## 2024-01-01 RX ADMIN — INSULIN LISPRO 3 UNITS: 100 INJECTION, SOLUTION INTRAVENOUS; SUBCUTANEOUS at 14:46

## 2024-01-01 RX ADMIN — INSULIN LISPRO 3 UNITS: 100 INJECTION, SOLUTION INTRAVENOUS; SUBCUTANEOUS at 17:59

## 2024-01-01 RX ADMIN — HALOPERIDOL LACTATE 2 MG: 5 INJECTION, SOLUTION INTRAMUSCULAR at 19:45

## 2024-01-01 RX ADMIN — ACETAMINOPHEN 1000 MG: 500 TABLET ORAL at 05:00

## 2024-01-01 RX ADMIN — QUETIAPINE FUMARATE 25 MG: 25 TABLET ORAL at 18:31

## 2024-01-01 RX ADMIN — INSULIN LISPRO 2 UNITS: 100 INJECTION, SOLUTION INTRAVENOUS; SUBCUTANEOUS at 21:09

## 2024-01-01 RX ADMIN — APIXABAN 5 MG: 5 TABLET, FILM COATED ORAL at 05:22

## 2024-01-01 RX ADMIN — QUETIAPINE FUMARATE 25 MG: 25 TABLET ORAL at 08:02

## 2024-01-01 RX ADMIN — ALPRAZOLAM 0.5 MG: 0.5 TABLET ORAL at 12:43

## 2024-01-01 RX ADMIN — MORPHINE SULFATE 10 MG: 10 INJECTION INTRAVENOUS at 06:30

## 2024-01-01 RX ADMIN — MORPHINE SULFATE 20 MG: 100 SOLUTION ORAL at 13:27

## 2024-01-01 RX ADMIN — QUETIAPINE FUMARATE 25 MG: 25 TABLET ORAL at 05:00

## 2024-01-01 RX ADMIN — INSULIN LISPRO 2 UNITS: 100 INJECTION, SOLUTION INTRAVENOUS; SUBCUTANEOUS at 23:42

## 2024-01-01 SDOH — ECONOMIC STABILITY: TRANSPORTATION INSECURITY
IN THE PAST 12 MONTHS, HAS THE LACK OF TRANSPORTATION KEPT YOU FROM MEDICAL APPOINTMENTS OR FROM GETTING MEDICATIONS?: PATIENT UNABLE TO ANSWER

## 2024-01-01 SDOH — ECONOMIC STABILITY: TRANSPORTATION INSECURITY
IN THE PAST 12 MONTHS, HAS LACK OF RELIABLE TRANSPORTATION KEPT YOU FROM MEDICAL APPOINTMENTS, MEETINGS, WORK OR FROM GETTING THINGS NEEDED FOR DAILY LIVING?: PATIENT UNABLE TO ANSWER

## 2024-01-01 ASSESSMENT — PAIN SCALES - PAIN ASSESSMENT IN ADVANCED DEMENTIA (PAINAD)
BREATHING: OCCASIONAL LABORED BREATHING, SHORT PERIOD OF HYPERVENTILATION
TOTALSCORE: 5
TOTALSCORE: 4
CONSOLABILITY: NO NEED TO CONSOLE
TOTALSCORE: 2
BODYLANGUAGE: TENSE, DISTRESSED PACING, FIDGETING
BODYLANGUAGE: TENSE, DISTRESSED PACING, FIDGETING
BODYLANGUAGE: RELAXED
CONSOLABILITY: DISTRACTED OR REASSURED BY VOICE/TOUCH
CONSOLABILITY: UNABLE TO CONSOLE, DISTRACT OR REASSURE
FACIALEXPRESSION: SMILING OR INEXPRESSIVE
BODYLANGUAGE: RIGID, FISTS CLENCHED, KNEES UP, PUSHING/PULLING AWAY, STRIKES OUT
NEGVOCALIZATION: OCCASIONAL MOAN/GROAN, LOW SPEECH, NEGATIVE/DISAPPROVING QUALITY
NEGVOCALIZATION: OCCASIONAL MOAN/GROAN, LOW SPEECH, NEGATIVE/DISAPPROVING QUALITY
NEGVOCALIZATION: REPEATED TROUBLED CALLING OUT, LOUD MOANING/GROANING, CRYING
NEGVOCALIZATION: REPEATED TROUBLED CALLING OUT, LOUD MOANING/GROANING, CRYING
FACIALEXPRESSION: SAD, FRIGHTENED, FROWN
FACIALEXPRESSION: SAD, FRIGHTENED, FROWN
NEGVOCALIZATION: REPEATED TROUBLED CALLING OUT, LOUD MOANING/GROANING, CRYING
FACIALEXPRESSION: SMILING OR INEXPRESSIVE
CONSOLABILITY: DISTRACTED OR REASSURED BY VOICE/TOUCH
FACIALEXPRESSION: FACIAL GRIMACING
CONSOLABILITY: UNABLE TO CONSOLE, DISTRACT OR REASSURE
TOTALSCORE: 9
BREATHING: NORMAL
BREATHING: NORMAL
BREATHING: OCCASIONAL LABORED BREATHING, SHORT PERIOD OF HYPERVENTILATION
BREATHING: NORMAL
TOTALSCORE: 4
BODYLANGUAGE: RELAXED

## 2024-01-01 ASSESSMENT — COGNITIVE AND FUNCTIONAL STATUS - GENERAL
MOBILITY SCORE: 18
SUGGESTED CMS G CODE MODIFIER DAILY ACTIVITY: CK
TURNING FROM BACK TO SIDE WHILE IN FLAT BAD: A LITTLE
DRESSING REGULAR UPPER BODY CLOTHING: A LITTLE
TURNING FROM BACK TO SIDE WHILE IN FLAT BAD: A LITTLE
MOVING FROM LYING ON BACK TO SITTING ON SIDE OF FLAT BED: A LITTLE
DAILY ACTIVITIY SCORE: 18
TOILETING: A LITTLE
PERSONAL GROOMING: A LITTLE
DRESSING REGULAR LOWER BODY CLOTHING: A LITTLE
DRESSING REGULAR UPPER BODY CLOTHING: A LITTLE
MOVING FROM LYING ON BACK TO SITTING ON SIDE OF FLAT BED: A LITTLE
MOVING TO AND FROM BED TO CHAIR: A LITTLE
SUGGESTED CMS G CODE MODIFIER MOBILITY: CK
EATING MEALS: A LITTLE
DAILY ACTIVITIY SCORE: 18
MOVING TO AND FROM BED TO CHAIR: A LITTLE
EATING MEALS: A LITTLE
MOBILITY SCORE: 18
SUGGESTED CMS G CODE MODIFIER MOBILITY: CK
HELP NEEDED FOR BATHING: A LITTLE
HELP NEEDED FOR BATHING: A LITTLE
STANDING UP FROM CHAIR USING ARMS: A LITTLE
WALKING IN HOSPITAL ROOM: A LITTLE
SUGGESTED CMS G CODE MODIFIER DAILY ACTIVITY: CK
PERSONAL GROOMING: A LITTLE
CLIMB 3 TO 5 STEPS WITH RAILING: A LITTLE
WALKING IN HOSPITAL ROOM: A LITTLE
DRESSING REGULAR LOWER BODY CLOTHING: A LITTLE
CLIMB 3 TO 5 STEPS WITH RAILING: A LITTLE
TOILETING: A LITTLE
STANDING UP FROM CHAIR USING ARMS: A LITTLE

## 2024-01-01 ASSESSMENT — PAIN DESCRIPTION - PAIN TYPE
TYPE: ACUTE PAIN

## 2024-01-01 ASSESSMENT — LIFESTYLE VARIABLES
HAVE PEOPLE ANNOYED YOU BY CRITICIZING YOUR DRINKING: NO
ALCOHOL_USE: NO
CONSUMPTION TOTAL: INCOMPLETE
EVER HAD A DRINK FIRST THING IN THE MORNING TO STEADY YOUR NERVES TO GET RID OF A HANGOVER: NO
HAVE YOU EVER FELT YOU SHOULD CUT DOWN ON YOUR DRINKING: NO
DOES PATIENT WANT TO STOP DRINKING: NO
TOTAL SCORE: 0
EVER FELT BAD OR GUILTY ABOUT YOUR DRINKING: NO
TOTAL SCORE: 0
TOTAL SCORE: 0

## 2024-01-01 ASSESSMENT — FIBROSIS 4 INDEX
FIB4 SCORE: 1.14
FIB4 SCORE: 1.14
FIB4 SCORE: 1.9
FIB4 SCORE: 1.78
FIB4 SCORE: 1.9
FIB4 SCORE: 1.9

## 2024-01-01 ASSESSMENT — ENCOUNTER SYMPTOMS
SHORTNESS OF BREATH: 0
LOSS OF CONSCIOUSNESS: 0
MYALGIAS: 0
PALPITATIONS: 0
COUGH: 0
DIZZINESS: 0

## 2024-01-01 ASSESSMENT — CHA2DS2 SCORE
AGE 65 TO 74: NO
HYPERTENSION: YES
DIABETES: YES
AGE 75 OR GREATER: YES
CHF OR LEFT VENTRICULAR DYSFUNCTION: YES
SEX: FEMALE
VASCULAR DISEASE: NO
CHA2DS2 VASC SCORE: 6
PRIOR STROKE OR TIA OR THROMBOEMBOLISM: NO

## 2024-01-01 ASSESSMENT — SOCIAL DETERMINANTS OF HEALTH (SDOH)
IN THE PAST 12 MONTHS, HAS THE ELECTRIC, GAS, OIL, OR WATER COMPANY THREATENED TO SHUT OFF SERVICE IN YOUR HOME?: PATIENT UNABLE TO ANSWER
WITHIN THE PAST 12 MONTHS, YOU WORRIED THAT YOUR FOOD WOULD RUN OUT BEFORE YOU GOT THE MONEY TO BUY MORE: PATIENT UNABLE TO ANSWER
WITHIN THE LAST YEAR, HAVE YOU BEEN AFRAID OF YOUR PARTNER OR EX-PARTNER?: NO
WITHIN THE PAST 12 MONTHS, THE FOOD YOU BOUGHT JUST DIDN'T LAST AND YOU DIDN'T HAVE MONEY TO GET MORE: PATIENT UNABLE TO ANSWER
WITHIN THE LAST YEAR, HAVE YOU BEEN KICKED, HIT, SLAPPED, OR OTHERWISE PHYSICALLY HURT BY YOUR PARTNER OR EX-PARTNER?: NO
WITHIN THE LAST YEAR, HAVE TO BEEN RAPED OR FORCED TO HAVE ANY KIND OF SEXUAL ACTIVITY BY YOUR PARTNER OR EX-PARTNER?: NO
WITHIN THE LAST YEAR, HAVE YOU BEEN HUMILIATED OR EMOTIONALLY ABUSED IN OTHER WAYS BY YOUR PARTNER OR EX-PARTNER?: NO

## 2024-01-31 DIAGNOSIS — I10 ESSENTIAL HYPERTENSION, BENIGN: ICD-10-CM

## 2024-02-02 RX ORDER — FUROSEMIDE 40 MG/1
TABLET ORAL
Qty: 100 TABLET | Refills: 1 | Status: SHIPPED | OUTPATIENT
Start: 2024-02-02

## 2024-02-02 RX ORDER — POTASSIUM CHLORIDE 600 MG/1
TABLET, FILM COATED, EXTENDED RELEASE ORAL
Qty: 100 TABLET | Refills: 1 | Status: SHIPPED | OUTPATIENT
Start: 2024-02-02

## 2024-04-01 ENCOUNTER — OFFICE VISIT (OUTPATIENT)
Dept: MEDICAL GROUP | Facility: PHYSICIAN GROUP | Age: 79
End: 2024-04-01
Payer: MEDICARE

## 2024-04-01 VITALS
HEIGHT: 67 IN | TEMPERATURE: 97 F | DIASTOLIC BLOOD PRESSURE: 76 MMHG | BODY MASS INDEX: 33.27 KG/M2 | OXYGEN SATURATION: 95 % | HEART RATE: 88 BPM | WEIGHT: 212 LBS | SYSTOLIC BLOOD PRESSURE: 132 MMHG

## 2024-04-01 DIAGNOSIS — E11.621 DIABETIC ULCER OF RIGHT HEEL ASSOCIATED WITH TYPE 2 DIABETES MELLITUS, UNSPECIFIED ULCER STAGE (HCC): ICD-10-CM

## 2024-04-01 DIAGNOSIS — E11.42 TYPE 2 DIABETES MELLITUS WITH DIABETIC POLYNEUROPATHY, WITH LONG-TERM CURRENT USE OF INSULIN (HCC): ICD-10-CM

## 2024-04-01 DIAGNOSIS — L97.419 DIABETIC ULCER OF RIGHT HEEL ASSOCIATED WITH TYPE 2 DIABETES MELLITUS, UNSPECIFIED ULCER STAGE (HCC): ICD-10-CM

## 2024-04-01 DIAGNOSIS — Z79.4 TYPE 2 DIABETES MELLITUS WITH DIABETIC POLYNEUROPATHY, WITH LONG-TERM CURRENT USE OF INSULIN (HCC): ICD-10-CM

## 2024-04-01 PROCEDURE — 3078F DIAST BP <80 MM HG: CPT | Performed by: STUDENT IN AN ORGANIZED HEALTH CARE EDUCATION/TRAINING PROGRAM

## 2024-04-01 PROCEDURE — 99214 OFFICE O/P EST MOD 30 MIN: CPT | Performed by: STUDENT IN AN ORGANIZED HEALTH CARE EDUCATION/TRAINING PROGRAM

## 2024-04-01 PROCEDURE — 3075F SYST BP GE 130 - 139MM HG: CPT | Performed by: STUDENT IN AN ORGANIZED HEALTH CARE EDUCATION/TRAINING PROGRAM

## 2024-04-01 ASSESSMENT — PATIENT HEALTH QUESTIONNAIRE - PHQ9
SUM OF ALL RESPONSES TO PHQ9 QUESTIONS 1 AND 2: 0
1. LITTLE INTEREST OR PLEASURE IN DOING THINGS: NOT AT ALL
2. FEELING DOWN, DEPRESSED, IRRITABLE, OR HOPELESS: NOT AT ALL

## 2024-04-01 ASSESSMENT — FIBROSIS 4 INDEX: FIB4 SCORE: 1.78

## 2024-04-01 NOTE — PROGRESS NOTES
"Subjective:     Chief Complaint   Patient presents with    Wound Check     R foot, found it this morning while taking shoe piece of skin          HPI:   Ophelia presents today with a diabetic foot ulcer.  Aug 2023 A1c 8.9.  Patient follows up with endocrinology (records requested for endocrinology and nephrology).  Patient reports doing regular foot checks but noticed the ulcer on the back of her right heel this morning.        ROS:  Negative except as stated above.      Objective:     Exam:  /76 (BP Location: Right arm, Patient Position: Sitting, BP Cuff Size: Adult)   Pulse 88   Temp 36.1 °C (97 °F) (Temporal)   Ht 1.702 m (5' 7\")   Wt 96.2 kg (212 lb)   SpO2 95%   BMI 33.20 kg/m²  Body mass index is 33.2 kg/m².    Physical Exam    Extremities:  Ulcer on right heel appears to be healing with no signs of infection or bleeding.    Assessment & Plan:     78 y.o. female with the following -     1. Type 2 diabetes mellitus with diabetic polyneuropathy, with long-term current use of insulin (HCC)  Chronic, uncontrolled.  Aug 2023 A1c 8.9 (goal < 8 for age).  Follows up with endocrinology and nephrology (records requested).  Patient has several other chronic conditions and was given referral to [facility].  Patient has not seen PCP in 1 year and will return to establish care.  - REFERRAL TO CARE MANAGEMENT    2. Diabetic ulcer of right heel associated with type 2 diabetes mellitus, unspecified ulcer stage (HCC)  Chronic, uncontrolled.  Given referral to podiatry and wound clinic for further management.  - Referral to Podiatry  - Referral to Wound Clinic      HCC Gap Form    Last edited 04/01/24 12:43 PDT by Emmanuelle Gan M.D.           I spent a total of 35 minutes with record review, exam, communication with the patient, communication with other providers, and documentation of this encounter.      Return in about 1 month (around 5/1/2024) for establish care.    Please note that this dictation was created using " voice recognition software. I have made every reasonable attempt to correct obvious errors, but I expect that there are errors of grammar and possibly content that I did not discover before finalizing the note.

## 2024-04-01 NOTE — LETTER
[facility]  JOSE ALEJANDRO [place]  [address]  [place] [address]  Fax: [phone]   Authorization for Release/Disclosure of   Protected Health Information   Name: OPHELIA SOSA : 1945 SSN: [id number]   Address: [address] [place], [place] [address]  [place] [address] Phone:    [phone] (home)    I authorize the entity listed below to release/disclose the PHI below to:   [facility]/JOSE ALEJANDRO [place] and Emmanuelle Gan M.D.   Provider or Entity Name:  [facility]   Address   City, State, Zip   Phone:      Fax:     Reason for request: continuity of care   Information to be released:    [  ] LAST COLONOSCOPY,  including any PATH REPORT and follow-up  [  ] LAST FIT/COLOGUARD RESULT [  ] LAST DEXA  [  ] LAST MAMMOGRAM  [  ] LAST PAP  [  ] LAST LABS [  ] RETINA EXAM REPORT  [  ] IMMUNIZATION RECORDS  [ X ] Release all info      [  ] Check here and initial the line next to each item to release ALL health information INCLUDING  _____ Care and treatment for drug and / or alcohol abuse  _____ HIV testing, infection status, or AIDS  _____ Genetic Testing    DATES OF SERVICE OR TIME PERIOD TO BE DISCLOSED: _____________  I understand and acknowledge that:  * This Authorization may be revoked at any time by you in writing, except if your health information has already been used or disclosed.  * Your health information that will be used or disclosed as a result of you signing this authorization could be re-disclosed by the recipient. If this occurs, your re-disclosed health information may no longer be protected by State or Federal laws.  * You may refuse to sign this Authorization. Your refusal will not affect your ability to obtain treatment.  * This Authorization becomes effective upon signing and will  on (date) __________.      If no date is indicated, this Authorization will  one (1) year from the signature date.    Name: Ophelia Sosa  Signature: Date:   2024     PLEASE  FAX REQUESTED RECORDS BACK TO: [phone]

## 2024-04-01 NOTE — LETTER
[facility]  [facility]SE ALEJANDR[facility] [place]  [address]  [place] [address]  Fax: [phone]   Authorization for Release/Disclosure of   Protected Health Information   Name: OPHELIA BAUER : 1945 SSN: [id number]   Address: [address] [place] [address] [address]  [place] [address] Phone:    [phone] (home)    I authorize the entity listed below to release/disclose the PHI below to:   [facility]/JOSE ALEJANDRO [place] and Emmanuelle Gan M.D.   Provider or Entity Name:  [facility] (Endocrinology, Nephrology)   Address   City, State, [facility]   Phone:      Fax:     Reason for request: continuity of care   Information to be released:    [  ] LAST COLONOSCOPY,  including any PATH REPORT and follow-up  [  ] LAST FIT/COLOGUARD RESULT [  ] LAST DEXA  [  ] LAST MAMMOGRAM  [  ] LAST PAP  [  ] LAST LABS [  ] RETINA EXAM REPORT  [  ] IMMUNIZATION RECORDS  [ X ] Release all info      [  ] Check here and initial the line next to each item to release ALL health information INCLUDING  _____ Care and treatment for drug and / or alcohol abuse  _____ HIV testing, infection status, or AIDS  _____ Genetic Testing    DATES OF SERVICE OR TIME PERIOD TO BE DISCLOSED: _____________  I understand and acknowledge that:  * This Authorization may be revoked at any time by you in writing, except if your health information has already been used or disclosed.  * Your health information that will be used or disclosed as a result of you signing this authorization could be re-disclosed by the recipient. If this occurs, your re-disclosed health information may no longer be protected by State or Federal laws.  * You may refuse to sign this Authorization. Your refusal will not affect your ability to obtain treatment.  * This Authorization becomes effective upon signing and will  on (date) __________.      If no date is indicated, this Authorization will  one (1) year from the signature date.    Name: Ophelia Nuñez  Ian  Signature: Date:   4/1/2024     PLEASE FAX REQUESTED RECORDS BACK TO: [phone]

## 2024-04-03 ENCOUNTER — PATIENT OUTREACH (OUTPATIENT)
Dept: HEALTH INFORMATION MANAGEMENT | Facility: OTHER | Age: 79
End: 2024-04-03
Payer: MEDICARE

## 2024-04-03 DIAGNOSIS — E11.22 TYPE 2 DIABETES MELLITUS WITH STAGE 3B CHRONIC KIDNEY DISEASE, WITH LONG-TERM CURRENT USE OF INSULIN (HCC): ICD-10-CM

## 2024-04-03 DIAGNOSIS — N18.32 TYPE 2 DIABETES MELLITUS WITH STAGE 3B CHRONIC KIDNEY DISEASE, WITH LONG-TERM CURRENT USE OF INSULIN (HCC): ICD-10-CM

## 2024-04-03 DIAGNOSIS — E78.5 HYPERLIPIDEMIA, UNSPECIFIED HYPERLIPIDEMIA TYPE: ICD-10-CM

## 2024-04-03 DIAGNOSIS — E11.42 TYPE 2 DIABETES MELLITUS WITH POLYNEUROPATHY (HCC): ICD-10-CM

## 2024-04-03 DIAGNOSIS — Z79.4 TYPE 2 DIABETES MELLITUS WITH STAGE 3B CHRONIC KIDNEY DISEASE, WITH LONG-TERM CURRENT USE OF INSULIN (HCC): ICD-10-CM

## 2024-04-03 DIAGNOSIS — I27.20 PULMONARY HYPERTENSION (HCC): ICD-10-CM

## 2024-04-03 DIAGNOSIS — F32.A DEPRESSION, UNSPECIFIED DEPRESSION TYPE: ICD-10-CM

## 2024-04-03 SDOH — HEALTH STABILITY: PHYSICAL HEALTH: ON AVERAGE, HOW MANY DAYS PER WEEK DO YOU ENGAGE IN MODERATE TO STRENUOUS EXERCISE (LIKE A BRISK WALK)?: 0 DAYS

## 2024-04-03 SDOH — ECONOMIC STABILITY: HOUSING INSECURITY
IN THE LAST 12 MONTHS, WAS THERE A TIME WHEN YOU DID NOT HAVE A STEADY PLACE TO SLEEP OR SLEPT IN A SHELTER (INCLUDING NOW)?: NO

## 2024-04-03 SDOH — ECONOMIC STABILITY: TRANSPORTATION INSECURITY
IN THE PAST 12 MONTHS, HAS LACK OF TRANSPORTATION KEPT YOU FROM MEETINGS, WORK, OR FROM GETTING THINGS NEEDED FOR DAILY LIVING?: NO

## 2024-04-03 SDOH — ECONOMIC STABILITY: INCOME INSECURITY: IN THE LAST 12 MONTHS, WAS THERE A TIME WHEN YOU WERE NOT ABLE TO PAY THE MORTGAGE OR RENT ON TIME?: YES

## 2024-04-03 SDOH — ECONOMIC STABILITY: FOOD INSECURITY: WITHIN THE PAST 12 MONTHS, THE FOOD YOU BOUGHT JUST DIDN'T LAST AND YOU DIDN'T HAVE MONEY TO GET MORE.: NEVER TRUE

## 2024-04-03 SDOH — ECONOMIC STABILITY: FOOD INSECURITY: WITHIN THE PAST 12 MONTHS, YOU WORRIED THAT YOUR FOOD WOULD RUN OUT BEFORE YOU GOT MONEY TO BUY MORE.: NEVER TRUE

## 2024-04-03 SDOH — HEALTH STABILITY: PHYSICAL HEALTH: ON AVERAGE, HOW MANY MINUTES DO YOU ENGAGE IN EXERCISE AT THIS LEVEL?: 0 MIN

## 2024-04-03 SDOH — ECONOMIC STABILITY: HOUSING INSECURITY: IN THE LAST 12 MONTHS, HOW MANY PLACES HAVE YOU LIVED?: 1

## 2024-04-03 SDOH — ECONOMIC STABILITY: TRANSPORTATION INSECURITY
IN THE PAST 12 MONTHS, HAS THE LACK OF TRANSPORTATION KEPT YOU FROM MEDICAL APPOINTMENTS OR FROM GETTING MEDICATIONS?: NO

## 2024-04-03 ASSESSMENT — SOCIAL DETERMINANTS OF HEALTH (SDOH)
DO YOU BELONG TO ANY CLUBS OR ORGANIZATIONS SUCH AS CHURCH GROUPS UNIONS, FRATERNAL OR ATHLETIC GROUPS, OR SCHOOL GROUPS?: NO
WITHIN THE LAST YEAR, HAVE YOU BEEN KICKED, HIT, SLAPPED, OR OTHERWISE PHYSICALLY HURT BY YOUR PARTNER OR EX-PARTNER?: NO
WITHIN THE LAST YEAR, HAVE YOU BEEN HUMILIATED OR EMOTIONALLY ABUSED IN OTHER WAYS BY YOUR PARTNER OR EX-PARTNER?: NO
HOW HARD IS IT FOR YOU TO PAY FOR THE VERY BASICS LIKE FOOD, HOUSING, MEDICAL CARE, AND HEATING?: SOMEWHAT HARD
HOW OFTEN DO YOU GET TOGETHER WITH FRIENDS OR RELATIVES?: NEVER
IN THE PAST 12 MONTHS, HAS THE ELECTRIC, GAS, OIL, OR WATER COMPANY THREATENED TO SHUT OFF SERVICE IN YOUR HOME?: YES
ARE YOU MARRIED, WIDOWED, DIVORCED, SEPARATED, NEVER MARRIED, OR LIVING WITH A PARTNER?: NEVER MARRIED
IN A TYPICAL WEEK, HOW MANY TIMES DO YOU TALK ON THE PHONE WITH FAMILY, FRIENDS, OR NEIGHBORS?: MORE THAN THREE TIMES A WEEK
HOW OFTEN DO YOU ATTENT MEETINGS OF THE CLUB OR ORGANIZATION YOU BELONG TO?: NEVER
HOW OFTEN DO YOU ATTEND CHURCH OR RELIGIOUS SERVICES?: NEVER
WITHIN THE LAST YEAR, HAVE YOU BEEN AFRAID OF YOUR PARTNER OR EX-PARTNER?: NO
WITHIN THE LAST YEAR, HAVE TO BEEN RAPED OR FORCED TO HAVE ANY KIND OF SEXUAL ACTIVITY BY YOUR PARTNER OR EX-PARTNER?: NO

## 2024-04-03 ASSESSMENT — LIFESTYLE VARIABLES
HOW OFTEN DO YOU HAVE A DRINK CONTAINING ALCOHOL: NEVER
HOW OFTEN DO YOU HAVE SIX OR MORE DRINKS ON ONE OCCASION: NEVER
SKIP TO QUESTIONS 9-10: 1
HOW MANY STANDARD DRINKS CONTAINING ALCOHOL DO YOU HAVE ON A TYPICAL DAY: PATIENT DOES NOT DRINK
AUDIT-C TOTAL SCORE: 0

## 2024-04-03 ASSESSMENT — PATIENT HEALTH QUESTIONNAIRE - PHQ9
CLINICAL INTERPRETATION OF PHQ2 SCORE: 2
SUM OF ALL RESPONSES TO PHQ QUESTIONS 1-9: 6
5. POOR APPETITE OR OVEREATING: 1 - SEVERAL DAYS

## 2024-04-03 NOTE — PROGRESS NOTES
"04/02/24 - 9:05am -- Called and spoke with patient about the Personal Care Management program. Patient was already prepared for the phone call and had spoken to Dr. Gan at her appointment on 04/01/24 regarding this as being something beneficial to the patient. Dr. Gan placed a referral and patient agreed to enrollment with this RN on 04/03/24.    04/03/24 - 10:00am -- Called patient to get the enrollment process started. The patient discussed with this RN sleep was limited the night prior stating she sometimes takes Benadryl and/or cannabis oil to assist more restful sleep as needed which is noted in the assessment as well. Patient also mentioned lately she has been experiencing anxiety attacks, and cannabis oil tends to help with this as well (1 dropper full). Patient went on to discuss her support system which consists of only phone calls, she doesn't get out much and doesn't have a direct social network other than regular phone calls. Her friend, Iesha, is someone she would like us to contact if ever necessary -- this phone number can be found in demographics. Patient mentioned her diabetic foot ulcer which she also discussed at PCP visit on 04/01/24, A1C 8.9. For foot pain, occasionally patient will use cannabis cream and states this helps a lot. Patient is taking NPH insulin regularly and has developed a good routine with this. Patient states she will drink some orange soda or eat \"a couple Oreo cookies\" if she ever needs a sugar boost. Discussed depression with the patient and completed the PHQ-9, depression and anxiety are present but it varies in degree, some days are better than others. Patient stated \"today is a good day, tomorrow might be a bad day where I'll put on a show or play a game, which usually helps.\" -- Per patient, she would not consider the depression to be severe and can usually bring herself out of it. Patient expressed wanting to work on health maintenance and appointments, " "completing HCC gaps and getting herself out of this \"hole\". Patient mentioned several times that she knows she needs some assistance and is motivated to let this program help her where she needs. This RN and patient discussed her house and cleanliness - she is open to affordable resources for home cleaning services but has a fixed monthly income through Social Security so her budget is very limited. The plan moving forward is our monthly outreach call, establishing care with Dr. Gan in May, 2024. This RN discussed patient's AWV with Dr. Gan and she has agreed to first see her as a New Patient and we will work together moving forward on the remaining care gaps.     INITIAL CARE MANAGEMENT CARE PLAN/ASSESSMENT     Medication Self-Management Goals:     Reviewed medications listed below with patient.      Current Outpatient Medications:     furosemide (LASIX) 40 MG Tab, TAKE 1 TABLET BY MOUTH EVERY DAY IN THE EVENING, Disp: 100 Tablet, Rfl: 1    KLOR-CON 8 MEQ tablet, TAKE 1 TABLET BY MOUTH EVERY DAY IN THE EVENING, Disp: 100 Tablet, Rfl: 1    ELIQUIS 5 MG Tab, TAKE 1 TABLET BY MOUTH TWICE A DAY, Disp: 60 Tablet, Rfl: 11    Insulin Syringe-Needle U-100 (BD INSULIN SYRINGE U/F) 31G X 5/16\" 0.5 ML Misc, Inject 1 Each under the skin 2 times a day., Disp: 60 Each, Rfl: 0    metFORMIN ER (GLUCOPHAGE XR) 500 MG TABLET SR 24 HR, TAKE 1 TABLET BY MOUTH TWICE A DAY, Disp: 180 Tablet, Rfl: 4    clobetasol (TEMOVATE) 0.05 % Cream, APPLY 1 APPLICATION TOPICALLY 2 TIMES A DAY AS NEEDED (ECZEMA)., Disp: 60 g, Rfl: 0    FIBER PO, Take  by mouth., Disp: , Rfl:     carbamide peroxide (DEBROX) 6.5 % Solution, Administer 6 Drops into the right ear 2 times a day. Administer drops in both ears., Disp: 15 mL, Rfl: 0    albuterol 108 (90 Base) MCG/ACT Aero Soln inhalation aerosol, Inhale 2 Puffs every 6 hours as needed for Shortness of Breath., Disp: 8.5 g, Rfl: 2    diphenhydrAMINE HCl (BENADRYL ALLERGY PO), Take  by mouth as " needed., Disp: , Rfl:     NON SPECIFIED, as needed. CBD oil, Disp: , Rfl:     mupirocin (BACTROBAN) 2 % Ointment, Apply 1 Application topically 2 times a day., Disp: 30 g, Rfl: 0    insulin NPH (HUMULIN/NOVOLIN) 100 UNIT/ML Suspension, 46 units in the morning and 3-10 units in the evening per sliding scale (Patient taking differently: 46 units in the morning and 3-20 units in the evening per sliding scale), Disp: 10 mL, Rfl: 1    insulin regular (HUMULIN R) 100 Unit/mL Solution, Inject 3-6 units subcutaneously daily in the morning., Disp: 10 mL, Rfl: 1    VITAMIN D, CHOLECALCIFEROL, PO, Take 3,000 Units by mouth every day., Disp: , Rfl:     B Complex Vitamins (VITAMIN B COMPLEX PO), Take  by mouth every day., Disp: , Rfl:     rosuvastatin (CRESTOR) 5 MG Tab, TAKE ONE TABLET AT BEDTIME FOR CHOLESTEROL. TAKE THREE TIMES WEEKLY, Disp: 30 Tab, Rfl: 5    NS SOLN 60 mL with albuterol 2.5 mg/0.5 mL NEBU 5 mL, 5 mg/hr by Nebulization route. Spring and fall, Disp: , Rfl:     losartan (COZAAR) 25 MG Tab, Take 25 mg by mouth 2 Times a Day., Disp: , Rfl:     FREESTYLE LITE strip, TEST BLOOD SUGAR 3 TIMES DAILY, Disp: , Rfl: 12    fluticasone (FLONASE) 50 MCG/ACT nasal spray, Spray 1 Spray in nose 1 time daily as needed., Disp: , Rfl:              Physical/Functional/Environmental Status:     Activities of Daily Living:  Bathing: independent   Dressing: independent  Grooming: independent  Mouth Care: independent  Toileting: independent  Climbing a Flight of Stairs: independent    Independent Activities of Daily Living:  Shopping: independent  Cooking: independent  Managing Medications: independent  Using the phone and looking up numbers: independent  Driving or using public transportation: needs assistance - Uses ride share through [facility] for doctors appointments.  Managing Finances: independent        4/3/2024     9:48 AM   STEADI Fall Risk   STEADI Risk for Falling Score 5   One or more falls in the last year No   Advised to use  a cane or walker to get around safely No   Feels unsteady when walking No   Steadies self on furniture while walking at home Yes   Worried about falling No   Needs to push with hands when rising from a chair No   Has trouble stepping up onto a curb / using stairs No   Often has to rush to the toilet No   Has lost some feeling in feet Yes   Takes medicine that makes him/her feel lightheaded or more tired than usual Yes   Takes medicine to sleep or improve mood Yes   Often feels sad or depressed Yes         Goal:  Remain free from falls, use can as needed through patient's home.     Social Determinants of Health       Financial Status:      Financial Resource Strain: Medium Risk (4/3/2024)    Overall Financial Resource Strain (CARDIA)     Difficulty of Paying Living Expenses: Somewhat hard         Referred to CHW/SW:  NA       Transportation Status:      Transportation Needs: No Transportation Needs (4/3/2024)    PRAPARE - Transportation     Lack of Transportation (Medical): No     Lack of Transportation (Non-Medical): No        Referred to CHW/SW:  NA      Food Insecurity:      Food Insecurity: No Food Insecurity (4/3/2024)    Hunger Vital Sign     Worried About Running Out of Food in the Last Year: Never true     Ran Out of Food in the Last Year: Never true        Referred to CHW/SW:  NA       Housing Status:     Housing Stability: High Risk (4/3/2024)    Housing Stability Vital Sign     Unable to Pay for Housing in the Last Year: Yes     Number of Places Lived in the Last Year: 1     Unstable Housing in the Last Year: No        Referred to CHW/SW:  NA  Patient is interested in resources for house cleaning services, however; patient has fixed monthly income and cleaning would need to fit into patient's budget.     Social Connections:     Social Connections: Socially Isolated (4/3/2024)    Social Connection and Isolation Panel [NHANES]     Frequency of Communication with Friends and Family: More than three times a  "week     Frequency of Social Gatherings with Friends and Family: Never     Attends Confucianism Services: Never     Active Member of Clubs or Organizations: No     Attends Club or Organization Meetings: Never     Marital Status: Never         Referred to CHW/SW:  ANDREW      Mental/Behavioral/Psychosocial Status:        2/22/2023     2:00 PM 4/1/2024    11:59 AM 4/3/2024     9:06 AM   Depression Screen (PHQ-2/PHQ-9)   PHQ-2 Total Score  0    PHQ-2 Total Score 0  2   PHQ-9 Total Score   6       Interpretation of PHQ-9 Total Score   Score Severity   1-4 No Depression   5-9 Mild Depression   10-14 Moderate Depression   15-19 Moderately Severe Depression   20-27 Severe Depression             Chronic Care Management Care Plan      Discussion:  Complete necessary patient care/HCC gaps. Continue completing tasks for health maintenance and required tests/appointments. Patient felt the need previously to cancel appointments due to lack of motivation completing these but also, per patient, not feeling comfortable enough with provider. Some depression playing a role in cancelling appointments and not feeling the need for these. Ultimate goal to chip away at tasks and get \"back on track\". Patient mentions to this RN several times she \"is used to being alone majority of her life, but she realizes now she needs some help.\"     Goal: Maintain Anxiety/Mental Health.   Barriers: 1. Insufficient motivation 2. Needs guidance/assistance   Interventions: 1. Accountability through program completion 2. Provide resources for anxiety exercises     Start Date: 05/03/24  End Date:          Goal: Complete care gaps/appointments & maintain healthy lifestyle/preventative health.  Barriers: 1. Knowledge deficit 2. Depression/Anxiety  Interventions: 1. Finding a PCP patient is comfortable with 2. Educate patient on importance of completion     Start Date: 05/03/24  End Date:           Next Scheduled patient outreach: 05/03/24        "

## 2024-04-04 ENCOUNTER — PATIENT OUTREACH (OUTPATIENT)
Dept: HEALTH INFORMATION MANAGEMENT | Facility: OTHER | Age: 79
End: 2024-04-04
Payer: MEDICARE

## 2024-04-04 DIAGNOSIS — E11.22 TYPE 2 DIABETES MELLITUS WITH STAGE 3B CHRONIC KIDNEY DISEASE, WITH LONG-TERM CURRENT USE OF INSULIN (HCC): ICD-10-CM

## 2024-04-04 DIAGNOSIS — Z79.4 TYPE 2 DIABETES MELLITUS WITH STAGE 3B CHRONIC KIDNEY DISEASE, WITH LONG-TERM CURRENT USE OF INSULIN (HCC): ICD-10-CM

## 2024-04-04 DIAGNOSIS — N18.32 TYPE 2 DIABETES MELLITUS WITH STAGE 3B CHRONIC KIDNEY DISEASE, WITH LONG-TERM CURRENT USE OF INSULIN (HCC): ICD-10-CM

## 2024-04-04 NOTE — PROGRESS NOTES
Community Health Worker Follow-Up    Reason for outreach: CHW Agustina received referral from Aracely BERNSTEIN asking to assist pt with finding house cleaning services.    CHW Interventions: CHW sent resources via mail. CHW included contact information.     Specific Resources Provided:  Housing/Shelter: N/A  Transportation: N/A  Food: N/A  Financial: N/A  Social Supports: [employer]  Other: [facility]        Homemaker/ In Home Care List    Plan: CHW will continue to collaborate with Aracely BERNSTEIN to assist pt where needed.

## 2024-04-25 ENCOUNTER — NON-PROVIDER VISIT (OUTPATIENT)
Dept: WOUND CARE | Facility: MEDICAL CENTER | Age: 79
End: 2024-04-25
Attending: STUDENT IN AN ORGANIZED HEALTH CARE EDUCATION/TRAINING PROGRAM
Payer: MEDICARE

## 2024-04-25 PROCEDURE — 99211 OFF/OP EST MAY X REQ PHY/QHP: CPT

## 2024-04-25 NOTE — PATIENT INSTRUCTIONS
-Change your dressing as instructed and immediately if it becomes soiled, soaked, or falls off.    -Should you experience any significant changes in your wound(s), such as infection (redness, swelling, localized heat, increased pain, fever > 101 F, chills) or have any questions regarding your home care instructions, please contact the wound center at [phone]. If after hours, contact your primary care physician or go to the hospital emergency room.

## 2024-04-26 NOTE — CERTIFICATION
Non Provider Encounter- Diabetic Foot Ulcer      HISTORY OF PRESENT ILLNESS  Wound History:    START OF CARE IN CLINIC: 4/25/24    REFERRING PROVIDER: Joana MANUEL   WOUND- Diabetic foot ulcer   LOCATION: right posterior heel/achilles   HISTORY: Patient is a 77 y/o diabetic female who presents with wound right posterior heel/achilles, has been present for approximately 6 weeks. Patient is unsure of etiology, states she only wears tennis shoes when she leaves the house and wears rubber soled socks in the house. Patient states that she noticed a large amount of skin that fell out of her sock 4/1/24. She met with her PCP for a referral to the wound clinic and also received a referral to podiatry. Patient states that she follows with endocrinologist Dr. Tierney.    Pertinent Medical History:   Past Medical History:   Diagnosis Date    Complete heart block (HCC) 11/16/2018    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    ASTHMA     patient denies    Congestive heart failure (HCC)     COPD     COPD (chronic obstructive pulmonary disease) (HCC)     Depressed     Diabetes     Diastolic dysfunction     DM (diabetes mellitus) (HCC)     Heart murmur     HLD (hyperlipidemia)     HTN (hypertension)     Hypertension     Obstructive sleep apnea     Osteoarthritis     Pulmonary hypertension (HCC)     Sleep apnea          DIABETES HX: Diagnosed with type 2 diabetes 40 years ago, and is currently managing with insulin and oral medication.  Checks blood sugars daily and reports that these typically run around 150-250.  Has had previous diabetes education.  Does have numbness in feet.  Usually wears diabetic shoes with inserts or socks with rubber soles. Does check feet routinely.  Has not had previous foot ulcers or foot surgery.  Current occupation is retired.  Offloading, none.        TOBACCO USE: none     OFFLOADING: none    Pertinent Labs and Diagnostics:    Labs:     A1c:   Lab  Results   Component Value Date/Time    HBA1C 8.9 (H) 08/31/2023 10:01 AM          IMAGING: none    VASCULAR STUDIES: n/a    LAST  WOUND CULTURE:  DATE : n/a             Patient allergies and medications reviewed via Epic.     WOUND ASSESSMENT-      Wound 04/25/24 Diabetic Ulcer Heel;Achilles Posterior Right (Active)   Wound Image      Site Assessment Brown;Yellow;Eschar    Periwound Assessment Dry;Callused    Margins Attached edges    Closure Secondary intention    Drainage Amount None    Treatments Cleansed;Site care    Offloading/DME Other (comment)    Wound Cleansing Hypochlorus Acid    Periwound Protectant No-sting Skin Prep    Dressing Status Clean;Dry;Intact    Dressing Changed New    Dressing Cleansing/Solutions Not Applicable    Dressing Options Honey Colloid;Offloading Dressing - Heel    Dressing Change/Treatment Frequency Every 72 hrs, and As Needed    Wound Team Following Weekly    Non-staged Wound Description Full thickness    Wound Length (cm) 2.7 cm    Wound Width (cm) 1.7 cm    Wound Surface Area (cm^2) 4.59 cm^2    Post-Procedure Length (cm) 2.7 cm    Post-Procedure Width (cm) 1.7 cm    Post-Procedure Surface Area (cm^2) 4.59 cm^2    Wound Odor None    Pulses Right;DP;2+;PT;1+;Doppler    Right Foot Monofilament 10-point exam (Sensate) 0/10    Left Foot Monofilament 10-point exam (Sensate) 0/10    Exposed Structures BRISA          PATIENT EDUCATION  - Patient instructed on dressing care and rationale.  - Requested for soonest provider appt for order for imaging.  - Importance of tight glucose control for wound healing   - Implications of loss of protective sensation (LOPS) discussed with patient- including increased risk for amputation.  - Advised to check feet at least daily, moisturize feet, and to always wear protective foot wear.   -  Importance of offloading foot to assist with wound healing  - Advised pt not to trim nails or calluses, seek foot/nail care from podiatrist or certified foot/nail  nurse  - Importance of adequate nutrition for wound healing

## 2024-04-30 ENCOUNTER — PATIENT OUTREACH (OUTPATIENT)
Dept: HEALTH INFORMATION MANAGEMENT | Facility: OTHER | Age: 79
End: 2024-04-30
Payer: MEDICARE

## 2024-04-30 DIAGNOSIS — Z79.4 TYPE 2 DIABETES MELLITUS WITH STAGE 3B CHRONIC KIDNEY DISEASE, WITH LONG-TERM CURRENT USE OF INSULIN (HCC): ICD-10-CM

## 2024-04-30 DIAGNOSIS — E78.5 HYPERLIPIDEMIA, UNSPECIFIED HYPERLIPIDEMIA TYPE: ICD-10-CM

## 2024-04-30 DIAGNOSIS — I27.20 PULMONARY HYPERTENSION (HCC): ICD-10-CM

## 2024-04-30 DIAGNOSIS — E11.22 TYPE 2 DIABETES MELLITUS WITH STAGE 3B CHRONIC KIDNEY DISEASE, WITH LONG-TERM CURRENT USE OF INSULIN (HCC): ICD-10-CM

## 2024-04-30 DIAGNOSIS — F32.A DEPRESSION, UNSPECIFIED DEPRESSION TYPE: ICD-10-CM

## 2024-04-30 DIAGNOSIS — E11.42 TYPE 2 DIABETES MELLITUS WITH POLYNEUROPATHY (HCC): ICD-10-CM

## 2024-04-30 DIAGNOSIS — N18.32 TYPE 2 DIABETES MELLITUS WITH STAGE 3B CHRONIC KIDNEY DISEASE, WITH LONG-TERM CURRENT USE OF INSULIN (HCC): ICD-10-CM

## 2024-04-30 NOTE — PROGRESS NOTES
"04/30/24 -- Patient called this RN this afternoon and requested some help with her upcoming wound appointments and instructions. Called patient back and was able to let her know times and dates of upcoming appointments, she confirmed she's written these in her calendar and will plan on attending as scheduled.   The patient also asked this RN to clarify some instructions on wound dressing change. I did reiterate likely the instructions given originally were what they would expect patient to do, however; offered to contact wound care center and ask per patient if she would be able to leave current dressing, which was placed on wound site Sunday 04/28/24, until upcoming appointment Friday 05/03. Vane got back to me and did request patient stick to instructions which were given for dressing change every 3 days and that the patient should have been given enough supplies for each change until next appointment on 05/03/24. This RN called the patient back at 3:30 pm after hearing back from wound care center ([facility]) and let the patient the know outcome of phone call and she states she would \"do the best she could with what she was given.\" Monthly outreach call planned for tomorrow 05/01/24 at 1:30 pm.     05/01/24 -- This RN spoke to the patient again regarding wound care and dressing change concerns. Reached out to Vane Messina who was kind enough to help me and provided me with the ext [phone] to give to the patient for any future inquiries. Patient was told to keep current dressing on as she states she does not have the proper supplies for a change at this time. Advised to talk to provider at upcoming appointment on 05/03/24. Home health may be appropriate for patient.     05/02/24 -- Completed monthly outreach call with the patient. This RN spoke to the patient initially about upcoming appointments; patient is rather overwhelmed with how many she has in the month of May between wound care and establishing care but this " "RN has explained the importance of keeping all of these to the best of her ability. Pt is agreeable and knows they should be kept and completed as scheduled as part of her health maintenance. I've offered to meet with her in person at upcoming appt with Dr. Gan on 5/13, but have left this up to her -- she will keep me updated and states \"she will let me know.\"   Additionally, this RN and patient discussed lack of appetite in the evenings. Pt is able to make balanced meals that she enjoys for breakfast and lunch but dinnertime is challenging, pt states \"she just doesn't want anything\" and \"nothing sounds good.\" I've suggested speaking with Dr. Gan about this on 5/13 but in the interim have recommended shakes or smoothies as alternatives to ensure she is receiving the nutrients she needs. As patient is diabetic, making sure she is including necessary carbohydrates and mixing in other foods such as proteins and fruits, etc. But to make sure she runs this by PCP. This RN also made sure the patient is remaining confident with insulin and tracking BS's. She states this is no concern and is comfortable with this. Pt has no additional questions at this time but is encouraged to contact me if she has anything she may need. Plan to follow up on or around 06/03/23 for monthly outreach call.   "

## 2024-05-01 ENCOUNTER — PATIENT OUTREACH (OUTPATIENT)
Dept: HEALTH INFORMATION MANAGEMENT | Facility: OTHER | Age: 79
End: 2024-05-01
Payer: MEDICARE

## 2024-05-01 ENCOUNTER — TELEPHONE (OUTPATIENT)
Dept: WOUND CARE | Facility: MEDICAL CENTER | Age: 79
End: 2024-05-01
Payer: MEDICARE

## 2024-05-01 DIAGNOSIS — I27.20 PULMONARY HYPERTENSION (HCC): ICD-10-CM

## 2024-05-01 DIAGNOSIS — E11.22 TYPE 2 DIABETES MELLITUS WITH STAGE 3B CHRONIC KIDNEY DISEASE, WITH LONG-TERM CURRENT USE OF INSULIN (HCC): ICD-10-CM

## 2024-05-01 DIAGNOSIS — Z79.4 TYPE 2 DIABETES MELLITUS WITH STAGE 3B CHRONIC KIDNEY DISEASE, WITH LONG-TERM CURRENT USE OF INSULIN (HCC): ICD-10-CM

## 2024-05-01 DIAGNOSIS — F32.A DEPRESSION, UNSPECIFIED DEPRESSION TYPE: ICD-10-CM

## 2024-05-01 DIAGNOSIS — E11.42 TYPE 2 DIABETES MELLITUS WITH POLYNEUROPATHY (HCC): ICD-10-CM

## 2024-05-01 DIAGNOSIS — E78.5 HYPERLIPIDEMIA, UNSPECIFIED HYPERLIPIDEMIA TYPE: ICD-10-CM

## 2024-05-01 DIAGNOSIS — N18.32 TYPE 2 DIABETES MELLITUS WITH STAGE 3B CHRONIC KIDNEY DISEASE, WITH LONG-TERM CURRENT USE OF INSULIN (HCC): ICD-10-CM

## 2024-05-01 NOTE — PROGRESS NOTES
"Assessment      04/30/24 -- Patient called this RN this afternoon and requested some help with her upcoming wound appointments and instructions. Called patient back and was able to let her know times and dates of upcoming appointments, she confirmed she's written these in her calendar and will plan on attending as scheduled.   The patient also asked this RN to clarify some instructions on wound dressing change. I did reiterate likely the instructions given originally were what they would expect patient to do, however; offered to contact wound care center and ask per patient if she would be able to leave current dressing, which was placed on wound site Sunday 04/28/24, until upcoming appointment Friday 05/03. Vane got back to me and did request patient stick to instructions which were given for dressing change every 3 days and that the patient should have been given enough supplies for each change until next appointment on 05/03/24. This RN called the patient back at 3:30 pm after hearing back from wound care center ([facility]) and let the patient the know outcome of phone call and she states she would \"do the best she could with what she was given.\" Monthly outreach call planned for this week.     05/01/24 -- This RN called pt back per request of the patient via , left voicemail asking me to return her call. Discussed upcoming appointments; patient has some anxiety related to the amount of appointments scheduled especially with the wound care she now has. Advised to keep all scheduled appointments as they are pertinent to proper healing and jenn maintenance. Pt expressed to this RN again her concern for dressing changes and Vane DASH Spoke with me regarding this; advised to discuss wound care and dressing changes at appointment 05/03/24. Vane and this RN have agreed that Home Health for wound care may be appropriate.    05/01/24 -- This RN spoke to the patient again regarding wound care and dressing change concerns. " "Reached out to Vane Messina who was kind enough to help me and provided me with the ext [phone] to give to the patient for any future inquiries. Patient was told to keep current dressing on as she states she does not have the proper supplies for a change at this time. Advised to talk to provider at upcoming appointment on 05/03/24. Home health may be appropriate for patient.     05/03/24 -- Completed monthly outreach. This RN spoke to the patient initially about upcoming appointments; patient is rather overwhelmed with how many she has in the month of May between wound care and establishing care but this RN has explained the importance of keeping all of these to the best of her ability. Pt is agreeable and knows they should be kept and completed as scheduled as part of her health maintenance. I've offered to meet with her in person at upcoming appt with Dr. Gan on 5/13, but have left this up to her -- she will keep me updated and states \"she will let me know.\"   Additionally, this RN and patient discussed lack of appetite in the evenings. Pt is able to make balanced meals that she enjoys for breakfast and lunch but dinnertime is challenging, pt states \"she just doesn't want anything\" and \"nothing sounds good.\" I've suggested speaking with Dr. Gan about this on 5/13 but in the interim have recommended shakes or smoothies as alternatives to ensure she is receiving the nutrients she needs. As patient is diabetic, making sure she is including necessary carbohydrates and mixing in other foods such as proteins and fruits, etc. But to make sure she runs this by PCP. This RN also made sure the patient is remaining confident with insulin and tracking BS's. She states this is no concern and is comfortable with this. Pt has no additional questions at this time but is encouraged to contact me if she has anything she may need. Plan to follow up on or around 06/03/23 for monthly outreach call.    "     Education    Balanced meals for diabetes and maintaining an appropriate blood sugar, receiving adequate nutrients throughout the day and ways to incorporate nutrients throughout the day to sustain appropriate BS and healthy weight.     Plan of Care and Goals    Continue taking BS's as necessary, fueling with appropriate nutrients and including a shake or smoothie as needed with additional nutrients to help with lack of appetite. Plan to discuss lack of appetite with Dr. Gan at upcoming New Patient appointment on 05/13/23. Encouraged to call me at any time with any questions or concerns at [phone].     Barriers:    Anxiety, knowledge deficit.    Progress:    Progressing slowly; maintaining.     Next outreach: 06/03/24

## 2024-05-01 NOTE — TELEPHONE ENCOUNTER
Spoke with Willie Palmer, Patient Outreach RN calling on behalf of Ophelia. Ophelia has more questions regarding wound care and doesn't believe she has the supplies. Advised Willie to tell her to leave dressing intact and will reassess at appointment on 5/3/24. To discuss need for home health at this visit.

## 2024-05-03 ENCOUNTER — PATIENT OUTREACH (OUTPATIENT)
Dept: HEALTH INFORMATION MANAGEMENT | Facility: OTHER | Age: 79
End: 2024-05-03
Payer: MEDICARE

## 2024-05-03 ENCOUNTER — OFFICE VISIT (OUTPATIENT)
Dept: WOUND CARE | Facility: MEDICAL CENTER | Age: 79
End: 2024-05-03
Attending: STUDENT IN AN ORGANIZED HEALTH CARE EDUCATION/TRAINING PROGRAM
Payer: MEDICARE

## 2024-05-03 ENCOUNTER — TELEPHONE (OUTPATIENT)
Dept: WOUND CARE | Facility: MEDICAL CENTER | Age: 79
End: 2024-05-03

## 2024-05-03 VITALS
SYSTOLIC BLOOD PRESSURE: 150 MMHG | HEART RATE: 95 BPM | RESPIRATION RATE: 20 BRPM | DIASTOLIC BLOOD PRESSURE: 60 MMHG | OXYGEN SATURATION: 95 %

## 2024-05-03 DIAGNOSIS — E78.5 HYPERLIPIDEMIA, UNSPECIFIED HYPERLIPIDEMIA TYPE: ICD-10-CM

## 2024-05-03 DIAGNOSIS — N18.32 TYPE 2 DIABETES MELLITUS WITH STAGE 3B CHRONIC KIDNEY DISEASE, WITH LONG-TERM CURRENT USE OF INSULIN (HCC): ICD-10-CM

## 2024-05-03 DIAGNOSIS — F32.A DEPRESSION, UNSPECIFIED DEPRESSION TYPE: ICD-10-CM

## 2024-05-03 DIAGNOSIS — I27.20 PULMONARY HYPERTENSION (HCC): ICD-10-CM

## 2024-05-03 DIAGNOSIS — Z79.4 TYPE 2 DIABETES MELLITUS WITH STAGE 3B CHRONIC KIDNEY DISEASE, WITH LONG-TERM CURRENT USE OF INSULIN (HCC): ICD-10-CM

## 2024-05-03 DIAGNOSIS — E11.22 TYPE 2 DIABETES MELLITUS WITH STAGE 3B CHRONIC KIDNEY DISEASE, WITH LONG-TERM CURRENT USE OF INSULIN (HCC): ICD-10-CM

## 2024-05-03 DIAGNOSIS — E11.65 UNCONTROLLED TYPE 2 DIABETES MELLITUS WITH HYPERGLYCEMIA (HCC): ICD-10-CM

## 2024-05-03 DIAGNOSIS — E11.621 DIABETIC ULCER OF RIGHT HEEL ASSOCIATED WITH TYPE 2 DIABETES MELLITUS, WITH OTHER ULCER SEVERITY (HCC): Primary | ICD-10-CM

## 2024-05-03 DIAGNOSIS — E11.42 DIABETIC POLYNEUROPATHY ASSOCIATED WITH TYPE 2 DIABETES MELLITUS (HCC): ICD-10-CM

## 2024-05-03 DIAGNOSIS — R54 AGE-RELATED PHYSICAL DEBILITY: ICD-10-CM

## 2024-05-03 DIAGNOSIS — L89.610 PRESSURE INJURY OF RIGHT HEEL, UNSTAGEABLE (HCC): ICD-10-CM

## 2024-05-03 DIAGNOSIS — L97.418 DIABETIC ULCER OF RIGHT HEEL ASSOCIATED WITH TYPE 2 DIABETES MELLITUS, WITH OTHER ULCER SEVERITY (HCC): Primary | ICD-10-CM

## 2024-05-03 DIAGNOSIS — E11.42 TYPE 2 DIABETES MELLITUS WITH POLYNEUROPATHY (HCC): ICD-10-CM

## 2024-05-03 PROCEDURE — 99214 OFFICE O/P EST MOD 30 MIN: CPT | Mod: 25 | Performed by: NURSE PRACTITIONER

## 2024-05-03 PROCEDURE — 3078F DIAST BP <80 MM HG: CPT | Performed by: NURSE PRACTITIONER

## 2024-05-03 PROCEDURE — 3077F SYST BP >= 140 MM HG: CPT | Performed by: NURSE PRACTITIONER

## 2024-05-03 PROCEDURE — 11055 PARING/CUTG B9 HYPRKER LES 1: CPT | Performed by: NURSE PRACTITIONER

## 2024-05-03 ASSESSMENT — ENCOUNTER SYMPTOMS
SHORTNESS OF BREATH: 1
CHILLS: 0
BACK PAIN: 1
FEVER: 0
VOMITING: 0
CONSTIPATION: 1
NAUSEA: 0
CLAUDICATION: 0
COUGH: 0
DIARRHEA: 0

## 2024-05-03 NOTE — TELEPHONE ENCOUNTER
Submitted through [facility] Order Form for [facility] request for portable 3 view right foot xray with emphasis on posterior heel to be completed on 5/6/2024.

## 2024-05-03 NOTE — PROCEDURES
Provider Encounter- Diabetic Foot Ulcer      HISTORY OF PRESENT ILLNESS  Wound History:   START OF CARE IN CLINIC: 4/25/24               REFERRING PROVIDER: Joana MANUEL              WOUND- Diabetic foot ulcer              LOCATION: right posterior heel/achilles              HISTORY: 77 y/o diabetic female referred to the clinic for management of a wound to her right posterior heel/achilles.  Patient is uncertain when it started, but states it has been present for at least 6 weeks before coming into the clinic.  She is unsure of etiology, states she only wears her orthotic shoes with inserts when she leaves the house and wears rubber soled socks while in her house.  She rarely leaves her home, does not drive, relies on [employer].  Patient states that she noticed a large amount of skin that fell out of her sock 4/1/24. She met with her PCP for a referral to the wound clinic and also received a referral to podiatry. Patient states that she follows with endocrinologist Dr. Tierney.       Pertinent Medical History: Uncontrolled DM2, diabetic polyneuropathy, COPD, CHF    DIABETES HX: Diagnosed with type 2 diabetes in her 30s, and is currently managing with insulin.  Checks blood sugars 4 times per day and reports that these typically run around 170-200.  Has  had previous diabetes education.  Does  have numbness in feet.  Usually wears slipper socks while in her home, orthotic shoes with inserts when she leaves home.  She does check her feet routinely.  Has not had previous foot ulcers or foot surgery.  Current occupation -she is a retired nurse.  Offloading none.        TOBACCO USE:   Former smoker, quit in 2011    Patient's problem list, allergies, and current medications reviewed and updated in Epic    Interval History:  5/3/2024 : Initial provider visit with MAR Goetz, FNP-BC, CWOCN, CFCN.  Patient states that overall she is feeling okay.  She was only able to change her dressing once over the past  "week.  The eschar to her heel is softened, periwound is macerated.  She states that she spends a lot of her time in her \"Ileana lounger\" including for sleep at night.  She has orthotic shoes and inserts which are 8-9 years old, but she insists that they are like brand-new because she only wears them when she leaves the house, she does not want new shoes and inserts.      REVIEW OF SYSTEMS:   Review of Systems   Constitutional:  Negative for chills and fever.   Respiratory:  Positive for shortness of breath. Negative for cough.         Becomes short of breath very easily   Cardiovascular:  Positive for leg swelling. Negative for chest pain and claudication.        Has pacemaker  Fatigues easily with exertion   Gastrointestinal:  Positive for constipation. Negative for diarrhea, nausea and vomiting.   Genitourinary:  Negative for dysuria.   Musculoskeletal:  Positive for back pain.        Back pain for many years   Neurological:         Very little sensation in both feet       PHYSICAL EXAMINATION:   BP (!) 150/60   Pulse 95   Resp 20   SpO2 95%     Physical Exam  Constitutional:       Appearance: She is obese.   Cardiovascular:      Rate and Rhythm: Regular rhythm.      Pulses: Normal pulses.      Comments: Palpable pedal pulses, bilateral-2 to  Both feet are warm to touch  Pulmonary:      Effort: Pulmonary effort is normal.   Musculoskeletal:      Right lower leg: Edema present.      Left lower leg: Edema present.      Comments: 2+ edema bilateral lower extremities   Skin:     Comments: Unstageable pressure injury to right posterior heel-covered with moist eschar, no appreciable drainage periwound with macerated callus.  No periwound erythema or induration   Neurological:      Mental Status: She is alert and oriented to person, place, and time.         Monofilament testing with a 10 gram force: sensation intact: decreased bilaterally  Visual Inspection: Feet with maceration, ulcers, fissures.  Pedal pulses: intact " bilaterally     WOUND ASSESSMENT  Wound 04/25/24 Diabetic Ulcer Heel;Achilles Posterior Right (Active)   Wound Image   05/03/24 1554   Site Assessment Brown;Red;Eschar 05/03/24 1554   Periwound Assessment Maceration 05/03/24 1554   Margins Defined edges 05/03/24 1554   Closure Secondary intention 04/25/24 1500   Drainage Amount Other (Comment) 05/03/24 1554   Treatments Cleansed;Site care 05/03/24 1554   Offloading/DME Other (comment) 04/25/24 1500   Wound Cleansing Normal Saline Irrigation 05/03/24 1554   Periwound Protectant Not Applicable 05/03/24 1554   Dressing Status Clean;Dry;Intact 04/25/24 1500   Dressing Changed New 04/25/24 1500   Dressing Cleansing/Solutions 3% Betadine 05/03/24 1554   Dressing Options Other (Comments);Open to Air 05/03/24 1554   Dressing Change/Treatment Frequency Daily, and As Needed 05/03/24 1554   Wound Team Following Weekly 04/25/24 1500   Non-staged Wound Description Not applicable 05/03/24 1554   Wound Length (cm) 3.5 cm 05/03/24 1554   Wound Width (cm) 2 cm 05/03/24 1554   Wound Surface Area (cm^2) 7 cm^2 05/03/24 1554   Post-Procedure Length (cm) 2.7 cm 04/25/24 1500   Post-Procedure Width (cm) 1.7 cm 04/25/24 1500   Post-Procedure Surface Area (cm^2) 4.59 cm^2 04/25/24 1500   Tunneling (cm) 0 cm 05/03/24 1554   Undermining (cm) 0 cm 05/03/24 1554   Wound Odor None 05/03/24 1554   Pulses Right;DP;2+;PT;1+;Doppler 04/25/24 1500   Right Foot Monofilament 10-point exam (Sensate) 0/10 04/25/24 1500   Left Foot Monofilament 10-point exam (Sensate) 0/10 04/25/24 1500   Exposed Structures None 05/03/24 1554   Number of days: 8         PROCEDURE:   -2% viscous lidocaine applied topically to wound bed for approximately 5 minutes prior to debridement  -Scissors used to excise macerated periwound callus to skin level, there was no bleeding  -No excisional debridement of wound in clinic today  -Wound care completed by wound RN, refer to flowsheet  -Patient tolerated the procedure well,  "without c/o pain or discomfort.       Pertinent Labs and Diagnostics:    Labs:     A1c:   Lab Results   Component Value Date/Time    HBA1C 8.9 (H) 08/31/2023 10:01 AM          IMAGING: No results found.    VASCULAR STUDIES: No results found.    LAST  WOUND CULTURE:  DATE :      No results found for: \"CULTRSULT\"        ASSESSMENT AND PLAN:     1. Diabetic ulcer of right heel associated with type 2 diabetes mellitus, with other ulcer severity ([facility])  2. Pressure injury of right heel, unstageable ([facility])    5/3/2024: Unstageable pressure injury of right heel complicated by uncontrolled DM2 and neuropathy.  -Ulcer 100% covered with eschar.  -Discontinue honey dressing, causing maceration to periwound and unwanted softening of eschar.  Paint ulcer with Betadine daily to manage bioburden and to desiccate eschar  -X-ray to rule out OM.  Because patient is basically homebound, will have [facility] perform x-ray in her home  -Offloading strategies discussed and demonstrated in clinic today.  Patient advised to float her heel off of pillow while in her recliner chair  -Referral to home health to assist with wound care.  Patient has difficulty managing on her own.  Home health to change dressing 1-2 times per week in between clinic visits  -Patient to return to clinic next week for reassessment    Wound care: Paint with Betadine daily, gauze cover dressing, offload    3. Uncontrolled type 2 diabetes mellitus with hyperglycemia ([facility])    5/3/2024: Patient's most recent A1c 8.9.  She reports her blood sugars are often in the 170s- 200s  -Patient advised keep blood sugars below 140 in order to optimize wound healing  -Patient is established with endocrinology.  Recommend she make an appointment soon      4. Diabetic polyneuropathy associated with type 2 diabetes mellitus ([facility])    5/3/2024: Patient has very little sensation in her feet  - Implications of loss of protective sensation (LOPS) discussed with patient- including " increased risk for amputation.  Advised to check feet at least daily, moisturize feet, and to always wear protective foot wear.      5. Age-related physical debility    5/3/2024: Patient states she is mostly homebound, only leaves her home for groceries and doctors appointments.  Relies on [employer] for transportation  -Referral to home health          PATIENT EDUCATION  - Importance of tight glucose control for wound healing   - Implications of loss of protective sensation (LOPS) discussed with patient- including increased risk for amputation.  - Advised to check feet at least daily, moisturize feet, and to always wear protective foot wear.   -  Importance of offloading foot to assist with wound healing  - Advised pt not to trim nails or calluses, seek foot/nail care from podiatrist or certified foot/nail nurse  - Importance of adequate nutrition for wound healing    My total time spent caring for the patient on the day of the encounter was 30 minutes.   This does not include time spent on separately billable procedures/tests.       Please note that this note may have been created using voice recognition software. I have worked with technical experts from [facility] to optimize the interface.  I have made every reasonable attempt to correct obvious errors, but there may be errors of grammar and possibly content that I did not discover before finalizing the note.    N

## 2024-05-04 NOTE — PATIENT INSTRUCTIONS
-Should you experience any significant changes in your wound(s), such as signs of infection (increasing redness, swelling, localized heat, increased pain, fever > 101 F, chills) or have any questions regarding your home care instructions, please contact the wound center at [phone]. If after hours, contact your primary care physician or go to the hospital emergency room.     -If you are 5 or more minutes late for an appointment, we reserve the right to cancel and reschedule that appointment. Additionally, if you are habitually late or not showing (3 late cancellations and/or no shows), we reserve the right to cancel your remaining appointments and it will be your responsibility to obtain a new referral if services are still needed.

## 2024-05-06 ENCOUNTER — HOME HEALTH ADMISSION (OUTPATIENT)
Dept: HOME HEALTH SERVICES | Facility: HOME HEALTHCARE | Age: 79
End: 2024-05-06
Payer: MEDICARE

## 2024-05-07 ENCOUNTER — HOME CARE VISIT (OUTPATIENT)
Dept: HOME HEALTH SERVICES | Facility: HOME HEALTHCARE | Age: 79
End: 2024-05-07

## 2024-05-07 VITALS
SYSTOLIC BLOOD PRESSURE: 132 MMHG | HEART RATE: 72 BPM | TEMPERATURE: 97.6 F | DIASTOLIC BLOOD PRESSURE: 60 MMHG | OXYGEN SATURATION: 92 %

## 2024-05-07 ASSESSMENT — ENCOUNTER SYMPTOMS
SUBJECTIVE PAIN PROGRESSION: UNCHANGED
HYPERTENSION: 1
LOWEST PAIN SEVERITY IN PAST 24 HOURS: 0/10
LAST BOWEL MOVEMENT: 66966
NAUSEA: DENIES
DIZZINESS: 1
PERSON REPORTING PAIN: PATIENT
DENIES PAIN: 1
PAIN SEVERITY GOAL: 3/10
HEADACHES: 1
HIGHEST PAIN SEVERITY IN PAST 24 HOURS: 2/10
STOOL FREQUENCY: LESS THAN DAILY
VOMITING: DENIES
BOWEL PATTERN NORMAL: 1

## 2024-05-09 ENCOUNTER — PATIENT OUTREACH (OUTPATIENT)
Dept: HEALTH INFORMATION MANAGEMENT | Facility: OTHER | Age: 79
End: 2024-05-09

## 2024-05-09 DIAGNOSIS — E78.5 HYPERLIPIDEMIA, UNSPECIFIED HYPERLIPIDEMIA TYPE: ICD-10-CM

## 2024-05-09 DIAGNOSIS — I27.20 PULMONARY HYPERTENSION (HCC): ICD-10-CM

## 2024-05-09 DIAGNOSIS — E11.22 TYPE 2 DIABETES MELLITUS WITH STAGE 3B CHRONIC KIDNEY DISEASE, WITH LONG-TERM CURRENT USE OF INSULIN (HCC): ICD-10-CM

## 2024-05-09 DIAGNOSIS — E11.42 TYPE 2 DIABETES MELLITUS WITH POLYNEUROPATHY (HCC): ICD-10-CM

## 2024-05-09 DIAGNOSIS — Z79.4 TYPE 2 DIABETES MELLITUS WITH STAGE 3B CHRONIC KIDNEY DISEASE, WITH LONG-TERM CURRENT USE OF INSULIN (HCC): ICD-10-CM

## 2024-05-09 DIAGNOSIS — N18.32 TYPE 2 DIABETES MELLITUS WITH STAGE 3B CHRONIC KIDNEY DISEASE, WITH LONG-TERM CURRENT USE OF INSULIN (HCC): ICD-10-CM

## 2024-05-09 DIAGNOSIS — F32.A DEPRESSION, UNSPECIFIED DEPRESSION TYPE: ICD-10-CM

## 2024-05-09 NOTE — PROGRESS NOTES
2:30pm - Patient called, left VM requesting call back. This RN called patient back and patient has concerns of major lack of appetite. Suggestion to increase protein intake, snacks which still sustain appropriate BS's and talk with Dr. Gan 05/13/24 at New Patient appointment. We've discussed possible depression as the patient admits to little interest in doing daily tasks such as walks to the mailbox. Pt also reports to this RN not sleeping well the last several nights. Additionally, patient asked about donating items which she will be in touch about in the future (possibly refer JOSE RAUL Berrios to assist with this).

## 2024-05-10 ENCOUNTER — OFFICE VISIT (OUTPATIENT)
Dept: WOUND CARE | Facility: MEDICAL CENTER | Age: 79
End: 2024-05-10
Attending: STUDENT IN AN ORGANIZED HEALTH CARE EDUCATION/TRAINING PROGRAM
Payer: MEDICARE

## 2024-05-10 VITALS
OXYGEN SATURATION: 92 % | DIASTOLIC BLOOD PRESSURE: 50 MMHG | SYSTOLIC BLOOD PRESSURE: 126 MMHG | RESPIRATION RATE: 18 BRPM | TEMPERATURE: 97.3 F | HEART RATE: 95 BPM

## 2024-05-10 DIAGNOSIS — E11.621 DIABETIC ULCER OF RIGHT HEEL ASSOCIATED WITH TYPE 2 DIABETES MELLITUS, WITH OTHER ULCER SEVERITY (HCC): ICD-10-CM

## 2024-05-10 DIAGNOSIS — L97.418 DIABETIC ULCER OF RIGHT HEEL ASSOCIATED WITH TYPE 2 DIABETES MELLITUS, WITH OTHER ULCER SEVERITY (HCC): ICD-10-CM

## 2024-05-10 DIAGNOSIS — E11.65 UNCONTROLLED TYPE 2 DIABETES MELLITUS WITH HYPERGLYCEMIA (HCC): ICD-10-CM

## 2024-05-10 DIAGNOSIS — E11.42 DIABETIC POLYNEUROPATHY ASSOCIATED WITH TYPE 2 DIABETES MELLITUS (HCC): ICD-10-CM

## 2024-05-10 DIAGNOSIS — L89.610 PRESSURE INJURY OF RIGHT HEEL, UNSTAGEABLE (HCC): ICD-10-CM

## 2024-05-10 DIAGNOSIS — R54 AGE-RELATED PHYSICAL DEBILITY: ICD-10-CM

## 2024-05-10 PROCEDURE — 3074F SYST BP LT 130 MM HG: CPT | Performed by: NURSE PRACTITIONER

## 2024-05-10 PROCEDURE — 3078F DIAST BP <80 MM HG: CPT | Performed by: NURSE PRACTITIONER

## 2024-05-10 PROCEDURE — 99213 OFFICE O/P EST LOW 20 MIN: CPT | Performed by: NURSE PRACTITIONER

## 2024-05-10 NOTE — PATIENT INSTRUCTIONS
-Change your dressing daily as instructed and immediately if it becomes soiled, soaked, or falls off.    -Should you experience any significant changes in your wound(s), such as infection (redness, swelling, localized heat, increased pain, fever > 101 F, chills) or have any questions regarding your home care instructions, please contact the wound center at [phone]. If after hours, contact your primary care physician or go to the hospital emergency room.

## 2024-05-10 NOTE — PROCEDURES
Provider Encounter- Diabetic Foot Ulcer      HISTORY OF PRESENT ILLNESS  Wound History:   START OF CARE IN CLINIC: 4/25/24               REFERRING PROVIDER: Joana MANUEL              WOUND- Diabetic foot ulcer              LOCATION: right posterior heel/achilles     R plantar heel- new 5/10/24                HISTORY: 77 y/o diabetic female referred to the clinic for management of a wound to her right posterior heel/achilles.  Patient is uncertain when it started, but states it has been present for at least 6 weeks before coming into the clinic.  She is unsure of etiology, states she only wears her orthotic shoes with inserts when she leaves the house and wears rubber soled socks while in her house.  She rarely leaves her home, does not drive, relies on [employer].  Patient states that she noticed a large amount of skin that fell out of her sock 4/1/24. She met with her PCP for a referral to the wound clinic and also received a referral to podiatry. Patient states that she follows with endocrinologist Dr. Tierney.       Pertinent Medical History: Uncontrolled DM2, diabetic polyneuropathy, COPD, CHF    DIABETES HX: Diagnosed with type 2 diabetes in her 30s, and is currently managing with insulin.  Checks blood sugars 4 times per day and reports that these typically run around 170-200.  Has  had previous diabetes education.  Does  have numbness in feet.  Usually wears slipper socks while in her home, orthotic shoes with inserts when she leaves home.  She does check her feet routinely.  Has not had previous foot ulcers or foot surgery.  Current occupation -she is a retired nurse.  Offloading none.        TOBACCO USE:   Former smoker, quit in 2011    Patient's problem list, allergies, and current medications reviewed and updated in Epic    Interval History:  5/3/2024 : Initial provider visit with MAR Goetz, FNKYLE-BC, CWOCN, CFCN.  Patient states that overall she is feeling okay.  She was only able to change  "her dressing once over the past week.  The eschar to her heel is softened, periwound is macerated.  She states that she spends a lot of her time in her \"Ileana lounger\" including for sleep at night.  She has orthotic shoes and inserts which are 8-9 years old, but she insists that they are like brand-new because she only wears them when she leaves the house, she does not want new shoes and inserts.    5/10/2024: Clinic visit with Yenifer MANUEL, FNP-BC, CWON, CFCN.  Pt denies fevers, chills, nausea, vomiting.  Sugars in the 200s.  Reports she is offloading her right heel while in her recliner.  Reports x-ray completed at the [facility] on 5/4/2024.  We did not receive results.  RN to follow-up.  She developed new ulcer from self removal of callus to plantar heel.  Reports she applied Afrin to the area followed by Band-Aid for 48 hours and then removed. Ulceration remains open, shallow.  Eschar majority of tissue is stable however there is a 1 cm area that is soft minimal fluctuance.  Add additional dry dressing.  Patient agreeable to try.        REVIEW OF SYSTEMS:   Unchanged from previous wound clinic assessment on 5/3/24, except as noted in interval history above      PHYSICAL EXAMINATION:   /50   Pulse 95   Temp 36.3 °C (97.3 °F) (Temporal)   Resp 18   SpO2 92%     Physical Exam  Constitutional:       Appearance: She is obese.   Cardiovascular:      Rate and Rhythm: Regular rhythm.      Pulses: Normal pulses.      Comments: Palpable pedal pulses, bilateral+2   Both feet are warm to touch  Pulmonary:      Effort: Pulmonary effort is normal.   Musculoskeletal:      Right lower leg: Edema present.      Left lower leg: Edema present.      Comments: 2+ edema bilateral lower extremities   Skin:     Comments: Unstageable pressure injury to right posterior heel-covered with eschar, no appreciable drainage however there is soft eschar/slough approximately 1 cm diameter.  Periwound intact.  No periwound " erythema or induration   Neurological:      Mental Status: She is alert and oriented to person, place, and time.         Monofilament testing with a 10 gram force: sensation intact: decreased bilaterally  Visual Inspection: Feet with maceration, ulcers, fissures.  Pedal pulses: intact bilaterally     WOUND ASSESSMENT  Wound 04/25/24 Diabetic Ulcer Heel;Achilles Posterior Right (Active)   Wound Image   05/10/24 1600   Site Assessment Brown;Eschar 05/10/24 1600   Periwound Assessment Dry;Intact 05/10/24 1600   Margins Defined edges 05/10/24 1600   Closure Secondary intention 04/25/24 1500   Drainage Amount None 05/10/24 1600   Treatments Cleansed;Site care 05/10/24 1600   Offloading/DME Other (comment) 04/25/24 1500   Wound Cleansing Foam Cleanser/Washcloth 05/10/24 1600   Periwound Protectant Not Applicable 05/10/24 1600   Dressing Status Clean;Dry;Intact 04/25/24 1500   Dressing Changed New 04/25/24 1500   Dressing Cleansing/Solutions 3% Betadine 05/10/24 1600   Dressing Options Dry Gauze;Hypafix Tape 05/10/24 1600   Dressing Change/Treatment Frequency Daily, and As Needed 05/10/24 1600   Wound Team Following Weekly 04/25/24 1500   Non-staged Wound Description Not applicable 05/10/24 1600   Wound Length (cm) 33 cm 05/10/24 1600   Wound Width (cm) 2.7 cm 05/10/24 1600   Wound Surface Area (cm^2) 89.1 cm^2 05/10/24 1600   Post-Procedure Length (cm) 2.7 cm 04/25/24 1500   Post-Procedure Width (cm) 1.7 cm 04/25/24 1500   Post-Procedure Surface Area (cm^2) 4.59 cm^2 04/25/24 1500   Tunneling (cm) 0 cm 05/03/24 1554   Undermining (cm) 0 cm 05/03/24 1554   Wound Odor None 05/10/24 1600   Pulses Right;DP;2+;PT;1+;Doppler 04/25/24 1500   Right Foot Monofilament 10-point exam (Sensate) 0/10 04/25/24 1500   Left Foot Monofilament 10-point exam (Sensate) 0/10 04/25/24 1500   Exposed Structures None 05/10/24 1600   Number of days: 16         PROCEDURE:     -No excisional debridement of wound in clinic today  -Wound care  "completed by wound RN, refer to flowsheet  -Patient tolerated the procedure well, without c/o pain or discomfort.       Pertinent Labs and Diagnostics:    Labs:     A1c:   Lab Results   Component Value Date/Time    HBA1C 8.9 (H) 08/31/2023 10:01 AM          IMAGING: No results found.    VASCULAR STUDIES: No results found.    LAST  WOUND CULTURE:  DATE :      No results found for: \"CULTRSULT\"        ASSESSMENT AND PLAN:     1. Diabetic ulcer of right heel associated with type 2 diabetes mellitus, with other ulcer severity ([facility])  2. Pressure injury of right heel, unstageable ([facility])    5/10/2024: Unstageable pressure injury of right heel complicated by uncontrolled DM2 and neuropathy.  -Majority of ulcer covered with eschar except for approximately 1 cm area that is soft, slough, minimal fluctuance.  - New ulcer to right plantar heel from self removal of callus.    - Continue Betadine daily.  Add dry dressing to further desiccate slough and eschar  -X-ray completed by quality imaging at home.  No results.  RN to follow-up with quality imaging  -Offloading strategies discussed and demonstrated in clinic today.  Patient reports she is offloading with a rolled Cypriot.  Recommend heel float boots and also PRAFO boot.  Patient declined at this time.    -referral was placed last week for home health.  Patient declined assessment by home health.  RN to follow-up with home health if patient is still on service.     -Patient to return to clinic next week for reassessment      Wound care: Paint with Betadine daily, gauze cover dressing, offload  Wound care right plantar ulcer: Dimethicone lotion, nonadhesive foam, Hypafix tape    3. Uncontrolled type 2 diabetes mellitus with hyperglycemia ([facility])    5/10/2024: Patient's most recent A1c 8.9.  She reports her blood sugars are often in the 170s- 200s  -Patient advised keep blood sugars below 140 in order to optimize wound healing  -Patient is established with endocrinology.  Recommend " she make an appointment soon      4. Diabetic polyneuropathy associated with type 2 diabetes mellitus ([facility])    5/10/2024: Patient has very little sensation in her feet  - Implications of loss of protective sensation (LOPS) discussed with patient- including increased risk for amputation.  Advised to check feet at least daily, moisturize feet, and to always wear protective foot wear.    -Declines PRAFO boot    5. Age-related physical debility    5/10/2024: Patient states she is mostly homebound, only leaves her home for groceries and doctors appointments.  Relies on [employer] for transportation  -Referral to home health placed last week.  Patient has not started on service yet.  See above          PATIENT EDUCATION  - Importance of tight glucose control for wound healing   - Implications of loss of protective sensation (LOPS) discussed with patient- including increased risk for amputation.  - Advised to check feet at least daily, moisturize feet, and to always wear protective foot wear.   -  Importance of offloading foot to assist with wound healing  - Advised pt not to trim nails or calluses, seek foot/nail care from podiatrist or certified foot/nail nurse  - Importance of adequate nutrition for wound healing    My total time spent caring for the patient on the day of the encounter was 20 minutes.   This does not include time spent on separately billable procedures/tests.       Please note that this note may have been created using voice recognition software. I have worked with technical experts from [employer] to optimize the interface.  I have made every reasonable attempt to correct obvious errors, but there may be errors of grammar and possibly content that I did not discover before finalizing the note.    N

## 2024-05-13 ENCOUNTER — APPOINTMENT (OUTPATIENT)
Dept: RADIOLOGY | Facility: MEDICAL CENTER | Age: 79
End: 2024-05-13
Attending: STUDENT IN AN ORGANIZED HEALTH CARE EDUCATION/TRAINING PROGRAM
Payer: MEDICARE

## 2024-05-13 ENCOUNTER — HOSPITAL ENCOUNTER (EMERGENCY)
Facility: MEDICAL CENTER | Age: 79
End: 2024-05-13
Attending: STUDENT IN AN ORGANIZED HEALTH CARE EDUCATION/TRAINING PROGRAM
Payer: MEDICARE

## 2024-05-13 ENCOUNTER — APPOINTMENT (OUTPATIENT)
Dept: MEDICAL GROUP | Facility: PHYSICIAN GROUP | Age: 79
End: 2024-05-13
Payer: MEDICARE

## 2024-05-13 VITALS
TEMPERATURE: 97.2 F | WEIGHT: 206 LBS | OXYGEN SATURATION: 95 % | HEART RATE: 98 BPM | SYSTOLIC BLOOD PRESSURE: 138 MMHG | HEIGHT: 67 IN | DIASTOLIC BLOOD PRESSURE: 76 MMHG | BODY MASS INDEX: 32.33 KG/M2

## 2024-05-13 VITALS
RESPIRATION RATE: 18 BRPM | OXYGEN SATURATION: 97 % | WEIGHT: 205.91 LBS | TEMPERATURE: 97.6 F | HEART RATE: 92 BPM | SYSTOLIC BLOOD PRESSURE: 181 MMHG | BODY MASS INDEX: 32.32 KG/M2 | DIASTOLIC BLOOD PRESSURE: 78 MMHG | HEIGHT: 67 IN

## 2024-05-13 DIAGNOSIS — I48.0 PAF (PAROXYSMAL ATRIAL FIBRILLATION) (HCC): Chronic | ICD-10-CM

## 2024-05-13 DIAGNOSIS — E11.22 TYPE 2 DIABETES MELLITUS WITH STAGE 3B CHRONIC KIDNEY DISEASE, WITH LONG-TERM CURRENT USE OF INSULIN (HCC): Chronic | ICD-10-CM

## 2024-05-13 DIAGNOSIS — Z79.4 TYPE 2 DIABETES MELLITUS WITH STAGE 3B CHRONIC KIDNEY DISEASE, WITH LONG-TERM CURRENT USE OF INSULIN (HCC): Chronic | ICD-10-CM

## 2024-05-13 DIAGNOSIS — E11.3553 STABLE PROLIFERATIVE DIABETIC RETINOPATHY OF BOTH EYES ASSOCIATED WITH TYPE 2 DIABETES MELLITUS (HCC): Chronic | ICD-10-CM

## 2024-05-13 DIAGNOSIS — Z76.89 ENCOUNTER TO ESTABLISH CARE: ICD-10-CM

## 2024-05-13 DIAGNOSIS — Z12.83 SKIN CANCER SCREENING: ICD-10-CM

## 2024-05-13 DIAGNOSIS — L97.419 DIABETIC ULCER OF RIGHT HEEL ASSOCIATED WITH TYPE 2 DIABETES MELLITUS, UNSPECIFIED ULCER STAGE (HCC): Chronic | ICD-10-CM

## 2024-05-13 DIAGNOSIS — I38 DIASTOLIC CHF DUE TO VALVULAR DISEASE (HCC): Chronic | ICD-10-CM

## 2024-05-13 DIAGNOSIS — N18.32 TYPE 2 DIABETES MELLITUS WITH STAGE 3B CHRONIC KIDNEY DISEASE, WITH LONG-TERM CURRENT USE OF INSULIN (HCC): Chronic | ICD-10-CM

## 2024-05-13 DIAGNOSIS — Z23 NEED FOR VACCINATION: ICD-10-CM

## 2024-05-13 DIAGNOSIS — E79.0 HYPERURICEMIA: ICD-10-CM

## 2024-05-13 DIAGNOSIS — I27.20 PULMONARY HYPERTENSION (HCC): Chronic | ICD-10-CM

## 2024-05-13 DIAGNOSIS — L98.9 SKIN LESIONS: ICD-10-CM

## 2024-05-13 DIAGNOSIS — L30.9 ECZEMA, UNSPECIFIED TYPE: ICD-10-CM

## 2024-05-13 DIAGNOSIS — I50.30 DIASTOLIC CHF DUE TO VALVULAR DISEASE (HCC): Chronic | ICD-10-CM

## 2024-05-13 DIAGNOSIS — J44.9 CHRONIC OBSTRUCTIVE PULMONARY DISEASE, UNSPECIFIED COPD TYPE (HCC): ICD-10-CM

## 2024-05-13 DIAGNOSIS — M79.671 FOOT PAIN, RIGHT: ICD-10-CM

## 2024-05-13 DIAGNOSIS — F32.4 MAJOR DEPRESSIVE DISORDER WITH SINGLE EPISODE, IN PARTIAL REMISSION (HCC): Chronic | ICD-10-CM

## 2024-05-13 DIAGNOSIS — M13.0 POLYARTHROPATHY: ICD-10-CM

## 2024-05-13 DIAGNOSIS — Z78.0 POSTMENOPAUSAL ESTROGEN DEFICIENCY: ICD-10-CM

## 2024-05-13 DIAGNOSIS — D68.69 SECONDARY HYPERCOAGULABLE STATE (HCC): Chronic | ICD-10-CM

## 2024-05-13 DIAGNOSIS — N25.81 SECONDARY HYPERPARATHYROIDISM OF RENAL ORIGIN (HCC): Chronic | ICD-10-CM

## 2024-05-13 DIAGNOSIS — E11.621 DIABETIC ULCER OF RIGHT HEEL ASSOCIATED WITH TYPE 2 DIABETES MELLITUS, UNSPECIFIED ULCER STAGE (HCC): Chronic | ICD-10-CM

## 2024-05-13 PROBLEM — B36.9 FUNGAL INFECTION OF SKIN: Status: RESOLVED | Noted: 2021-06-09 | Resolved: 2024-05-13

## 2024-05-13 PROBLEM — E11.65 TYPE 2 DIABETES MELLITUS WITH HYPERGLYCEMIA, WITH LONG-TERM CURRENT USE OF INSULIN (HCC): Chronic | Status: ACTIVE | Noted: 2021-08-26

## 2024-05-13 PROBLEM — G31.9 CEREBRAL ATROPHY (HCC): Status: ACTIVE | Noted: 2024-05-13

## 2024-05-13 LAB
ALBUMIN SERPL BCP-MCNC: 4.2 G/DL (ref 3.2–4.9)
ALBUMIN/GLOB SERPL: 1.1 G/DL
ALP SERPL-CCNC: 72 U/L (ref 30–99)
ALT SERPL-CCNC: 7 U/L (ref 2–50)
ANION GAP SERPL CALC-SCNC: 12 MMOL/L (ref 7–16)
AST SERPL-CCNC: 14 U/L (ref 12–45)
BASOPHILS # BLD AUTO: 0.5 % (ref 0–1.8)
BASOPHILS # BLD: 0.06 K/UL (ref 0–0.12)
BILIRUB SERPL-MCNC: 0.3 MG/DL (ref 0.1–1.5)
BUN SERPL-MCNC: 21 MG/DL (ref 8–22)
CALCIUM ALBUM COR SERPL-MCNC: 10.2 MG/DL (ref 8.5–10.5)
CALCIUM SERPL-MCNC: 10.4 MG/DL (ref 8.4–10.2)
CHLORIDE SERPL-SCNC: 98 MMOL/L (ref 96–112)
CO2 SERPL-SCNC: 26 MMOL/L (ref 20–33)
CREAT SERPL-MCNC: 1.27 MG/DL (ref 0.5–1.4)
EOSINOPHIL # BLD AUTO: 0.12 K/UL (ref 0–0.51)
EOSINOPHIL NFR BLD: 1 % (ref 0–6.9)
ERYTHROCYTE [DISTWIDTH] IN BLOOD BY AUTOMATED COUNT: 44.8 FL (ref 35.9–50)
GFR SERPLBLD CREATININE-BSD FMLA CKD-EPI: 43 ML/MIN/1.73 M 2
GLOBULIN SER CALC-MCNC: 3.7 G/DL (ref 1.9–3.5)
GLUCOSE BLD STRIP.AUTO-MCNC: 159 MG/DL (ref 65–99)
GLUCOSE SERPL-MCNC: 231 MG/DL (ref 65–99)
HCT VFR BLD AUTO: 37 % (ref 37–47)
HGB BLD-MCNC: 11.9 G/DL (ref 12–16)
IMM GRANULOCYTES # BLD AUTO: 0.04 K/UL (ref 0–0.11)
IMM GRANULOCYTES NFR BLD AUTO: 0.3 % (ref 0–0.9)
LYMPHOCYTES # BLD AUTO: 1.12 K/UL (ref 1–4.8)
LYMPHOCYTES NFR BLD: 9.8 % (ref 22–41)
MCH RBC QN AUTO: 30.4 PG (ref 27–33)
MCHC RBC AUTO-ENTMCNC: 32.2 G/DL (ref 32.2–35.5)
MCV RBC AUTO: 94.4 FL (ref 81.4–97.8)
MONOCYTES # BLD AUTO: 0.79 K/UL (ref 0–0.85)
MONOCYTES NFR BLD AUTO: 6.9 % (ref 0–13.4)
NEUTROPHILS # BLD AUTO: 9.34 K/UL (ref 1.82–7.42)
NEUTROPHILS NFR BLD: 81.5 % (ref 44–72)
NRBC # BLD AUTO: 0 K/UL
NRBC BLD-RTO: 0 /100 WBC (ref 0–0.2)
PLATELET # BLD AUTO: 271 K/UL (ref 164–446)
PMV BLD AUTO: 10.7 FL (ref 9–12.9)
POTASSIUM SERPL-SCNC: 4 MMOL/L (ref 3.6–5.5)
PROT SERPL-MCNC: 7.9 G/DL (ref 6–8.2)
RBC # BLD AUTO: 3.92 M/UL (ref 4.2–5.4)
SODIUM SERPL-SCNC: 136 MMOL/L (ref 135–145)
WBC # BLD AUTO: 11.5 K/UL (ref 4.8–10.8)

## 2024-05-13 PROCEDURE — G0009 ADMIN PNEUMOCOCCAL VACCINE: HCPCS | Performed by: STUDENT IN AN ORGANIZED HEALTH CARE EDUCATION/TRAINING PROGRAM

## 2024-05-13 PROCEDURE — 3078F DIAST BP <80 MM HG: CPT | Performed by: STUDENT IN AN ORGANIZED HEALTH CARE EDUCATION/TRAINING PROGRAM

## 2024-05-13 PROCEDURE — 3075F SYST BP GE 130 - 139MM HG: CPT | Performed by: STUDENT IN AN ORGANIZED HEALTH CARE EDUCATION/TRAINING PROGRAM

## 2024-05-13 PROCEDURE — 99215 OFFICE O/P EST HI 40 MIN: CPT | Mod: 25 | Performed by: STUDENT IN AN ORGANIZED HEALTH CARE EDUCATION/TRAINING PROGRAM

## 2024-05-13 PROCEDURE — 90677 PCV20 VACCINE IM: CPT | Performed by: STUDENT IN AN ORGANIZED HEALTH CARE EDUCATION/TRAINING PROGRAM

## 2024-05-13 RX ORDER — CLOBETASOL PROPIONATE 0.5 MG/G
1 CREAM TOPICAL 2 TIMES DAILY PRN
Qty: 60 G | Refills: 1 | Status: ON HOLD | OUTPATIENT
Start: 2024-05-13

## 2024-05-13 RX ORDER — ALBUTEROL SULFATE 90 UG/1
2 AEROSOL, METERED RESPIRATORY (INHALATION) EVERY 6 HOURS PRN
Qty: 8.5 G | Refills: 2 | Status: ON HOLD | OUTPATIENT
Start: 2024-05-13

## 2024-05-13 RX ORDER — DIPHENHYDRAMINE HCL 25 MG
12.5 TABLET ORAL ONCE
Status: COMPLETED | OUTPATIENT
Start: 2024-05-13 | End: 2024-05-13

## 2024-05-13 RX ORDER — PSYLLIUM HUSK 0.4 G
2 CAPSULE ORAL DAILY
COMMUNITY
End: 2024-05-21

## 2024-05-13 RX ADMIN — DIPHENHYDRAMINE HYDROCHLORIDE 12.5 MG: 25 TABLET ORAL at 20:59

## 2024-05-13 ASSESSMENT — FIBROSIS 4 INDEX
FIB4 SCORE: 1.78
FIB4 SCORE: 1.78

## 2024-05-13 NOTE — ASSESSMENT & PLAN NOTE
Follows up with cardiology.  May 2019 echo showed moderate pulmonary hypertension.  Currently on furosemide 40 mg daily.

## 2024-05-13 NOTE — ASSESSMENT & PLAN NOTE
Well controlled with albuterol as needed.  Patient reports symptoms occur during the spring and fall seasons.  Patient quit smoking in 2011.

## 2024-05-13 NOTE — ASSESSMENT & PLAN NOTE
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  Well-controlled with Tylenol 1000 mg at bedtime.

## 2024-05-13 NOTE — ASSESSMENT & PLAN NOTE
Patient currently denies depression and reports she knows when she is depressed.  Patient saw psychologist in the past and is able to use coping mechanisms.  Patient uses CBD oil for anxiety with panic attacks and sleep.

## 2024-05-13 NOTE — ASSESSMENT & PLAN NOTE
Follows up with nephrology, endocrinology, wound care, retina specialist.  August 2023 A1c 8.9 and GFR 46.  Currently using insulin regular 6 units in the morning and evening and insulin NPH 50 units in the morning and 10 units in the evening.  Patient is also on losartan 25 mg twice daily and rosuvastatin 5 mg 3 times weekly.

## 2024-05-13 NOTE — ASSESSMENT & PLAN NOTE
Follows up with cardiology.  May 2019 echo showed EF 65%, mild mitral stenosis, aortic sclerosis without stenosis, and pulmonary hypertension.  Currently on furosemide 40 mg daily with Klor-con 8 mEq daily.

## 2024-05-13 NOTE — PROGRESS NOTES
Subjective:     CC:  establish care    HISTORY OF THE PRESENT ILLNESS: Patient is a 78 y.o. female here today to establish care. Prior PCP was Joana Romano.    COPD (chronic obstructive pulmonary disease) ([facility])  Well controlled with albuterol as needed.  Patient reports symptoms occur during the spring and fall seasons.  Patient quit smoking in 2011.    Diastolic CHF due to valvular disease ([facility])  Follows up with cardiology.  May 2019 echo showed EF 65%, mild mitral stenosis, aortic sclerosis without stenosis, and pulmonary hypertension.  Currently on furosemide 40 mg daily with Klor-con 8 mEq daily.    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  Well-controlled with Tylenol 1000 mg at bedtime.    Eczema  Well-controlled with clobetasol as needed.  Patient reports in the summer she develops itchy irritation in skin folds.    Type 2 diabetes mellitus with stage 3b chronic kidney disease, with long-term current use of insulin ([facility])  Follows up with nephrology, endocrinology, wound care, retina specialist.  August 2023 A1c 8.9 and GFR 46.  Currently using insulin regular 6 units in the morning and evening and insulin NPH 50 units in the morning and 10 units in the evening.  Patient is also on losartan 25 mg twice daily and rosuvastatin 5 mg 3 times weekly.    Major depressive disorder with single episode, in partial remission ([facility])  Patient currently denies depression and reports she knows when she is depressed.  Patient saw psychologist in the past and is able to use coping mechanisms.  Patient uses CBD oil for anxiety with panic attacks and sleep.    Cardiac pacemaker  Follows up with cardiology.  Pacemaker placed November 2018 due to high-grade heart block.    PAF (paroxysmal atrial fibrillation) ([facility])  Follows with cardiology.  Currently on Eliquis 5 mg twice daily.    Pulmonary hypertension ([facility])  Follows up with cardiology.  May 2019 echo showed  moderate pulmonary hypertension.  Currently on furosemide 40 mg daily.        Health Maintenance: Completed    Allergies   Allergen Reactions    Ativan      DELIRIUM, CONFUSION.    Advair [Fluticasone-Salmeterol]     Ambien [Zolpidem Tartrate]     Erythromycin Vomiting    Ibuprofen     Lipitor [Atorvastatin Calcium]     Pcn [Penicillins] Hives    Pravachol [Pravastatin Sodium]      ADVERSE REACTION.     Patient Active Problem List   Diagnosis    Sleep apnea    Type 2 diabetes mellitus with stage 3b chronic kidney disease, with long-term current use of insulin ([facility])    Osteoarthritis    Pulmonary hypertension ([facility])    Systolic murmur    Essential hypertension, benign    Diabetic ulcer of right heel associated with type 2 diabetes mellitus ([facility])    Obesity    Eczema    Hyperlipidemia, mixed    Mitral stenosis    Diastolic CHF due to valvular disease ([facility])    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    Cardiac pacemaker    Vertigo    COPD (chronic obstructive pulmonary disease) ([facility])    Other insomnia    Major depressive disorder with single episode, in partial remission ([facility])    Secondary hyperparathyroidism of renal origin ([facility])    PAF (paroxysmal atrial fibrillation) ([facility])    Stable proliferative diabetic retinopathy of both eyes associated with type 2 diabetes mellitus ([facility])    Anticoagulated    Sensation of fullness in right ear    Secondary hypercoagulable state ([facility])    Polyarthropathy     Current Outpatient Medications   Medication Sig Dispense Refill    albuterol 108 (90 Base) MCG/ACT Aero Soln inhalation aerosol Inhale 2 Puffs every 6 hours as needed for Shortness of Breath. 8.5 g 2    clobetasol (TEMOVATE) 0.05 % Cream Apply 1 Application  topically 2 times a day as needed (eczema). 60 g 1    Psyllium (METAMUCIL) 0.36 g Cap Take 2 Capsules by mouth every day.      non-formulary med Take 1 Capful by mouth every day at 6 PM. Vital fruit capsules      Non Formulary Request Take 1  "Capsule by mouth every day at 6 PM. vital veggie capsule      cyanocobalamin (VITAMIN B12) 1000 MCG Tab Take 1,000 mcg by mouth every day at 6 PM.      Acetaminophen Extra Strength 500 MG Cap Take 2 Capsules by mouth every evening.      furosemide (LASIX) 40 MG Tab TAKE 1 TABLET BY MOUTH EVERY DAY IN THE EVENING 100 Tablet 1    KLOR-CON 8 MEQ tablet TAKE 1 TABLET BY MOUTH EVERY DAY IN THE EVENING 100 Tablet 1    ELIQUIS 5 MG Tab TAKE 1 TABLET BY MOUTH TWICE A DAY 60 Tablet 11    Insulin Syringe-Needle U-100 (BD INSULIN SYRINGE U/F) 31G X 5/16\" 0.5 ML Misc Inject 1 Each under the skin 2 times a day. 60 Each 0    metFORMIN ER (GLUCOPHAGE XR) 500 MG TABLET SR 24 HR TAKE 1 TABLET BY MOUTH TWICE A  Tablet 4    FIBER PO Take  by mouth.      diphenhydrAMINE HCl (BENADRYL ALLERGY PO) Take 25 mg by mouth as needed (for insomnia).      NON SPECIFIED as needed. CBD oil      mupirocin (BACTROBAN) 2 % Ointment Apply 1 Application topically 2 times a day. 30 g 0    insulin NPH (HUMULIN/NOVOLIN) 100 UNIT/ML Suspension 46 units in the morning and 3-10 units in the evening per sliding scale (Patient taking differently: 50 units in the morning and 10 units in the evening) 10 mL 1    insulin regular (HUMULIN R) 100 Unit/mL Solution Inject 3-6 units subcutaneously daily in the morning. 10 mL 1    VITAMIN D, CHOLECALCIFEROL, PO Take 3,000 Units by mouth every day.      rosuvastatin (CRESTOR) 5 MG Tab TAKE ONE TABLET AT BEDTIME FOR CHOLESTEROL. TAKE THREE TIMES WEEKLY 30 Tab 5    losartan (COZAAR) 25 MG Tab Take 25 mg by mouth 2 Times a Day.      FREESTYLE LITE strip TEST BLOOD SUGAR 3 TIMES DAILY  12    fluticasone (FLONASE) 50 MCG/ACT nasal spray Spray 1 Spray in nose 1 time daily as needed.       No current facility-administered medications for this visit.     Past Surgical History:   Procedure Laterality Date    BREAST BIOPSY      GYN SURGERY  hysterectomy    MITRAL VALVE REPLACE      OTHER ORTHOPEDIC SURGERY Right     " TONSILLECTOMY Bilateral       Social History     Socioeconomic History    Marital status: Single     Spouse name: Not on file    Number of children: 0    Years of education: Not on file    Highest education level: Not on file   Occupational History     Employer: Retired   Tobacco Use    Smoking status: Former     Current packs/day: 0.00     Types: Cigarettes     Quit date: 2011     Years since quittin.5    Smokeless tobacco: Never   Vaping Use    Vaping Use: Never used   Substance and Sexual Activity    Alcohol use: Yes     Comment: 1 drink every 6 months    Drug use: Yes     Comment: thc oil prn    Sexual activity: Not Currently   Other Topics Concern    Not on file   Social History Narrative    Not on file     Social Determinants of Health     Financial Resource Strain: Medium Risk (4/3/2024)    Overall Financial Resource Strain (CARDIA)     Difficulty of Paying Living Expenses: Somewhat hard   Food Insecurity: No Food Insecurity (4/3/2024)    Hunger Vital Sign     Worried About Running Out of Food in the Last Year: Never true     Ran Out of Food in the Last Year: Never true   Transportation Needs: No Transportation Needs (4/3/2024)    PRAPARE - Transportation     Lack of Transportation (Medical): No     Lack of Transportation (Non-Medical): No   Physical Activity: Inactive (4/3/2024)    Exercise Vital Sign     Days of Exercise per Week: 0 days     Minutes of Exercise per Session: 0 min   Stress: Stress Concern Present (4/3/2024)    Citizen of [place] [employer]ational [employer] - Occupational Stress Questionnaire     Feeling of Stress : To some extent   Social Connections: Feeling Socially Integrated (2024)    [place] : Social Isolation     Frequency of experiencing loneliness or isolation: Rarely   Recent Concern: Social Connections - Socially Isolated (4/3/2024)    Social Connection and Isolation Panel [NHANES]     Frequency of Communication with Friends and Family: More than three times a week      "Frequency of Social Gatherings with Friends and Family: Never     Attends Restoration Services: Never     Active Member of Clubs or Organizations: No     Attends Club or Organization Meetings: Never     Marital Status: Never    Intimate Partner Violence: Not At Risk (4/3/2024)    Humiliation, Afraid, Rape, and Kick questionnaire     Fear of Current or Ex-Partner: No     Emotionally Abused: No     Physically Abused: No     Sexually Abused: No   Housing Stability: High Risk (4/3/2024)    Housing Stability Vital Sign     Unable to Pay for Housing in the Last Year: Yes     Number of Places Lived in the Last Year: 1     Unstable Housing in the Last Year: No     Family History   Problem Relation Age of Onset    Hypertension Mother     Heart Disease Mother     Diabetes Mother     Diabetes Father     Hypertension Father     Diabetes Maternal Grandmother     Diabetes Paternal Grandfather          ROS:     Constitutional:  Negative for chills, fever, fatigue, weight loss.  HEENT:  Negative for blurred vision, hearing loss, sore throat.    Respiratory:  Negative for cough, sputum production and shortness of breath.  Cardiovascular:  Negative for chest pain, palpitations and leg swelling.  Gastrointestinal:  Negative for abdominal pain, blood in stool, constipation, diarrhea and vomiting.   Musculoskeletal:  Negative for falls.   Skin:  Negative for rash.   Neurological:  Negative for dizziness, seizures, weakness and headaches.   Endo/Heme/Allergies:  Does not bruise/bleed easily.   Psychiatric/Behavioral:  Negative for depression, anxiety and suicidal thoughts.      Objective:     Exam: /76 (BP Location: Right arm, Patient Position: Sitting, BP Cuff Size: Adult long)   Pulse 98   Temp 36.2 °C (97.2 °F) (Temporal)   Ht 1.702 m (5' 7\")   Wt 93.4 kg (206 lb)   SpO2 95%  Body mass index is 32.26 kg/m².    Gen: Alert and oriented, no acute distress.  Eyes:  PERRL, conjunctivae clear, lids normal.   ENMT: Lips " without lesions, good dentition, moist mucous membranes.  Neck: Neck is supple, trachea middle, no thyromegaly.  Lungs: Normal effort, CTAB, no wheezing / rhonchi / rales.  CV: RRR, normal S1 and S2, no murmurs.  GI:  Abdomen soft, non-tender, non-distended with normal bowel sounds.  Skin:  Warm and dry with numerous lesions on back and peeling of skin on left cheek.  Neuro: AAO x 3, no acute focal deficits.  Psych: Normal affect and mood.      Assessment & Plan:   78 y.o. female with the following -    1. Encounter to establish care  Patient presents today to establish care.  Chart was reviewed and history was discussed in detail the patient.  Previous labs reviewed and annual labs ordered.  UTD with vaccines.  Patient declined Medicare AWV.    2. Type 2 diabetes mellitus with stage 3b chronic kidney disease, with long-term current use of insulin ([facility])  3. Diabetic ulcer of right heel associated with type 2 diabetes mellitus, unspecified ulcer stage ([facility])  4. Stable proliferative diabetic retinopathy of both eyes associated with type 2 diabetes mellitus ([facility])  Chronic, uncontrolled.  Follows up with nephrology, endocrinology, wound care, retina specialist.  August 2023 A1c 8.9 and GFR 46.  Repeat labs ordered.  Continue insulin regular 6 units in the morning and evening, insulin NPH 50 units in the morning and 10 units in the evening, losartan 25 mg twice daily, rosuvastatin 5 mg 3 times weekly.  - CBC WITHOUT DIFFERENTIAL; Future  - Comp Metabolic Panel; Future  - HEMOGLOBIN A1C; Future  - Lipid Profile; Future  - MICROALBUMIN CREAT RATIO URINE; Future    5. PAF (paroxysmal atrial fibrillation) ([facility])  6. Secondary hypercoagulable state ([facility])  Chronic, stable.  Follows up with cardiology.  Continue Eliquis 5 mg twice daily.    7. Pulmonary hypertension ([facility])  Chronic, stable.  Follows up with cardiology.  Continue furosemide 40 mg daily.    8. Chronic obstructive pulmonary disease, unspecified COPD type  ([facility])  Chronic, stable.  Patient quit smoking in 2011.  Continue albuterol as needed.  - albuterol 108 (90 Base) MCG/ACT Aero Soln inhalation aerosol; Inhale 2 Puffs every 6 hours as needed for Shortness of Breath.  Dispense: 8.5 g; Refill: 2    9. Eczema, unspecified type  - clobetasol (TEMOVATE) 0.05 % Cream; Apply 1 Application  topically 2 times a day as needed (eczema).  Dispense: 60 g; Refill: 1    10. Hyperuricemia  Chronic, uncontrolled.  May 2023 uric acid 8.5 and repeat labs ordered.  - URIC ACID; Future    11. Secondary hyperparathyroidism of renal origin ([facility])  Chronic, stable.  Follows up with endocrinology.  August 2023 calcium and Vit D WNL.  - PTH WITH CALCIUM; Future    12. Diastolic CHF due to valvular disease ([facility])  Chronic, stable.  Follows up with cardiology.  Continue furosemide 40 mg daily.    13. Polyarthropathy  Chronic, uncontrolled.  Continue Tylenol 1000 mg at bedtime.    14. Major depressive disorder with single episode, in partial remission ([facility])  Chronic, stable.  Patient does not feel the need for medication.  Patient is using CBD oil for panic attacks and sleep.    15. Postmenopausal estrogen deficiency  - DS-BONE DENSITY STUDY (DEXA); Future    16. Skin lesions  17. Skin cancer screening  - Referral to Dermatology    18. Need for vaccination  - Pneumococcal Conjugate Vaccine 20-Valent (6 wks+)      [facility] Form    Last edited 05/13/24 12:56 PDT by Emmanuelle Gan M.D.           I spent a total of 60 minutes with record review, exam, communication with the patient, communication with other providers, and documentation of this encounter.    Return in about 1 month (around 6/13/2024) for Discuss labs, Discuss imaging.    Please note that this dictation was created using voice recognition software. I have made every reasonable attempt to correct obvious errors, but I expect that there are errors of grammar and possibly content that I did not discover before finalizing the note.

## 2024-05-13 NOTE — ASSESSMENT & PLAN NOTE
Well-controlled with clobetasol as needed.  Patient reports in the summer she develops itchy irritation in skin folds.

## 2024-05-14 ENCOUNTER — PATIENT OUTREACH (OUTPATIENT)
Dept: HEALTH INFORMATION MANAGEMENT | Facility: OTHER | Age: 79
End: 2024-05-14

## 2024-05-14 DIAGNOSIS — N18.32 TYPE 2 DIABETES MELLITUS WITH STAGE 3B CHRONIC KIDNEY DISEASE, WITH LONG-TERM CURRENT USE OF INSULIN (HCC): ICD-10-CM

## 2024-05-14 DIAGNOSIS — E11.42 TYPE 2 DIABETES MELLITUS WITH POLYNEUROPATHY (HCC): ICD-10-CM

## 2024-05-14 DIAGNOSIS — E11.22 TYPE 2 DIABETES MELLITUS WITH STAGE 3B CHRONIC KIDNEY DISEASE, WITH LONG-TERM CURRENT USE OF INSULIN (HCC): ICD-10-CM

## 2024-05-14 DIAGNOSIS — F32.A DEPRESSION, UNSPECIFIED DEPRESSION TYPE: ICD-10-CM

## 2024-05-14 DIAGNOSIS — I27.20 PULMONARY HYPERTENSION (HCC): ICD-10-CM

## 2024-05-14 DIAGNOSIS — E78.5 HYPERLIPIDEMIA, UNSPECIFIED HYPERLIPIDEMIA TYPE: ICD-10-CM

## 2024-05-14 DIAGNOSIS — Z79.4 TYPE 2 DIABETES MELLITUS WITH STAGE 3B CHRONIC KIDNEY DISEASE, WITH LONG-TERM CURRENT USE OF INSULIN (HCC): ICD-10-CM

## 2024-05-14 NOTE — ED PROVIDER NOTES
"ED Provider Note    CHIEF COMPLAINT  Chief Complaint   Patient presents with    Foot Pain     Right  Standing in kitchen, stepped down w/ foot and felt a \"crunch, crunch\"       EXTERNAL RECORDS REVIEWED  Reviewed outpatient PCP note from today for medical history.    HPI/ROS  LIMITATION TO HISTORY   Select: : None  OUTSIDE HISTORIAN(S):  Friend providing clinically relevant collateral history    Ophelia Sosa is a 78 y.o. female with past medical history of congestive heart failure, COPD, type 2 diabetes, hypertension, hyperlipidemia, pulmonary hypertension, sleep apnea, Charcot foot presenting to the emergency department for evaluation of right-sided foot discomfort.  Patient says that she stepped on her foot and felt a popping sensation.  Says that she has been having difficulty ambulating, she is able to weight-bear on her heel and on her forefoot but feels like her foot is unstable when she tries to roll from heel-to-toe.  She does not have any specific pain in her foot.  She does have chronic neuropathy in bilateral lower extremities   Patient lives alone.  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   She sees a wound care nurse on a weekly basis who tends to the wound on her heel.   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    SURGICAL HISTORY   has a past surgical history that includes other orthopedic surgery (Right); gyn surgery (hysterectomy); mitral valve replace; " "tonsillectomy (Bilateral); and breast biopsy.    FAMILY HISTORY  Family History   Problem Relation Age of Onset    Hypertension Mother     Heart Disease Mother     Diabetes Mother     Diabetes Father     Hypertension Father     Diabetes Maternal Grandmother     Diabetes Paternal Grandfather        SOCIAL HISTORY  Social History     Tobacco Use    Smoking status: Former     Current packs/day: 0.00     Types: Cigarettes     Quit date: 2011     Years since quittin.5    Smokeless tobacco: Never   Vaping Use    Vaping Use: Never used   Substance and Sexual Activity    Alcohol use: Not Currently     Comment: denies    Drug use: Yes     Comment: CBD    Sexual activity: Not Currently       CURRENT MEDICATIONS  Home Medications    **Home medications have not yet been reviewed for this encounter**         ALLERGIES  Allergies   Allergen Reactions    Ativan      DELIRIUM, CONFUSION.    Advair [Fluticasone-Salmeterol]     Ambien [Zolpidem Tartrate]     Erythromycin Vomiting    Ibuprofen     Lipitor [Atorvastatin Calcium]     Pcn [Penicillins] Hives    Pravachol [Pravastatin Sodium]      ADVERSE REACTION.       PHYSICAL EXAM  VITAL SIGNS: BP (!) 181/78   Pulse 92   Temp 36.4 °C (97.6 °F) (Temporal)   Resp 18   Ht 1.702 m (5' 7\")   Wt 93.4 kg (205 lb 14.6 oz)   SpO2 97%   BMI 32.25 kg/m²    .General:  non-toxic, no acute distress  Neuro: oriented x 3, moving all extremities.   HEENT:   - Head: Normocephalic, atraumatic  - Eyes: PERRL  - Ears/Nose: normal external nose and ears  - Mouth: moist mucosal membranes  Resp: clear to auscultation, no increased work of breathing  CV: Regular rate and rhythm  Abd: Soft, non-tender, non-distended  Extremities:   Right lower extremity:  Chronic wound to the posterior heel, there is a small amount of bleeding from the superior margin of the wound.  There is no purulent discharge or malodor.  The eschar at the heel is dry chronic appearing.  Patient has Charcot deformity of " the midfoot.  She has no tenderness palpation about the midfoot or any of the metatarsals.  Her foot is warm and well-perfused.  She is insensate on the foot.  When patient ambulates she is able to weight-bear on her heel but it seems that her knee collapses when she tries to roll her foot from heel to forefoot.  She is able to plantarflex and dorsiflex the foot.  No tenderness about the distal Achilles tendon  Psych: lucid and conversational             DIAGNOSTIC STUDIES / PROCEDURES    EKG  My independent EKG interpretation:  Results for orders placed or performed in visit on 23   EKG   Result Value Ref Range    Report       [facility]    Test Date:  2023  Pt Name:    PAYAL BAUER                Department: [facility]  MRN:        [id number]                      Room:  Gender:     Female                       Technician: SHERRY  :        [id number]                   Requested By:AMAN CASTELLANOS  Order #:    [id number]                    Reading MD: Aman Castellanos MD    Measurements  Intervals                                Axis  Rate:       92                           P:          108  CT:         201                          QRS:        -84  QRSD:       149                          T:          91  QT:         412  QTc:        510    Interpretive Statements  Atrial-sensed ventricular-paced complexes  No further rhythm analysis attempted due to paced rhythm    Electronically Signed On 2023 18:35:54 PDT by Aman Castellanos MD         LABS  Results for orders placed or performed during the hospital encounter of 24   CBC WITH DIFFERENTIAL   Result Value Ref Range    WBC 11.5 (H) 4.8 - 10.8 K/uL    RBC 3.92 (L) 4.20 - 5.40 M/uL    Hemoglobin 11.9 (L) 12.0 - 16.0 g/dL    Hematocrit 37.0 37.0 - 47.0 %    MCV 94.4 81.4 - 97.8 fL    MCH 30.4 27.0 - 33.0 pg    MCHC 32.2 32.2 - 35.5 g/dL    RDW 44.8 35.9 - 50.0 fL    Platelet Count 271 164 - 446 K/uL    MPV 10.7 9.0 - 12.9 fL     Neutrophils-Polys 81.50 (H) 44.00 - 72.00 %    Lymphocytes 9.80 (L) 22.00 - 41.00 %    Monocytes 6.90 0.00 - 13.40 %    Eosinophils 1.00 0.00 - 6.90 %    Basophils 0.50 0.00 - 1.80 %    Immature Granulocytes 0.30 0.00 - 0.90 %    Nucleated RBC 0.00 0.00 - 0.20 /100 WBC    Neutrophils (Absolute) 9.34 (H) 1.82 - 7.42 K/uL    Lymphs (Absolute) 1.12 1.00 - 4.80 K/uL    Monos (Absolute) 0.79 0.00 - 0.85 K/uL    Eos (Absolute) 0.12 0.00 - 0.51 K/uL    Baso (Absolute) 0.06 0.00 - 0.12 K/uL    Immature Granulocytes (abs) 0.04 0.00 - 0.11 K/uL    NRBC (Absolute) 0.00 K/uL   COMP METABOLIC PANEL   Result Value Ref Range    Sodium 136 135 - 145 mmol/L    Potassium 4.0 3.6 - 5.5 mmol/L    Chloride 98 96 - 112 mmol/L    Co2 26 20 - 33 mmol/L    Anion Gap 12.0 7.0 - 16.0    Glucose 231 (H) 65 - 99 mg/dL    Bun 21 8 - 22 mg/dL    Creatinine 1.27 0.50 - 1.40 mg/dL    Calcium 10.4 (H) 8.4 - 10.2 mg/dL    Correct Calcium 10.2 8.5 - 10.5 mg/dL    AST(SGOT) 14 12 - 45 U/L    ALT(SGPT) 7 2 - 50 U/L    Alkaline Phosphatase 72 30 - 99 U/L    Total Bilirubin 0.3 0.1 - 1.5 mg/dL    Albumin 4.2 3.2 - 4.9 g/dL    Total Protein 7.9 6.0 - 8.2 g/dL    Globulin 3.7 (H) 1.9 - 3.5 g/dL    A-G Ratio 1.1 g/dL   ESTIMATED GFR   Result Value Ref Range    GFR (CKD-EPI) 43 (A) >60 mL/min/1.73 m 2   POCT glucose device results   Result Value Ref Range    POC Glucose, Blood 159 (H) 65 - 99 mg/dL       RADIOLOGY  I have independently interpreted the diagnostic imaging associated with this visit and am waiting the final reading from the radiologist.   My preliminary interpretation is as follows:   -   Radiologist interpretation:   DX-FOOT-COMPLETE 3+ RIGHT   Final Result      1.  Age-indeterminate fracture of the fourth toe proximal phalangeal neck.      2.  Redemonstration of Charcot's foot including near complete fusion at the tarsal metatarsal joints and across the proximal ends of the metatarsal bones.      3.  Chronic fragmentation of the second  metatarsal bone.      4.  Degenerative changes in the second and third MTP joint.      5.  No radiographic evidence of acute osteomyelitis. If there is clinical concern for radiographically occult osteomyelitis, MRI can be obtained.      6.  Possible age-indeterminate avulsion of the Achilles.                 MEDICAL DECISION MAKING      ED COURSE AND PLAN    Ophelia Sosa is a 78 y.o. female presenting to the emergency department after she felt a popping sensation in her right foot.  On exam she has a chronic wound to the posterior heel.  She has no tenderness to palpation.  She has Charcot deformity to the midfoot.  Her Achilles tendon is intact.  No evidence of acute limb ischemia.  I obtained a plain film of the foot which shows chronic deformities, age indeterminant fracture of the fourth metatarsal which I cannot appreciate.  She does have fusion of many of the tarsal bones.  She has calcification at the proximal Achilles tendon but the Achilles tendon remains intact and she has no tenderness palpation about the Achilles tendon.  Is somewhat unclear what is causing her instability at the forefoot as her entire forefoot appears to be fused.  She has appropriate follow-up with wound care nurse.  I obtain baseline labs which revealed borderline leukocytosis, plain film of the foot shows no obvious evidence of osteomyelitis.  Do not feel that MRI of the foot is necessary at this time.  Will plan to provide patient with a walking boot to help with ambulation which she can use to mobilize.  Advised patient to follow-up with her podiatrist.    ---Pertinent ED Course---:    10:01 PM I reviewed the patient's old records in Epic, medication list, allergies, past medical history and performed a physical examination.           Procedures:      ----------------------------------------------------------------------------------  DISCUSSIONS    I have discussed management of the patient with the following physicians and  EARLINE's:      Discussion of management with other QHP or appropriate source(s):     Escalation of care considered, and ultimately not performed: Considered MRI of the foot as described above.    Barriers to care at this time, including but not limited to: Limited physical capacity    Decision tools and prescription drugs considered including, but not limited to: Considered but no indication for antibiotics.    FINAL IMPRESSION    1. Foot pain, right        Discharge Medication List as of 5/13/2024 10:37 PM            DISPOSITION    Discharge home, Stable      This chart was dictated using an electronic voice recognition software. The chart has been reviewed and edited but there is still possibility for dictation errors due to limitation of software.    Preet Waldrop, DO 5/13/2024

## 2024-05-14 NOTE — ED NOTES
Asked pt if she had taken her benadryl from home prior to medicating. Pt states she has not taken any benadryl.

## 2024-05-14 NOTE — PROGRESS NOTES
05/14/24 - Patient called regarding recent ED visit, concerned about home care and foot. HH evaluated 05/07/24 and determined with the patient HH not need and patient has since changed her mind, she believes it would be beneficial. Reaching out to PCP Malik for new HH referral.     05/16/2024 Received call from patient she does want [facility] to come once a week for her wound care - called spoke to Enoch with [facility] they can get her scheduled next week requesting new referral. Will let Willie BERNSTEIN know tomorrow.

## 2024-05-14 NOTE — ED TRIAGE NOTES
"Chief Complaint   Patient presents with    Foot Pain     Right  Standing in kitchen, stepped down w/ foot and felt a \"crunch, crunch\"     BP (!) 146/86   Pulse 96   Temp 36.4 °C (97.6 °F) (Temporal)   Resp 17   Ht 1.702 m (5' 7\")   Wt 93.4 kg (205 lb 14.6 oz)   SpO2 96%   BMI 32.25 kg/m²     Pt to ED via [facility] from home.  Pt states was standing in kitchen, felt was going to step on a power cord, put Right foot down and felt a \"crunch, crunch.\"  Pt states is having difficulty bearing wt on Right Foot.  Pt reports hx of diabetic wounds, concerned w/ bleeding from heel of Right Foot.  Dressing in place on Right Heel, no active bleeding noted.  "

## 2024-05-14 NOTE — ED NOTES
Pt provided with Dc paper work and education on follow up care. Pt declines questions and used wheelchair per her request to leave ER.

## 2024-05-14 NOTE — ED NOTES
Patient insist on having water patient educated this is not in the plan of care. Patient still insist patient having water and benadryl from home.

## 2024-05-14 NOTE — DISCHARGE INSTRUCTIONS
You were seen in the emergency department for evaluation of instability of your right foot.  Please follow-up with your podiatrist.  Wear the walking boot to help you mobilize.  Come back if you develop symptoms of infection

## 2024-05-16 DIAGNOSIS — E11.621 DIABETIC ULCER OF RIGHT HEEL ASSOCIATED WITH TYPE 2 DIABETES MELLITUS, UNSPECIFIED ULCER STAGE (HCC): Chronic | ICD-10-CM

## 2024-05-16 DIAGNOSIS — L97.419 DIABETIC ULCER OF RIGHT HEEL ASSOCIATED WITH TYPE 2 DIABETES MELLITUS, UNSPECIFIED ULCER STAGE (HCC): Chronic | ICD-10-CM

## 2024-05-17 ENCOUNTER — OFFICE VISIT (OUTPATIENT)
Dept: WOUND CARE | Facility: MEDICAL CENTER | Age: 79
End: 2024-05-17
Attending: STUDENT IN AN ORGANIZED HEALTH CARE EDUCATION/TRAINING PROGRAM
Payer: MEDICARE

## 2024-05-17 ENCOUNTER — HOME HEALTH ADMISSION (OUTPATIENT)
Dept: HOME HEALTH SERVICES | Facility: HOME HEALTHCARE | Age: 79
End: 2024-05-17
Payer: MEDICARE

## 2024-05-17 VITALS
SYSTOLIC BLOOD PRESSURE: 130 MMHG | TEMPERATURE: 97 F | OXYGEN SATURATION: 94 % | HEART RATE: 91 BPM | RESPIRATION RATE: 18 BRPM | DIASTOLIC BLOOD PRESSURE: 70 MMHG

## 2024-05-17 DIAGNOSIS — L89.614 PRESSURE INJURY OF RIGHT HEEL, STAGE 4 (HCC): ICD-10-CM

## 2024-05-17 DIAGNOSIS — R54 AGE-RELATED PHYSICAL DEBILITY: ICD-10-CM

## 2024-05-17 DIAGNOSIS — T14.8XXA WOUND INFECTION: ICD-10-CM

## 2024-05-17 DIAGNOSIS — E11.621 DIABETIC ULCER OF RIGHT HEEL ASSOCIATED WITH TYPE 2 DIABETES MELLITUS, WITH OTHER ULCER SEVERITY (HCC): ICD-10-CM

## 2024-05-17 DIAGNOSIS — E11.42 DIABETIC POLYNEUROPATHY ASSOCIATED WITH TYPE 2 DIABETES MELLITUS (HCC): ICD-10-CM

## 2024-05-17 DIAGNOSIS — L08.9 WOUND INFECTION: ICD-10-CM

## 2024-05-17 DIAGNOSIS — E11.65 UNCONTROLLED TYPE 2 DIABETES MELLITUS WITH HYPERGLYCEMIA (HCC): ICD-10-CM

## 2024-05-17 DIAGNOSIS — L97.418 DIABETIC ULCER OF RIGHT HEEL ASSOCIATED WITH TYPE 2 DIABETES MELLITUS, WITH OTHER ULCER SEVERITY (HCC): ICD-10-CM

## 2024-05-17 PROCEDURE — 99214 OFFICE O/P EST MOD 30 MIN: CPT | Mod: 25 | Performed by: NURSE PRACTITIONER

## 2024-05-17 PROCEDURE — 3078F DIAST BP <80 MM HG: CPT | Performed by: NURSE PRACTITIONER

## 2024-05-17 PROCEDURE — 3075F SYST BP GE 130 - 139MM HG: CPT | Performed by: NURSE PRACTITIONER

## 2024-05-17 PROCEDURE — 11043 DBRDMT MUSC&/FSCA 1ST 20/<: CPT | Performed by: NURSE PRACTITIONER

## 2024-05-17 RX ORDER — DOXYCYCLINE HYCLATE 100 MG
100 TABLET ORAL 2 TIMES DAILY
Qty: 28 TABLET | Refills: 0 | Status: ON HOLD | OUTPATIENT
Start: 2024-05-17 | End: 2024-05-31

## 2024-05-17 NOTE — PROGRESS NOTES
Called Ophelia after appointment to discuss MRI considerations. Ophelia is claustrophobic, has a pace maker, has not had an MRI since pace maker placed. Needs to have head out of MRI to avoid sedation. Has used benadryl at times for anxiety.     Has an off loading boot from ED that is old. Will try to wear while in bed but unsure that will be able to wear. Discussed need to float heel with or without boot and not to use ankle roll. Advised that this may have caused increased redness from pressure.

## 2024-05-17 NOTE — PATIENT INSTRUCTIONS
-Keep dressings clean and dry. Change dressings daily, and if the dressings become saturated, soiled, or fall off.    -If you need to change your dressings at home: Wash your wound with normal saline, wound cleanser, or unscented soap and water prior to applying your new dressings. Please avoid cleansing with hydrogen peroxide or rubbing alcohol. Hydrogen peroxide and rubbing alcohol are toxic to new tissue and skin cells.    -Avoid prolonged standing or sitting without elevating your legs.    -Never walk around the house barefoot.    -Wear your offloading footwear any time you are up walking.    -Should you experience any significant changes in your wound, such as signs of infection (increasing redness, swelling, localized heat, increased pain, fever > 101 F, chills) or have any questions regarding your home care instructions, please contact the wound center at [phone]. If after hours, contact your primary care physician or go to the hospital emergency room.     -If you are 5 or more minutes late for an appointment, we reserve the right to cancel and reschedule that appointment. Additionally, if you are habitually late or not showing (3 late cancellations and/or no shows), we reserve the right to cancel your remaining appointments and it will be your responsibility to obtain a new referral if services are still needed.

## 2024-05-18 NOTE — PROGRESS NOTES
"   Provider Encounter- Diabetic Foot Ulcer      HISTORY OF PRESENT ILLNESS  Wound History:    START OF CARE IN CLINIC: 4/25/2024    REFERRING PROVIDER: Emmanuelle Gan MD     WOUND- Diabetic foot ulcer   LOCATION: Right posterior heel     Right plantar heel-new 5/10/2024     HISTORY: referred to the clinic for management of a wound to her right posterior heel/achilles. Patient is uncertain when it started, but states it has been present for at least 6 weeks before coming into the clinic. She is unsure of etiology, states she only wears her orthotic shoes with inserts when she leaves the house and wears rubber soled socks while in her house. She rarely leaves her home, does not drive, relies on [employer]. Patient states that she noticed a large amount of skin that fell out of her sock 4/1/24. She met with her PCP for a referral to the wound clinic and also received a referral to podiatry. Patient states that she follows with endocrinologist Dr. Tierney.     Pertinent Medical History: DM type II, polyneuropathy, COPD, CHF    DIABETES HX: Diagnosed with type 2 diabetes in 1970s, and is currently managing diabetes with insulin.    Checks blood sugars: >3x/day   Blood sugar average: 1   Has had previous diabetes education.    Does have numbness in feet.    Foot wear:nonprescriptive shoes.  Does check feet routinely.    Previous foot ulcers: No   Previous foot surgery:no  Current occupation retired RN.    Offloading none.        TOBACCO USE: Former smoker    Patient's problem list, allergies, and current medications reviewed and updated in Epic    Interval History:  5/3/2024 : Initial provider visit with Sofi Larson, APRN, FNP-BC, CWOCN, CFCN.  Patient states that overall she is feeling okay.  She was only able to change her dressing once over the past week.  The eschar to her heel is softened, periwound is macerated.  She states that she spends a lot of her time in her \"Ileana lounger\" including for sleep at night.  She " has orthotic shoes and inserts which are 8-9 years old, but she insists that they are like brand-new because she only wears them when she leaves the house, she does not want new shoes and inserts.     5/10/2024: Clinic visit with ANNY Henry, CASTILLO, OSMIN.  Pt denies fevers, chills, nausea, vomiting.  Sugars in the 200s.  Reports she is offloading her right heel while in her recliner.  Reports x-ray completed at the [facility] on 5/4/2024.  We did not receive results.  RN to follow-up.  She developed new ulcer from self removal of callus to plantar heel.  Reports she applied Afrin to the area followed by Band-Aid for 48 hours and then removed. Ulceration remains open, shallow.  Eschar majority of tissue is stable however there is a 1 cm area that is soft minimal fluctuance.  Add additional dry dressing.  Patient agreeable to try.     5/17/2024: Clinic visit with ANNY Henry, CASTILLO, OSMIN.  Pt denies fevers, chills, nausea, vomiting.  Blood sugar 202.  Reports she felt a pop/crunch to her right foot when she stepped down while in the kitchen.  She proceeded to ED immediately on 5/13/2024.  X-ray completed, no evidence of OM, no fracture noted however she was found to have avulsion to Achilles.  She was given a boot and informed to follow-up with podiatry.  Has not made appointment yet.  She has not been able to wear the boot, reporting to progress in.  Heel ulcer is worse.  She does have increased tenderness, swelling, purple discoloration to Achilles.  Started on doxycycline.  MRI of foot and ankle ordered.  Referral to foot and ankle surgeon placed.           REVIEW OF SYSTEMS:   Unchanged from previous wound clinic assessment on 5/10/2024, except as noted in interval history above      PHYSICAL EXAMINATION:   /70   Pulse 91   Temp 36.1 °C (97 °F) (Temporal)   Resp 18   SpO2 94%     Physical Exam  Vitals reviewed.   Constitutional:       Appearance: Normal appearance.    Cardiovascular:      Rate and Rhythm: Normal rate.      Comments: Palpable pedal pulses bilaterally  Pulmonary:      Effort: Pulmonary effort is normal.   Musculoskeletal:         General: Swelling present.      Right lower leg: Edema present.      Left lower leg: Edema present.      Comments: 2+ pitting edema BLE  Achilles assessed, intact, able to dorsi and plantarflex   Skin:     Comments: Right posterior heel: Full-thickness, eschar is now wet, majority of wound bed covered in slough with necrotic tissue noted, exposed Achilles tendon, there is increased erythema/purplish discoloration extending to posterior calf, increased edema,  moderate serosanguineous drainage, mild odor    Right plantar heel: Full-thickness, dried scab   Neurological:      Mental Status: She is alert.      Comments:   Insensate to feet     Monofilament testing with a 10 gram force: sensation intact: decreased bilaterally  Visual Inspection: Feet with maceration, ulcers, fissures.  Pedal pulses: intact bilaterally      WOUND ASSESSMENT  Wound 04/25/24 Diabetic Ulcer Heel;Achilles Posterior Right (Active)   Wound Image     05/17/24 1415   Site Assessment Yellow;Brown 05/17/24 1415   Periwound Assessment Blanchable erythema;Ecchymosis 05/17/24 1415   Margins Epibole (rolled edges) 05/17/24 1415   Closure Secondary intention 04/25/24 1500   Drainage Amount Moderate 05/17/24 1415   Drainage Description Serosanguineous 05/17/24 1415   Treatments Cleansed;Provider debridement 05/17/24 1415   Offloading/DME Other (comment) 04/25/24 1500   Wound Cleansing Hypochlorus Acid 05/17/24 1415   Periwound Protectant No-sting Skin Prep 05/17/24 1415   Dressing Status Clean;Dry;Intact 04/25/24 1500   Dressing Changed New 04/25/24 1500   Dressing Cleansing/Solutions 1/4 Strength Dakin's Solution 05/17/24 1415   Dressing Options Moist Roll Gauze;Dry Gauze;Hypafix Tape 05/17/24 1415   Dressing Change/Treatment Frequency Daily, and As Needed 05/17/24 1415    Wound Team Following Weekly 04/25/24 1500   Non-staged Wound Description Full thickness 05/17/24 1415   Wound Length (cm) 3.7 cm 05/17/24 1415   Wound Width (cm) 2.5 cm 05/17/24 1415   Wound Depth (cm) 0.2 cm 05/17/24 1415   Wound Surface Area (cm^2) 9.25 cm^2 05/17/24 1415   Wound Volume (cm^3) 1.85 cm^3 05/17/24 1415   Post-Procedure Length (cm) 3.7 cm 05/17/24 1415   Post-Procedure Width (cm) 2.6 cm 05/17/24 1415   Post-Procedure Depth (cm) 0.5 cm 05/17/24 1415   Post-Procedure Surface Area (cm^2) 9.62 cm^2 05/17/24 1415   Post-Procedure Volume (cm^3) 4.81 cm^3 05/17/24 1415   Tunneling (cm) 0 cm 05/17/24 1415   Undermining (cm) 0 cm 05/17/24 1415   Wound Odor Mild 05/17/24 1415   Pulses Right;DP;2+;PT;1+;Doppler 04/25/24 1500   Right Foot Monofilament 10-point exam (Sensate) 0/10 04/25/24 1500   Left Foot Monofilament 10-point exam (Sensate) 0/10 04/25/24 1500   Exposed Structures BRISA 05/17/24 1415   Number of days: 23       PROCEDURE:   2% viscous lidocaine applied and allowed to dwell for approximately 10 minutes.  Using scissors, forceps, curette, excised nonviable tissue, significant slough, necrotic tissue from wound bed until healthy bleeding tissue visualized.  Not able to remove all of slough due to slough being adherent.  Total area debrided 9.62 cm² to muscle fascia.  Does not probe to bone at this point.  Bleeding controlled with manual pressure.  Wound care completed by RN.  See flowsheet.  Patient tolerated procedure well.    Pertinent Labs and Diagnostics:    Labs:     A1c:   Lab Results   Component Value Date/Time    HBA1C 8.9 (H) 08/31/2023 10:01 AM          IMAGING: Foot/Toes Imaging, Past Year DX-FOOT-COMPLETE 3+ RIGHT    Result Date: 5/13/2024  Narrative 5/13/2024 9:02 PM HISTORY/REASON FOR EXAM:  Atraumatic Pain/Swelling/Deformity TECHNIQUE/EXAM DESCRIPTION AND NUMBER OF VIEWS: 3 views of the RIGHT foot. COMPARISON:  2/1/2016 FINDINGS:  Bone mineralization is normal.  There is no evidence  "of fracture or dislocation.  There is severe joint space narrowing periarticular sclerosis and spurring throughout the tarsal metatarsal joints with fusion across several joints. Some malalignment is noted with lateral displacement of metatarsals relative to the tarsal bones. Chronic fragmentation of the distal diaphysis of the second metatarsal bone also appears to be present. Degenerative changes at the second and third MTP joints are also noted.  No bone erosions are identified.. Age-indeterminate fracture of the fourth toe proximal phalangeal neck. Osseous fragments at the expected location of the Achilles.     Impression 1.  Age-indeterminate fracture of the fourth toe proximal phalangeal neck. 2.  Redemonstration of Charcot's foot including near complete fusion at the tarsal metatarsal joints and across the proximal ends of the metatarsal bones. 3.  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 4.  Degenerative changes in the second and third MTP joint. 5.  No radiographic evidence of acute osteomyelitis. If there is clinical concern for radiographically occult osteomyelitis, MRI can be obtained. 6.  Possible age-indeterminate avulsion of the Achilles.       VASCULAR STUDIES: No results found.    LAST  WOUND CULTURE: No results found for: \"CULTRSULT\"          ASSESSMENT AND PLAN:   1. Diabetic ulcer of right heel associated with type 2 diabetes mellitus, with other ulcer severity ([facility])  2. Pressure injury of right heel, stage IV ([facility])     5/17/2024: Eschar no longer stable, evolve, wet, odors, necrotic Achilles tendon  - New ulcer to right plantar heel from self removal of callus dry stable.    - -X-ray completed by quality imaging at home.  Negative for bone infection.  -Went to ED after hearing a pop when stepping down on right foot to Achilles.  X-ray 5/13/2024 also negative for osteomyelitis, negative for fracture, there is avulsion of Achilles.  - Was given offloading boot in ED however not " wearing.  Recommend patient wear boot to immobilize ankle, stabilize Achilles.  Patient educated on how to offload heel while wearing boot.  Recommend using pillow underneath knee/calf  - Referral to foot and ankle surgeon placed  - MRI foot and ankle with IV contrast ordered to rule out OM, Achilles injury  -Previously using rolled off again.  This could be causing additional pressure, ecchymosis to posterior calf/Achilles  -Previously recommend heel float boots and also PRAFO boot.  Patient declined at this time.   -referral was placed previously for home health.  Patient declined assessment by home health.  - Patient is agreeable to have home health to assess wound and to assist with dressing changes  -Initiate Dakin's moist dressing solution daily.  Supplies provided.  -Patient to return to clinic next week for reassessment for ED if ulcer continues to significantly deteriorate        Wound care: Dakin's moist dressing daily  Wound care right plantar ulcer: Dimethicone lotion, roll gauze, Hypafix tape     3. Uncontrolled type 2 diabetes mellitus with hyperglycemia ([facility]CC)     5/17/2024: Patient's most recent A1c 8.9.  She reports her blood sugars are often in the 170s- 200s  -Patient advised keep blood sugars below 140 in order to optimize wound healing  -Patient is established with endocrinology.  Recommend she make an appointment soon        4. Diabetic polyneuropathy associated with type 2 diabetes mellitus (HCC)     5/10/2024: Patient has very little sensation in her feet  - Implications of loss of protective sensation (LOPS) discussed with patient- including increased risk for amputation.  Advised to check feet at least daily, moisturize feet, and to always wear protective foot wear.    -Declines PRAFO boot     5. Age-related physical debility     5/10/2024: Patient states she is mostly homebound, only leaves her home for groceries and doctors appointments.  Relies on [employer] for transportation  -Referral to  home health placed last week.  Patient has not started on service yet.  See above     6.  Wound infection    5/17/2024: Increased erythema/purple discoloration with increased drainage, necrosis of stable eschar.  Necrotic tissue with exposed Achilles tendon  - Initiate doxycycline.  Patient has allergy to penicillin  - Does have stage III renal disease.  Avoid nephrotoxic medications.  - X-ray negative for OM however clinically condition is changed  - Purple discoloration differential to include Achilles partial rupture, pressure from [place] roll versus cellulitis  - Follow-up MRI foot and ankle see above      PATIENT EDUCATION  - Importance of tight glucose control for wound healing   - Implications of loss of protective sensation (LOPS) discussed with patient- including increased risk for amputation.  - Advised to check feet at least daily, moisturize feet, and to always wear protective foot wear.   -  Importance of offloading foot to assist with wound healing  - Advised pt not to trim nails or calluses, seek foot/nail care from podiatrist or certified foot/nail nurse  - Importance of adequate nutrition for wound healing    My total time spent caring for the patient on the day of the encounter was 30 minutes, reviewing history, assessment, counseling and education, and coordination of care.  This does not include time spent on separately billable procedures/tests.        Please note that this note may have been created using voice recognition software. I have worked with technical experts from [employer] to optimize the interface.  I have made every reasonable attempt to correct obvious errors, but there may be errors of grammar and possibly content that I did not discover before finalizing the note.

## 2024-05-21 ENCOUNTER — HOME CARE VISIT (OUTPATIENT)
Dept: HOME HEALTH SERVICES | Facility: HOME HEALTHCARE | Age: 79
End: 2024-05-21
Payer: MEDICARE

## 2024-05-21 ENCOUNTER — DOCUMENTATION (OUTPATIENT)
Dept: CARDIOLOGY | Facility: MEDICAL CENTER | Age: 79
End: 2024-05-21

## 2024-05-21 ENCOUNTER — PATIENT OUTREACH (OUTPATIENT)
Dept: HEALTH INFORMATION MANAGEMENT | Facility: OTHER | Age: 79
End: 2024-05-21
Payer: MEDICARE

## 2024-05-21 VITALS
BODY MASS INDEX: 32.25 KG/M2 | HEIGHT: 67 IN | TEMPERATURE: 97.6 F | HEART RATE: 80 BPM | RESPIRATION RATE: 16 BRPM | DIASTOLIC BLOOD PRESSURE: 56 MMHG | OXYGEN SATURATION: 98 % | SYSTOLIC BLOOD PRESSURE: 132 MMHG

## 2024-05-21 DIAGNOSIS — E11.22 TYPE 2 DIABETES MELLITUS WITH STAGE 3B CHRONIC KIDNEY DISEASE, WITH LONG-TERM CURRENT USE OF INSULIN (HCC): ICD-10-CM

## 2024-05-21 DIAGNOSIS — N18.32 TYPE 2 DIABETES MELLITUS WITH STAGE 3B CHRONIC KIDNEY DISEASE, WITH LONG-TERM CURRENT USE OF INSULIN (HCC): ICD-10-CM

## 2024-05-21 DIAGNOSIS — Z79.4 TYPE 2 DIABETES MELLITUS WITH STAGE 3B CHRONIC KIDNEY DISEASE, WITH LONG-TERM CURRENT USE OF INSULIN (HCC): ICD-10-CM

## 2024-05-21 PROCEDURE — 665005 NO-PAY RAP - HOME HEALTH

## 2024-05-21 ASSESSMENT — ENCOUNTER SYMPTOMS
SHORTNESS OF BREATH: 1
CONSTIPATION: 1
DENIES PAIN: 1
PAIN LOCATION: JOINTS
POOR WOUND HEALING: 1
UNPLANNED WEIGHT LOSS: 1
FATIGUE: 1
FATIGUES EASILY: 1
DYSPNEA ON EXERTION: 1
DEPRESSED MOOD: 1
SUBJECTIVE PAIN PROGRESSION: UNCHANGED
STOOL FREQUENCY: LESS THAN DAILY
PERSON REPORTING PAIN: PATIENT
LAST BOWEL MOVEMENT: 66981

## 2024-05-21 ASSESSMENT — ACTIVITIES OF DAILY LIVING (ADL): OASIS_M1830: 04

## 2024-05-21 NOTE — PROGRESS NOTES
Medication chart review for [facility] services    Received referral from [facility].   Medications reviewed  compared with discharge summary if available.  Discharge summary date, if applicable:   5/24    Current medication list per [facility]     Medication list one, patient is currently taking    Current Outpatient Medications:     Hydrocortisone (Perianal), 1 Application, Topical, QDAY PRN    Psyllium (METAMUCIL MULTIHEALTH FIBER PO), 2 Capsule, Oral, DAILY    doxycycline, 100 mg, Oral, BID    albuterol, 2 Puff, Inhalation, Q6HRS PRN    clobetasol, 1 Application , Topical, BID PRN    non-formulary med, 1 Capful, Oral, DAILY AT 1800    Non Formulary Request, 1 Capsule, Oral, DAILY AT 1800    cyanocobalamin, 1,000 mcg, Oral, DAILY AT 1800    Acetaminophen Extra Strength, 2 Capsule, Oral, Nightly    furosemide, TAKE 1 TABLET BY MOUTH EVERY DAY IN THE EVENING    Klor-Con, TAKE 1 TABLET BY MOUTH EVERY DAY IN THE EVENING    Eliquis, TAKE 1 TABLET BY MOUTH TWICE A DAY    Insulin Syringe-Needle U-100, 1 Each, Subcutaneous, BID (Patient not taking: Reported on 5/21/2024)    metFORMIN ER, 500 mg, Oral, BID    diphenhydrAMINE HCl (BENADRYL ALLERGY PO), 25 mg, Oral, PRN    mupirocin, 1 Application , Topical, BID    insulin NPH, 46 units in the morning and 3-10 units in the evening per sliding scale (Patient taking differently: 50 units in the morning and 10 units in the evening)    insulin regular, Inject 3-6 units subcutaneously daily in the morning. (Patient taking differently: 6 Units, Subcutaneous, 2 Times Daily (BID), Inject 6 units daily in the morning and 6 units a, Indications: Type 2 Diabetes Mellitus)    VITAMIN D, CHOLECALCIFEROL, PO, 3,000 Units, Oral, DAILY    rosuvastatin, TAKE ONE TABLET AT BEDTIME FOR CHOLESTEROL. TAKE THREE TIMES WEEKLY    losartan, 25 mg, Oral, BID    fluticasone, 1 Spray, Nasal, QDAY PRN      Medication list two, drugs that the patient has been prescribed or recommended to take  by their healthcare provider on discharge summary  N/a    Allergies   Allergen Reactions    Ativan      DELIRIUM, CONFUSION.    Advair [Fluticasone-Salmeterol]     Ambien [Zolpidem Tartrate]     Erythromycin Vomiting    Ibuprofen     Lipitor [Atorvastatin Calcium]     Pcn [Penicillins] Hives    Pravachol [Pravastatin Sodium]      ADVERSE REACTION.       Labs     Lab Results   Component Value Date/Time    SODIUM 136 05/13/2024 09:20 PM    POTASSIUM 4.0 05/13/2024 09:20 PM    CHLORIDE 98 05/13/2024 09:20 PM    CO2 26 05/13/2024 09:20 PM    GLUCOSE 231 (H) 05/13/2024 09:20 PM    BUN 21 05/13/2024 09:20 PM    CREATININE 1.27 05/13/2024 09:20 PM     Lab Results   Component Value Date/Time    ALKPHOSPHAT 72 05/13/2024 09:20 PM    ASTSGOT 14 05/13/2024 09:20 PM    ALTSGPT 7 05/13/2024 09:20 PM    TBILIRUBIN 0.3 05/13/2024 09:20 PM    INR 1.03 11/16/2018 11:15 AM    ALBUMIN 4.2 05/13/2024 09:20 PM    ALBUMIN 4.13 06/10/2011 10:17 AM        Assessment for clinically significant drug interactions, drug omissions/additions, duplicative therapies.            CC   Emmanuelle Gan M.D.  [address] [place] [phone]  Fax: [phone]    [facility]  Phone [phone] fax [phone]    This note was created using voice recognition software ([employer]). The accuracy of the dictation is limited by the abilities of the software. I have reviewed the note prior to signing, however some errors in grammar and context are still possible. If you have any questions related to this note please do not hesitate to contact our office.

## 2024-05-22 PROCEDURE — G0180 MD CERTIFICATION HHA PATIENT: HCPCS | Performed by: STUDENT IN AN ORGANIZED HEALTH CARE EDUCATION/TRAINING PROGRAM

## 2024-05-23 ENCOUNTER — HOME CARE VISIT (OUTPATIENT)
Dept: HOME HEALTH SERVICES | Facility: HOME HEALTHCARE | Age: 79
End: 2024-05-23
Payer: MEDICARE

## 2024-05-23 VITALS
RESPIRATION RATE: 16 BRPM | OXYGEN SATURATION: 95 % | HEART RATE: 77 BPM | TEMPERATURE: 97.3 F | SYSTOLIC BLOOD PRESSURE: 124 MMHG | DIASTOLIC BLOOD PRESSURE: 60 MMHG

## 2024-05-23 ASSESSMENT — PATIENT HEALTH QUESTIONNAIRE - PHQ9
5. POOR APPETITE OR OVEREATING: 1 - SEVERAL DAYS
SUM OF ALL RESPONSES TO PHQ QUESTIONS 1-9: 9
CLINICAL INTERPRETATION OF PHQ2 SCORE: 2

## 2024-05-23 ASSESSMENT — ENCOUNTER SYMPTOMS
FATIGUES EASILY: 1
SEVERE DYSPNEA: 1
PAIN SEVERITY GOAL: 0/10
SUBJECTIVE PAIN PROGRESSION: UNCHANGED
HIGHEST PAIN SEVERITY IN PAST 24 HOURS: 0/10
PERSON REPORTING PAIN: PATIENT
NAUSEA: DENIES
VOMITING: DENIES
STOOL FREQUENCY: DAILY
LOWEST PAIN SEVERITY IN PAST 24 HOURS: 0/10
LAST BOWEL MOVEMENT: 66983
SKIN LESIONS: 1
DENIES PAIN: 1
BOWEL PATTERN NORMAL: 1

## 2024-05-23 ASSESSMENT — ACTIVITIES OF DAILY LIVING (ADL)
LAUNDRY_REQUIRES_ASSISTANCE: 1
SHOPPING_REQUIRES_ASSISTANCE: 1

## 2024-05-24 ENCOUNTER — TELEPHONE (OUTPATIENT)
Dept: HEALTH INFORMATION MANAGEMENT | Facility: OTHER | Age: 79
End: 2024-05-24
Payer: MEDICARE

## 2024-05-24 ENCOUNTER — OFFICE VISIT (OUTPATIENT)
Dept: WOUND CARE | Facility: MEDICAL CENTER | Age: 79
End: 2024-05-24
Attending: STUDENT IN AN ORGANIZED HEALTH CARE EDUCATION/TRAINING PROGRAM
Payer: MEDICARE

## 2024-05-24 VITALS
HEART RATE: 86 BPM | SYSTOLIC BLOOD PRESSURE: 140 MMHG | DIASTOLIC BLOOD PRESSURE: 65 MMHG | OXYGEN SATURATION: 96 % | RESPIRATION RATE: 18 BRPM | TEMPERATURE: 98.2 F

## 2024-05-24 DIAGNOSIS — E11.42 DIABETIC POLYNEUROPATHY ASSOCIATED WITH TYPE 2 DIABETES MELLITUS (HCC): ICD-10-CM

## 2024-05-24 DIAGNOSIS — L08.9 WOUND INFECTION: ICD-10-CM

## 2024-05-24 DIAGNOSIS — L97.418 DIABETIC ULCER OF RIGHT HEEL ASSOCIATED WITH TYPE 2 DIABETES MELLITUS, WITH OTHER ULCER SEVERITY (HCC): ICD-10-CM

## 2024-05-24 DIAGNOSIS — E11.621 DIABETIC ULCER OF RIGHT HEEL ASSOCIATED WITH TYPE 2 DIABETES MELLITUS, WITH OTHER ULCER SEVERITY (HCC): ICD-10-CM

## 2024-05-24 DIAGNOSIS — T14.8XXA WOUND INFECTION: ICD-10-CM

## 2024-05-24 DIAGNOSIS — R54 AGE-RELATED PHYSICAL DEBILITY: ICD-10-CM

## 2024-05-24 DIAGNOSIS — E11.65 UNCONTROLLED TYPE 2 DIABETES MELLITUS WITH HYPERGLYCEMIA (HCC): ICD-10-CM

## 2024-05-24 DIAGNOSIS — L89.614 PRESSURE INJURY OF RIGHT HEEL, STAGE 4 (HCC): ICD-10-CM

## 2024-05-24 PROCEDURE — 3077F SYST BP >= 140 MM HG: CPT | Performed by: NURSE PRACTITIONER

## 2024-05-24 PROCEDURE — 99215 OFFICE O/P EST HI 40 MIN: CPT | Mod: 25 | Performed by: NURSE PRACTITIONER

## 2024-05-24 PROCEDURE — 11043 DBRDMT MUSC&/FSCA 1ST 20/<: CPT | Performed by: NURSE PRACTITIONER

## 2024-05-24 PROCEDURE — 3078F DIAST BP <80 MM HG: CPT | Performed by: NURSE PRACTITIONER

## 2024-05-24 NOTE — PATIENT INSTRUCTIONS
-Keep your wound dressing clean, dry, and intact. Only change dressing if it's over saturated, soiled or falls off.     -Change your dressing if it becomes soiled, soaked, or falls off.    -Should you experience any significant changes in your wound(s), such as infection (redness, swelling, localized heat, increased pain, fever > 101 F, chills) or have any questions regarding your home care instructions, please contact the wound center at [phone]. If after hours, contact your primary care physician or go to the hospital emergency room.

## 2024-05-25 NOTE — PROGRESS NOTES
"   Provider Encounter- Diabetic Foot Ulcer      HISTORY OF PRESENT ILLNESS  Wound History:    START OF CARE IN CLINIC: 4/25/2024    REFERRING PROVIDER: Emmanuelle Gan MD     WOUND- Diabetic foot ulcer   LOCATION: Right posterior heel     Right plantar heel-new 5/10/2024     Right Achilles-new 5/24/24     HISTORY: referred to the clinic for management of a wound to her right posterior heel/achilles. Patient is uncertain when it started, but states it has been present for at least 6 weeks before coming into the clinic. She is unsure of etiology, states she only wears her orthotic shoes with inserts when she leaves the house and wears rubber soled socks while in her house. She rarely leaves her home, does not drive, relies on [employer]. Patient states that she noticed a large amount of skin that fell out of her sock 4/1/24. She met with her PCP for a referral to the wound clinic and also received a referral to podiatry. Patient states that she follows with endocrinologist Dr. Tierney.     Pertinent Medical History: DM type II, polyneuropathy, COPD, CHF    DIABETES HX: Diagnosed with type 2 diabetes in 1970s, and is currently managing diabetes with insulin.    Checks blood sugars: >3x/day   Blood sugar average: 1   Has had previous diabetes education.    Does have numbness in feet.    Foot wear:nonprescriptive shoes.  Does check feet routinely.    Previous foot ulcers: No   Previous foot surgery:no  Current occupation retired RN.    Offloading none.        TOBACCO USE: Former smoker    Patient's problem list, allergies, and current medications reviewed and updated in Epic    Interval History:  5/3/2024 : Initial provider visit with MAR Goetz, FNP-BC, CWOCN, CFCN.  Patient states that overall she is feeling okay.  She was only able to change her dressing once over the past week.  The eschar to her heel is softened, periwound is macerated.  She states that she spends a lot of her time in her \"Ileana lounger\" " including for sleep at night.  She has orthotic shoes and inserts which are 8-9 years old, but she insists that they are like brand-new because she only wears them when she leaves the house, she does not want new shoes and inserts.     5/10/2024: Clinic visit with ANNY Henry, OSMIN CHONG.  Pt denies fevers, chills, nausea, vomiting.  Sugars in the 200s.  Reports she is offloading her right heel while in her recliner.  Reports x-ray completed at the [facility] on 5/4/2024.  We did not receive results.  RN to follow-up.  She developed new ulcer from self removal of callus to plantar heel.  Reports she applied Afrin to the area followed by Band-Aid for 48 hours and then removed. Ulceration remains open, shallow.  Eschar majority of tissue is stable however there is a 1 cm area that is soft minimal fluctuance.  Add additional dry dressing.  Patient agreeable to try.     5/17/2024: Clinic visit with ANNY Henry, OSMIN CHONG.  Pt denies fevers, chills, nausea, vomiting.  Blood sugar 202.  Reports she felt a pop/crunch to her right foot when she stepped down while in the kitchen.  She proceeded to ED immediately on 5/13/2024.  X-ray completed, no evidence of OM, no fracture noted however she was found to have avulsion to Achilles.  She was given a boot and informed to follow-up with podiatry.  Has not made appointment yet.  She has not been able to wear the boot, reporting to progress in.  Heel ulcer is worse.  She does have increased tenderness, swelling, purple discoloration to Achilles.  Started on doxycycline.  MRI of foot and ankle ordered.  Referral to foot and ankle surgeon placed.    5/24/2024: Clinic visit with ANNY Henry, OSMIN CHONG.  Pt denies fevers, chills, nausea, vomiting.  Patient tearful, anxious regarding her right heel and Achilles.  Ports he is no longer able to perform daily Dakin's dressing changes.  States she has been compliant with wearing  offloading boot at all times except for dressing changes.  MRI foot and ankle scheduled for 6/4/2024 as patient has pacemaker.  She is taking her doxycycline, tolerating.  Despite this, Achilles tendon remains exposed with cellulitis to right lower leg despite oral antibiotics.  Recommend patient proceed to ED for further workup for osteomyelitis/sepsis.  Patient reports she has to attend to social affairs prior to going to ED.  Patient planning to go to ED early next week.               REVIEW OF SYSTEMS:   Unchanged from previous wound clinic assessment on 5/17/2024, except as noted in interval history above      PHYSICAL EXAMINATION:   BP (!) 140/65   Pulse 86   Temp 36.8 °C (98.2 °F) (Temporal)   Resp 18   SpO2 96%     Physical Exam  Vitals reviewed.   Constitutional:       Appearance: Normal appearance.   Cardiovascular:      Rate and Rhythm: Normal rate.      Comments: Palpable pedal pulses bilaterally  Pulmonary:      Effort: Pulmonary effort is normal.   Musculoskeletal:         General: Swelling present.      Right lower leg: Edema present.      Left lower leg: Edema present.      Comments: Increased edema to RLE  Achilles assessed, intact, able to dorsi and plantarflex   Skin:     Comments: Right posterior heel: Full-thickness, adherent stringy slough, exposed Achilles tendon, erythema with warmth extending to posterior calf remains, increased edema,  moderate serosanguineous drainage, no odor    Right Achilles: Skin breakdown with heavy serous drainage, erythema, warmth, edema, tenderness    Right plantar heel: Full-thickness, dried scab   Neurological:      Mental Status: She is alert.      Comments:   Insensate to feet   Psychiatric:      Comments: Anxious, tearful     Monofilament testing with a 10 gram force: sensation intact: decreased bilaterally  Visual Inspection: Feet with maceration, ulcers, fissures.  Pedal pulses: intact bilaterally      WOUND ASSESSMENT  Wound 04/25/24 Diabetic Ulcer  Heel;Achilles Posterior Right (Active)   Wound Image    05/24/24 1400   Site Assessment Pink;Yellow;Red 05/24/24 1400   Periwound Assessment Blanchable erythema;Maceration;Ecchymosis 05/24/24 1400   Margins Epibole (rolled edges) 05/24/24 1400   Closure Secondary intention 04/25/24 1500   Drainage Amount Moderate 05/24/24 1400   Drainage Description Serosanguineous 05/24/24 1400   Treatments Cleansed;Topical Lidocaine;Provider debridement;Site care 05/24/24 1400   Offloading/DME Offloading Boot 05/24/24 1400   Wound Cleansing Hypochlorus Acid 05/24/24 1400   Periwound Protectant No-sting Skin Prep 05/24/24 1400   Dressing Status Clean;Dry;Intact 04/25/24 1500   Dressing Changed Changed 05/24/24 1400   Dressing Cleansing/Solutions Not Applicable 05/24/24 1400   Dressing Options Honey Gel;Hydrofiber Silver;Offloading Dressing - Heel 05/24/24 1400   Dressing Change/Treatment Frequency Every 72 hrs, and As Needed 05/24/24 1400   Wound Team Following Weekly 04/25/24 1500   Non-staged Wound Description Full thickness 05/24/24 1400   Wound Length (cm) 3.5 cm 05/24/24 1400   Wound Width (cm) 2.8 cm 05/24/24 1400   Wound Depth (cm) 0.4 cm 05/24/24 1400   Wound Surface Area (cm^2) 9.8 cm^2 05/24/24 1400   Wound Volume (cm^3) 3.92 cm^3 05/24/24 1400   Post-Procedure Length (cm) 3.7 cm 05/24/24 1400   Post-Procedure Width (cm) 2.9 cm 05/24/24 1400   Post-Procedure Depth (cm) 0.5 cm 05/24/24 1400   Post-Procedure Surface Area (cm^2) 10.73 cm^2 05/24/24 1400   Post-Procedure Volume (cm^3) 5.365 cm^3 05/24/24 1400   Wound Healing % -112 05/24/24 1400   Tunneling (cm) 0 cm 05/24/24 1400   Undermining (cm) 0 cm 05/24/24 1400   Wound Odor Mild 05/24/24 1400   Pulses Right;DP;2+;PT;1+;Doppler 04/25/24 1500   Right Foot Monofilament 10-point exam (Sensate) 0/10 04/25/24 1500   Left Foot Monofilament 10-point exam (Sensate) 0/10 04/25/24 1500   Exposed Structures BRISA 05/24/24 1400   Number of days: 29       Wound 05/24/24 Achilles;Tibia  Posterior;Distal Right (Active)   Wound Image   05/24/24 1400   Site Assessment Pink;Red 05/24/24 1400   Periwound Assessment Warm;Ecchymosis;Blanchable erythema;Edema 05/24/24 1400   Margins Attached edges 05/24/24 1400   Closure Secondary intention 05/24/24 1400   Drainage Amount Small 05/24/24 1400   Drainage Description Serosanguineous 05/24/24 1400   Treatments Cleansed;Topical Lidocaine;Provider debridement;Site care 05/24/24 1400   Wound Cleansing Hypochlorus Acid 05/24/24 1400   Periwound Protectant Skin Protectant Wipes to Periwound;Barrier Paste 05/24/24 1400   Dressing Changed New 05/24/24 1400   Dressing Cleansing/Solutions Not Applicable 05/24/24 1400   Dressing Options Hydrofiber Silver;Offloading Dressing - Sacral 05/24/24 1400   Dressing Change/Treatment Frequency Every 72 hrs, and As Needed 05/24/24 1400   Wound Team Following Bi-Weekly 05/24/24 1400   Wound Length (cm) 0.5 cm 05/24/24 1400   Wound Width (cm) 0.5 cm 05/24/24 1400   Wound Depth (cm) 0.1 cm 05/24/24 1400   Wound Surface Area (cm^2) 0.25 cm^2 05/24/24 1400   Wound Volume (cm^3) 0.025 cm^3 05/24/24 1400   Post-Procedure Length (cm) 0.6 cm 05/24/24 1400   Post-Procedure Width (cm) 0.6 cm 05/24/24 1400   Post-Procedure Depth (cm) 0.1 cm 05/24/24 1400   Post-Procedure Surface Area (cm^2) 0.36 cm^2 05/24/24 1400   Post-Procedure Volume (cm^3) 0.036 cm^3 05/24/24 1400   Tunneling (cm) 0 cm 05/24/24 1400   Undermining (cm) 0 cm 05/24/24 1400   Wound Odor None 05/24/24 1400   Exposed Structures None 05/24/24 1400   Number of days: 0       PROCEDURE: Debridement of posterior heel, Achilles  2% viscous lidocaine applied and allowed to dwell for approximately 10 minutes.  Using scissors, forceps, curette, excised nonviable tissue, adherent slough, from wound bed until healthy bleeding tissue visualized.  Not able to remove all of slough due to adherence.  Total area debrided 11.09 cm² to muscle fascia.  Achilles tendon exposed.  Does not probe to  "bone at this point.  Bleeding controlled with manual pressure.  Wound care completed by RN.  See flowsheet.  Patient tolerated procedure well.    Pertinent Labs and Diagnostics:    Labs:     A1c:   Lab Results   Component Value Date/Time    HBA1C 8.9 (H) 08/31/2023 10:01 AM          IMAGING: Foot/Toes Imaging, Past Year DX-FOOT-COMPLETE 3+ RIGHT    Result Date: 5/13/2024  Narrative 5/13/2024 9:02 PM HISTORY/REASON FOR EXAM:  Atraumatic Pain/Swelling/Deformity TECHNIQUE/EXAM DESCRIPTION AND NUMBER OF VIEWS: 3 views of the RIGHT foot. COMPARISON:  2/1/2016 FINDINGS:  Bone mineralization is normal.  There is no evidence of fracture or dislocation.  There is severe joint space narrowing periarticular sclerosis and spurring throughout the tarsal metatarsal joints with fusion across several joints. Some malalignment is noted with lateral displacement of metatarsals relative to the tarsal bones. Chronic fragmentation of the distal diaphysis of the second metatarsal bone also appears to be present. Degenerative changes at the second and third MTP joints are also noted.  No bone erosions are identified.. Age-indeterminate fracture of the fourth toe proximal phalangeal neck. Osseous fragments at the expected location of the Achilles.     Impression 1.  Age-indeterminate fracture of the fourth toe proximal phalangeal neck. 2.  Redemonstration of Charcot's foot including near complete fusion at the tarsal metatarsal joints and across the proximal ends of the metatarsal bones. 3.  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 4.  Degenerative changes in the second and third MTP joint. 5.  No radiographic evidence of acute osteomyelitis. If there is clinical concern for radiographically occult osteomyelitis, MRI can be obtained. 6.  Possible age-indeterminate avulsion of the Achilles.       VASCULAR STUDIES: No results found.    LAST  WOUND CULTURE: No results found for: \"CULTRSULT\"          ASSESSMENT AND PLAN:   1. " Diabetic ulcer of right heel associated with type 2 diabetes mellitus, with other ulcer severity ([facility])  2. Pressure injury of right heel, stage IV ([facility])     5/24/2024: Adherent slough, exposed Achilles to posterior heel.  Increased skin breakdown to Achilles with cellulitis  - ulcer to right plantar heel from self removal of callus dry stable.    - -X-ray completed by quality imaging at home.  Negative for bone infection.  -Went to ED after hearing a pop when stepping down on right foot to Achilles.  X-ray 5/13/2024 also negative for osteomyelitis, negative for fracture, there is avulsion of Achilles.  - Was given offloading boot in ED.  Previously not wearing.  Reports she has been compliant with wearing offloading boot since her last visit.   Recommend patient wear boot to immobilize ankle, stabilize Achilles.  Patient educated on how to offload heel while wearing boot.  Recommend using pillow underneath knee/calf  - Referral to foot and ankle surgeon placed.  Has not scheduled appointment.  - MRI foot and ankle with IV contrast  to rule out OM, Achilles injury scheduled for 6/4/2024  -Previously recommend heel float boots and also PRAFO boot.  Patient declined at this time.   -Home health involved.   follow-up 2 times per week  -Unable to perform daily Dakin's moist dressing changes.  Currently no odor, eschar no longer present.    -Initiate honey gel to posterior heel to keep Achilles tendon moist, autolytic debride slough   -Strongly advised patient go to ED today for further workup of osteomyelitis/sepsis.  At risk for sepsis, proximal amputation including BKA.  Recommend IV antibiotics, consultation of ID with right heel I&D and VAC placement pending further imaging.  Patient reports she has to attend to social affairs prior and then will go to ED by early next week.     Wound care: Honey gel, Hydrofiber silver, heel offloading foam     3. Uncontrolled type 2 diabetes mellitus with hyperglycemia ([facility])      5/24/2024: Patient's most recent A1c 8.9.  She reports her blood sugars are often in the 170s- 200s  -Patient advised keep blood sugars below 140 in order to optimize wound healing  -Patient is established with endocrinology.  Recommend she make an appointment soon        4. Diabetic polyneuropathy associated with type 2 diabetes mellitus ([facility])     5/24/2024: Patient has very little sensation in her feet  - Implications of loss of protective sensation (LOPS) discussed with patient- including increased risk for amputation.  Advised to check feet at least daily, moisturize feet, and to always wear protective foot wear.    -Declines PRAFO boot  - Reports she is wearing offloading boot at all times secondary to Achilles injury.  Continue offloading boot     5. Age-related physical debility     5/24/2024: Patient states she is mostly homebound, only leaves her home for groceries and doctors appointments.  Relies on [employer] for transportation  -Patient has started with home health     6.  Wound infection    5/24/2024: Purple discoloration improving, erythema remains, edema remains with warmth despite doxycycline.  Reports allergy to penicillin with hives.  Remaining oral antibiotic options placing patient at risk for further complications in regards to her comorbidities.  Reviewed in conjunction with ID MAR.  - Unable to collect wound culture in clinic, home health to obtain next visit if patient has not proceeded to ED prior to this  - Does have stage III renal disease.  Avoid nephrotoxic medications.  - X-ray negative for OM however clinically condition is changed  - Follow-up MRI foot and ankle see above      PATIENT EDUCATION  - Importance of tight glucose control for wound healing   - Implications of loss of protective sensation (LOPS) discussed with patient- including increased risk for amputation.  - Advised to check feet at least daily, moisturize feet, and to always wear protective foot wear.   -  Importance of  offloading foot to assist with wound healing  - Advised pt not to trim nails or calluses, seek foot/nail care from podiatrist or certified foot/nail nurse  - Importance of adequate nutrition for wound healing    My total time spent caring for the patient on the day of the encounter was 40 minutes, reviewing history, assessment, counseling and education, and coordination of care.  This does not include time spent on separately billable procedures/tests.        Please note that this note may have been created using voice recognition software. I have worked with technical experts from [employer] to optimize the interface.  I have made every reasonable attempt to correct obvious errors, but there may be errors of grammar and possibly content that I did not discover before finalizing the note.

## 2024-05-27 ENCOUNTER — HOME CARE VISIT (OUTPATIENT)
Dept: HOME HEALTH SERVICES | Facility: HOME HEALTHCARE | Age: 79
End: 2024-05-27
Payer: MEDICARE

## 2024-05-27 ENCOUNTER — HOSPITAL ENCOUNTER (OUTPATIENT)
Facility: MEDICAL CENTER | Age: 79
End: 2024-05-27
Attending: NURSE PRACTITIONER
Payer: COMMERCIAL

## 2024-05-27 VITALS
TEMPERATURE: 98.2 F | HEART RATE: 85 BPM | RESPIRATION RATE: 16 BRPM | OXYGEN SATURATION: 92 % | SYSTOLIC BLOOD PRESSURE: 168 MMHG | DIASTOLIC BLOOD PRESSURE: 62 MMHG

## 2024-05-27 LAB
GRAM STN SPEC: NORMAL
SIGNIFICANT IND 70042: NORMAL
SITE SITE: NORMAL
SOURCE SOURCE: NORMAL

## 2024-05-27 ASSESSMENT — ENCOUNTER SYMPTOMS
SUBJECTIVE PAIN PROGRESSION: GRADUALLY WORSENING
SKIN LESIONS: 1
NAUSEA: DENIES
HIGHEST PAIN SEVERITY IN PAST 24 HOURS: 4/10
DEPRESSED MOOD: 1
LOWEST PAIN SEVERITY IN PAST 24 HOURS: 3/10
VOMITING: DENIES
HEADACHES: 1
BOWEL PATTERN NORMAL: 1
PAIN: 1
PAIN LOCATION - PAIN SEVERITY: 3/10
PAIN SEVERITY GOAL: 3/10
ABDOMINAL PAIN: 1
FATIGUES EASILY: 1
STOOL FREQUENCY: DAILY
LAST BOWEL MOVEMENT: 66986
PAIN LOCATION - EXACERBATING FACTORS: STRESS
PAIN LOCATION: HEADACHE
PAIN LOCATION - PAIN QUALITY: ACHE
PAIN LOCATION - RELIEVING FACTORS: REST, MEDS
HYPERTENSION: 1
PERSON REPORTING PAIN: PATIENT

## 2024-05-28 ENCOUNTER — APPOINTMENT (OUTPATIENT)
Dept: RADIOLOGY | Facility: MEDICAL CENTER | Age: 79
DRG: 629 | End: 2024-05-28
Attending: EMERGENCY MEDICINE
Payer: MEDICARE

## 2024-05-28 ENCOUNTER — HOME CARE VISIT (OUTPATIENT)
Dept: HOME HEALTH SERVICES | Facility: HOME HEALTHCARE | Age: 79
End: 2024-05-28
Payer: MEDICARE

## 2024-05-28 ENCOUNTER — HOSPITAL ENCOUNTER (INPATIENT)
Facility: MEDICAL CENTER | Age: 79
End: 2024-05-28
Attending: EMERGENCY MEDICINE | Admitting: STUDENT IN AN ORGANIZED HEALTH CARE EDUCATION/TRAINING PROGRAM
Payer: MEDICARE

## 2024-05-28 DIAGNOSIS — E11.621 DIABETIC ULCER OF RIGHT HEEL ASSOCIATED WITH TYPE 2 DIABETES MELLITUS, WITH OTHER ULCER SEVERITY (HCC): ICD-10-CM

## 2024-05-28 DIAGNOSIS — L97.412 DIABETIC ULCER OF RIGHT HEEL ASSOCIATED WITH TYPE 1 DIABETES MELLITUS, WITH FAT LAYER EXPOSED (HCC): ICD-10-CM

## 2024-05-28 DIAGNOSIS — E10.621 DIABETIC ULCER OF RIGHT HEEL ASSOCIATED WITH TYPE 1 DIABETES MELLITUS, WITH FAT LAYER EXPOSED (HCC): ICD-10-CM

## 2024-05-28 DIAGNOSIS — L03.115 CELLULITIS OF FOOT, RIGHT: ICD-10-CM

## 2024-05-28 DIAGNOSIS — L97.418 DIABETIC ULCER OF RIGHT HEEL ASSOCIATED WITH TYPE 2 DIABETES MELLITUS, WITH OTHER ULCER SEVERITY (HCC): ICD-10-CM

## 2024-05-28 DIAGNOSIS — L08.9 WOUND INFECTION: ICD-10-CM

## 2024-05-28 DIAGNOSIS — E11.42 DIABETIC POLYNEUROPATHY ASSOCIATED WITH TYPE 2 DIABETES MELLITUS (HCC): ICD-10-CM

## 2024-05-28 DIAGNOSIS — T14.8XXA WOUND INFECTION: ICD-10-CM

## 2024-05-28 DIAGNOSIS — L89.614 PRESSURE INJURY OF RIGHT HEEL, STAGE 4 (HCC): ICD-10-CM

## 2024-05-28 DIAGNOSIS — M86.671 OTHER CHRONIC OSTEOMYELITIS OF RIGHT FOOT (HCC): ICD-10-CM

## 2024-05-28 DIAGNOSIS — F41.9 ANXIETY: ICD-10-CM

## 2024-05-28 DIAGNOSIS — E11.65 UNCONTROLLED TYPE 2 DIABETES MELLITUS WITH HYPERGLYCEMIA (HCC): ICD-10-CM

## 2024-05-28 PROBLEM — E11.9 DM (DIABETES MELLITUS) (HCC): Status: ACTIVE | Noted: 2024-05-28

## 2024-05-28 PROBLEM — M86.9 OSTEOMYELITIS (HCC): Status: ACTIVE | Noted: 2024-05-28

## 2024-05-28 PROBLEM — Z71.89 ACP (ADVANCE CARE PLANNING): Status: ACTIVE | Noted: 2024-05-28

## 2024-05-28 PROBLEM — L03.90 CELLULITIS: Status: ACTIVE | Noted: 2024-05-28

## 2024-05-28 LAB
ALBUMIN SERPL BCP-MCNC: 4.1 G/DL (ref 3.2–4.9)
ALBUMIN/GLOB SERPL: 1.1 G/DL
ALP SERPL-CCNC: 76 U/L (ref 30–99)
ALT SERPL-CCNC: 12 U/L (ref 2–50)
ANION GAP SERPL CALC-SCNC: 16 MMOL/L (ref 7–16)
AST SERPL-CCNC: 15 U/L (ref 12–45)
BASOPHILS # BLD AUTO: 0.6 % (ref 0–1.8)
BASOPHILS # BLD: 0.08 K/UL (ref 0–0.12)
BILIRUB SERPL-MCNC: 0.3 MG/DL (ref 0.1–1.5)
BUN SERPL-MCNC: 30 MG/DL (ref 8–22)
CALCIUM ALBUM COR SERPL-MCNC: 10.6 MG/DL (ref 8.5–10.5)
CALCIUM SERPL-MCNC: 10.7 MG/DL (ref 8.5–10.5)
CHLORIDE SERPL-SCNC: 98 MMOL/L (ref 96–112)
CO2 SERPL-SCNC: 22 MMOL/L (ref 20–33)
CORTIS SERPL-MCNC: 11.1 UG/DL (ref 0–23)
CREAT SERPL-MCNC: 1.22 MG/DL (ref 0.5–1.4)
CRP SERPL HS-MCNC: 0.81 MG/DL (ref 0–0.75)
CRP SERPL HS-MCNC: 0.9 MG/DL (ref 0–0.75)
EOSINOPHIL # BLD AUTO: 0.11 K/UL (ref 0–0.51)
EOSINOPHIL NFR BLD: 0.9 % (ref 0–6.9)
ERYTHROCYTE [DISTWIDTH] IN BLOOD BY AUTOMATED COUNT: 44.6 FL (ref 35.9–50)
ERYTHROCYTE [SEDIMENTATION RATE] IN BLOOD BY WESTERGREN METHOD: 108 MM/HOUR (ref 0–25)
ERYTHROCYTE [SEDIMENTATION RATE] IN BLOOD BY WESTERGREN METHOD: 132 MM/HOUR (ref 0–25)
EXTRA TUBE BLU BLU: NORMAL
GFR SERPLBLD CREATININE-BSD FMLA CKD-EPI: 45 ML/MIN/1.73 M 2
GLOBULIN SER CALC-MCNC: 3.7 G/DL (ref 1.9–3.5)
GLUCOSE BLD STRIP.AUTO-MCNC: 160 MG/DL (ref 65–99)
GLUCOSE SERPL-MCNC: 171 MG/DL (ref 65–99)
HCT VFR BLD AUTO: 34.2 % (ref 37–47)
HGB BLD-MCNC: 11 G/DL (ref 12–16)
IMM GRANULOCYTES # BLD AUTO: 0.05 K/UL (ref 0–0.11)
IMM GRANULOCYTES NFR BLD AUTO: 0.4 % (ref 0–0.9)
INR PPP: 1.27 (ref 0.87–1.13)
LACTATE SERPL-SCNC: 1 MMOL/L (ref 0.5–2)
LYMPHOCYTES # BLD AUTO: 1.64 K/UL (ref 1–4.8)
LYMPHOCYTES NFR BLD: 13 % (ref 22–41)
MCH RBC QN AUTO: 29.6 PG (ref 27–33)
MCHC RBC AUTO-ENTMCNC: 32.2 G/DL (ref 32.2–35.5)
MCV RBC AUTO: 91.9 FL (ref 81.4–97.8)
MONOCYTES # BLD AUTO: 0.78 K/UL (ref 0–0.85)
MONOCYTES NFR BLD AUTO: 6.2 % (ref 0–13.4)
NEUTROPHILS # BLD AUTO: 9.97 K/UL (ref 1.82–7.42)
NEUTROPHILS NFR BLD: 78.9 % (ref 44–72)
NRBC # BLD AUTO: 0 K/UL
NRBC BLD-RTO: 0 /100 WBC (ref 0–0.2)
PLATELET # BLD AUTO: 376 K/UL (ref 164–446)
PMV BLD AUTO: 10.1 FL (ref 9–12.9)
POTASSIUM SERPL-SCNC: 4.2 MMOL/L (ref 3.6–5.5)
PROCALCITONIN SERPL-MCNC: 0.06 NG/ML
PROT SERPL-MCNC: 7.8 G/DL (ref 6–8.2)
PROTHROMBIN TIME: 16.1 SEC (ref 12–14.6)
RBC # BLD AUTO: 3.72 M/UL (ref 4.2–5.4)
SCCMEC + MECA PNL NOSE NAA+PROBE: NEGATIVE
SODIUM SERPL-SCNC: 136 MMOL/L (ref 135–145)
WBC # BLD AUTO: 12.6 K/UL (ref 4.8–10.8)

## 2024-05-28 PROCEDURE — 82962 GLUCOSE BLOOD TEST: CPT

## 2024-05-28 PROCEDURE — 86140 C-REACTIVE PROTEIN: CPT

## 2024-05-28 PROCEDURE — 700105 HCHG RX REV CODE 258: Performed by: EMERGENCY MEDICINE

## 2024-05-28 PROCEDURE — 82533 TOTAL CORTISOL: CPT

## 2024-05-28 PROCEDURE — 87040 BLOOD CULTURE FOR BACTERIA: CPT

## 2024-05-28 PROCEDURE — 36415 COLL VENOUS BLD VENIPUNCTURE: CPT

## 2024-05-28 PROCEDURE — 770006 HCHG ROOM/CARE - MED/SURG/GYN SEMI*

## 2024-05-28 PROCEDURE — 83605 ASSAY OF LACTIC ACID: CPT

## 2024-05-28 PROCEDURE — 700111 HCHG RX REV CODE 636 W/ 250 OVERRIDE (IP): Performed by: EMERGENCY MEDICINE

## 2024-05-28 PROCEDURE — A9270 NON-COVERED ITEM OR SERVICE: HCPCS | Performed by: STUDENT IN AN ORGANIZED HEALTH CARE EDUCATION/TRAINING PROGRAM

## 2024-05-28 PROCEDURE — 700102 HCHG RX REV CODE 250 W/ 637 OVERRIDE(OP): Performed by: STUDENT IN AN ORGANIZED HEALTH CARE EDUCATION/TRAINING PROGRAM

## 2024-05-28 PROCEDURE — 84145 PROCALCITONIN (PCT): CPT

## 2024-05-28 PROCEDURE — 85610 PROTHROMBIN TIME: CPT

## 2024-05-28 PROCEDURE — 87641 MR-STAPH DNA AMP PROBE: CPT

## 2024-05-28 PROCEDURE — 99223 1ST HOSP IP/OBS HIGH 75: CPT | Mod: AI | Performed by: STUDENT IN AN ORGANIZED HEALTH CARE EDUCATION/TRAINING PROGRAM

## 2024-05-28 PROCEDURE — 99285 EMERGENCY DEPT VISIT HI MDM: CPT

## 2024-05-28 PROCEDURE — 700101 HCHG RX REV CODE 250: Performed by: EMERGENCY MEDICINE

## 2024-05-28 PROCEDURE — 73630 X-RAY EXAM OF FOOT: CPT | Mod: RT

## 2024-05-28 PROCEDURE — 99497 ADVNCD CARE PLAN 30 MIN: CPT | Mod: 25 | Performed by: STUDENT IN AN ORGANIZED HEALTH CARE EDUCATION/TRAINING PROGRAM

## 2024-05-28 PROCEDURE — 85652 RBC SED RATE AUTOMATED: CPT

## 2024-05-28 PROCEDURE — 96365 THER/PROPH/DIAG IV INF INIT: CPT

## 2024-05-28 PROCEDURE — 96375 TX/PRO/DX INJ NEW DRUG ADDON: CPT

## 2024-05-28 PROCEDURE — 700105 HCHG RX REV CODE 258: Performed by: STUDENT IN AN ORGANIZED HEALTH CARE EDUCATION/TRAINING PROGRAM

## 2024-05-28 PROCEDURE — 80053 COMPREHEN METABOLIC PANEL: CPT

## 2024-05-28 PROCEDURE — 85025 COMPLETE CBC W/AUTO DIFF WBC: CPT

## 2024-05-28 RX ORDER — SODIUM CHLORIDE, SODIUM LACTATE, POTASSIUM CHLORIDE, CALCIUM CHLORIDE 600; 310; 30; 20 MG/100ML; MG/100ML; MG/100ML; MG/100ML
INJECTION, SOLUTION INTRAVENOUS CONTINUOUS
Status: DISCONTINUED | OUTPATIENT
Start: 2024-05-28 | End: 2024-05-28

## 2024-05-28 RX ORDER — LINEZOLID 2 MG/ML
600 INJECTION, SOLUTION INTRAVENOUS EVERY 12 HOURS
Status: DISCONTINUED | OUTPATIENT
Start: 2024-05-28 | End: 2024-05-28

## 2024-05-28 RX ORDER — LABETALOL HYDROCHLORIDE 5 MG/ML
10 INJECTION, SOLUTION INTRAVENOUS EVERY 4 HOURS PRN
Status: ACTIVE | OUTPATIENT
Start: 2024-05-28

## 2024-05-28 RX ORDER — OXYCODONE HYDROCHLORIDE 5 MG/1
5 TABLET ORAL
Status: ACTIVE | OUTPATIENT
Start: 2024-05-28

## 2024-05-28 RX ORDER — ACETAMINOPHEN 325 MG/1
650 TABLET ORAL EVERY 6 HOURS
Status: DISPENSED | OUTPATIENT
Start: 2024-05-28 | End: 2024-06-02

## 2024-05-28 RX ORDER — IPRATROPIUM BROMIDE AND ALBUTEROL SULFATE 2.5; .5 MG/3ML; MG/3ML
3 SOLUTION RESPIRATORY (INHALATION)
Status: ACTIVE | OUTPATIENT
Start: 2024-05-28

## 2024-05-28 RX ORDER — ONDANSETRON 2 MG/ML
4 INJECTION INTRAMUSCULAR; INTRAVENOUS EVERY 4 HOURS PRN
Status: ACTIVE | OUTPATIENT
Start: 2024-05-28

## 2024-05-28 RX ORDER — DIPHENHYDRAMINE HCL 25 MG
12.5 TABLET ORAL ONCE
Status: DISCONTINUED | OUTPATIENT
Start: 2024-05-28 | End: 2024-05-28

## 2024-05-28 RX ORDER — ALBUTEROL SULFATE 90 UG/1
2 AEROSOL, METERED RESPIRATORY (INHALATION) EVERY 6 HOURS PRN
Status: ACTIVE | OUTPATIENT
Start: 2024-05-28

## 2024-05-28 RX ORDER — ROSUVASTATIN CALCIUM 5 MG/1
5 TABLET, COATED ORAL
Status: DISPENSED | OUTPATIENT
Start: 2024-05-29

## 2024-05-28 RX ORDER — SODIUM CHLORIDE, SODIUM LACTATE, POTASSIUM CHLORIDE, CALCIUM CHLORIDE 600; 310; 30; 20 MG/100ML; MG/100ML; MG/100ML; MG/100ML
INJECTION, SOLUTION INTRAVENOUS CONTINUOUS
Status: DISCONTINUED | OUTPATIENT
Start: 2024-05-28 | End: 2024-05-29

## 2024-05-28 RX ORDER — HYDROMORPHONE HYDROCHLORIDE 1 MG/ML
0.25 INJECTION, SOLUTION INTRAMUSCULAR; INTRAVENOUS; SUBCUTANEOUS
Status: ACTIVE | OUTPATIENT
Start: 2024-05-28

## 2024-05-28 RX ORDER — DIPHENHYDRAMINE HCL 25 MG
25 TABLET ORAL
Status: ACTIVE | OUTPATIENT
Start: 2024-05-28

## 2024-05-28 RX ORDER — ONDANSETRON 4 MG/1
4 TABLET, ORALLY DISINTEGRATING ORAL EVERY 4 HOURS PRN
Status: ACTIVE | OUTPATIENT
Start: 2024-05-28

## 2024-05-28 RX ORDER — DIPHENHYDRAMINE HCL 25 MG
25 TABLET ORAL
Status: DISPENSED | OUTPATIENT
Start: 2024-05-28

## 2024-05-28 RX ORDER — DEXTROSE MONOHYDRATE 25 G/50ML
25 INJECTION, SOLUTION INTRAVENOUS
Status: ACTIVE | OUTPATIENT
Start: 2024-05-28

## 2024-05-28 RX ORDER — ACETAMINOPHEN 325 MG/1
650 TABLET ORAL EVERY 6 HOURS PRN
Status: DISPENSED | OUTPATIENT
Start: 2024-05-28

## 2024-05-28 RX ORDER — DIAZEPAM 2 MG/1
2 TABLET ORAL
Status: DISCONTINUED | OUTPATIENT
Start: 2024-05-28 | End: 2024-05-28

## 2024-05-28 RX ORDER — AMOXICILLIN 250 MG
2 CAPSULE ORAL EVERY EVENING
Status: DISPENSED | OUTPATIENT
Start: 2024-05-28

## 2024-05-28 RX ORDER — SODIUM CHLORIDE, SODIUM LACTATE, POTASSIUM CHLORIDE, AND CALCIUM CHLORIDE .6; .31; .03; .02 G/100ML; G/100ML; G/100ML; G/100ML
500 INJECTION, SOLUTION INTRAVENOUS
Status: ACTIVE | OUTPATIENT
Start: 2024-05-28

## 2024-05-28 RX ORDER — POLYETHYLENE GLYCOL 3350 17 G/17G
1 POWDER, FOR SOLUTION ORAL
Status: ACTIVE | OUTPATIENT
Start: 2024-05-28

## 2024-05-28 RX ORDER — FLUTICASONE PROPIONATE 50 MCG
1 SPRAY, SUSPENSION (ML) NASAL DAILY
Status: DISCONTINUED | OUTPATIENT
Start: 2024-05-29 | End: 2024-05-28

## 2024-05-28 RX ORDER — ACETAMINOPHEN 325 MG/1
650 TABLET ORAL EVERY 6 HOURS PRN
Status: ACTIVE | OUTPATIENT
Start: 2024-06-02

## 2024-05-28 RX ORDER — OXYCODONE HYDROCHLORIDE 5 MG/1
2.5 TABLET ORAL
Status: ACTIVE | OUTPATIENT
Start: 2024-05-28

## 2024-05-28 RX ORDER — GUAIFENESIN/DEXTROMETHORPHAN 100-10MG/5
10 SYRUP ORAL EVERY 6 HOURS PRN
Status: ACTIVE | OUTPATIENT
Start: 2024-05-28

## 2024-05-28 RX ADMIN — VANCOMYCIN HYDROCHLORIDE 2250 MG: 5 INJECTION, POWDER, LYOPHILIZED, FOR SOLUTION INTRAVENOUS at 16:38

## 2024-05-28 RX ADMIN — ACETAMINOPHEN 650 MG: 325 TABLET, FILM COATED ORAL at 23:40

## 2024-05-28 RX ADMIN — DIPHENHYDRAMINE HYDROCHLORIDE 25 MG: 25 TABLET ORAL at 23:33

## 2024-05-28 RX ADMIN — ACETAMINOPHEN 650 MG: 325 TABLET, FILM COATED ORAL at 20:56

## 2024-05-28 RX ADMIN — CEFTRIAXONE SODIUM 2000 MG: 10 INJECTION, POWDER, FOR SOLUTION INTRAVENOUS at 16:30

## 2024-05-28 RX ADMIN — SODIUM CHLORIDE, POTASSIUM CHLORIDE, SODIUM LACTATE AND CALCIUM CHLORIDE: 600; 310; 30; 20 INJECTION, SOLUTION INTRAVENOUS at 20:52

## 2024-05-28 SDOH — ECONOMIC STABILITY: TRANSPORTATION INSECURITY
IN THE PAST 12 MONTHS, HAS LACK OF RELIABLE TRANSPORTATION KEPT YOU FROM MEDICAL APPOINTMENTS, MEETINGS, WORK OR FROM GETTING THINGS NEEDED FOR DAILY LIVING?: NO

## 2024-05-28 ASSESSMENT — COGNITIVE AND FUNCTIONAL STATUS - GENERAL
SUGGESTED CMS G CODE MODIFIER MOBILITY: CK
SUGGESTED CMS G CODE MODIFIER DAILY ACTIVITY: CJ
TOILETING: A LITTLE
HELP NEEDED FOR BATHING: A LITTLE
DAILY ACTIVITIY SCORE: 22
MOBILITY SCORE: 17
TURNING FROM BACK TO SIDE WHILE IN FLAT BAD: A LITTLE
MOVING FROM LYING ON BACK TO SITTING ON SIDE OF FLAT BED: A LITTLE
CLIMB 3 TO 5 STEPS WITH RAILING: A LOT
WALKING IN HOSPITAL ROOM: A LITTLE
MOVING TO AND FROM BED TO CHAIR: A LITTLE
STANDING UP FROM CHAIR USING ARMS: A LITTLE

## 2024-05-28 ASSESSMENT — SOCIAL DETERMINANTS OF HEALTH (SDOH)
WITHIN THE LAST YEAR, HAVE YOU BEEN AFRAID OF YOUR PARTNER OR EX-PARTNER?: NO
WITHIN THE LAST YEAR, HAVE YOU BEEN HUMILIATED OR EMOTIONALLY ABUSED IN OTHER WAYS BY YOUR PARTNER OR EX-PARTNER?: NO
WITHIN THE LAST YEAR, HAVE YOU BEEN KICKED, HIT, SLAPPED, OR OTHERWISE PHYSICALLY HURT BY YOUR PARTNER OR EX-PARTNER?: NO
WITHIN THE PAST 12 MONTHS, THE FOOD YOU BOUGHT JUST DIDN'T LAST AND YOU DIDN'T HAVE MONEY TO GET MORE: NEVER TRUE
WITHIN THE PAST 12 MONTHS, YOU WORRIED THAT YOUR FOOD WOULD RUN OUT BEFORE YOU GOT THE MONEY TO BUY MORE: NEVER TRUE
IN THE PAST 12 MONTHS, HAS THE ELECTRIC, GAS, OIL, OR WATER COMPANY THREATENED TO SHUT OFF SERVICE IN YOUR HOME?: NO
WITHIN THE LAST YEAR, HAVE TO BEEN RAPED OR FORCED TO HAVE ANY KIND OF SEXUAL ACTIVITY BY YOUR PARTNER OR EX-PARTNER?: NO

## 2024-05-28 ASSESSMENT — ENCOUNTER SYMPTOMS
NAUSEA: 0
FOCAL WEAKNESS: 0
FALLS: 0
HEADACHES: 0
SHORTNESS OF BREATH: 0
DEPRESSION: 0
ABDOMINAL PAIN: 0
HEARTBURN: 0
DIZZINESS: 0
BRUISES/BLEEDS EASILY: 0
CHILLS: 0
DOUBLE VISION: 0
MYALGIAS: 1
VOMITING: 0
BLURRED VISION: 0
PALPITATIONS: 0
COUGH: 0
FEVER: 0
WEAKNESS: 1

## 2024-05-28 ASSESSMENT — LIFESTYLE VARIABLES
CONSUMPTION TOTAL: NEGATIVE
EVER FELT BAD OR GUILTY ABOUT YOUR DRINKING: NO
ON A TYPICAL DAY WHEN YOU DRINK ALCOHOL HOW MANY DRINKS DO YOU HAVE: 0
DOES PATIENT WANT TO STOP DRINKING: NO
HOW MANY TIMES IN THE PAST YEAR HAVE YOU HAD 5 OR MORE DRINKS IN A DAY: 0
TOTAL SCORE: 0
ALCOHOL_USE: NO
HAVE PEOPLE ANNOYED YOU BY CRITICIZING YOUR DRINKING: NO
SUBSTANCE_ABUSE: 0
EVER HAD A DRINK FIRST THING IN THE MORNING TO STEADY YOUR NERVES TO GET RID OF A HANGOVER: NO
TOTAL SCORE: 0
AVERAGE NUMBER OF DAYS PER WEEK YOU HAVE A DRINK CONTAINING ALCOHOL: 0
HAVE YOU EVER FELT YOU SHOULD CUT DOWN ON YOUR DRINKING: NO
TOTAL SCORE: 0

## 2024-05-28 ASSESSMENT — PATIENT HEALTH QUESTIONNAIRE - PHQ9
7. TROUBLE CONCENTRATING ON THINGS, SUCH AS READING THE NEWSPAPER OR WATCHING TELEVISION: NOT AT ALL
5. POOR APPETITE OR OVEREATING: MORE THAN HALF THE DAYS
6. FEELING BAD ABOUT YOURSELF - OR THAT YOU ARE A FAILURE OR HAVE LET YOURSELF OR YOUR FAMILY DOWN: NOT AL ALL
9. THOUGHTS THAT YOU WOULD BE BETTER OFF DEAD, OR OF HURTING YOURSELF: NOT AT ALL
SUM OF ALL RESPONSES TO PHQ9 QUESTIONS 1 AND 2: 2
3. TROUBLE FALLING OR STAYING ASLEEP OR SLEEPING TOO MUCH: SEVERAL DAYS
2. FEELING DOWN, DEPRESSED, IRRITABLE, OR HOPELESS: SEVERAL DAYS
SUM OF ALL RESPONSES TO PHQ QUESTIONS 1-9: 6
1. LITTLE INTEREST OR PLEASURE IN DOING THINGS: SEVERAL DAYS
8. MOVING OR SPEAKING SO SLOWLY THAT OTHER PEOPLE COULD HAVE NOTICED. OR THE OPPOSITE, BEING SO FIGETY OR RESTLESS THAT YOU HAVE BEEN MOVING AROUND A LOT MORE THAN USUAL: NOT AT ALL
4. FEELING TIRED OR HAVING LITTLE ENERGY: SEVERAL DAYS

## 2024-05-28 ASSESSMENT — PAIN DESCRIPTION - PAIN TYPE: TYPE: ACUTE PAIN

## 2024-05-28 ASSESSMENT — FIBROSIS 4 INDEX
FIB4 SCORE: 1.54
FIB4 SCORE: 0.91

## 2024-05-28 NOTE — ED PROVIDER NOTES
ED Provider Note    CHIEF COMPLAINT  Chief Complaint   Patient presents with    Wound Check     Pt c/o wound to R heel. Sent by MD for further evaluation of possible cellulitis    Foot Pain     Right foot/heel wound        EXTERNAL RECORDS REVIEWED  Outpatient Notes patient had a [facility] visit yesterday for evaluation of her wound on her right heel the patient has been taking doxycycline but her heel ulcer is not improving though the redness around her ankle has been improving she was advised to follow-up with wound care and when wound care saw her they sent her here for further evaluation for possible osteomyelitis    HPI/ROS  LIMITATION TO HISTORY   Select: : None  OUTSIDE HISTORIAN(S):  none    Ophelia Sosa is a 78 y.o. female who presents with a nonhealing wound to her right heel with cellulitis to her right foot and ankle that she has had and been getting progressively worse for the last month.  She is currently finishing up a course of doxycycline and states that the redness is slightly improved but she still has the wound on her heel and increased drainage.  Patient is an insulin dependent diabetic and has been diabetic for 40 years.  She has not noticed any fevers.  She has had some discomfort with walking and has been wearing a boot.  She denies any falls or trauma.  She denies any dizziness lightheadedness nausea or vomiting.  He has a history of smoking but no longer smokes.    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    SURGICAL  HISTORY   has a past surgical history that includes other orthopedic surgery (Right); gyn surgery (hysterectomy); mitral valve replace; tonsillectomy (Bilateral); and breast biopsy.    FAMILY HISTORY  Family History   Problem Relation Age of Onset    Hypertension Mother     Heart Disease Mother     Diabetes Mother     Diabetes Father     Hypertension Father     Diabetes Maternal Grandmother     Diabetes Paternal Grandfather        SOCIAL HISTORY  Social History     Tobacco Use    Smoking status: Former     Current packs/day: 0.00     Types: Cigarettes     Quit date: 2011     Years since quittin.5    Smokeless tobacco: Never   Vaping Use    Vaping status: Never Used   Substance and Sexual Activity    Alcohol use: Not Currently     Comment: denies    Drug use: Yes     Comment: CBD    Sexual activity: Not Currently       CURRENT MEDICATIONS  Home Medications       Reviewed by Magali Murdock (Pharmacy Tech) on 24 at 1658  Med List Status: Complete     Medication Last Dose Status   Acetaminophen Extra Strength 500 MG Cap 2024 Active   albuterol 108 (90 Base) MCG/ACT Aero Soln inhalation aerosol PRN Active   B Complex Vitamins (B COMPLEX PO) 2024 Active   clobetasol (TEMOVATE) 0.05 % Cream PRN Active   diphenhydrAMINE HCl (BENADRYL ALLERGY PO) 2024 Active   doxycycline (VIBRAMYCIN) 100 MG Tab 2024 Active   ELIQUIS 5 MG Tab 2024 Active   fluticasone (FLONASE) 50 MCG/ACT nasal spray 2024 Active   furosemide (LASIX) 40 MG Tab 2024 Active   insulin NPH (HUMULIN/NOVOLIN) 100 UNIT/ML Suspension 2024 Active   insulin regular (HUMULIN R) 100 Unit/mL Solution 2024 Active   KLOR-CON 8 MEQ tablet 2024 Active   losartan (COZAAR) 25 MG Tab 2024 Active   metFORMIN ER (GLUCOPHAGE XR) 500 MG TABLET SR 24 HR 2024 Active   Non Formulary Request 2024 Active   non-formulary med 2024 Active   Psyllium (METAMUCIL M[facility]IHE[facility] PO) 2024 Active    rosuvastatin (CRESTOR) 5 MG Tab 5/27/2024 Active   VITAMIN D, CHOLECALCIFEROL, PO 5/28/2024 Active                  Audit from Redirected Encounters    **Home medications have not yet been reviewed for this encounter**         ALLERGIES  Allergies   Allergen Reactions    Ativan      DELIRIUM, CONFUSION.    Advair [Fluticasone-Salmeterol]     Ambien [Zolpidem Tartrate]     Erythromycin Vomiting    Ibuprofen     Lipitor [Atorvastatin Calcium]     Pcn [Penicillins] Hives    Pravachol [Pravastatin Sodium]      ADVERSE REACTION.       PHYSICAL EXAM  VITAL SIGNS: BP (!) 158/79   Pulse 100   Temp 36.9 °C (98.5 °F) (Temporal)   Resp 14   Wt 93 kg (205 lb)   SpO2 93%   BMI 32.11 kg/m²      Constitutional: Well developed, Well nourished, anxious non-toxic appearance.   HEENT: Normocephalic, Atraumatic,  external ears normal, pharynx pink,  Mucous  Membranes moist, No rhinorrhea or mucosal edema  Eyes: PERRL, EOMI, Conjunctiva normal, No discharge.   Neck: Normal range of motion, No tenderness, Supple, No stridor.   Lymphatic: No lymphadenopathy    Cardiovascular: Regular Rate and Rhythm, No murmurs,  rubs, or gallops.   Thorax & Lungs: Lungs clear to auscultation bilaterally, No respiratory distress, No wheezes, rhales or rhonchi, No chest wall tenderness.   Abdomen: Bowel sounds normal, Soft, non tender, non distended,  No pulsatile masses., no rebound guarding or peritoneal signs.   Skin: Patient has erythema to her right lower foot and ankle about senior living up the shin circumferentially.  Her right heel has a 4 cm x 3 cm circular ulcer with fat exposed and purulent discharge with a foul odor  Extremities: Equal, intact distal pulses, No cyanosis, clubbing or edema,  No tenderness.  positive heel ulcer as above in skin exam as well as surrounding cellulitis tracking up towards the mid shin  Musculoskeletal: Good range of motion in all major joints. No tenderness to palpation or major deformities noted.   Neurologic:  Alert & oriented No focal deficits noted.  Psychiatric: Affect normal, Judgment normal, Mood normal.            EKG/LABS  Results for orders placed or performed during the hospital encounter of 05/28/24   CBC WITH DIFFERENTIAL   Result Value Ref Range    WBC 12.6 (H) 4.8 - 10.8 K/uL    RBC 3.72 (L) 4.20 - 5.40 M/uL    Hemoglobin 11.0 (L) 12.0 - 16.0 g/dL    Hematocrit 34.2 (L) 37.0 - 47.0 %    MCV 91.9 81.4 - 97.8 fL    MCH 29.6 27.0 - 33.0 pg    MCHC 32.2 32.2 - 35.5 g/dL    RDW 44.6 35.9 - 50.0 fL    Platelet Count 376 164 - 446 K/uL    MPV 10.1 9.0 - 12.9 fL    Neutrophils-Polys 78.90 (H) 44.00 - 72.00 %    Lymphocytes 13.00 (L) 22.00 - 41.00 %    Monocytes 6.20 0.00 - 13.40 %    Eosinophils 0.90 0.00 - 6.90 %    Basophils 0.60 0.00 - 1.80 %    Immature Granulocytes 0.40 0.00 - 0.90 %    Nucleated RBC 0.00 0.00 - 0.20 /100 WBC    Neutrophils (Absolute) 9.97 (H) 1.82 - 7.42 K/uL    Lymphs (Absolute) 1.64 1.00 - 4.80 K/uL    Monos (Absolute) 0.78 0.00 - 0.85 K/uL    Eos (Absolute) 0.11 0.00 - 0.51 K/uL    Baso (Absolute) 0.08 0.00 - 0.12 K/uL    Immature Granulocytes (abs) 0.05 0.00 - 0.11 K/uL    NRBC (Absolute) 0.00 K/uL   Comp Metabolic Panel   Result Value Ref Range    Sodium 136 135 - 145 mmol/L    Potassium 4.2 3.6 - 5.5 mmol/L    Chloride 98 96 - 112 mmol/L    Co2 22 20 - 33 mmol/L    Anion Gap 16.0 7.0 - 16.0    Glucose 171 (H) 65 - 99 mg/dL    Bun 30 (H) 8 - 22 mg/dL    Creatinine 1.22 0.50 - 1.40 mg/dL    Calcium 10.7 (H) 8.5 - 10.5 mg/dL    Correct Calcium 10.6 (H) 8.5 - 10.5 mg/dL    AST(SGOT) 15 12 - 45 U/L    ALT(SGPT) 12 2 - 50 U/L    Alkaline Phosphatase 76 30 - 99 U/L    Total Bilirubin 0.3 0.1 - 1.5 mg/dL    Albumin 4.1 3.2 - 4.9 g/dL    Total Protein 7.8 6.0 - 8.2 g/dL    Globulin 3.7 (H) 1.9 - 3.5 g/dL    A-G Ratio 1.1 g/dL   CRP QUANTITIVE (NON-CARDIAC)   Result Value Ref Range    Stat C-Reactive Protein 0.90 (H) 0.00 - 0.75 mg/dL   EXTRA TUBE,KANG   Result Value Ref Range    Extra Tube, Blue  SORTED    ESTIMATED GFR   Result Value Ref Range    GFR (CKD-EPI) 45 (A) >60 mL/min/1.73 m 2      I have independently interpreted this EKG    RADIOLOGY/PROCEDURES   I have independently interpreted the diagnostic imaging associated with this visit and am waiting the final reading from the radiologist.   My preliminary interpretation is as follows: X-ray foot no soft tissue gas    Radiologist interpretation:  DX-FOOT-COMPLETE 3+ RIGHT   Final Result      Small posterior heel ulcer and there is some new soft tissue gas posterior to the ankle.      No radiographic evidence for acute osteomyelitis.      Multiple chronic osseous changes are again noted.      MR-FOOT-W/O RIGHT    (Results Pending)       COURSE & MEDICAL DECISION MAKING    ASSESSMENT, COURSE AND PLAN  Care Narrative: Ophelia Sosa is a 78 y.o. female who presents with a nonhealing wound to her right heel with cellulitis to her right foot and ankle that she has had and been getting progressively worse for the last month.  She is currently finishing up a course of doxycycline and states that the redness is slightly improved but she still has the wound on her heel and increased drainage.  Patient is an insulin dependent diabetic and has been diabetic for 40 years.  She has not noticed any fevers.  She has had some discomfort with walking and has been wearing a boot.  She denies any falls or trauma.  She denies any dizziness lightheadedness nausea or vomiting.  He has a history of smoking but no longer smokes.  On physical exam she has a ulceration to her right heel with fat exposed and purulent drainage and a foul odor she has erythema tracking up to her mid right shin.  She has no soft tissue crepitance.  She does have neuropathy and decreased sensation to the both of her feet.  Heart is regular rhythm lungs are clear to auscultation bilaterally she is anxious.  The patient does have a history of heart block and does have a pacemaker.             ADDITIONAL PROBLEMS MANAGED  none    DISPOSITION AND DISCUSSIONS  An IV was started and labs are drawn to give the patient an IV dose of Rocephin and vancomycin.  I also gave her an oral dose of 12.5 mg of Benadryl for her anxiety per her request.  White count is slightly evaded at 12.6 hemoglobin 11 platelet count 376 with 78% neutrophils.    X-ray of the right foot shows no soft tissue gas.    Her comprehensive metabolic panel has a glucose of 171 BUN is 30 calcium is elevated at 10.7 liver function tests are normal.  CRP is elevated at 0.9.  Sed rate is pending.    Patient will be admitted to the hospital for further workup of her heel ulcer and wound care and IV antibiotics.      I have discussed management of the patient with the following physicians and EARLINE's: Hospitalist Dr. Neal admit the patient for IV antibiotics and further care    Discussion of management with other QHP or appropriate source(s): Pharmacy regarding antibiotic choice      Escalation of care considered, and ultimately not performed: none    Barriers to care at this time, including but not limited to: The patient has diabetes.     Decision tools and prescription drugs considered including, but not limited to: Antibiotics vancomycin and ceftriaxone .    Patient will be admitted to the hospital in guarded condition.    FINAL DIAGNOSIS  1. Diabetic ulcer of right heel associated with type 1 diabetes mellitus, with fat layer exposed (HCC)    2. Cellulitis of foot, right           Electronically signed by: Dee Barker M.D., 5/28/2024 2:32 PM

## 2024-05-28 NOTE — ED NOTES
Med rec is complete per Patient at bedside with list.   Family/friend/caregiver present at time of interview with permission from Patient.    Allergies reviewed.    Has patient had any outside antibiotics in the last 30 days? Y    Any Anticoagulants (rivaroxaban, apixaban, edoxaban, dabigatran, warfarin, enoxaparin) taken in the last 14 days? Y  Anticoagulant name: Eliquis , Last dose: 5/28/24@ 06:30 AM.        Pharmacy/Pharmacies Pt utilizes : [phone]  [address] or [facility]Insulin and diabetic supplies

## 2024-05-28 NOTE — ED TRIAGE NOTES
Pt to triage .  Chief Complaint   Patient presents with    Wound Check     Pt c/o wound to R heel. Sent by MD for further evaluation of possible cellulitis    Foot Pain     Right foot/heel wound

## 2024-05-29 ENCOUNTER — HOME CARE VISIT (OUTPATIENT)
Dept: HOME HEALTH SERVICES | Facility: HOME HEALTHCARE | Age: 79
End: 2024-05-29
Payer: MEDICARE

## 2024-05-29 LAB
ALBUMIN SERPL BCP-MCNC: 3.1 G/DL (ref 3.2–4.9)
ALBUMIN/GLOB SERPL: 1.1 G/DL
ALP SERPL-CCNC: 59 U/L (ref 30–99)
ALT SERPL-CCNC: 6 U/L (ref 2–50)
ANION GAP SERPL CALC-SCNC: 12 MMOL/L (ref 7–16)
APPEARANCE UR: CLEAR
AST SERPL-CCNC: 10 U/L (ref 12–45)
BACTERIA #/AREA URNS HPF: ABNORMAL /HPF
BACTERIA BLD CULT: NORMAL
BACTERIA BLD CULT: NORMAL
BACTERIA WND AEROBE CULT: ABNORMAL
BACTERIA WND AEROBE CULT: ABNORMAL
BASOPHILS # BLD AUTO: 0.6 % (ref 0–1.8)
BASOPHILS # BLD: 0.06 K/UL (ref 0–0.12)
BILIRUB SERPL-MCNC: <0.2 MG/DL (ref 0.1–1.5)
BILIRUB UR QL STRIP.AUTO: NEGATIVE
BUN SERPL-MCNC: 29 MG/DL (ref 8–22)
CALCIUM ALBUM COR SERPL-MCNC: 10.2 MG/DL (ref 8.5–10.5)
CALCIUM SERPL-MCNC: 9.5 MG/DL (ref 8.5–10.5)
CHLORIDE SERPL-SCNC: 104 MMOL/L (ref 96–112)
CO2 SERPL-SCNC: 23 MMOL/L (ref 20–33)
COLOR UR: YELLOW
CREAT SERPL-MCNC: 1.15 MG/DL (ref 0.5–1.4)
EOSINOPHIL # BLD AUTO: 0.21 K/UL (ref 0–0.51)
EOSINOPHIL NFR BLD: 2.2 % (ref 0–6.9)
EPI CELLS #/AREA URNS HPF: NEGATIVE /HPF
ERYTHROCYTE [DISTWIDTH] IN BLOOD BY AUTOMATED COUNT: 45.1 FL (ref 35.9–50)
EST. AVERAGE GLUCOSE BLD GHB EST-MCNC: 186 MG/DL
GFR SERPLBLD CREATININE-BSD FMLA CKD-EPI: 48 ML/MIN/1.73 M 2
GLOBULIN SER CALC-MCNC: 2.9 G/DL (ref 1.9–3.5)
GLUCOSE BLD STRIP.AUTO-MCNC: 166 MG/DL (ref 65–99)
GLUCOSE BLD STRIP.AUTO-MCNC: 177 MG/DL (ref 65–99)
GLUCOSE BLD STRIP.AUTO-MCNC: 232 MG/DL (ref 65–99)
GLUCOSE BLD STRIP.AUTO-MCNC: 251 MG/DL (ref 65–99)
GLUCOSE SERPL-MCNC: 135 MG/DL (ref 65–99)
GLUCOSE UR STRIP.AUTO-MCNC: NEGATIVE MG/DL
GRAM STN SPEC: ABNORMAL
HBA1C MFR BLD: 8.1 % (ref 4–5.6)
HCT VFR BLD AUTO: 29.3 % (ref 37–47)
HGB BLD-MCNC: 9.3 G/DL (ref 12–16)
HYALINE CASTS #/AREA URNS LPF: ABNORMAL /LPF
IMM GRANULOCYTES # BLD AUTO: 0.04 K/UL (ref 0–0.11)
IMM GRANULOCYTES NFR BLD AUTO: 0.4 % (ref 0–0.9)
KETONES UR STRIP.AUTO-MCNC: NEGATIVE MG/DL
LEUKOCYTE ESTERASE UR QL STRIP.AUTO: ABNORMAL
LYMPHOCYTES # BLD AUTO: 1.72 K/UL (ref 1–4.8)
LYMPHOCYTES NFR BLD: 17.7 % (ref 22–41)
MAGNESIUM SERPL-MCNC: 2.1 MG/DL (ref 1.5–2.5)
MCH RBC QN AUTO: 29.6 PG (ref 27–33)
MCHC RBC AUTO-ENTMCNC: 31.7 G/DL (ref 32.2–35.5)
MCV RBC AUTO: 93.3 FL (ref 81.4–97.8)
MICRO URNS: ABNORMAL
MONOCYTES # BLD AUTO: 0.86 K/UL (ref 0–0.85)
MONOCYTES NFR BLD AUTO: 8.9 % (ref 0–13.4)
NEUTROPHILS # BLD AUTO: 6.82 K/UL (ref 1.82–7.42)
NEUTROPHILS NFR BLD: 70.2 % (ref 44–72)
NITRITE UR QL STRIP.AUTO: NEGATIVE
NRBC # BLD AUTO: 0 K/UL
NRBC BLD-RTO: 0 /100 WBC (ref 0–0.2)
PH UR STRIP.AUTO: 6 [PH] (ref 5–8)
PHOSPHATE SERPL-MCNC: 3.6 MG/DL (ref 2.5–4.5)
PLATELET # BLD AUTO: 293 K/UL (ref 164–446)
PMV BLD AUTO: 10.4 FL (ref 9–12.9)
POTASSIUM SERPL-SCNC: 4.2 MMOL/L (ref 3.6–5.5)
PROT SERPL-MCNC: 6 G/DL (ref 6–8.2)
PROT UR QL STRIP: NEGATIVE MG/DL
RBC # BLD AUTO: 3.14 M/UL (ref 4.2–5.4)
RBC # URNS HPF: ABNORMAL /HPF
RBC UR QL AUTO: NEGATIVE
SIGNIFICANT IND 70042: ABNORMAL
SIGNIFICANT IND 70042: NORMAL
SIGNIFICANT IND 70042: NORMAL
SITE SITE: ABNORMAL
SITE SITE: NORMAL
SITE SITE: NORMAL
SODIUM SERPL-SCNC: 139 MMOL/L (ref 135–145)
SOURCE SOURCE: ABNORMAL
SOURCE SOURCE: NORMAL
SOURCE SOURCE: NORMAL
SP GR UR STRIP.AUTO: 1.01
UROBILINOGEN UR STRIP.AUTO-MCNC: 0.2 MG/DL
WBC # BLD AUTO: 9.7 K/UL (ref 4.8–10.8)
WBC #/AREA URNS HPF: ABNORMAL /HPF

## 2024-05-29 PROCEDURE — 97602 WOUND(S) CARE NON-SELECTIVE: CPT

## 2024-05-29 PROCEDURE — 83036 HEMOGLOBIN GLYCOSYLATED A1C: CPT

## 2024-05-29 PROCEDURE — 700102 HCHG RX REV CODE 250 W/ 637 OVERRIDE(OP): Performed by: INTERNAL MEDICINE

## 2024-05-29 PROCEDURE — 81001 URINALYSIS AUTO W/SCOPE: CPT

## 2024-05-29 PROCEDURE — 82962 GLUCOSE BLOOD TEST: CPT

## 2024-05-29 PROCEDURE — 36415 COLL VENOUS BLD VENIPUNCTURE: CPT

## 2024-05-29 PROCEDURE — 700105 HCHG RX REV CODE 258: Performed by: STUDENT IN AN ORGANIZED HEALTH CARE EDUCATION/TRAINING PROGRAM

## 2024-05-29 PROCEDURE — 99232 SBSQ HOSP IP/OBS MODERATE 35: CPT | Performed by: INTERNAL MEDICINE

## 2024-05-29 PROCEDURE — 770006 HCHG ROOM/CARE - MED/SURG/GYN SEMI*

## 2024-05-29 PROCEDURE — 700111 HCHG RX REV CODE 636 W/ 250 OVERRIDE (IP): Mod: JZ | Performed by: STUDENT IN AN ORGANIZED HEALTH CARE EDUCATION/TRAINING PROGRAM

## 2024-05-29 PROCEDURE — 700102 HCHG RX REV CODE 250 W/ 637 OVERRIDE(OP): Performed by: STUDENT IN AN ORGANIZED HEALTH CARE EDUCATION/TRAINING PROGRAM

## 2024-05-29 PROCEDURE — 84100 ASSAY OF PHOSPHORUS: CPT

## 2024-05-29 PROCEDURE — 83735 ASSAY OF MAGNESIUM: CPT

## 2024-05-29 PROCEDURE — 85025 COMPLETE CBC W/AUTO DIFF WBC: CPT

## 2024-05-29 PROCEDURE — 80053 COMPREHEN METABOLIC PANEL: CPT

## 2024-05-29 PROCEDURE — A9270 NON-COVERED ITEM OR SERVICE: HCPCS | Performed by: STUDENT IN AN ORGANIZED HEALTH CARE EDUCATION/TRAINING PROGRAM

## 2024-05-29 PROCEDURE — A9270 NON-COVERED ITEM OR SERVICE: HCPCS | Performed by: INTERNAL MEDICINE

## 2024-05-29 RX ORDER — LOSARTAN POTASSIUM 25 MG/1
25 TABLET ORAL 2 TIMES DAILY
Status: DISPENSED | OUTPATIENT
Start: 2024-05-29

## 2024-05-29 RX ORDER — FUROSEMIDE 40 MG/1
40 TABLET ORAL DAILY
Status: DISPENSED | OUTPATIENT
Start: 2024-05-29

## 2024-05-29 RX ORDER — DIPHENHYDRAMINE HYDROCHLORIDE 50 MG/ML
25 INJECTION INTRAMUSCULAR; INTRAVENOUS ONCE
Status: ACTIVE | OUTPATIENT
Start: 2024-05-29 | End: 2024-05-30

## 2024-05-29 RX ORDER — ENOXAPARIN SODIUM 100 MG/ML
40 INJECTION SUBCUTANEOUS DAILY
Status: ACTIVE | OUTPATIENT
Start: 2024-05-29

## 2024-05-29 RX ORDER — SODIUM HYPOCHLORITE 1.25 MG/ML
SOLUTION TOPICAL 2 TIMES DAILY
Status: DISPENSED | OUTPATIENT
Start: 2024-05-29

## 2024-05-29 RX ORDER — NYSTATIN 100000 [USP'U]/G
POWDER TOPICAL 2 TIMES DAILY
Status: DISPENSED | OUTPATIENT
Start: 2024-05-29 | End: 2024-06-05

## 2024-05-29 RX ADMIN — INSULIN HUMAN 4 UNITS: 100 INJECTION, SOLUTION PARENTERAL at 16:37

## 2024-05-29 RX ADMIN — INSULIN HUMAN 10 UNITS: 100 INJECTION, SUSPENSION SUBCUTANEOUS at 16:38

## 2024-05-29 RX ADMIN — SENNOSIDES AND DOCUSATE SODIUM 2 TABLET: 50; 8.6 TABLET ORAL at 05:52

## 2024-05-29 RX ADMIN — AMPICILLIN AND SULBACTAM 3 G: 1; 2 INJECTION, POWDER, FOR SOLUTION INTRAMUSCULAR; INTRAVENOUS at 18:07

## 2024-05-29 RX ADMIN — LOSARTAN POTASSIUM 25 MG: 25 TABLET, FILM COATED ORAL at 09:58

## 2024-05-29 ASSESSMENT — ENCOUNTER SYMPTOMS
FEVER: 0
VOMITING: 0
ABDOMINAL PAIN: 0
SENSORY CHANGE: 1
CHILLS: 0
NAUSEA: 0

## 2024-05-29 ASSESSMENT — PAIN DESCRIPTION - PAIN TYPE: TYPE: ACUTE PAIN

## 2024-05-29 NOTE — H&P
Hospital Medicine History & Physical Note    Date of Service  5/28/2024    Primary Care Physician  Emmanuelle Gan M.D.    Consultants  None    Code Status  DNAR/DNI    Chief Complaint  Chief Complaint   Patient presents with    Wound Check     Pt c/o wound to R heel. Sent by MD for further evaluation of possible cellulitis    Foot Pain     Right foot/heel wound        History of Presenting Illness  Ophelia Sosa is a 78 y.o. female with history of chronic A-fib on Eliquis, pulmonary hypertension, CHF, SND s/p pacemaker placement, COPD, insulin-dependent diabetes, hyperlipidemia, CKD 3 who presented 5/28/2024 with evaluation for nonhealing right foot Achilles wound.  Patient was seen by wound care earlier, referred to ER due to nonhealing wound.  Patient has been taking doxycycline and seeing wound care outpatient with minimal improvement.  In ER, no definitive osteomyelitis seen on x-ray right foot.  Patient was given ceftriaxone  and vancomycin by ERP.  Admission requested by ERP.  Therefore, admitted to medicine service for further evaluation and treatment.    I discussed the plan of care with patient, family, bedside RN, and pharmacy.    Review of Systems  Review of Systems   Constitutional:  Positive for malaise/fatigue. Negative for chills and fever.   HENT:  Negative for hearing loss and tinnitus.    Eyes:  Negative for blurred vision and double vision.   Respiratory:  Negative for cough and shortness of breath.    Cardiovascular:  Negative for chest pain and palpitations.   Gastrointestinal:  Negative for abdominal pain, heartburn, nausea and vomiting.   Genitourinary:  Negative for dysuria and urgency.   Musculoskeletal:  Positive for joint pain and myalgias. Negative for falls.   Skin:  Negative for itching and rash.   Neurological:  Positive for weakness. Negative for dizziness, focal weakness and headaches.   Endo/Heme/Allergies:  Negative for environmental allergies. Does not bruise/bleed easily.    Psychiatric/Behavioral:  Negative for depression and substance abuse.    All other systems reviewed and are negative.      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    Surgical History   has a past surgical history that includes other orthopedic surgery (Right); gyn surgery (hysterectomy); mitral valve replace; tonsillectomy (Bilateral); and breast biopsy.     Family History  family history includes Diabetes in her father, maternal grandmother, mother, and paternal grandfather; Heart Disease in her mother; Hypertension in her father and mother.   Family history reviewed with patient. There is family history that is pertinent to the chief complaint.     Social History   reports that she quit smoking about 12 years ago. Her smoking use included cigarettes. She has never used smokeless tobacco. She reports that she does not currently use alcohol. She reports current drug use.    Allergies  Allergies   Allergen Reactions    Ativan      DELIRIUM, CONFUSION.    Advair [Fluticasone-Salmeterol]     Ambien [Zolpidem Tartrate]     Erythromycin Vomiting    Ibuprofen     Lipitor [Atorvastatin Calcium]     Pcn [Penicillins] Hives    Pravachol [Pravastatin Sodium]      ADVERSE REACTION.       Medications  Prior to Admission Medications   Prescriptions Last Dose Informant Patient Reported? Taking?   Acetaminophen Extra Strength 500 MG Cap 5/27/2024 at pm Patient Yes Yes   Sig: Take 2 Capsules by mouth every evening. Indications: Pain   B Complex Vitamins (B COMPLEX PO)  5/28/2024 at 0630 Patient Yes Yes   Sig: Take 1 Tablet by mouth every day.   ELIQUIS 5 MG Tab 5/28/2024 at 0630 Patient No Yes   Sig: TAKE 1 TABLET BY MOUTH TWICE A DAY   Patient taking differently: Take 5 mg by mouth 2 times a day. Indications: Thromboembolism secondary to Atrial Fibrillation   KLOR-CON 8 MEQ tablet 5/28/2024 at 0630 Patient No Yes   Sig: TAKE 1 TABLET BY MOUTH EVERY DAY IN THE EVENING   Patient taking differently: Take 8 mEq by mouth every evening. Indications: takes with Lasix   Non Formulary Request 5/28/2024 at 0630 Patient Yes Yes   Sig: Take 1 Capsule by mouth every day. vital veggie capsule  Indications: supplement   Psyllium (METAMUCIL MULTIHEALTH FIBER PO) 5/28/2024 at 0630 Patient Yes Yes   Sig: Take 2-3 Capsules by mouth every day. takes 3 capsules on Monday, Wednesday and Friday. 2 capsules all other days  Indications: constipation   VITAMIN D, CHOLECALCIFEROL, PO 5/28/2024 at 0630 Patient Yes Yes   Sig: Take 3,000 Units by mouth every day. Indications: supplement   albuterol 108 (90 Base) MCG/ACT Aero Soln inhalation aerosol PRN Patient No No   Sig: Inhale 2 Puffs every 6 hours as needed for Shortness of Breath.   clobetasol (TEMOVATE) 0.05 % Cream PRN Patient No Yes   Sig: Apply 1 Application  topically 2 times a day as needed (eczema).   diphenhydrAMINE HCl (BENADRYL ALLERGY PO) 5/27/2024 at pm Patient Yes Yes   Sig: Take 12.5 mg by mouth 2 times a day as needed (for insomnia). Indications: sleep   doxycycline (VIBRAMYCIN) 100 MG Tab 5/28/2024 at 1000 Patient No Yes   Sig: Take 1 Tablet by mouth 2 times a day for 14 days.   fluticasone (FLONASE) 50 MCG/ACT nasal spray 5/27/2024 at pm Patient Yes Yes   Sig: Administer 1 Spray into affected nostril(S) 1 time a day as needed. Indications: Stuffy Nose   furosemide (LASIX) 40 MG Tab 5/28/2024 at 0630 Patient No Yes   Sig: TAKE 1 TABLET BY MOUTH EVERY DAY IN THE EVENING   Patient taking differently: Take 40 mg by mouth every day.  Indications: Cardiac Failure   insulin NPH (HUMULIN/NOVOLIN) 100 UNIT/ML Suspension 2024 at 0730 Patient No Yes   Si units in the morning and 3-10 units in the evening per sliding scale   Patient taking differently: 50 units in the morning and 10 units in the evening   insulin regular (HUMULIN R) 100 Unit/mL Solution 2024 at 0630 Patient No Yes   Sig: Inject 6 Units under the skin 2 times a day. Inject 6 units daily in the morning and 6 units a  Indications: Type 2 Diabetes   losartan (COZAAR) 25 MG Tab 2024 at 0630 Patient Yes Yes   Sig: Take 25 mg by mouth 2 times a day. Indications: High Blood Pressure Disorder   metFORMIN ER (GLUCOPHAGE XR) 500 MG TABLET SR 24 HR 2024 at 0700 Patient No Yes   Sig: TAKE 1 TABLET BY MOUTH TWICE A DAY   non-formulary med 2024 at 0630 Patient Yes Yes   Sig: Take 1 Capful by mouth every day. Vital fruit capsules  Indications: supplement   rosuvastatin (CRESTOR) 5 MG Tab 2024 at 2200 Patient No Yes   Sig: TAKE ONE TABLET AT BEDTIME FOR CHOLESTEROL. TAKE THREE TIMES WEEKLY   Patient taking differently: Take 5 mg by mouth in the afternoon every Mon, Wed, and Fri. TAKE ONE TABLET AT BEDTIME FOR CHOLESTEROL. TAKE THREE TIMES WEEKLY  Indications: High Amount of Fats in the Blood      Facility-Administered Medications: None       Physical Exam  Temp:  [36.6 °C (97.9 °F)-36.9 °C (98.5 °F)] 36.7 °C (98 °F)  Pulse:  [] 83  Resp:  [14-18] 18  BP: (138-158)/(45-93) 145/45  SpO2:  [93 %-98 %] 95 %  Blood Pressure : (!) 157/71   Temperature: 36.9 °C (98.5 °F)   Pulse: 83   Respiration: 15   Pulse Oximetry: 95 %       Physical Exam  Vitals and nursing note reviewed.   Constitutional:       General: She is not in acute distress.     Appearance: She is obese.   HENT:      Head: Normocephalic and atraumatic.      Nose: Nose normal.      Mouth/Throat:      Mouth: Mucous membranes are dry.      Pharynx: Oropharynx is clear.   Eyes:      General: No scleral  "icterus.     Extraocular Movements: Extraocular movements intact.   Cardiovascular:      Rate and Rhythm: Normal rate and regular rhythm.      Pulses: Normal pulses.      Heart sounds:      No friction rub.   Pulmonary:      Effort: No respiratory distress.      Breath sounds: No wheezing or rales.   Chest:      Chest wall: No tenderness.   Abdominal:      General: There is no distension.      Tenderness: There is no abdominal tenderness. There is no guarding or rebound.   Musculoskeletal:      Cervical back: Neck supple. No tenderness.      Comments: Open wound in right heel/foot -- discharge, mildly tender   Skin:     General: Skin is warm and dry.      Capillary Refill: Capillary refill takes less than 2 seconds.   Neurological:      General: No focal deficit present.      Mental Status: She is alert and oriented to person, place, and time.   Psychiatric:         Mood and Affect: Mood normal.         Thought Content: Thought content normal.           Laboratory:  Recent Labs     05/28/24  1614   WBC 12.6*   RBC 3.72*   HEMOGLOBIN 11.0*   HEMATOCRIT 34.2*   MCV 91.9   MCH 29.6   MCHC 32.2   RDW 44.6   PLATELETCT 376   MPV 10.1     Recent Labs     05/28/24  1614   SODIUM 136   POTASSIUM 4.2   CHLORIDE 98   CO2 22   GLUCOSE 171*   BUN 30*   CREATININE 1.22   CALCIUM 10.7*     Recent Labs     05/28/24  1614   ALTSGPT 12   ASTSGOT 15   ALKPHOSPHAT 76   TBILIRUBIN 0.3   GLUCOSE 171*     Recent Labs     05/28/24  1614   INR 1.27*     No results for input(s): \"NTPROBNP\" in the last 72 hours.      No results for input(s): \"TROPONINT\" in the last 72 hours.    Imaging:  DX-FOOT-COMPLETE 3+ RIGHT   Final Result      Small posterior heel ulcer and there is some new soft tissue gas posterior to the ankle.      No radiographic evidence for acute osteomyelitis.      Multiple chronic osseous changes are again noted.      MR-FOOT-W/O RIGHT    (Results Pending)   US-EXTREMITY ARTERY LOWER UNILAT W/TACO (COMBO) RIGHT    (Results " Pending)       X-Ray:  I have personally reviewed the images and compared with prior images.    Assessment/Plan:  Justification for Admission Status  I anticipate this patient will require at least 2 midnights of hospitalization, therefore appropriate for inpatient status.    * Osteomyelitis (HCC)- (present on admission)  Assessment & Plan  Suspected of right foot/Achilles heel  Glycemic control  Supportive wound care  Check TACO  Supportive pain control  Antibiotic: Unasyn  -s/p ceftriaxone and vancomycin in ER  Follow cultures    Cellulitis  Assessment & Plan  Cellulitis of right foot, possible osteomyelitis  Plan as noted in osteomyelitis    Diabetic ulcer of right heel associated with type 2 diabetes mellitus (HCC)- (present on admission)  Assessment & Plan  Supportive wound care  Glycemic control  Plan as noted above    DM (diabetes mellitus) ([facility])  Assessment & Plan  ISS  Statin    PAF (paroxysmal atrial fibrillation) ([facility])- (present on admission)  Assessment & Plan  Hold Eliquis for now  --Resume pending MRI result and if no further invasive intervention needed    CKD (chronic kidney disease) stage 3, GFR 30-59 ml/min- (present on admission)  Assessment & Plan  Minimize nephrotoxic agents, renally dose meds    COPD (chronic obstructive pulmonary disease) ([facility])- (present on admission)  Assessment & Plan  Per history  Currently not in acute exacerbation    Cardiac pacemaker- (present on admission)  Assessment & Plan  Per history    Diastolic CHF due to valvular disease ([facility])- (present on admission)  Assessment & Plan  Prior history  Currently not in acute exacerbation    ACP (advance care planning)  Assessment & Plan  Goal of care discussed with patient in ER.  She show me her POLST which she carries with her in her purse -- dated 03/2008 --confirming DNR/DNI status.  She stated she does not wish to have any aggressive/heroic life-sustaining measures-no CPR/defibrillation/intubation or mechanical ventilation.   She is however agreeable for IV antibiotic, supportive wound care, further diagnostic workup with MRI imaging, possible surgical intervention if warranted.  Diagnosis, prognosis, question and concern addressed.  ACP: 17 minutes    Other insomnia- (present on admission)  Assessment & Plan  As needed Benadryl    Class 1 obesity in adult- (present on admission)  Assessment & Plan  Diet and lifestyle modification  Body mass index is 32.11 kg/m².          VTE prophylaxis: SCDs/TEDs

## 2024-05-29 NOTE — PROGRESS NOTES
Pt ambulated to bathroom, when back to bed pt wanted to check her own blood sugar. Pt level was 140

## 2024-05-29 NOTE — PROGRESS NOTES
"Pt unhappy with her diabetes management, stating \"the hospital is going to mess up her schedule and she is going to go into insulin crisis\" Educated pt on hospital sliding scale, pt stated she \"did not want to hear it, and that she has managed her diabetes without change for 40 years\".   NOC RN will pass on to day shift RN pt is requesting to speak to provider about diabetes plan   "

## 2024-05-29 NOTE — DISCHARGE PLANNING
Thank you for alerting HH to your request. At this time we will need a provider to place referral before we can resume. Please let HH know once this is complete and we will continue to process.     Thank you

## 2024-05-29 NOTE — DISCHARGE PLANNING
Received Choice form at [id number]  Agency/Facility Name: [facility]  Referral sent per Choice form @ [phone]

## 2024-05-29 NOTE — CARE PLAN
The patient is Watcher - Medium risk of patient condition declining or worsening    Shift Goals  Clinical Goals: Monitor blood sugars and MRI in morning  Patient Goals: Diabetes managment  Family Goals: BRISA    Progress made toward(s) clinical / shift goals:  Pt blood sugar was 160 requiring 3 units of insulin, pt refused administration. MRI screening completed.     Patient is not progressing towards the following goals:      Problem: Knowledge Deficit - Standard  Goal: Patient and family/care givers will demonstrate understanding of plan of care, disease process/condition, diagnostic tests and medications  Outcome: Not Progressing     Problem: Skin Integrity  Goal: Risk for impaired skin integrity will decrease  Outcome: Not Progressing

## 2024-05-29 NOTE — PROGRESS NOTES
Pt transferred to floor via wheelchair and assumed care of pt. Family at bedside. Pt call light and personal belongings in reach. Bed locked in lowest position.

## 2024-05-29 NOTE — ASSESSMENT & PLAN NOTE
Suspected of right foot/Achilles heel, clinically with similar clinical appearance today  Consulted ortho, going for debridement and wound vac placement on 5/31  ID consulted, still important to get MRI foot for ABX decision making--pending at this time  Glycemic control  Supportive wound care  TACO are normal  Pain control  Continue empiric IV unasyn  MRSA nares negative, WBC has normalized, remains afebrile  I personally reviewed each of the above labs on 5/31  OSH wound culture (contacted by wound care clinic) is growing MRSE, tetracycline resistant, consider zyvox

## 2024-05-29 NOTE — ASSESSMENT & PLAN NOTE
Minimize nephrotoxic agents, renally dose meds  Cr remains near her baseline, I personally reviewed the bmp on 5/31

## 2024-05-29 NOTE — PROGRESS NOTES
Pt blood sugar was 160 requiring 3 units of insulin, pt refused administration. MRI screening form completed. Pt requested benadryl sleep aid, administered. Pt resting in bed with call light and belongings within reach, bed in lowest position, frame alarm on.

## 2024-05-29 NOTE — PROGRESS NOTES
Hospital Medicine Daily Progress Note    Date of Service  5/29/2024    Chief Complaint  Ophelia Sosa is a 78 y.o. female admitted 5/28/2024 with longstanding type 2 diabetes mellitus on insulin, chronic A-fib on Eliquis, hypertension, COPD and CKD stage III presented to the hospital with a nonhealing right foot Achilles wound and ulcer.  She has been followed by outpatient wound care with worsening wound and was instructed to come to the emergency room in hospital for further evaluation for osteomyelitis and possible abscess.  She was then placed empirically on IV ceftriaxone and vancomycin which the latter was discontinued after MRSA nares was negative.  She now remains on IV Unasyn.  An MRI of the foot has been ordered but will need to check MRI compatibility.    Hospital Course  See above    Interval Problem Update  5/29  Patient very concerned about her insulin regimen which is highly regulated and individualized. I had a long conversation with the patient that we will try to accommodate as much as we can for her insulin regimen. She has no pain of the affected foot given profound neuropathy from the knee distally. She remains afebrile. She had a St Syd PPM placed in 2018 at [facility]. She wants it interrogated and is unsure if it is MRI compatible. Cursory review on [employer] shows that it might need to be placed into compatible mode.     I have discussed this patient's plan of care and discharge plan at IDT rounds today with Case Management, Nursing, Nursing leadership, and other members of the IDT team.    Consultants/Specialty  none    Code Status  DNAR/DNI    Disposition  The patient is not medically cleared for discharge to home or a post-acute facility.  Anticipate discharge to: home with close outpatient follow-up    I have placed the appropriate orders for post-discharge needs.    Review of Systems  Review of Systems   Constitutional:  Negative for chills and fever.   Gastrointestinal:  Negative for  abdominal pain, nausea and vomiting.   Genitourinary:  Negative for dysuria, frequency, hematuria and urgency.   Neurological:  Positive for sensory change (chronic in legs).        Physical Exam  Temp:  [36.3 °C (97.4 °F)-36.7 °C (98 °F)] 36.6 °C (97.8 °F)  Pulse:  [79-83] 81  Resp:  [16-18] 18  BP: (138-157)/(45-93) 156/59  SpO2:  [94 %-97 %] 96 %    Physical Exam  Vitals and nursing note reviewed.   Constitutional:       General: She is not in acute distress.     Appearance: She is well-developed.      Comments: Sitting upright, appears comfortable   HENT:      Head: Normocephalic and atraumatic.      Mouth/Throat:      Pharynx: No oropharyngeal exudate.   Eyes:      General: No scleral icterus.     Pupils: Pupils are equal, round, and reactive to light.   Neck:      Thyroid: No thyromegaly.   Cardiovascular:      Rate and Rhythm: Normal rate and regular rhythm.      Heart sounds: Normal heart sounds. No murmur heard.  Pulmonary:      Effort: Pulmonary effort is normal. No respiratory distress.      Breath sounds: Normal breath sounds. No wheezing.   Abdominal:      General: Bowel sounds are normal. There is no distension.      Palpations: Abdomen is soft.      Tenderness: There is no abdominal tenderness.   Musculoskeletal:         General: Tenderness and signs of injury present. Normal range of motion.      Cervical back: Normal range of motion and neck supple.      Right lower leg: Edema present.      Left lower leg: Edema present.   Skin:     General: Skin is warm and dry.      Findings: No rash.   Neurological:      Mental Status: She is alert and oriented to person, place, and time.      Cranial Nerves: No cranial nerve deficit.         Fluids    Intake/Output Summary (Last 24 hours) at 5/29/2024 1643  Last data filed at 5/29/2024 0930  Gross per 24 hour   Intake 240 ml   Output --   Net 240 ml       Laboratory  Recent Labs     05/28/24  1614 05/29/24  0223   WBC 12.6* 9.7   RBC 3.72* 3.14*   HEMOGLOBIN  11.0* 9.3*   HEMATOCRIT 34.2* 29.3*   MCV 91.9 93.3   MCH 29.6 29.6   MCHC 32.2 31.7*   RDW 44.6 45.1   PLATELETCT 376 293   MPV 10.1 10.4     Recent Labs     05/28/24  1614 05/29/24  0223   SODIUM 136 139   POTASSIUM 4.2 4.2   CHLORIDE 98 104   CO2 22 23   GLUCOSE 171* 135*   BUN 30* 29*   CREATININE 1.22 1.15   CALCIUM 10.7* 9.5     Recent Labs     05/28/24  1614   INR 1.27*               Imaging  DX-FOOT-COMPLETE 3+ RIGHT   Final Result      Small posterior heel ulcer and there is some new soft tissue gas posterior to the ankle.      No radiographic evidence for acute osteomyelitis.      Multiple chronic osseous changes are again noted.      MR-FOOT-W/O RIGHT    (Results Pending)   US-EXTREMITY ARTERY LOWER UNILAT W/TACO (COMBO) RIGHT    (Results Pending)        Assessment/Plan  * Osteomyelitis ([facility])- (present on admission)  Assessment & Plan  Suspected of right foot/Achilles heel  Glycemic control  Supportive wound care  TACO and MRI pending  Consider ID and ortho consults  Pain control  Continue empiric IV unasyn  MRSA nares negative, WBC has normalized, remains afebrile  I personally reviewed each of the above labs on 5/29    ACP (advance care planning)- (present on admission)  Assessment & Plan  Confirmed DNR/DNI    Cellulitis- (present on admission)  Assessment & Plan  Cellulitis of right foot, possible osteomyelitis  Plan as noted in osteomyelitis    DM (diabetes mellitus) ([facility])- (present on admission)  Assessment & Plan  Has a particular regimen which she wants to adhere to  Start NPH 50 units qam and 10 units QPM  ISS  Consider standing SA insulin dependent on sugar levels    PAF (paroxysmal atrial fibrillation) ([facility])- (present on admission)  Assessment & Plan  Hold Eliquis for now  --Resume pending MRI result and if no further invasive intervention needed    Other insomnia- (present on admission)  Assessment & Plan  As needed Benadryl    CKD (chronic kidney disease) stage 3, GFR 30-59 ml/min- (present on  admission)  Assessment & Plan  Minimize nephrotoxic agents, renally dose meds  Cr remains near her baseline, I personally reviewed the bmp on 5/29    COPD (chronic obstructive pulmonary disease) (HCC)- (present on admission)  Assessment & Plan  Per history  Currently not in acute exacerbation    Cardiac pacemaker- (present on admission)  Assessment & Plan  Per history  St Syd placed in 2018, unclear if MRI compatible  St Syd rep has been called, awaiting their input    Diastolic CHF due to valvular disease (HCC)- (present on admission)  Assessment & Plan  Prior history  Currently not in acute exacerbation    Class 1 obesity in adult- (present on admission)  Assessment & Plan  Diet and lifestyle modification  Body mass index is 32.11 kg/m².      Diabetic ulcer of right heel associated with type 2 diabetes mellitus (HCC)- (present on admission)  Assessment & Plan  Supportive wound care  Glycemic control  Plan as noted above         VTE prophylaxis:    enoxaparin ppx      I have performed a physical exam and reviewed and updated ROS and Plan today (5/29/2024). In review of yesterday's note (5/28/2024), there are no changes except as documented above.

## 2024-05-29 NOTE — WOUND TEAM
[facility]  Inpatient Services  Initial Wound and Skin Care Evaluation    Admission Date: 2024     Last order of IP CONSULT TO WOUND CARE was found on 2024 from Hospital Encounter on 2024     HPI, PMH, SH: Reviewed    Past Surgical History:   Procedure Laterality Date    BREAST BIOPSY      GYN SURGERY  hysterectomy    MITRAL VALVE REPLACE      OTHER ORTHOPEDIC SURGERY Right     TONSILLECTOMY Bilateral      Social History     Tobacco Use    Smoking status: Former     Current packs/day: 0.00     Types: Cigarettes     Quit date: 2011     Years since quittin.5    Smokeless tobacco: Never   Substance Use Topics    Alcohol use: Not Currently     Comment: denies     Chief Complaint   Patient presents with    Wound Check     Pt c/o wound to R heel. Sent by MD for further evaluation of possible cellulitis    Foot Pain     Right foot/heel wound      Diagnosis: Osteomyelitis (HCC) [M86.9]    Unit where seen by Wound Team: S604/01     WOUND CONSULT RELATED TO:  R heel    WOUND TEAM PLAN OF CARE - Frequency of Follow-up:   Nursing to follow dressing orders written for wound care. Contact wound team if area fails to progress, deteriorates or with any questions/concerns if something comes up before next scheduled follow up (See below as to whether wound is following and frequency of wound follow up)   Bi-weekly - R heel    WOUND HISTORY:  Ophelia Sosa is a 78 y.o. female with history of chronic A-fib on Eliquis, pulmonary hypertension, CHF, SND s/p pacemaker placement, COPD, insulin-dependent diabetes, hyperlipidemia, CKD 3 who presented 2024 with evaluation for nonhealing right foot Achilles wound.  Patient was seen by wound care earlier, referred to ER due to nonhealing wound.  Patient has been taking doxycycline and seeing wound care outpatient with minimal improvement.     WOUND ASSESSMENT/LDA  Wound 24 Diabetic Ulcer Heel Right (Active)   Date First Assessed/Time First  Assessed: 05/29/24 1603   Primary Wound Type: Diabetic Ulcer  Location: Heel  Laterality: Right      Assessments 5/29/2024 12:45 PM   Wound Image     Site Assessment Yellow;Slough   Periwound Assessment Pink;Flaky   Margins Undefined edges   Closure Secondary intention   Drainage Amount Small   Drainage Description Serous;Tan   Treatments Cleansed;Nonselective debridement;Site care   Wound Cleansing Approved Wound Cleanser   Periwound Protectant Barrier Paste   Dressing Status Clean;Dry;Intact   Dressing Changed New   Dressing Cleansing/Solutions Normal Saline   Dressing Options Offloading Dressing - Heel   Dressing Change/Treatment Frequency Every Shift, and As Needed   NEXT Dressing Change/Treatment Date 05/30/24   Wound Length (cm) 4.3 cm   Wound Width (cm) 3.5 cm   Wound Surface Area (cm^2) 15.05 cm^2   Shape irregular   Wound Odor None   Pulses 2+   WOUND NURSE ONLY - Time Spent with Patient (mins) 30        Vascular:    TACO:   No results found.    Lab Values:    Lab Results   Component Value Date/Time    WBC 9.7 05/29/2024 02:23 AM    RBC 3.14 (L) 05/29/2024 02:23 AM    HEMOGLOBIN 9.3 (L) 05/29/2024 02:23 AM    HEMATOCRIT 29.3 (L) 05/29/2024 02:23 AM    CREACTPROT 0.81 (H) 05/28/2024 09:37 PM    SEDRATEWES 132 (H) 05/28/2024 09:37 PM    HBA1C 8.1 (H) 05/29/2024 02:23 AM         Culture Results show:  Recent Results (from the past 720 hour(s))   CULTURE WOUND W/ GRAM STAIN    Collection Time: 05/27/24 10:20 AM    Specimen: Wound   Result Value Ref Range    Significant Indicator POS (POS)     Source WND     Site right heel cx     Culture Result Light growth usual skin shannan. (A)     Gram Stain Result Many Gram positive cocci.     Culture Result Staphylococcus epidermidis  Heavy growth   (A)        Susceptibility    Staphylococcus epidermidis - MONA     Clindamycin >4 Resistant mcg/mL     Cefazolin <=8 Resistant mcg/mL     Cefepime <=4 Resistant mcg/mL     Daptomycin <=0.5 Sensitive mcg/mL     Erythromycin >4  Resistant mcg/mL     Ampicillin/sulbactam <=8/4 Resistant mcg/mL     Vancomycin 2 Sensitive mcg/mL     Linezolid <=1 Sensitive mcg/mL     Oxacillin >2 Resistant mcg/mL     Tetracycline >8 Resistant mcg/mL       Pain Level/Medicated:  None, Tolerated without pain medication       INTERVENTIONS BY WOUND TEAM:  Chart and images reviewed. Discussed with bedside RN. All areas of concern (based on picture review, LDA review and discussion with bedside RN) have been thoroughly assessed. Documentation of areas based on significant findings. This RN in to assess patient. Performed standard wound care which includes appropriate positioning, dressing removal and non-selective debridement. Pictures and measurements obtained weekly if/when required.    Wound:  R heel  Cleansed/Non-selectively Debrided with:  Wound cleanser and Gauze  Maty wound: Cleansed with Wound cleanser and Gauze, Prepped with Barrier paste  Primary Dressing:  Kerlix moistened with normal saline, ordering Dakins  Secondary (Outer) Dressing: Heel offloading dressing    Advanced Wound Care Discharge Planning  Number of Clinicians necessary to complete wound care: 1  Is patient requiring IV pain medications for dressing changes:  No   Length of time for dressing change 30 min. (This does not include chart review, pre-medication time, set up, clean up or time spent charting.)    Interdisciplinary consultation: Patient, Bedside RN, N/A.  Pressure injury and staging reviewed with N/A.    EVALUATION / RATIONALE FOR TREATMENT:     Date:  05/29/24  Wound Status:  Initial evaluation    The L heel is intact and blanching. The patient politely declined to have sacrum assessed. Th R heel with diabetic foot ulcer present. The wound with over 80% slough present. Dakins applied to chemically debride nonviable tissue, decrease bioburden and odor. The patient has a strong 2+ pedal pulse, and states to have severe neuropathy. MRI and TACO have been ordered by MD.     NURSING  PLAN OF CARE ORDERS:  Dressing changes: See Dressing Care orders    NUTRITION RECOMMENDATIONS   Wound Team Recommendations:  N/A    DIET ORDERS (From admission to next 24h)       Start     Ordered    05/28/24 2304  Diet Order Diet: Consistent CHO (Diabetic)  ALL MEALS        Question:  Diet:  Answer:  Consistent CHO (Diabetic)    05/28/24 2304                    PREVENTATIVE INTERVENTIONS:    Q shift Drew - performed per nursing policy  Q shift pressure point assessments - performed per nursing policy    Surface/Positioning  Standard/trauma mattress - Currently in Place    Offloading/Redistribution  Float Heels off Bed with Pillows - Currently in Place         Anticipated discharge plans:  TBD and N/A        Vac Discharge Needs:  Vac Discharge plan is purely a recommendation from wound team and not a requirement for discharge unless otherwise stated by physician.  Not Applicable Pt not on a wound vac

## 2024-05-29 NOTE — DISCHARGE PLANNING
"HTH/SCP TCN chart review completed. Collaborated with MARYLU Cosby prior to meeting with the pt. The most current review of medical record, knowledge of pt's PLOF and social support, LACE+ score of 75, 6 clicks scores of 22 ADL and 17 mobility were considered.  Per review, noted patient was followed by [facility] previous to acute hospitalization.    TCN met with patient at bedside. Introduced TCN program. Provided education regarding post acute levels of care. Education provided regarding case management policy for blanket SNF referrals. Discussed HTH/SCP plan benefits. Pt verbalized understanding.     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 Patient endorsed she is not currently driving \"due to my foot\" and endorsed transportation assistance from either [employer] (although noted she has \"almost used all my rides\") or friend, Iesha Smith. Patient endorsed that friend Iesha would likely provide transportation assistance home at discharge. Patient was amendable to referral to W for more information regarding transportation assistance.     Patient endorsed she was followed by [facility] previous to acute hospitalization and requested resumption of service upon her hospital discharge. Choice proactively obtained for [facility] ([facility]), faxed to [facility] and given to MARYLU. Discussed after hospital visit follow up with patient advising she just established care with Dr. Gan and would be amendable to after hospital visit follow up as appropriate.     TCN will continue to follow and collaborate with discharge planning team as additional post acute needs arise. Thank you.     Completed today  Choice obtained: [facility] ([facility] of [facility] )  SCP with [facility]. discussed possible outpatient " transitional PCP follow up if indicated and pt in agreement; sent information to assist in scheduling   Referral sent to [facility] today, 5/29

## 2024-05-29 NOTE — ASSESSMENT & PLAN NOTE
Per history  St Syd placed in 2018, confirmed it is MRI compatible  No recent syncope or issues here, no need for interrogation

## 2024-05-29 NOTE — PROGRESS NOTES
4 Eyes Skin Assessment Completed by DAY Ca and DAY Mcdowell.    Head WDL  Ears WDL  Nose WDL  Mouth WDL  Neck WDL  Breast/Chest Redness, moisture  Shoulder Blades WDL  Spine WDL, moles  (R) Arm/Elbow/Hand Scab, scratches  (L) Arm/Elbow/Hand WDL  Abdomen WDL  Groin Redness and Rash/Moisture  Scrotum/Coccyx/Buttocks Redness, Blanching, and Discoloration  (R) Leg Scab dry  (L) Leg Scab dry  (R) Heel/Foot/Toe Redness, Ulcer(s), Swelling, and Scab  (L) Heel/Foot/Toe Scab dry      Devices In Places Blood Pressure Cuff      Interventions In Place InterDry, Pillows, Barrier Cream, and Heels Loaded W/Pillows    Possible Skin Injury Yes    Pictures Uploaded Into Epic Yes  Wound Consult Placed Yes  RN Wound Prevention Protocol Ordered Yes

## 2024-05-29 NOTE — DIETARY
Nutrition services: Day 1 of admit.  Ophelia Sosa is a 78 y.o. female with admitting DX of Osteomyelitis   Consult received for malnutrition screening tool score of 2 for 2-13lb wt loss x 3 months and poor PO.    Visited pt at bedside. Pt said that for breakfast, she typically drinks a smoothie w/ either banana or peaches, protein milk and whole grain cheerios. For lunch, she will typically have vegetables w/ finger foods (corn dogs, appetizer type foods) w/ dip. For dinner, she has either fice or pasta and then chicken nuggets (or some other sort of meat), coleslaw or potato salad w/ extra flowers and onions. Pt said that now she eats about 1/4 less of her food x 3 months d/t her foot, her age, living alone, and food not being as appetizing as it once was. Pt said that she lost 12lbs over the last couple of months. Per pt report, pt has lost 5.4% of wt x a couple of months (not clinically significant). Pt expressed her food preferences.   Pt consented to nutrition focused physical exam (NFPE). RD palpated temporalis muscle, buccal fat pads, acromion process/clavicle area, and upper arm area. Pt w/ mild buccal fat loss (depression in buccal fat area).     Assessment:  Weight: 96.3 kg (212 lb 4.9 oz) via bed scale on 5/28  Body mass index is 33.25 kg/m²., BMI classification: Obese class I  Diet/Intake: Consistent CHO; No PO documentation yet.    Evaluation:   Per H&P: Pt had wound to R heel. Sent by MD for further eval of possible cellulitis.  PMH: COPD, insulin dependent diabetes, hyperlipidemia, CKD 3  Labs 5/29: glu 135, A1c 8.1  Meds: lasix, SSI (refused this am), D50, nystatin, zofran PRN, oxycodone PRN, crestor, BM protocol  Skin: Pt w/ wound to R heel. Wound team consult plated. Has generalized RLE, LLE edema  +BM: 5/27, pt stated  Wt Readings from Last Encounters:   05/28/24 96.3 kg (212 lb 4.9 oz)   05/13/24 93.4 kg (205 lb 14.6 oz)   05/13/24 93.4 kg (206 lb)   04/01/24 96.2 kg (212 lb)   11/02/23 97.1 kg  (214 lb)   09/15/23 99.8 kg (220 lb)   08/24/23 99.8 kg (220 lb)     Malnutrition Risk: Per ASPEN criteria and pt report, pt meets moderate malnutrition in the context of chronic illness r/t unknown etiology aeb <75% of estimated energy requirements of >/=3 months and mild buccal fat loss.     Recommendations/Plan:  Adjust pt likes/dislikes on Health Touch (meal ticketing system)    RD to monitor PO intake to determine necessity nutritional intervention.  Encourage intake of >50% of meals  Document intake of all meals as % taken in ADL's to provide interdisciplinary communication across all shifts.   Monitor weight.    RD following.

## 2024-05-29 NOTE — ASSESSMENT & PLAN NOTE
Has a particular regimen which she wants to adhere to  Start NPH 50 units qam and 10 units QPM (refusing multiple administrations of her blood sugars)  Reinforced with patient that persistent hyperglycemia will worsen her infection/delay wound healing, she understands  I personally reviewed the blood sugars on 5/31/2024  ISS  Consider standing SA insulin dependent on sugar levels

## 2024-05-29 NOTE — CARE PLAN
The patient is Watcher - Medium risk of patient condition declining or worsening    Shift Goals  Clinical Goals: Patient will have good blood sugar control throughout this shift  Patient Goals: Updates on POC, talk to the doctor  Family Goals: BRISA    Progress made toward(s) clinical / shift goals:  n/a    Patient is not progressing towards the following goals:      Problem: Knowledge Deficit - Standard  Goal: Patient and family/care givers will demonstrate understanding of plan of care, disease process/condition, diagnostic tests and medications  Outcome: Not Progressing Patient is refusing most of her medications. Education being provided but patient continues to be non compliant. Patient states she would like to self administer her insulin as she wants. Patient was able to find her card for her pacemaker, Rn called the company per company they will send someone out to do her interrogation to see if pacemaker is MRI compatible. Company was unable to give an ETA as to when they will be coming.      Problem: Fluid Volume  Goal: Fluid volume balance will be maintained  Outcome: Not Progressing patient refused her lasix     Problem: Skin Integrity  Goal: Risk for impaired skin integrity will decrease  Outcome: Not Progressing wound care team saw patient today and changed her dressing.

## 2024-05-30 ENCOUNTER — APPOINTMENT (OUTPATIENT)
Dept: RADIOLOGY | Facility: MEDICAL CENTER | Age: 79
DRG: 629 | End: 2024-05-30
Attending: STUDENT IN AN ORGANIZED HEALTH CARE EDUCATION/TRAINING PROGRAM
Payer: MEDICARE

## 2024-05-30 LAB
ANION GAP SERPL CALC-SCNC: 11 MMOL/L (ref 7–16)
BUN SERPL-MCNC: 26 MG/DL (ref 8–22)
CALCIUM SERPL-MCNC: 9.6 MG/DL (ref 8.5–10.5)
CHLORIDE SERPL-SCNC: 102 MMOL/L (ref 96–112)
CO2 SERPL-SCNC: 24 MMOL/L (ref 20–33)
CREAT SERPL-MCNC: 1.03 MG/DL (ref 0.5–1.4)
GFR SERPLBLD CREATININE-BSD FMLA CKD-EPI: 55 ML/MIN/1.73 M 2
GLUCOSE BLD STRIP.AUTO-MCNC: 231 MG/DL (ref 65–99)
GLUCOSE BLD STRIP.AUTO-MCNC: 234 MG/DL (ref 65–99)
GLUCOSE BLD STRIP.AUTO-MCNC: 269 MG/DL (ref 65–99)
GLUCOSE BLD STRIP.AUTO-MCNC: 272 MG/DL (ref 65–99)
GLUCOSE BLD STRIP.AUTO-MCNC: 283 MG/DL (ref 65–99)
GLUCOSE SERPL-MCNC: 287 MG/DL (ref 65–99)
POTASSIUM SERPL-SCNC: 4.7 MMOL/L (ref 3.6–5.5)
SODIUM SERPL-SCNC: 137 MMOL/L (ref 135–145)

## 2024-05-30 PROCEDURE — 770006 HCHG ROOM/CARE - MED/SURG/GYN SEMI*

## 2024-05-30 PROCEDURE — 700111 HCHG RX REV CODE 636 W/ 250 OVERRIDE (IP): Mod: JZ | Performed by: STUDENT IN AN ORGANIZED HEALTH CARE EDUCATION/TRAINING PROGRAM

## 2024-05-30 PROCEDURE — 700101 HCHG RX REV CODE 250: Performed by: INTERNAL MEDICINE

## 2024-05-30 PROCEDURE — 99232 SBSQ HOSP IP/OBS MODERATE 35: CPT | Performed by: INTERNAL MEDICINE

## 2024-05-30 PROCEDURE — 82962 GLUCOSE BLOOD TEST: CPT | Mod: 91

## 2024-05-30 PROCEDURE — 700105 HCHG RX REV CODE 258: Performed by: STUDENT IN AN ORGANIZED HEALTH CARE EDUCATION/TRAINING PROGRAM

## 2024-05-30 PROCEDURE — A9270 NON-COVERED ITEM OR SERVICE: HCPCS | Performed by: INTERNAL MEDICINE

## 2024-05-30 PROCEDURE — 36415 COLL VENOUS BLD VENIPUNCTURE: CPT

## 2024-05-30 PROCEDURE — A9270 NON-COVERED ITEM OR SERVICE: HCPCS | Performed by: STUDENT IN AN ORGANIZED HEALTH CARE EDUCATION/TRAINING PROGRAM

## 2024-05-30 PROCEDURE — 700102 HCHG RX REV CODE 250 W/ 637 OVERRIDE(OP): Performed by: STUDENT IN AN ORGANIZED HEALTH CARE EDUCATION/TRAINING PROGRAM

## 2024-05-30 PROCEDURE — 80048 BASIC METABOLIC PNL TOTAL CA: CPT

## 2024-05-30 PROCEDURE — 93922 UPR/L XTREMITY ART 2 LEVELS: CPT

## 2024-05-30 PROCEDURE — 700102 HCHG RX REV CODE 250 W/ 637 OVERRIDE(OP): Performed by: INTERNAL MEDICINE

## 2024-05-30 RX ADMIN — AMPICILLIN AND SULBACTAM 3 G: 1; 2 INJECTION, POWDER, FOR SOLUTION INTRAMUSCULAR; INTRAVENOUS at 23:31

## 2024-05-30 RX ADMIN — AMPICILLIN AND SULBACTAM 3 G: 1; 2 INJECTION, POWDER, FOR SOLUTION INTRAMUSCULAR; INTRAVENOUS at 13:12

## 2024-05-30 RX ADMIN — INSULIN HUMAN 7 UNITS: 100 INJECTION, SOLUTION PARENTERAL at 08:43

## 2024-05-30 RX ADMIN — DIPHENHYDRAMINE HYDROCHLORIDE 25 MG: 25 TABLET ORAL at 00:57

## 2024-05-30 RX ADMIN — LOSARTAN POTASSIUM 25 MG: 25 TABLET, FILM COATED ORAL at 05:52

## 2024-05-30 RX ADMIN — ACETAMINOPHEN 650 MG: 325 TABLET, FILM COATED ORAL at 05:51

## 2024-05-30 RX ADMIN — FUROSEMIDE 40 MG: 40 TABLET ORAL at 05:51

## 2024-05-30 RX ADMIN — NYSTATIN: 100000 POWDER TOPICAL at 05:50

## 2024-05-30 RX ADMIN — AMPICILLIN AND SULBACTAM 3 G: 1; 2 INJECTION, POWDER, FOR SOLUTION INTRAMUSCULAR; INTRAVENOUS at 06:01

## 2024-05-30 RX ADMIN — LOSARTAN POTASSIUM 25 MG: 25 TABLET, FILM COATED ORAL at 17:03

## 2024-05-30 RX ADMIN — AMPICILLIN AND SULBACTAM 3 G: 1; 2 INJECTION, POWDER, FOR SOLUTION INTRAMUSCULAR; INTRAVENOUS at 00:37

## 2024-05-30 RX ADMIN — INSULIN HUMAN 4 UNITS: 100 INJECTION, SOLUTION PARENTERAL at 17:03

## 2024-05-30 RX ADMIN — AMPICILLIN AND SULBACTAM 3 G: 1; 2 INJECTION, POWDER, FOR SOLUTION INTRAMUSCULAR; INTRAVENOUS at 17:12

## 2024-05-30 RX ADMIN — SODIUM HYPOCHLORITE 1 ML: 1.25 SOLUTION TOPICAL at 05:49

## 2024-05-30 RX ADMIN — SODIUM HYPOCHLORITE 473 ML: 1.25 SOLUTION TOPICAL at 17:03

## 2024-05-30 ASSESSMENT — ENCOUNTER SYMPTOMS
CHILLS: 0
SENSORY CHANGE: 1
VOMITING: 0
FEVER: 0
NAUSEA: 0
ABDOMINAL PAIN: 0

## 2024-05-30 ASSESSMENT — PAIN DESCRIPTION - PAIN TYPE: TYPE: ACUTE PAIN

## 2024-05-30 NOTE — DISCHARGE PLANNING
Thank you for alerting HH to your request. At this time we will need a provider to place referral before we can resume. Please let HH know once this is complete and we will continue to pro

## 2024-05-30 NOTE — THERAPY
05/30/24 1125   Interdisciplinary Plan of Care Collaboration   Collaboration Comments Defer PT eval, pending MRI right foot.

## 2024-05-30 NOTE — CARE PLAN
The patient is Stable - Low risk of patient condition declining or worsening    Shift Goals  Clinical Goals: Monitor glucose, safe mobility  Patient Goals: Nutrition  Family Goals: Not present    Progress made toward(s) clinical / shift goals:    Problem: Knowledge Deficit - Standard  Goal: Patient and family/care givers will demonstrate understanding of plan of care, disease process/condition, diagnostic tests and medications  Description: Target End Date:  1-3 days or as soon as patient condition allows    Document in Patient Education    1.  Patient and family/caregiver oriented to unit, equipment, visitation policy and means for communicating concern  2.  Complete/review Learning Assessment  3.  Assess knowledge level of disease process/condition, treatment plan, diagnostic tests and medications  4.  Explain disease process/condition, treatment plan, diagnostic tests and medications  Outcome: Progressing     Problem: Hemodynamics  Goal: Patient's hemodynamics, fluid balance and neurologic status will be stable or improve  Description: Target End Date:  Prior to discharge or change in level of care    Document on Assessment and I/O flowsheet templates    1.  Monitor vital signs, pulse oximetry and cardiac monitor per provider order and/or policy  2.  Maintain blood pressure per provider order  3.  Hemodynamic monitoring per provider order  4.  Manage IV fluids and IV infusions  5.  Monitor intake and output  6.  Daily weights per unit policy or provider order  7.  Assess peripheral pulses and capillary refill  8.  Assess color and body temperature  9.  Position patient for maximum circulation/cardiac output  10. Monitor for signs/symptoms of excessive bleeding  11. Assess mental status, restlessness and changes in level of consciousness  12. Monitor temperature and report fever or hypothermia to provider immediately. Consideration of targeted temperature management.  Outcome: Progressing     Problem: Fall  Risk  Goal: Patient will remain free from falls  Description: Target End Date:  Prior to discharge or change in level of care    Document interventions on the Eda Hernandez Fall Risk Assessment    1.  Assess for fall risk factors  2.  Implement fall precautions  Outcome: Progressing       Patient is not progressing towards the following goals:

## 2024-05-30 NOTE — PROGRESS NOTES
Hospital Medicine Daily Progress Note    Date of Service  5/30/2024    Chief Complaint  Ophelia Sosa is a 78 y.o. female admitted 5/28/2024 with longstanding type 2 diabetes mellitus on insulin, chronic A-fib on Eliquis, hypertension, COPD and CKD stage III presented to the hospital with a nonhealing right foot Achilles wound and ulcer.  She has been followed by outpatient wound care with worsening wound and was instructed to come to the emergency room in hospital for further evaluation for osteomyelitis and possible abscess.  She was then placed empirically on IV ceftriaxone and vancomycin which the latter was discontinued after MRSA nares was negative.  She now remains on IV Unasyn.  An MRI of the foot has been ordered but will need to check MRI compatibility.    Hospital Course  See above    Interval Problem Update  5/29  Patient very concerned about her insulin regimen which is highly regulated and individualized. I had a long conversation with the patient that we will try to accommodate as much as we can for her insulin regimen. She has no pain of the affected foot given profound neuropathy from the knee distally. She remains afebrile. She had a St Syd PPM placed in 2018 at [facility]. She wants it interrogated and is unsure if it is MRI compatible. Cursory review on [employer] shows that it might need to be placed into compatible mode.     5/30  No acute events overnight. Patient intermittently refusing BS checks and insulin administration. BS's ranged from 166-272. Remains afebrile. We confirmed with the ST Syd rep that her PPM is MRI compatible. Mildly hypertensive. She feels the redness of her foot is improving.     I have discussed this patient's plan of care and discharge plan at IDT rounds today with Case Management, Nursing, Nursing leadership, and other members of the IDT team.    Consultants/Specialty  none    Code Status  DNAR/DNI    Disposition  Medically Cleared  I have placed the appropriate orders  for post-discharge needs.    Review of Systems  Review of Systems   Constitutional:  Negative for chills and fever.        No clear changes noted today   Gastrointestinal:  Negative for abdominal pain, nausea and vomiting.   Genitourinary:  Negative for dysuria, frequency, hematuria and urgency.   Neurological:  Positive for sensory change (chronic in legs).        Physical Exam  Temp:  [36.4 °C (97.5 °F)-36.7 °C (98 °F)] 36.5 °C (97.7 °F)  Pulse:  [80-85] 80  Resp:  [18] 18  BP: (142-156)/(50-61) 151/58  SpO2:  [93 %-97 %] 95 %    Physical Exam  Vitals and nursing note reviewed.   Constitutional:       General: She is not in acute distress.     Appearance: She is well-developed.      Comments: Sitting upright, appears more comfortable on 5/30   HENT:      Head: Normocephalic and atraumatic.      Mouth/Throat:      Pharynx: No oropharyngeal exudate.   Eyes:      General: No scleral icterus.     Pupils: Pupils are equal, round, and reactive to light.   Neck:      Thyroid: No thyromegaly.   Cardiovascular:      Rate and Rhythm: Normal rate and regular rhythm.      Heart sounds: Normal heart sounds. No murmur heard.  Pulmonary:      Effort: Pulmonary effort is normal. No respiratory distress.      Breath sounds: Normal breath sounds. No wheezing.   Abdominal:      General: Bowel sounds are normal. There is no distension.      Palpations: Abdomen is soft.      Tenderness: There is no abdominal tenderness.   Musculoskeletal:         General: Signs of injury present. No tenderness. Normal range of motion.      Cervical back: Normal range of motion and neck supple.      Right lower leg: Edema present.      Left lower leg: Edema present.      Comments: Remains about the same on 5/30   Skin:     General: Skin is warm and dry.      Findings: Erythema (decreased) present. No rash.   Neurological:      Mental Status: She is alert and oriented to person, place, and time.      Cranial Nerves: No cranial nerve deficit.       Sensory: Sensory deficit (neuropathy of B/L LE's) present.         Fluids    Intake/Output Summary (Last 24 hours) at 5/30/2024 1427  Last data filed at 5/30/2024 0930  Gross per 24 hour   Intake 360 ml   Output --   Net 360 ml       Laboratory  Recent Labs     05/28/24  1614 05/29/24  0223   WBC 12.6* 9.7   RBC 3.72* 3.14*   HEMOGLOBIN 11.0* 9.3*   HEMATOCRIT 34.2* 29.3*   MCV 91.9 93.3   MCH 29.6 29.6   MCHC 32.2 31.7*   RDW 44.6 45.1   PLATELETCT 376 293   MPV 10.1 10.4     Recent Labs     05/28/24  1614 05/29/24  0223 05/30/24  0243   SODIUM 136 139 137   POTASSIUM 4.2 4.2 4.7   CHLORIDE 98 104 102   CO2 22 23 24   GLUCOSE 171* 135* 287*   BUN 30* 29* 26*   CREATININE 1.22 1.15 1.03   CALCIUM 10.7* 9.5 9.6     Recent Labs     05/28/24 1614   INR 1.27*               Imaging  US-TACO SINGLE LEVEL BILAT   Final Result      DX-FOOT-COMPLETE 3+ RIGHT   Final Result      Small posterior heel ulcer and there is some new soft tissue gas posterior to the ankle.      No radiographic evidence for acute osteomyelitis.      Multiple chronic osseous changes are again noted.      MR-FOOT-W/O RIGHT    (Results Pending)        Assessment/Plan  * Osteomyelitis ([facility])- (present on admission)  Assessment & Plan  Suspected of right foot/Achilles heel, clinically with less redness today  Glycemic control  Supportive wound care  TACO are normal  MRI foot is pending  Consider ID and ortho consults  Pain control  Continue empiric IV unasyn  MRSA nares negative, WBC has normalized, remains afebrile  I personally reviewed each of the above labs on 5/30  OSH wound culture (contacted by wound care clinic) is growing MRSE, add doxycycline    ACP (advance care planning)- (present on admission)  Assessment & Plan  Confirmed DNR/DNI    Cellulitis- (present on admission)  Assessment & Plan  Cellulitis of right foot, possible osteomyelitis  Plan as noted in osteomyelitis    DM (diabetes mellitus) (HCC)- (present on admission)  Assessment &  Plan  Has a particular regimen which she wants to adhere to  Start NPH 50 units qam and 10 units QPM (refusing multiple administrations of her blood sugars)  Reinforced with patient that persistent hyperglycemia will worsen her infection/delay wound healing, she understands  I personally reviewed the blood sugars on 5/30/2024  ISS  Consider standing SA insulin dependent on sugar levels    PAF (paroxysmal atrial fibrillation) ([facility])- (present on admission)  Assessment & Plan  Hold Eliquis for now  --Resume pending MRI result and if no further invasive intervention needed    Other insomnia- (present on admission)  Assessment & Plan  As needed Benadryl    CKD (chronic kidney disease) stage 3, GFR 30-59 ml/min- (present on admission)  Assessment & Plan  Minimize nephrotoxic agents, renally dose meds  Cr remains near her baseline, I personally reviewed the bmp on 5/30    COPD (chronic obstructive pulmonary disease) ([facility])- (present on admission)  Assessment & Plan  Per history  Currently not in acute exacerbation    Cardiac pacemaker- (present on admission)  Assessment & Plan  Per history  St Syd placed in 2018, confirmed it is MRI compatible  No recent syncope or issues here, no need for interrogation    Diastolic CHF due to valvular disease ([facility])- (present on admission)  Assessment & Plan  Prior history  Currently not in acute exacerbation    Class 1 obesity in adult- (present on admission)  Assessment & Plan  Diet and lifestyle modification  Body mass index is 32.11 kg/m².      Diabetic ulcer of right heel associated with type 2 diabetes mellitus (HCC)- (present on admission)  Assessment & Plan  Supportive wound care  Glycemic control  Plan as noted above         VTE prophylaxis:    enoxaparin ppx      I have performed a physical exam and reviewed and updated ROS and Plan today (5/30/2024). In review of yesterday's note (5/29/2024), there are no changes except as documented above.

## 2024-05-30 NOTE — DISCHARGE PLANNING
TCN following. HTH/SCP chart reviewed. Collaborated with MARYLU Cosby. No new TCN needs identified in discharge planning at this time. Please see prior TCN note from 5/29/2024 for most recent discharge planning considerations if indicated. As noted, choice proactively obtained for resumption of [facility] services as appropriate.     TCN will continue to follow and collaborate with discharge planning team as additional post acute needs arise. Thank you.      Completed today  Choice obtained: HH (resumption of [facility])  SCP with [facility] PCP. discussed possible outpatient transitional PCP follow up if indicated and pt in agreement. Per review of AVS, patient is scheduled for Transitional Care Hospital Follow Up with Physician Emmanuelle Gan M.D. Friday Jun 21, 2024 11:25 AM  Referral sent to W 5/29. Per W patient is followed by Chronic Care Management outpatient

## 2024-05-30 NOTE — PROGRESS NOTES
Spoke to [facility] rep for patients pacemaker. Reps name is Constance De Leon. Per Constanec patient pacemaker is MRI compatible.

## 2024-05-30 NOTE — PROGRESS NOTES
Assumed care of patient. Assessment performed. Patient expressed concerns related to quality of meal tray and was offered nutrition shake alternatives, which she accepted. No reports of pain; does report neuropathy and loss of sensation in the BLE. 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 FSBG 166, which she said was too low for insulin. Call light and belongings within reach. All needs met a this time.

## 2024-05-31 ENCOUNTER — ANESTHESIA (OUTPATIENT)
Dept: SURGERY | Facility: MEDICAL CENTER | Age: 79
End: 2024-05-31
Payer: MEDICARE

## 2024-05-31 ENCOUNTER — ANESTHESIA EVENT (OUTPATIENT)
Dept: SURGERY | Facility: MEDICAL CENTER | Age: 79
End: 2024-05-31
Payer: MEDICARE

## 2024-05-31 ENCOUNTER — APPOINTMENT (OUTPATIENT)
Dept: WOUND CARE | Facility: MEDICAL CENTER | Age: 79
End: 2024-05-31
Attending: STUDENT IN AN ORGANIZED HEALTH CARE EDUCATION/TRAINING PROGRAM
Payer: MEDICARE

## 2024-05-31 VITALS
OXYGEN SATURATION: 94 % | BODY MASS INDEX: 33.25 KG/M2 | SYSTOLIC BLOOD PRESSURE: 119 MMHG | TEMPERATURE: 96.9 F | DIASTOLIC BLOOD PRESSURE: 44 MMHG | RESPIRATION RATE: 16 BRPM | HEART RATE: 91 BPM | WEIGHT: 212.3 LBS

## 2024-05-31 LAB
BACTERIA SPEC ANAEROBE CULT: NORMAL
BACTERIA TISS AEROBE CULT: NORMAL
BASOPHILS # BLD AUTO: 0.9 % (ref 0–1.8)
BASOPHILS # BLD: 0.07 K/UL (ref 0–0.12)
EKG IMPRESSION: NORMAL
EOSINOPHIL # BLD AUTO: 0.29 K/UL (ref 0–0.51)
EOSINOPHIL NFR BLD: 3.8 % (ref 0–6.9)
ERYTHROCYTE [DISTWIDTH] IN BLOOD BY AUTOMATED COUNT: 46.9 FL (ref 35.9–50)
FUNGUS SPEC CULT: NORMAL
FUNGUS SPEC FUNGUS STN: NORMAL
GLUCOSE BLD STRIP.AUTO-MCNC: 164 MG/DL (ref 65–99)
GLUCOSE BLD STRIP.AUTO-MCNC: 195 MG/DL (ref 65–99)
GLUCOSE BLD STRIP.AUTO-MCNC: 240 MG/DL (ref 65–99)
GLUCOSE BLD STRIP.AUTO-MCNC: 261 MG/DL (ref 65–99)
GLUCOSE BLD STRIP.AUTO-MCNC: 288 MG/DL (ref 65–99)
GLUCOSE BLD STRIP.AUTO-MCNC: 293 MG/DL (ref 65–99)
GRAM STN SPEC: NORMAL
GRAM STN SPEC: NORMAL
HCT VFR BLD AUTO: 30.6 % (ref 37–47)
HGB BLD-MCNC: 9.5 G/DL (ref 12–16)
IMM GRANULOCYTES # BLD AUTO: 0.03 K/UL (ref 0–0.11)
IMM GRANULOCYTES NFR BLD AUTO: 0.4 % (ref 0–0.9)
LYMPHOCYTES # BLD AUTO: 1.44 K/UL (ref 1–4.8)
LYMPHOCYTES NFR BLD: 18.7 % (ref 22–41)
MCH RBC QN AUTO: 29.6 PG (ref 27–33)
MCHC RBC AUTO-ENTMCNC: 31 G/DL (ref 32.2–35.5)
MCV RBC AUTO: 95.3 FL (ref 81.4–97.8)
MONOCYTES # BLD AUTO: 0.69 K/UL (ref 0–0.85)
MONOCYTES NFR BLD AUTO: 8.9 % (ref 0–13.4)
MYCOBACTERIUM SPEC CULT: NORMAL
NEUTROPHILS # BLD AUTO: 5.19 K/UL (ref 1.82–7.42)
NEUTROPHILS NFR BLD: 67.3 % (ref 44–72)
NRBC # BLD AUTO: 0 K/UL
NRBC BLD-RTO: 0 /100 WBC (ref 0–0.2)
PLATELET # BLD AUTO: 249 K/UL (ref 164–446)
PMV BLD AUTO: 10.2 FL (ref 9–12.9)
RBC # BLD AUTO: 3.21 M/UL (ref 4.2–5.4)
RHODAMINE-AURAMINE STN SPEC: NORMAL
SIGNIFICANT IND 70042: NORMAL
SITE SITE: NORMAL
SOURCE SOURCE: NORMAL
WBC # BLD AUTO: 7.7 K/UL (ref 4.8–10.8)

## 2024-05-31 PROCEDURE — 700102 HCHG RX REV CODE 250 W/ 637 OVERRIDE(OP): Performed by: STUDENT IN AN ORGANIZED HEALTH CARE EDUCATION/TRAINING PROGRAM

## 2024-05-31 PROCEDURE — 99232 SBSQ HOSP IP/OBS MODERATE 35: CPT | Performed by: INTERNAL MEDICINE

## 2024-05-31 PROCEDURE — A9270 NON-COVERED ITEM OR SERVICE: HCPCS | Performed by: STUDENT IN AN ORGANIZED HEALTH CARE EDUCATION/TRAINING PROGRAM

## 2024-05-31 PROCEDURE — 700111 HCHG RX REV CODE 636 W/ 250 OVERRIDE (IP): Mod: JZ | Performed by: STUDENT IN AN ORGANIZED HEALTH CARE EDUCATION/TRAINING PROGRAM

## 2024-05-31 PROCEDURE — 97597 DBRDMT OPN WND 1ST 20 CM/<: CPT

## 2024-05-31 PROCEDURE — 85025 COMPLETE CBC W/AUTO DIFF WBC: CPT

## 2024-05-31 PROCEDURE — 87116 MYCOBACTERIA CULTURE: CPT

## 2024-05-31 PROCEDURE — 87205 SMEAR GRAM STAIN: CPT

## 2024-05-31 PROCEDURE — A9270 NON-COVERED ITEM OR SERVICE: HCPCS | Performed by: INTERNAL MEDICINE

## 2024-05-31 PROCEDURE — 87102 FUNGUS ISOLATION CULTURE: CPT

## 2024-05-31 PROCEDURE — 160038 HCHG SURGERY MINUTES - EA ADDL 1 MIN LEVEL 2: Performed by: ORTHOPAEDIC SURGERY

## 2024-05-31 PROCEDURE — 87015 SPECIMEN INFECT AGNT CONCNTJ: CPT | Mod: 91

## 2024-05-31 PROCEDURE — 82962 GLUCOSE BLOOD TEST: CPT | Mod: 91

## 2024-05-31 PROCEDURE — 93005 ELECTROCARDIOGRAM TRACING: CPT | Performed by: ORTHOPAEDIC SURGERY

## 2024-05-31 PROCEDURE — 160027 HCHG SURGERY MINUTES - 1ST 30 MINS LEVEL 2: Performed by: ORTHOPAEDIC SURGERY

## 2024-05-31 PROCEDURE — A9270 NON-COVERED ITEM OR SERVICE: HCPCS | Performed by: ANESTHESIOLOGY

## 2024-05-31 PROCEDURE — 700102 HCHG RX REV CODE 250 W/ 637 OVERRIDE(OP): Performed by: ANESTHESIOLOGY

## 2024-05-31 PROCEDURE — 87070 CULTURE OTHR SPECIMN AEROBIC: CPT

## 2024-05-31 PROCEDURE — 700111 HCHG RX REV CODE 636 W/ 250 OVERRIDE (IP): Mod: JZ | Performed by: ANESTHESIOLOGY

## 2024-05-31 PROCEDURE — 87077 CULTURE AEROBIC IDENTIFY: CPT

## 2024-05-31 PROCEDURE — 87206 SMEAR FLUORESCENT/ACID STAI: CPT

## 2024-05-31 PROCEDURE — 87075 CULTR BACTERIA EXCEPT BLOOD: CPT

## 2024-05-31 PROCEDURE — 700102 HCHG RX REV CODE 250 W/ 637 OVERRIDE(OP): Performed by: INTERNAL MEDICINE

## 2024-05-31 PROCEDURE — 700105 HCHG RX REV CODE 258: Performed by: STUDENT IN AN ORGANIZED HEALTH CARE EDUCATION/TRAINING PROGRAM

## 2024-05-31 PROCEDURE — 160002 HCHG RECOVERY MINUTES (STAT): Performed by: ORTHOPAEDIC SURGERY

## 2024-05-31 PROCEDURE — 770006 HCHG ROOM/CARE - MED/SURG/GYN SEMI*

## 2024-05-31 PROCEDURE — 160009 HCHG ANES TIME/MIN: Performed by: ORTHOPAEDIC SURGERY

## 2024-05-31 PROCEDURE — 160048 HCHG OR STATISTICAL LEVEL 1-5: Performed by: ORTHOPAEDIC SURGERY

## 2024-05-31 PROCEDURE — 160035 HCHG PACU - 1ST 60 MINS PHASE I: Performed by: ORTHOPAEDIC SURGERY

## 2024-05-31 PROCEDURE — 36415 COLL VENOUS BLD VENIPUNCTURE: CPT

## 2024-05-31 PROCEDURE — 87186 SC STD MICRODIL/AGAR DIL: CPT

## 2024-05-31 PROCEDURE — 97602 WOUND(S) CARE NON-SELECTIVE: CPT

## 2024-05-31 PROCEDURE — 93010 ELECTROCARDIOGRAM REPORT: CPT | Performed by: INTERNAL MEDICINE

## 2024-05-31 PROCEDURE — 0QBL0ZZ EXCISION OF RIGHT TARSAL, OPEN APPROACH: ICD-10-PCS | Performed by: ORTHOPAEDIC SURGERY

## 2024-05-31 RX ORDER — ONDANSETRON 2 MG/ML
4 INJECTION INTRAMUSCULAR; INTRAVENOUS
Status: DISCONTINUED | OUTPATIENT
Start: 2024-05-31 | End: 2024-05-31 | Stop reason: HOSPADM

## 2024-05-31 RX ORDER — METOPROLOL TARTRATE 1 MG/ML
1 INJECTION, SOLUTION INTRAVENOUS
Status: DISCONTINUED | OUTPATIENT
Start: 2024-05-31 | End: 2024-05-31 | Stop reason: HOSPADM

## 2024-05-31 RX ORDER — OXYCODONE HCL 5 MG/5 ML
10 SOLUTION, ORAL ORAL
Status: COMPLETED | OUTPATIENT
Start: 2024-05-31 | End: 2024-05-31

## 2024-05-31 RX ORDER — HYDROMORPHONE HYDROCHLORIDE 1 MG/ML
0.1 INJECTION, SOLUTION INTRAMUSCULAR; INTRAVENOUS; SUBCUTANEOUS
Status: DISCONTINUED | OUTPATIENT
Start: 2024-05-31 | End: 2024-05-31 | Stop reason: HOSPADM

## 2024-05-31 RX ORDER — ONDANSETRON 2 MG/ML
INJECTION INTRAMUSCULAR; INTRAVENOUS PRN
Status: DISCONTINUED | OUTPATIENT
Start: 2024-05-31 | End: 2024-05-31 | Stop reason: SURG

## 2024-05-31 RX ORDER — BUPIVACAINE HYDROCHLORIDE 5 MG/ML
INJECTION, SOLUTION EPIDURAL; INTRACAUDAL
Status: COMPLETED | OUTPATIENT
Start: 2024-05-31 | End: 2024-05-31

## 2024-05-31 RX ORDER — SODIUM CHLORIDE, SODIUM LACTATE, POTASSIUM CHLORIDE, CALCIUM CHLORIDE 600; 310; 30; 20 MG/100ML; MG/100ML; MG/100ML; MG/100ML
INJECTION, SOLUTION INTRAVENOUS
Status: DISCONTINUED | OUTPATIENT
Start: 2024-05-31 | End: 2024-05-31 | Stop reason: SURG

## 2024-05-31 RX ORDER — CEFAZOLIN SODIUM 1 G/3ML
INJECTION, POWDER, FOR SOLUTION INTRAMUSCULAR; INTRAVENOUS PRN
Status: DISCONTINUED | OUTPATIENT
Start: 2024-05-31 | End: 2024-05-31 | Stop reason: SURG

## 2024-05-31 RX ORDER — EPINEPHRINE 1 MG/ML(1)
AMPUL (ML) INJECTION PRN
Status: DISCONTINUED | OUTPATIENT
Start: 2024-05-31 | End: 2024-05-31 | Stop reason: SURG

## 2024-05-31 RX ORDER — DIPHENHYDRAMINE HCL 25 MG
12.5 TABLET ORAL ONCE
Status: COMPLETED | OUTPATIENT
Start: 2024-05-31 | End: 2024-05-31

## 2024-05-31 RX ORDER — HYDRALAZINE HYDROCHLORIDE 20 MG/ML
5 INJECTION INTRAMUSCULAR; INTRAVENOUS
Status: DISCONTINUED | OUTPATIENT
Start: 2024-05-31 | End: 2024-05-31 | Stop reason: HOSPADM

## 2024-05-31 RX ORDER — HYDROMORPHONE HYDROCHLORIDE 1 MG/ML
0.4 INJECTION, SOLUTION INTRAMUSCULAR; INTRAVENOUS; SUBCUTANEOUS
Status: DISCONTINUED | OUTPATIENT
Start: 2024-05-31 | End: 2024-05-31 | Stop reason: HOSPADM

## 2024-05-31 RX ORDER — HYDROMORPHONE HYDROCHLORIDE 1 MG/ML
0.2 INJECTION, SOLUTION INTRAMUSCULAR; INTRAVENOUS; SUBCUTANEOUS
Status: DISCONTINUED | OUTPATIENT
Start: 2024-05-31 | End: 2024-05-31 | Stop reason: HOSPADM

## 2024-05-31 RX ORDER — SODIUM CHLORIDE, SODIUM LACTATE, POTASSIUM CHLORIDE, CALCIUM CHLORIDE 600; 310; 30; 20 MG/100ML; MG/100ML; MG/100ML; MG/100ML
INJECTION, SOLUTION INTRAVENOUS CONTINUOUS
Status: DISCONTINUED | OUTPATIENT
Start: 2024-05-31 | End: 2024-05-31 | Stop reason: HOSPADM

## 2024-05-31 RX ORDER — OXYCODONE HCL 5 MG/5 ML
5 SOLUTION, ORAL ORAL
Status: COMPLETED | OUTPATIENT
Start: 2024-05-31 | End: 2024-05-31

## 2024-05-31 RX ORDER — HALOPERIDOL 5 MG/ML
1 INJECTION INTRAMUSCULAR
Status: DISCONTINUED | OUTPATIENT
Start: 2024-05-31 | End: 2024-05-31 | Stop reason: HOSPADM

## 2024-05-31 RX ORDER — DIPHENHYDRAMINE HYDROCHLORIDE 50 MG/ML
12.5 INJECTION INTRAMUSCULAR; INTRAVENOUS
Status: DISCONTINUED | OUTPATIENT
Start: 2024-05-31 | End: 2024-05-31 | Stop reason: HOSPADM

## 2024-05-31 RX ORDER — LIDOCAINE HYDROCHLORIDE 20 MG/ML
INJECTION, SOLUTION EPIDURAL; INFILTRATION; INTRACAUDAL; PERINEURAL PRN
Status: DISCONTINUED | OUTPATIENT
Start: 2024-05-31 | End: 2024-05-31 | Stop reason: SURG

## 2024-05-31 RX ADMIN — FUROSEMIDE 40 MG: 40 TABLET ORAL at 05:45

## 2024-05-31 RX ADMIN — INSULIN HUMAN 7 UNITS: 100 INJECTION, SOLUTION PARENTERAL at 07:41

## 2024-05-31 RX ADMIN — AMPICILLIN AND SULBACTAM 3 G: 1; 2 INJECTION, POWDER, FOR SOLUTION INTRAMUSCULAR; INTRAVENOUS at 11:22

## 2024-05-31 RX ADMIN — EPINEPHRINE 150 MCG: 1 INJECTION INTRAMUSCULAR; INTRAVENOUS; SUBCUTANEOUS at 12:47

## 2024-05-31 RX ADMIN — PROPOFOL 100 MCG/KG/MIN: 10 INJECTION, EMULSION INTRAVENOUS at 12:41

## 2024-05-31 RX ADMIN — FENTANYL CITRATE 25 MCG: 50 INJECTION, SOLUTION INTRAMUSCULAR; INTRAVENOUS at 13:44

## 2024-05-31 RX ADMIN — LOSARTAN POTASSIUM 25 MG: 25 TABLET, FILM COATED ORAL at 17:14

## 2024-05-31 RX ADMIN — SODIUM HYPOCHLORITE 1 ML: 1.25 SOLUTION TOPICAL at 05:53

## 2024-05-31 RX ADMIN — LIDOCAINE HYDROCHLORIDE 15 ML: 20 INJECTION, SOLUTION EPIDURAL; INFILTRATION; INTRACAUDAL at 12:47

## 2024-05-31 RX ADMIN — BUPIVACAINE HYDROCHLORIDE 15 ML: 5 INJECTION, SOLUTION EPIDURAL; INTRACAUDAL at 12:47

## 2024-05-31 RX ADMIN — INSULIN HUMAN 50 UNITS: 100 INJECTION, SUSPENSION SUBCUTANEOUS at 07:41

## 2024-05-31 RX ADMIN — DIPHENHYDRAMINE HYDROCHLORIDE 25 MG: 25 TABLET ORAL at 01:54

## 2024-05-31 RX ADMIN — SODIUM CHLORIDE, POTASSIUM CHLORIDE, SODIUM LACTATE AND CALCIUM CHLORIDE: 600; 310; 30; 20 INJECTION, SOLUTION INTRAVENOUS at 12:34

## 2024-05-31 RX ADMIN — ONDANSETRON 4 MG: 2 INJECTION INTRAMUSCULAR; INTRAVENOUS at 12:54

## 2024-05-31 RX ADMIN — DIPHENHYDRAMINE HYDROCHLORIDE 12.5 MG: 25 TABLET ORAL at 11:06

## 2024-05-31 RX ADMIN — LOSARTAN POTASSIUM 25 MG: 25 TABLET, FILM COATED ORAL at 05:45

## 2024-05-31 RX ADMIN — AMPICILLIN AND SULBACTAM 3 G: 1; 2 INJECTION, POWDER, FOR SOLUTION INTRAMUSCULAR; INTRAVENOUS at 23:17

## 2024-05-31 RX ADMIN — AMPICILLIN AND SULBACTAM 3 G: 1; 2 INJECTION, POWDER, FOR SOLUTION INTRAMUSCULAR; INTRAVENOUS at 05:46

## 2024-05-31 RX ADMIN — CEFAZOLIN 2 G: 1 INJECTION, POWDER, FOR SOLUTION INTRAMUSCULAR; INTRAVENOUS at 12:54

## 2024-05-31 RX ADMIN — FENTANYL CITRATE 25 MCG: 50 INJECTION, SOLUTION INTRAMUSCULAR; INTRAVENOUS at 13:53

## 2024-05-31 RX ADMIN — ROSUVASTATIN CALCIUM 5 MG: 5 TABLET, FILM COATED ORAL at 17:14

## 2024-05-31 RX ADMIN — OXYCODONE HYDROCHLORIDE 5 MG: 5 SOLUTION ORAL at 13:44

## 2024-05-31 RX ADMIN — ACETAMINOPHEN 650 MG: 325 TABLET, FILM COATED ORAL at 23:14

## 2024-05-31 RX ADMIN — AMPICILLIN AND SULBACTAM 3 G: 1; 2 INJECTION, POWDER, FOR SOLUTION INTRAMUSCULAR; INTRAVENOUS at 17:16

## 2024-05-31 ASSESSMENT — PAIN DESCRIPTION - PAIN TYPE
TYPE: ACUTE PAIN;SURGICAL PAIN
TYPE: ACUTE PAIN;SURGICAL PAIN
TYPE: ACUTE PAIN
TYPE: ACUTE PAIN;SURGICAL PAIN

## 2024-05-31 ASSESSMENT — ENCOUNTER SYMPTOMS
VOMITING: 0
ABDOMINAL PAIN: 0
SENSORY CHANGE: 1
NERVOUS/ANXIOUS: 1
CHILLS: 0
FEVER: 0
NAUSEA: 0

## 2024-05-31 ASSESSMENT — PAIN SCALES - GENERAL: PAIN_LEVEL: 0

## 2024-05-31 NOTE — WOUND TEAM
Assisted Oneyda OSORIO (Wound PT), with wound care, non-selective debridement performed using wound cleanser/NS and gauze. Please see Oneyda OSORIO (Wound PT) wound note for further wound care details.

## 2024-05-31 NOTE — CARE PLAN
The patient is Stable - Low risk of patient condition declining or worsening    Shift Goals  Clinical Goals: Patient will be complaint during this shift.  Patient Goals: POC updates/ get MRi done  Family Goals: BRISA    Progress made toward(s) clinical / shift goals:  n/a    Patient is not progressing towards the following goals:      Problem: Knowledge Deficit - Standard  Goal: Patient and family/care givers will demonstrate understanding of plan of care, disease process/condition, diagnostic tests and medications  Outcome: Not Progressing  Note: Patient continues to refuse sveral medications, education provided still refusing. Provider aware of refusals. Ambulating to the bathroom, declines any discomfort at this time.

## 2024-05-31 NOTE — DOCUMENTATION QUERY
"                                                                         [facility]                                                                       Query Response Note      PATIENT:               PAYAL BAUER  ACCT #:                  [id number]  MRN:                     [id number]  :                      1945  ADMIT DATE:       2024 2:03 PM  DISCH DATE:          RESPONDING  PROVIDER #:        [id number]           QUERY TEXT:    Per Registered Dietician assessment findings patient has \"<75% of estimated energy requirements of >/=3 months and mild buccal fat loss\".    Please clarify the nutritional status based upon the clinical indicators below.      The patient's Clinical Indicators include:  American Society for Parenteral and Enteral Nutrition (ASPEN) criteria (presence of at least two)    Per dietary consult dated :   Meets criteria for moderate malnutrition in the context of chronic illness r/t unknown etiology AEB:  - <75% of estimated energy requirements of >/=3 months.  -  Mild buccal fat loss.    Risk Factors: COPD, type 2 diabetes on insulin, RLE cellulitis, diastolic CHF  Treatment: RD consult, Glucerna BID, document oral intake, encourage > 50% meal intake, monitor wt., nutrition rep    Thank You,  Merna Blevins RN  Clinical    Connect via [employer] or [email or web address]  Options provided:   -- Malnutrition, Moderate Protein-Calorie   -- Malnutrition, Mild Protein-Calorie   -- Insignificant Findings   -- Other explanation, (please specify other explanation)      Query created by: Merna Blevins on 2024 7:28 AM    RESPONSE TEXT:    Malnutrition, Moderate Protein-Calorie          Electronically signed by:  PARAS WICK MD 2024 10:22 AM              "

## 2024-05-31 NOTE — ANESTHESIA PREPROCEDURE EVALUATION
Case: [id number] Date/Time: 05/31/24 1109    Procedures:       IRRIGATION AND DEBRIDEMENT, WOUND (Right: Heel)      APPLICATION OR REPLACEMENT, WOUND VAC    Anesthesia type: General    Location: [place] [address] / SURGERY [facility]    Surgeons: Pk Fleming M.D.          79y/o F with PMH of asthma, COPD, DM, HTN, CHARLIE, pulmonary HTN, pacemaker dependent with complete heart block, s/p MVR for mitral stenosis, chronic Afib on eliquis, and CKD  Presents to OR for washout of nonhealing R foot Achilles wound and ulcer.      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    Sleep apnea        Past Surgical History:   Procedure Laterality Date    BREAST BIOPSY      GYN SURGERY  hysterectomy    MITRAL VALVE REPLACE      OTHER ORTHOPEDIC SURGERY Right     TONSILLECTOMY Bilateral        Current Outpatient Medications   Medication Instructions    Acetaminophen Extra Strength 500 MG Cap 2 Capsules, Oral, NIGHTLY    albuterol 108 (90 Base) MCG/ACT Aero Soln inhalation aerosol 2 Puffs, Inhalation, EVERY 6 HOURS PRN    B Complex Vitamins (B COMPLEX PO) 1 Tablet, Oral, DAILY    clobetasol (TEMOVATE) 0.05 % Cream 1 Application , Topical, 2 TIMES DAILY PRN    diphenhydrAMINE HCl (BENADRYL ALLERGY PO) 12.5 mg, Oral, 2 TIMES DAILY PRN    doxycycline (VIBRAMYCIN) 100 mg, Oral, 2 TIMES DAILY    ELIQUIS 5 MG Tab TAKE 1 TABLET BY MOUTH TWICE A DAY    fluticasone (FLONASE) 50 MCG/ACT nasal spray 1 Spray, Nasal, 1 TIME DAILY PRN    furosemide (LASIX) 40 MG Tab  TAKE 1 TABLET BY MOUTH EVERY DAY IN THE EVENING    insulin NPH (HUMULIN/NOVOLIN) 100 UNIT/ML Suspension 46 units in the morning and 3-10 units in the evening per sliding scale    insulin regular (HUMULIN R) 6 Units, Subcutaneous, 2 Times Daily (BID), Inject 6 units daily in the morning and 6 units a    KLOR-CON 8 MEQ tablet TAKE 1 TABLET BY MOUTH EVERY DAY IN THE EVENING    losartan (COZAAR) 25 mg, Oral, 2 TIMES DAILY    metFORMIN ER (GLUCOPHAGE XR) 500 mg, Oral, 2 TIMES DAILY    Non Formulary Request 1 Capsule, Oral, DAILY, vital veggie capsule    non-formulary med 1 Capful, Oral, DAILY, Vital fruit capsules    Psyllium (METAMUCIL MULTIHEALTH FIBER PO) 2-3 Capsules, Oral, DAILY, takes 3 capsules on Monday, Wednesday and Friday. 2 capsules all other days    rosuvastatin (CRESTOR) 5 MG Tab TAKE ONE TABLET AT BEDTIME FOR CHOLESTEROL. TAKE THREE TIMES WEEKLY    VITAMIN D, CHOLECALCIFEROL, PO 3,000 Units, Oral, DAILY       BP (!) 151/57   Pulse 83   Temp 36.4 °C (97.6 °F) (Temporal)   Resp 18   Wt 96.3 kg (212 lb 4.9 oz)   SpO2 92%     Allergies   Allergen Reactions    Ativan      DELIRIUM, CONFUSION.    Advair [Fluticasone-Salmeterol]     Ambien [Zolpidem Tartrate]     Erythromycin Vomiting    Ibuprofen     Lipitor [Atorvastatin Calcium]     Pcn [Penicillins] Hives     Tolerated Unasyn 5/2024    Pravachol [Pravastatin Sodium]      ADVERSE REACTION.       Lab Results   Component Value Date/Time    SODIUM 137 05/30/2024 02:43 AM    POTASSIUM 4.7 05/30/2024 02:43 AM    CHLORIDE 102 05/30/2024 02:43 AM    GLUCOSE 287 (H) 05/30/2024 02:43 AM    BUN 26 (H) 05/30/2024 02:43 AM    CREATININE 1.03 05/30/2024 02:43 AM        Lab Results   Component Value Date/Time    WBC 7.7 05/31/2024 02:53 AM    RBC 3.21 (L) 05/31/2024 02:53 AM    HEMOGLOBIN 9.5 (L) 05/31/2024 02:53 AM    HEMATOCRIT 30.6 (L) 05/31/2024 02:53 AM    PLATELETCT 249 05/31/2024 02:53 AM      TTE 2019:  CONCLUSIONS  Normal left ventricular systolic  function.  Left ventricular ejection fraction is visually estimated to be 65%.  Mild mitral stenosis.  Aortic sclerosis without stenosis.  Right heart pressures are consistent with moderate pulmonary hypertension.  Pacer/ICD wire seen in right ventricle.    Relevant Problems   ANESTHESIA   (positive) Sleep apnea      PULMONARY   (positive) COPD (chronic obstructive pulmonary disease) (HCC)      CARDIAC   (positive) Cardiac pacemaker   (positive) Diastolic CHF due to valvular disease ([facility])   (positive) Essential hypertension, benign   (positive) PAF (paroxysmal atrial fibrillation) ([facility])   (positive) Pulmonary hypertension (HCC)   (positive) 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   (positive) Systolic murmur         (positive) CKD (chronic kidney disease) stage 3, GFR 30-59 ml/min      ENDO   (positive) Type 2 diabetes mellitus with stage 3b chronic kidney disease, with long-term current use of insulin (HCC)      Other   (positive) Osteomyelitis ([facility])       Physical Exam    Airway   Mallampati: II  TM distance: >3 FB  Neck ROM: full       Cardiovascular - normal exam  Rhythm: regular  Rate: normal  (-) murmur     Dental   (+) upper dentures, lower dentures           Pulmonary - normal exam  Breath sounds clear to auscultation     Abdominal   (+) obese     Neurological - normal exam                   Anesthesia Plan    ASA 3   ASA physical status 3 criteria: diabetes - poorly controlled, hypertension - poorly controlled and implanted pacemaker    Plan - peripheral nerve block     Peripheral nerve block will be primary anesthetic    (Possible GA if cannot tolerate with nerve block)      Induction: intravenous    Postoperative Plan: Postoperative administration of opioids is intended.    Pertinent diagnostic labs and testing reviewed    Informed Consent:    Anesthetic plan and risks discussed with patient.      Peripheral nerve block procedure and risks discussed with patient in detail.  Risks discussed include, but are not limited to, failed block, prolonged numbness, pain, infection, bleeding, local anesthetic toxicity, and/or nerve damage. All questions answered and patient consents to proceed as planned and discussed.    Anesthetic procedure and risks discussed with patient in detail.  Risks include but are not limited to PONV, pain, sore throat, damage to teeth/lips/gums, aspiration, positioning injury, allergic reaction, vocal cord injury, prolonged intubation, stroke, and/or cardiopulmonary problems up to and including death.  Patient indicates complete understanding. All questions fully answered and they agree to proceed as planned above.

## 2024-05-31 NOTE — PROGRESS NOTES
FS was 283.  Pt requested to have 3 units of insulin only.  Pt stated that she doesn't care what doctor ordered.  She said she knows body better than doctor.  Pt requested to bring prn benadryl at 2130 for sleep.  When RN went to give benadryl, pt was already sleeping.  Returned benadryl to [facility].

## 2024-05-31 NOTE — CONSULTS
Brief ID note:    -Patient in the OR, was not seen or examined.  Chart reviewed.  Patient with uncontrolled type 2 diabetes, chronic A-fib on Eliquis, hypertension, COPD, CKD stage III, presented with nonhealing right foot Achilles ulcer, concern for osteomyelitis and abscess.  She is being taken to the OR for surgical intervention  -A superficial swab from 5/27 is growing multiple skin shannan including MRSE, unreliable sample.  MRSA nares swab negative. Okay to continue IV Unasyn for now, will need to tailor antibiotics based on deep OR culture results    Formal ID consult in a.m.

## 2024-05-31 NOTE — ANESTHESIA POSTPROCEDURE EVALUATION
Patient: Ophelia Sosa    Procedure Summary       Date: 05/31/24 Room / Location: [place] [address] / SURGERY [facility]    Anesthesia Start: 1234 Anesthesia Stop: 1341    Procedures:       IRRIGATION AND DEBRIDEMENT, WOUND (Right: Heel)      APPLICATION OR REPLACEMENT, WOUND VAC (Right: Heel) Diagnosis: (RIGHT HEEL WOUND AND CALCANEOUS OSTEOMYELITIS)    Surgeons: Pk Fleming M.D. Responsible Provider:     Anesthesia Type: peripheral nerve block ASA Status: 3            Final Anesthesia Type: peripheral nerve block  Last vitals  BP   Blood Pressure : (!) 160/88    Temp   36.2 °C (97.2 °F)    Pulse   78   Resp   15    SpO2   100 %      Anesthesia Post Evaluation    Patient location during evaluation: PACU  Patient participation: complete - patient participated  Level of consciousness: awake  Pain score: 0    Airway patency: patent  Anesthetic complications: no  Cardiovascular status: hypertensive and hemodynamically stable  Respiratory status: acceptable  Hydration status: balanced    PONV: none          There were no known notable events for this encounter.     Nurse Pain Score: 0 (NPRS)

## 2024-05-31 NOTE — THERAPY
Physical Therapy Contact Note    EMR indicates patient is still pending MRI of R heel (concern for osteomyelitis and abscess) due to nonhealing would and ulcer. Will monitor for appropriateness of therapeutic intervention once medical diagnostics and intervention complete.     Leah Stock, PT, DPT  [phone]

## 2024-05-31 NOTE — DISCHARGE PLANNING
TCN following. HTH/SCP chart reviewed. No new TCN needs identified. Noted patient is ambulating 2 x 10 feet with SPC, with no assist and is currently on RA.  Please see prior TCN note from 5/29 for most recent discharge planning considerations if indicated.     Completed:  PT orders in chart  Choice obtained: HH (resumption of [facility] )  Referral sent to [facility] 5/29. Per [facility] patient is followed by Chronic Care Management outpatient

## 2024-05-31 NOTE — ANESTHESIA PROCEDURE NOTES
Peripheral Block    Date/Time: 5/31/2024 12:47 PM    Performed by: Pk Hester M.D.  Authorized by: Pk Hester M.D.    Patient Location:  OR  Start Time:  5/31/2024 12:47 PM  patient identified, IV checked, site marked, risks and benefits discussed, surgical consent, monitors and equipment checked, pre-op evaluation and timeout performed    Patient Position:  Left lateral decubitus  Prep: ChloraPrep    Monitoring:  Heart rate, continuous pulse ox and cardiac monitor  Block Region:  Lower Extremity  Lower Extremity - Block Type:  SCIATIC nerve block, lateral approach    Laterality:  Right  Procedures: ultrasound guided  Image captured, interpreted and electronically stored.  Strength:  1 %  Dose:  3 ml  Block Type:  Single-shot  Needle Length:  50mm  Needle Gauge:  22 G  Needle Localization:  Ultrasound guidance  Ultrasound picture in chart  Injection Assessment:  Negative aspiration for heme, no paresthesia on injection, incremental injection and local visualized surrounding nerve on ultrasound  Evidence of intravascular injection: No     US Guided Sciatic Nerve Block   US probe placed several cm proximal to popliteal crease on posterior thigh and scanned caudad and cephalad until Sciatic Nerve (SN) identified superficial/lateral to popliteal artery.  Needle inserted lateral to probe in an in plane approach under direct visualization to a perineural position.  After negative aspiration LA injected with ease and visualized surrounding the SN.  Block meds:  15mL 0.5% bupivicaine  15mL 2% lidocaine  Epi 150mcg

## 2024-05-31 NOTE — OP REPORT
DATE OF SERVICE:  05/31/2024     PREOPERATIVE DIAGNOSES:    1.  Right chronic open wound to the posterior heel.  2.  Concern for calcaneus osteomyelitis.     POSTOPERATIVE DIAGNOSES:    1.  Right open wound to the posterior heel.  2. Right calcaneus osteomyelitis.     PROCEDURES PERFORMED:    1.  Excisional debridement of right foot wound including skin, subcutaneous   tissue, tendon and bone through wound measuring approximately 4x5 cm.  2.  Partial craterization of right calcaneus for osteomyelitis.  3.  Application of wound VAC, right foot, less than 50 cm2.     SURGEON:  Pk Fleming MD     ANESTHESIOLOGIST:  Pk Hester MD     ANESTHESIA:  Regional and sedation.     ESTIMATED BLOOD LOSS: 50 mL.     DRAINS:  Wound VAC was applied to the right heel wound at 125 mmHg low   continuous pressure.     SPECIMENS:  Bone from the right calcaneus sent to lab for aerobic, anaerobic,   AFB and fungal cultures.     INDICATIONS FOR PROCEDURE:  The patient is 78 years old.  She has a history of   diabetes as well as neuropathy and other multiple medical comorbidities.  She   has chronic right posterior heel wound with concern for extension down to   bone and concern for osteomyelitis.  I was consulted to provide treatment   recommendations.  She had developed devitalized tissue and concern for   infection.  I recommended going to the operating room for excisional   debridement and wound VAC application.  She signed informed consent   preoperatively and wished to proceed with surgery as outlined above.     DESCRIPTION OF PROCEDURE:  The patient was met in the preoperative holding   area and her surgical site was signed.  Her consent was confirmed to be   accurate.  She was taken back to the operating room.  Dr. Hester performed a   regional anesthetic at my request to help with intraoperative and   postoperative pain control.  Sedation was administered.  She was positioned in   the left lateral decubitus position  with an axillary roll and a beanbag.    Right lower extremity was provisionally cleansed with isopropyl alcohol and   prepped and draped in the usual sterile fashion.  She had a posterior heel   wound, which measured about 4x5 cm extending down through a portion of the   Achilles tendon at the central aspect down to the calcaneal tuberosity, which   was exposed and there was some devitalized bone in that area concerning for   osteomyelitis.  A formal timeout was performed to confirm patient's correct   name, correct surgical site, correct procedure and correct laterality.    Excisional debridement of the wound including skin, subcutaneous tissue,   tendon of the Achilles that was devitalized and macerated as well as the bone   of the calcaneus were performed with curette and rongeurs.  I then performed   partial craterization of the calcaneus down to healthy viable bleeding bone   with rongeur and curette, sent the bone to lab for aerobic and anaerobic   cultures.  Thorough lavage of the wound was performed with Pulsavac using   normal saline. I did place a curette tunneling proximally at the subcutaneous   plane posterior to the Achilles and there was some purulent drainage present   within the wound that I thoroughly decompressed and thoroughly irrigated with   Pulsavac using normal saline as well.  I then packed a wound VAC sponge   tunneling proximally up the subcutaneous plane along the Achilles tendon and   over the open wound distally and was able to achieve good seal at 125 mmHg low   continuous pressure.  I then placed a well-padded compressive dressing.  She   was then awoken from sedation, transferred on the [facility] and taken to   postanesthesia care unit in stable condition.     PLAN:    1.  The patient will be readmitted postoperatively.  2.  She can weightbear as tolerated on the right lower extremity.  3.  I do feel that MRI would still be potentially beneficial to rule out other   potential sources of  infection and confirm intraoperative findings consistent   with osteomyelitis of the calcaneus.  4.  She should continue wound VAC therapy and I have consulted the inpatient   wound team to convert to VeraFlo wound VAC starting tomorrow.  5.  Recommend continuing empiric antibiotics following up intraoperative   cultures and obtaining formal infectious disease consultation to help guide   further antibiotic treatment recommendations.        ______________________________  MD ROQUE Mahajan/BRAN    DD:  05/31/2024 13:39  DT:  05/31/2024 14:22    Job#:  [id number]

## 2024-05-31 NOTE — DIETARY
Nutrition Update:    Day 3 of admit.  Ophelia Sosa is a 78 y.o. female with admitting DX of Osteomyelitis (HCC) [M86.9].  Patient being followed to optimize nutrition.    Current Diet: NPO this am, was consistent CHO prior.  Per ADLs, PO variable from refused to % w/ avg of ~38%. Most recent meals was %    Problem: Nutritional:  Goal: Achieve adequate nutritional intake  Description: Patient will consume >50% of meals  Outcome: Not met.    Recommendations/Plan:  Trial adding Glucerna BID to bolster nutrition once diet advances.  Advance diet as medically feasible.  Monitor weight.    RD following.

## 2024-05-31 NOTE — DISCHARGE PLANNING
Care Transition Team Assessment    LMSW met with pt at bedside to complete assessment. Pt A&Ox4 and able to verify the information on the face sheet. Pt lives alone at a two story condominium, but primarily uses the downstairs living space at [address] [place] [address] with no stairs. Prior to this hospitalization pt was independent at home with ADLs and most IADLs. Pt uses a cane and drives herself. Pt reported that her friends Iesha and Yazan are good support for her. Pt is retired and receives SSI monthly deposits. Pt denies any SA or MH concerns. Pt does not have an advance directive.     Pt did express numerous grievances surrounding her issues with her insulin and dietary concerns while in [facility]. LMSW escalated to nursing leadership and MD. Patient is pending an MRI prior to PT/OT recs and discharge disposition.     Information Source  Orientation Level: Oriented X4  Information Given By: Patient  Who is responsible for making decisions for patient? : Patient    Readmission Evaluation  Is this a readmission?: No    Elopement Risk  Legal Hold: No  Ambulatory or Self Mobile in Wheelchair: Yes  Disoriented: No  Psychiatric Symptoms: None  History of Wandering: No  Elopement this Admit: No  Vocalizing Wanting to Leave: No  Displays Behaviors, Body Language Wanting to Leave: No-Not at Risk for Elopement  Elopement Risk: Not at Risk for Elopement    Interdisciplinary Discharge Planning  Lives with - Patient's Self Care Capacity: Alone and Able to Care For Self  Patient or legal guardian wants to designate a caregiver: No  Support Systems: Friends / Neighbors  Housing / Facility: 2 Story Apartment / Condo    Discharge Preparedness  What is your plan after discharge?: Home with help  What are your discharge supports?: Other (comment) (Friends Iesha and Yazan)  Prior Functional Level: Independent with Activities of Daily Living, Independent with Medication Management  Difficulity with ADLs:  Walking  Difficulty with ADLs Comment:  (Uses Cane when she walks around home and out side of home)  Difficulity with IADLs: None    Functional Assesment  Prior Functional Level: Independent with Activities of Daily Living, Independent with Medication Management    Finances  Financial Barriers to Discharge: No  Prescription Coverage: Yes    Vision / Hearing Impairment  Vision Impairment : Yes  Right Eye Vision: Impaired, Wears Glasses  Left Eye Vision: Impaired, Wears Glasses  Hearing Impairment : No  Does Pt Need Special Equipment for the Hearing Impaired?: No    Advance Directive  Advance Directive?:  (Advanced Directive)    Domestic Abuse  Have you ever been the victim of abuse or violence?: No  Possible Abuse/Neglect Reported to:: Not Applicable    Psychological Assessment  History of Substance Abuse: None  History of Psychiatric Problems: No  Non-compliant with Treatment: No  Newly Diagnosed Illness: No    Discharge Risks or Barriers  Discharge risks or barriers?: Lives alone, no community support  Patient risk factors: Lives alone and no community support, Vulnerable adult    Anticipated Discharge Information  Discharge Disposition: D/T to home under HHA care in anticipation of covered skilled care (06)

## 2024-05-31 NOTE — ASSESSMENT & PLAN NOTE
Not well controlled  She does not tolerate any benzo's  Seems like MAXIMO, would benefit from an SSRI, but she declines at this time  Benadryl oral PRN  Uses CBD oil at home

## 2024-05-31 NOTE — PROGRESS NOTES
Pt stated that she feels like her blood sugar is low.  Requested RN to check her FS.  FS is 240 at this time.

## 2024-05-31 NOTE — CONSULTS
DATE OF SERVICE:  05/31/2024     ORTHOPEDIC CONSULTATION     REQUESTING PHYSICIAN:  Lance Arredondo M.D., hospitalist service.     REASON FOR CONSULTATION:  Right heel wound, concern for deep infection.     HISTORY OF PRESENT ILLNESS:  The patient is 78 years old.  She was admitted to   the hospitalist service on 05/28 with a right heel wound posteriorly.  She   was sent from an outside physician to [facility] due to concern for infection.    She does have medical issues including insulin-dependent diabetes, chronic   kidney disease, CHF, pacemaker.  The wound has reportedly been not healing   over the last several weeks. She has been on antibiotics for a couple weeks as   an outpatient     PAST MEDICAL HISTORY:    ALLERGIES: ATIVAN, ADVAIR, AMBIEN, ERYTHROMYCIN, IBUPROFEN, LIPITOR,   PENICILLIN, PRAVASTATIN.     OUTPATIENT MEDICATIONS:  Include Tylenol, Eliquis, Klor-Con, psyllium, vitamin   D, clobetasol, doxycycline, diphenhydramine, Flonase, Lasix, insulin NPH,   insulin regular, losartan, metformin ER, Crestor.     PAST MEDICAL DIAGNOSES:  Include asthma, complete heart block with pacemaker   placement, congestive heart failure, COPD, chronic kidney disease stage III,   hypertension, diabetes, obstructive sleep apnea, pulmonary hypertension.     PAST SURGICAL HISTORY:  Previous orthopedic surgery, hysterectomy, mitral   valve replacement, tonsillectomy.     SOCIAL HISTORY:.  The patient quit smoking 12 years ago.  She denies alcohol.    She does use CBD.     PHYSICAL EXAMINATION:    VITAL SIGNS:  Temperature is 97.6, heart rate 83, respiratory rate 18, blood   pressure 151/57, pulse oximetry 92% on room air.  GENERAL APPEARANCE:  The patient is alert, cooperative.  She is a little bit   anxious.  She is following commands.  MUSCULOSKELETAL:  Right lower extremity, her posterior heel wound has measured   approximately 5x6 cm with devitalized subcutaneous tissue and adjacent   erythema.  There is exposed Achilles  tendon within the wound.  She is able   dorsi and plantarflex the foot, and flex down the toes.  Her foot appears well   perfused.     DIAGNOSTIC IMAGING:  Plain x-rays of the right foot shows posterior heel   ulceration with soft tissue gas posterior to the ankle joint.  No evidence of   obvious destructive bony changes are seen.  There are changes in the mid foot   consistent with Charcot neuroarthropathy.  ABIs to the right lower extremity   suggests that she has had normal ABIs and first toe brachial index.     ASSESSMENT:  A 78-year-old female with chronic posterior heel wound.  She has   multiple underlying medical comorbidities.  She has devitalized soft tissue at   this nonhealing wound and concern for potential extension down to the   calcaneus with concern for osteomyelitis.  She has an MRI pending.  She had   reportedly normal ABIs on recent vascular studies.     RECOMMENDATIONS:    1.  I discussed findings with the patient and given the appearance of her   wound in proximity to the calcaneus and Achilles.  I recommend formal surgical   debridement and wound VAC application.  2.  We did discuss that she still may need extensive period of time for wound   care and potentially skin grafting for soft tissue coverage if she is unable   to successfully granulate her wound by secondary intention.  3.  She did express understanding.  She is little bit anxious to proceed with   surgical management, but I do feel that this is the best treatment option to   help get her wound to head in the right direction and hopefully achieve   definitive wound healing.  4.  I do still feel that the MRI would be beneficial to interrogate for   underlying osteomyelitis of the calcaneus, which might help direct antibiotic   therapy.        ______________________________  MD ROQUE Mahajan/BRAN    DD:  05/31/2024 11:56  DT:  05/31/2024 12:17    Job#:  [id number]

## 2024-05-31 NOTE — PROGRESS NOTES
Hospital Medicine Daily Progress Note    Date of Service  5/31/2024    Chief Complaint  Ophelia Sosa is a 78 y.o. female admitted 5/28/2024 with longstanding type 2 diabetes mellitus on insulin, chronic A-fib on Eliquis, hypertension, COPD and CKD stage III presented to the hospital with a nonhealing right foot Achilles wound and ulcer.  She has been followed by outpatient wound care with worsening wound and was instructed to come to the emergency room in hospital for further evaluation for osteomyelitis and possible abscess.  She was then placed empirically on IV ceftriaxone and vancomycin which the latter was discontinued after MRSA nares was negative.  She now remains on IV Unasyn.  An MRI of the foot has been ordered but will need to check MRI compatibility.    Hospital Course  See above    Interval Problem Update  5/29  Patient very concerned about her insulin regimen which is highly regulated and individualized. I had a long conversation with the patient that we will try to accommodate as much as we can for her insulin regimen. She has no pain of the affected foot given profound neuropathy from the knee distally. She remains afebrile. She had a St Syd PPM placed in 2018 at [facility]. She wants it interrogated and is unsure if it is MRI compatible. Cursory review on Google shows that it might need to be placed into compatible mode.     5/30  No acute events overnight. Patient intermittently refusing BS checks and insulin administration. BS's ranged from 166-272. Remains afebrile. We confirmed with the ST Syd rep that her PPM is MRI compatible. Mildly hypertensive. She feels the redness of her foot is improving.     5/31  I personally consulted and spoke with Dr Fleming of orthopedic surgery and Dr Tidwell of ID about patient's case in detail. Patient is highly anxious this AM with the prospects of going to surgery. Her blood sugars remain very poorly controlled and there is extensive nursing documentation  about various refusals of insulin administration. I spoke extensively with patient about the importance of blood sugar control in the context of a deep space infection of her R foot and she understands. Will add some benadryl for ongoing severe anxiety. She remains afebrile.     I have discussed this patient's plan of care and discharge plan at IDT rounds today with Case Management, Nursing, Nursing leadership, and other members of the IDT team.    Consultants/Specialty  none    Code Status  DNAR/DNI    Disposition  The patient is not medically cleared for discharge to home or a post-acute facility.  Anticipate discharge to: home with organized home healthcare and close outpatient follow-up    I have placed the appropriate orders for post-discharge needs.    Review of Systems  Review of Systems   Constitutional:  Negative for chills and fever.        No clear changes noted today   Gastrointestinal:  Negative for abdominal pain, nausea and vomiting.   Genitourinary:  Negative for dysuria, frequency, hematuria and urgency.   Neurological:  Positive for sensory change (chronic in legs).        No clear changes noted 5/31   Psychiatric/Behavioral:  The patient is nervous/anxious.         Physical Exam  Temp:  [36.4 °C (97.5 °F)-36.9 °C (98.4 °F)] 36.9 °C (98.4 °F)  Pulse:  [75-83] 75  Resp:  [16-18] 16  BP: (147-169)/(57-73) 169/73  SpO2:  [92 %-96 %] 95 %    Physical Exam  Vitals and nursing note reviewed.   Constitutional:       General: She is not in acute distress.     Appearance: She is well-developed.      Comments: Very anxious but overall cooperative and interactive   HENT:      Head: Normocephalic and atraumatic.      Mouth/Throat:      Pharynx: No oropharyngeal exudate.   Eyes:      General: No scleral icterus.     Pupils: Pupils are equal, round, and reactive to light.   Neck:      Thyroid: No thyromegaly.   Cardiovascular:      Rate and Rhythm: Normal rate and regular rhythm.      Heart sounds: Normal heart  sounds. No murmur heard.  Pulmonary:      Effort: Pulmonary effort is normal. No respiratory distress.      Breath sounds: Normal breath sounds. No wheezing.   Abdominal:      General: Bowel sounds are normal. There is no distension.      Palpations: Abdomen is soft.      Tenderness: There is no abdominal tenderness.   Musculoskeletal:         General: Signs of injury present. No tenderness. Normal range of motion.      Cervical back: Normal range of motion and neck supple.      Right lower leg: Edema present.      Left lower leg: Edema present.      Comments: Remains about the same on 5/31   Skin:     General: Skin is warm and dry.      Findings: Erythema (decreased) present. No rash.   Neurological:      Mental Status: She is alert and oriented to person, place, and time.      Cranial Nerves: No cranial nerve deficit.      Sensory: Sensory deficit (neuropathy of B/L LE's) present.         Fluids    Intake/Output Summary (Last 24 hours) at 5/31/2024 1227  Last data filed at 5/31/2024 0616  Gross per 24 hour   Intake 610 ml   Output --   Net 610 ml       Laboratory  Recent Labs     05/28/24  1614 05/29/24  0223 05/31/24  0253   WBC 12.6* 9.7 7.7   RBC 3.72* 3.14* 3.21*   HEMOGLOBIN 11.0* 9.3* 9.5*   HEMATOCRIT 34.2* 29.3* 30.6*   MCV 91.9 93.3 95.3   MCH 29.6 29.6 29.6   MCHC 32.2 31.7* 31.0*   RDW 44.6 45.1 46.9   PLATELETCT 376 293 249   MPV 10.1 10.4 10.2     Recent Labs     05/28/24  1614 05/29/24  0223 05/30/24  0243   SODIUM 136 139 137   POTASSIUM 4.2 4.2 4.7   CHLORIDE 98 104 102   CO2 22 23 24   GLUCOSE 171* 135* 287*   BUN 30* 29* 26*   CREATININE 1.22 1.15 1.03   CALCIUM 10.7* 9.5 9.6     Recent Labs     05/28/24  1614   INR 1.27*               Imaging  US-TACO SINGLE LEVEL BILAT   Final Result      DX-FOOT-COMPLETE 3+ RIGHT   Final Result      Small posterior heel ulcer and there is some new soft tissue gas posterior to the ankle.      No radiographic evidence for acute osteomyelitis.      Multiple  chronic osseous changes are again noted.      MR-FOOT-W/O RIGHT    (Results Pending)        Assessment/Plan  * Osteomyelitis ([facility])- (present on admission)  Assessment & Plan  Suspected of right foot/Achilles heel, clinically with similar clinical appearance today  Consulted ortho, going for debridement and wound vac placement on 5/31  ID consulted, still important to get MRI foot for ABX decision making--pending at this time  Glycemic control  Supportive wound care  TACO are normal  Pain control  Continue empiric IV unasyn  MRSA nares negative, WBC has normalized, remains afebrile  I personally reviewed each of the above labs on 5/31  OSH wound culture (contacted by wound care clinic) is growing MRSE, tetracycline resistant, consider zyvox    ACP (advance care planning)- (present on admission)  Assessment & Plan  Confirmed DNR/DNI    Cellulitis- (present on admission)  Assessment & Plan  Cellulitis of right foot, possible osteomyelitis  Plan as noted in osteomyelitis    DM (diabetes mellitus) ([facility])- (present on admission)  Assessment & Plan  Has a particular regimen which she wants to adhere to  Start NPH 50 units qam and 10 units QPM (refusing multiple administrations of her blood sugars)  Reinforced with patient that persistent hyperglycemia will worsen her infection/delay wound healing, she understands  I personally reviewed the blood sugars on 5/31/2024  ISS  Consider standing SA insulin dependent on sugar levels    PAF (paroxysmal atrial fibrillation) ([facility])- (present on admission)  Assessment & Plan  Hold Eliquis for now  --Resume pending MRI result and if no further invasive intervention needed    Other insomnia- (present on admission)  Assessment & Plan  As needed Benadryl    CKD (chronic kidney disease) stage 3, GFR 30-59 ml/min- (present on admission)  Assessment & Plan  Minimize nephrotoxic agents, renally dose meds  Cr remains near her baseline, I personally reviewed the bmp on 5/31    COPD (chronic  obstructive pulmonary disease) (HCC)- (present on admission)  Assessment & Plan  Per history  Currently not in acute exacerbation    Cardiac pacemaker- (present on admission)  Assessment & Plan  Per history  St Syd placed in 2018, confirmed it is MRI compatible  No recent syncope or issues here, no need for interrogation    Anxiety- (present on admission)  Assessment & Plan  Not well controlled  She does not tolerate any benzo's  Seems like MAXIMO, would benefit from an SSRI, but she declines at this time  Benadryl oral PRN  Uses CBD oil at home    Diastolic CHF due to valvular disease (HCC)- (present on admission)  Assessment & Plan  Prior history  Currently not in acute exacerbation    Class 1 obesity in adult- (present on admission)  Assessment & Plan  Diet and lifestyle modification  Body mass index is 32.11 kg/m².      Diabetic ulcer of right heel associated with type 2 diabetes mellitus (HCC)- (present on admission)  Assessment & Plan  Supportive wound care  Glycemic control  Plan as noted above         VTE prophylaxis:   SCDs/TEDs      I have performed a physical exam and reviewed and updated ROS and Plan today (5/31/2024). In review of yesterday's note (5/30/2024), there are no changes except as documented above.

## 2024-05-31 NOTE — ANESTHESIA TIME REPORT
Anesthesia Start and Stop Event Times       Date Time Event    5/31/2024 1231 Ready for Procedure     1234 Anesthesia Start     1341 Anesthesia Stop          Responsible Staff    No responsible staff documented.       Overtime Reason:  no overtime (within assigned shift)    Comments:

## 2024-05-31 NOTE — PROGRESS NOTES
78yoF with right posterior heel wound and concern for osteomyelitis pending MRI.  Recommend surgical debridement and wound vac application.  See full dictated consultation for further details.

## 2024-05-31 NOTE — OR SURGEON
Immediate Post OP Note    PreOp Diagnosis: Right chronic heel wound with concern for calcaneus osteomyelitis      PostOp Diagnosis: same      Procedure(s):  IRRIGATION AND DEBRIDEMENT, WOUND - Wound Class: Dirty or Infected  APPLICATION OR REPLACEMENT, WOUND VAC - Wound Class: Dirty or Infected    Surgeon(s):  Pk Fleming M.D.    Anesthesiologist/Type of Anesthesia:  No anesthesia staff entered./MAC    Surgical Staff:  Circulator: Arvin Smith R.N.  Relief Circulator: Shira Garcia R.N.  Scrub Person: Melanie Vegas; Leah Montaño    Specimens removed if any:  ID Type Source Tests Collected by Time Destination   1 : Left Calcaneous Other Other AFB CULTURE, FUNGAL CULTURE, GRAM STAIN ONLY, AEROBIC/ANAEROBIC CULTURE (SURGERY) Pk Fleming M.D. 5/31/2024  1:11 PM        Estimated Blood Loss: 50cc    Findings: see dictation    Complications: none known    PLAN:  --readmit postop  --WBAT RLE  --continue VAC  --fu intra-op cultures, ID consultation        5/31/2024 1:32 PM Pk Fleming M.D.

## 2024-05-31 NOTE — CARE PLAN
The patient is Stable - Low risk of patient condition declining or worsening    Shift Goals  Clinical Goals: dressing change in AM  Patient Goals: sleep  Family Goals: BRISA    Progress made toward(s) clinical / shift goals:  Right heel dressing change will be done in AM.  Pt denied any pain.      Patient is not progressing towards the following goals:

## 2024-05-31 NOTE — DISCHARGE PLANNING
Thank you for alerting HH to your request. At this time we will need a provider to place referral before we can resume. Please let HH know once this is complete and we will continue to process.

## 2024-05-31 NOTE — OR NURSING
Pt's VSS. Pt on 3L NC sating greater than 92%. Pt A&Ox4. Pt denies nausea and pain. Pt's surgical dressing CDI. Pt's POC, Yazan, called and updated. Pt returning to S[facility].

## 2024-06-01 LAB
ANION GAP SERPL CALC-SCNC: 12 MMOL/L (ref 7–16)
BASOPHILS # BLD AUTO: 0.7 % (ref 0–1.8)
BASOPHILS # BLD: 0.05 K/UL (ref 0–0.12)
BUN SERPL-MCNC: 18 MG/DL (ref 8–22)
CALCIUM SERPL-MCNC: 9 MG/DL (ref 8.5–10.5)
CHLORIDE SERPL-SCNC: 102 MMOL/L (ref 96–112)
CO2 SERPL-SCNC: 25 MMOL/L (ref 20–33)
CREAT SERPL-MCNC: 1.14 MG/DL (ref 0.5–1.4)
EOSINOPHIL # BLD AUTO: 0.24 K/UL (ref 0–0.51)
EOSINOPHIL NFR BLD: 3.3 % (ref 0–6.9)
ERYTHROCYTE [DISTWIDTH] IN BLOOD BY AUTOMATED COUNT: 45.1 FL (ref 35.9–50)
FUNGUS SPEC FUNGUS STN: NORMAL
GFR SERPLBLD CREATININE-BSD FMLA CKD-EPI: 49 ML/MIN/1.73 M 2
GLUCOSE BLD STRIP.AUTO-MCNC: 123 MG/DL (ref 65–99)
GLUCOSE BLD STRIP.AUTO-MCNC: 137 MG/DL (ref 65–99)
GLUCOSE BLD STRIP.AUTO-MCNC: 220 MG/DL (ref 65–99)
GLUCOSE BLD STRIP.AUTO-MCNC: 249 MG/DL (ref 65–99)
GLUCOSE SERPL-MCNC: 213 MG/DL (ref 65–99)
HCT VFR BLD AUTO: 29.1 % (ref 37–47)
HGB BLD-MCNC: 9.2 G/DL (ref 12–16)
IMM GRANULOCYTES # BLD AUTO: 0.03 K/UL (ref 0–0.11)
IMM GRANULOCYTES NFR BLD AUTO: 0.4 % (ref 0–0.9)
LYMPHOCYTES # BLD AUTO: 1.23 K/UL (ref 1–4.8)
LYMPHOCYTES NFR BLD: 16.8 % (ref 22–41)
MCH RBC QN AUTO: 29.4 PG (ref 27–33)
MCHC RBC AUTO-ENTMCNC: 31.6 G/DL (ref 32.2–35.5)
MCV RBC AUTO: 93 FL (ref 81.4–97.8)
MONOCYTES # BLD AUTO: 0.72 K/UL (ref 0–0.85)
MONOCYTES NFR BLD AUTO: 9.8 % (ref 0–13.4)
MYCOBACTERIUM SPEC CULT: NORMAL
NEUTROPHILS # BLD AUTO: 5.07 K/UL (ref 1.82–7.42)
NEUTROPHILS NFR BLD: 69 % (ref 44–72)
NRBC # BLD AUTO: 0 K/UL
NRBC BLD-RTO: 0 /100 WBC (ref 0–0.2)
PLATELET # BLD AUTO: 245 K/UL (ref 164–446)
PMV BLD AUTO: 10.6 FL (ref 9–12.9)
POTASSIUM SERPL-SCNC: 4.1 MMOL/L (ref 3.6–5.5)
RBC # BLD AUTO: 3.13 M/UL (ref 4.2–5.4)
RHODAMINE-AURAMINE STN SPEC: NORMAL
RHODAMINE-AURAMINE STN SPEC: NORMAL
SIGNIFICANT IND 70042: NORMAL
SITE SITE: NORMAL
SODIUM SERPL-SCNC: 139 MMOL/L (ref 135–145)
SOURCE SOURCE: NORMAL
WBC # BLD AUTO: 7.3 K/UL (ref 4.8–10.8)

## 2024-06-01 PROCEDURE — 700105 HCHG RX REV CODE 258: Performed by: STUDENT IN AN ORGANIZED HEALTH CARE EDUCATION/TRAINING PROGRAM

## 2024-06-01 PROCEDURE — A9270 NON-COVERED ITEM OR SERVICE: HCPCS | Performed by: STUDENT IN AN ORGANIZED HEALTH CARE EDUCATION/TRAINING PROGRAM

## 2024-06-01 PROCEDURE — 700102 HCHG RX REV CODE 250 W/ 637 OVERRIDE(OP): Performed by: STUDENT IN AN ORGANIZED HEALTH CARE EDUCATION/TRAINING PROGRAM

## 2024-06-01 PROCEDURE — 99223 1ST HOSP IP/OBS HIGH 75: CPT | Performed by: INTERNAL MEDICINE

## 2024-06-01 PROCEDURE — 770006 HCHG ROOM/CARE - MED/SURG/GYN SEMI*

## 2024-06-01 PROCEDURE — 97535 SELF CARE MNGMENT TRAINING: CPT

## 2024-06-01 PROCEDURE — 700111 HCHG RX REV CODE 636 W/ 250 OVERRIDE (IP): Mod: JZ | Performed by: INTERNAL MEDICINE

## 2024-06-01 PROCEDURE — A9270 NON-COVERED ITEM OR SERVICE: HCPCS | Performed by: INTERNAL MEDICINE

## 2024-06-01 PROCEDURE — 82962 GLUCOSE BLOOD TEST: CPT

## 2024-06-01 PROCEDURE — 99232 SBSQ HOSP IP/OBS MODERATE 35: CPT | Performed by: INTERNAL MEDICINE

## 2024-06-01 PROCEDURE — 85025 COMPLETE CBC W/AUTO DIFF WBC: CPT

## 2024-06-01 PROCEDURE — 700111 HCHG RX REV CODE 636 W/ 250 OVERRIDE (IP): Mod: JZ | Performed by: STUDENT IN AN ORGANIZED HEALTH CARE EDUCATION/TRAINING PROGRAM

## 2024-06-01 PROCEDURE — 700102 HCHG RX REV CODE 250 W/ 637 OVERRIDE(OP): Performed by: INTERNAL MEDICINE

## 2024-06-01 PROCEDURE — 80048 BASIC METABOLIC PNL TOTAL CA: CPT

## 2024-06-01 PROCEDURE — 36415 COLL VENOUS BLD VENIPUNCTURE: CPT

## 2024-06-01 RX ADMIN — INSULIN HUMAN 50 UNITS: 100 INJECTION, SUSPENSION SUBCUTANEOUS at 09:44

## 2024-06-01 RX ADMIN — INSULIN HUMAN 4 UNITS: 100 INJECTION, SOLUTION PARENTERAL at 13:17

## 2024-06-01 RX ADMIN — LOSARTAN POTASSIUM 25 MG: 25 TABLET, FILM COATED ORAL at 05:21

## 2024-06-01 RX ADMIN — ENOXAPARIN SODIUM 40 MG: 100 INJECTION SUBCUTANEOUS at 16:38

## 2024-06-01 RX ADMIN — FUROSEMIDE 40 MG: 40 TABLET ORAL at 05:21

## 2024-06-01 RX ADMIN — AMPICILLIN AND SULBACTAM 3 G: 1; 2 INJECTION, POWDER, FOR SOLUTION INTRAMUSCULAR; INTRAVENOUS at 16:41

## 2024-06-01 RX ADMIN — ACETAMINOPHEN 650 MG: 325 TABLET, FILM COATED ORAL at 05:21

## 2024-06-01 RX ADMIN — ACETAMINOPHEN 650 MG: 325 TABLET, FILM COATED ORAL at 22:31

## 2024-06-01 RX ADMIN — DIPHENHYDRAMINE HYDROCHLORIDE 25 MG: 25 TABLET ORAL at 03:21

## 2024-06-01 RX ADMIN — INSULIN HUMAN 10 UNITS: 100 INJECTION, SUSPENSION SUBCUTANEOUS at 17:14

## 2024-06-01 RX ADMIN — DIPHENHYDRAMINE HYDROCHLORIDE 25 MG: 25 TABLET ORAL at 22:31

## 2024-06-01 RX ADMIN — LOSARTAN POTASSIUM 25 MG: 25 TABLET, FILM COATED ORAL at 16:38

## 2024-06-01 RX ADMIN — ACETAMINOPHEN 650 MG: 325 TABLET, FILM COATED ORAL at 16:38

## 2024-06-01 RX ADMIN — INSULIN HUMAN 4 UNITS: 100 INJECTION, SOLUTION PARENTERAL at 09:44

## 2024-06-01 RX ADMIN — ACETAMINOPHEN 650 MG: 325 TABLET, FILM COATED ORAL at 11:58

## 2024-06-01 RX ADMIN — AMPICILLIN AND SULBACTAM 3 G: 1; 2 INJECTION, POWDER, FOR SOLUTION INTRAMUSCULAR; INTRAVENOUS at 22:34

## 2024-06-01 RX ADMIN — AMPICILLIN AND SULBACTAM 3 G: 1; 2 INJECTION, POWDER, FOR SOLUTION INTRAMUSCULAR; INTRAVENOUS at 11:59

## 2024-06-01 RX ADMIN — AMPICILLIN AND SULBACTAM 3 G: 1; 2 INJECTION, POWDER, FOR SOLUTION INTRAMUSCULAR; INTRAVENOUS at 05:23

## 2024-06-01 ASSESSMENT — PATIENT HEALTH QUESTIONNAIRE - PHQ9
SUM OF ALL RESPONSES TO PHQ9 QUESTIONS 1 AND 2: 0
2. FEELING DOWN, DEPRESSED, IRRITABLE, OR HOPELESS: NOT AT ALL
1. LITTLE INTEREST OR PLEASURE IN DOING THINGS: NOT AT ALL

## 2024-06-01 ASSESSMENT — ENCOUNTER SYMPTOMS
NERVOUS/ANXIOUS: 1
ABDOMINAL PAIN: 0
CHILLS: 0
VOMITING: 0
SENSORY CHANGE: 1
NAUSEA: 0
FEVER: 0

## 2024-06-01 ASSESSMENT — PAIN DESCRIPTION - PAIN TYPE: TYPE: ACUTE PAIN

## 2024-06-01 NOTE — CARE PLAN
The patient is Stable - Low risk of patient condition declining or worsening    Shift Goals  Clinical Goals: patient will be compliant with her medication concepcionin alvarez this shift  Patient Goals: Rest  Family Goals: BRISA    Progress made toward(s) clinical / shift goals:  n/a    Patient is not progressing towards the following goals:      Problem: Knowledge Deficit - Standard  Goal: Patient and family/care givers will demonstrate understanding of plan of care, disease process/condition, diagnostic tests and medications  Outcome: Not Progressing  Note: Patient continues to be anxious and easily agitated. Denies any discomfort at this time to her foot. Bed is in low locked position and call light is within her reach. Patient also continues to refuse several medications, education provided pt still refusing.

## 2024-06-01 NOTE — WOUND TEAM
Regular vac to R Heel converted to veraflo. See settings in LDA. Wound Team to begin vac changes per protocol on Monday 6/3/24.

## 2024-06-01 NOTE — RESPIRATORY CARE
"COPD EDUCATION by COPD CLINICAL EDUCATOR  6/1/2024 at 4:03 PM by Rianna Boogie, RRT     Patient interviewed by COPD education team. Patient refused COPD program at this time. An Action Plan was completed in the EMR to reflect current Respiratory Medication use.                   COPD Assessment  COPD Clinical Specialists ONLY  COPD Education Initiated: Yes--Short Intervention (met with pt admits to COPD denies CHARLIE quit 2011 has rescue albuterol  states she had PFT years ago thru [facility] (-0- in EMR) declined information)  DME Company: none  Physician Name: Emmanuelle Gan M.D  Pulmonologist Name: none  $ Demo/Eval of SVN's, MDI's and Aerosols:  (reviewed medication delivery declined spacer)  (OP) Pulmonary Function Testing:  (pt states she has had none in EMR)  Interdisciplinary Rounds: Attendance at Rounds (30 Min)    PFT Results  No results found for: \"PFT\"     MY COPD ACTION PLAN     It is recommended that patients and physicians /healthcare providers complete this action plan together. This plan should be discussed at each physician visit and updated as needed.    The green, yellow and red zones show groups of symptoms of COPD. This list of symptoms is not comprehensive, and you may experience other symptoms. In the \"Actions\" column, your healthcare provider has recommended actions for you to take based on your symptoms.    Patient Name: Ophelia Sosa   YOB: 1945   Last Updated on:     Green Zone:  I am doing well today Actions     Usual activitiy and exercise level   Take daily medications     Usual amounts of cough and phlegm/mucus   Use oxygen as prescribed     Sleep well at night   Continue regular exercise/diet plan     Appetite is good   At all times avoid cigarette smoke, inhaled irritants     Daily Medications (these medications are taken every day):                Yellow Zone:  I am having a bad day or a COPD flare Actions     More breathless than usual   Continue daily " "medications     I have less energy for my daily activities   Use quick relief inhaler as ordered     Increased or thicker phlegm/mucus   Use oxygen as prescribed     Using quick relief inhaler/nebulizer more often   Get plenty of rest     Swelling of ankles more than usual   Use pursed lip breathing     More coughing than usual   At all times avoid cigarette smoke, inhaled irritants     I feel like I have a \"chest cold\"     Poor sleep and my symptoms woke me up     My appetite is not good     My medicine is not helping      Call provider immediately if symptoms don’t improve     Continue daily medications, add rescue medications:   Albuterol 2 Puffs Every 6 hours PRN       Medications to be used during a flare up, (as Discussed with Provider):           Additional Information:  Take as directed from your Doctor    Red Zone:  I need urgent medical care Actions     Severe shortness of breath even at rest   Call 911 or seek medical care immediately     Not able to do any activity because of breathing      Fever or shaking chills      Feeling confused or very drowsy       Chest pains      Coughing up blood                  "

## 2024-06-01 NOTE — PROGRESS NOTES
Hospital Medicine Daily Progress Note    Date of Service  6/1/2024    Chief Complaint  Ophelia Sosa is a 78 y.o. female admitted 5/28/2024 with longstanding type 2 diabetes mellitus on insulin, chronic A-fib on Eliquis, hypertension, COPD and CKD stage III presented to the hospital with a nonhealing right foot Achilles wound and ulcer.  She has been followed by outpatient wound care with worsening wound and was instructed to come to the emergency room in hospital for further evaluation for osteomyelitis and possible abscess.  She was then placed empirically on IV ceftriaxone and vancomycin which the latter was discontinued after MRSA nares was negative.  She now remains on IV Unasyn.  An MRI of the foot has been ordered but will need to check MRI compatibility.    Hospital Course  See above    Interval Problem Update  5/29  Patient very concerned about her insulin regimen which is highly regulated and individualized. I had a long conversation with the patient that we will try to accommodate as much as we can for her insulin regimen. She has no pain of the affected foot given profound neuropathy from the knee distally. She remains afebrile. She had a St Syd PPM placed in 2018 at [facility]. She wants it interrogated and is unsure if it is MRI compatible. Cursory review on Google shows that it might need to be placed into compatible mode.     5/30  No acute events overnight. Patient intermittently refusing BS checks and insulin administration. BS's ranged from 166-272. Remains afebrile. We confirmed with the ST Syd rep that her PPM is MRI compatible. Mildly hypertensive. She feels the redness of her foot is improving.     5/31  I personally consulted and spoke with Dr Fleming of orthopedic surgery and Dr Tidwell of ID about patient's case in detail. Patient is highly anxious this AM with the prospects of going to surgery. Her blood sugars remain very poorly controlled and there is extensive nursing documentation  about various refusals of insulin administration. I spoke extensively with patient about the importance of blood sugar control in the context of a deep space infection of her R foot and she understands. Will add some benadryl for ongoing severe anxiety. She remains afebrile.     6/1  No acute events overnight. Blood sugars remain poorly controlled and patient has tons of ketchup packets at her bedside. I personally spoke with Dr Bauman of ID and since patient already had surgery, MRI foot is not needed. No other further issues. Patient says the lower dose benadryl helps with her anxiety and she is afebrile. She had debridement done down to calcaneal bone with deep intra-operative cultures obtained.     I have discussed this patient's plan of care and discharge plan at IDT rounds today with Case Management, Nursing, Nursing leadership, and other members of the IDT team.    Consultants/Specialty  none    Code Status  DNAR/DNI    Disposition  The patient is not medically cleared for discharge to home or a post-acute facility.  Anticipate discharge to: home with organized home healthcare and close outpatient follow-up    I have placed the appropriate orders for post-discharge needs.    Review of Systems  Review of Systems   Constitutional:  Negative for chills and fever.        No clear changes noted today   Gastrointestinal:  Negative for abdominal pain, nausea and vomiting.   Genitourinary:  Negative for dysuria, frequency, hematuria and urgency.   Neurological:  Positive for sensory change (chronic in legs).        No clear changes noted 6/1     Psychiatric/Behavioral:  The patient is nervous/anxious (much calmer on 6/1).         Physical Exam  Temp:  [36.1 °C (96.9 °F)-36.6 °C (97.8 °F)] 36.2 °C (97.2 °F)  Pulse:  [72-91] 81  Resp:  [15-20] 17  BP: (117-170)/(44-88) 159/65  SpO2:  [88 %-100 %] 97 %    Physical Exam  Vitals and nursing note reviewed.   Constitutional:       General: She is not in acute distress.      Appearance: She is well-developed.      Comments: Much calmer today   HENT:      Head: Normocephalic and atraumatic.      Mouth/Throat:      Pharynx: No oropharyngeal exudate.   Eyes:      General: No scleral icterus.     Pupils: Pupils are equal, round, and reactive to light.   Neck:      Thyroid: No thyromegaly.   Cardiovascular:      Rate and Rhythm: Normal rate and regular rhythm.      Heart sounds: Normal heart sounds. No murmur heard.  Pulmonary:      Effort: Pulmonary effort is normal. No respiratory distress.      Breath sounds: Normal breath sounds. No wheezing.   Abdominal:      General: Bowel sounds are normal. There is no distension.      Palpations: Abdomen is soft.      Tenderness: There is no abdominal tenderness.   Musculoskeletal:         General: Signs of injury present. No tenderness. Normal range of motion.      Cervical back: Normal range of motion and neck supple.      Right lower leg: Edema present.      Left lower leg: Edema present.      Comments: Wound vac in place, no leakage  Minimal erythema noted   Skin:     General: Skin is warm and dry.      Findings: Erythema (decreased) present. No rash.   Neurological:      Mental Status: She is alert and oriented to person, place, and time.      Cranial Nerves: No cranial nerve deficit.      Sensory: Sensory deficit (neuropathy of B/L LE's) present.         Fluids    Intake/Output Summary (Last 24 hours) at 6/1/2024 1216  Last data filed at 6/1/2024 0553  Gross per 24 hour   Intake 400 ml   Output --   Net 400 ml       Laboratory  Recent Labs     05/31/24  0253 06/01/24  0310   WBC 7.7 7.3   RBC 3.21* 3.13*   HEMOGLOBIN 9.5* 9.2*   HEMATOCRIT 30.6* 29.1*   MCV 95.3 93.0   MCH 29.6 29.4   MCHC 31.0* 31.6*   RDW 46.9 45.1   PLATELETCT 249 245   MPV 10.2 10.6     Recent Labs     05/30/24  0243 06/01/24  0310   SODIUM 137 139   POTASSIUM 4.7 4.1   CHLORIDE 102 102   CO2 24 25   GLUCOSE 287* 213*   BUN 26* 18   CREATININE 1.03 1.14   CALCIUM 9.6 9.0                      Imaging  US-TACO SINGLE LEVEL BILAT   Final Result      DX-FOOT-COMPLETE 3+ RIGHT   Final Result      Small posterior heel ulcer and there is some new soft tissue gas posterior to the ankle.      No radiographic evidence for acute osteomyelitis.      Multiple chronic osseous changes are again noted.      MR-FOOT-W/O RIGHT    (Results Pending)        Assessment/Plan  * Osteomyelitis ([facility])- (present on admission)  Assessment & Plan  S/P debridement of calcaneal bone and wound vac placed on 5/31, clinically is stable  Deep intra-operative cultures pending  Cancel MRI foot  Consulted ortho, going for debridement and wound vac placement on 5/31  ID consulted  Glycemic control (not well controlled as noted elsewhere)  Supportive wound care  TACO are normal  Pain control  Continue empiric IV unasyn  MRSA nares negative, WBC has normalized, remains afebrile  I personally reviewed each of the above labs on 6/1  OSH wound culture (contacted by wound care clinic) is growing MRSE, but not a good sample    ACP (advance care planning)- (present on admission)  Assessment & Plan  Confirmed DNR/DNI    Cellulitis- (present on admission)  Assessment & Plan  Cellulitis of right foot, possible osteomyelitis  Plan as noted in osteomyelitis    DM (diabetes mellitus) ([facility])- (present on admission)  Assessment & Plan  Has a particular regimen which she wants to adhere to, REMAINS WITH PERSISTENT HYPERGLYCEMIA  Start NPH 50 units qam and 10 units QPM (refusing multiple administrations of her blood sugars)  Reinforced with patient that persistent hyperglycemia will worsen her infection/delay wound healing, she understands  I personally reviewed the blood sugars on 6/1/2024  ISS  Consider standing SA insulin dependent on sugar levels    PAF (paroxysmal atrial fibrillation) ([facility])- (present on admission)  Assessment & Plan  Hold Eliquis for now  --Resume pending MRI result and if no further invasive intervention needed    Other  insomnia- (present on admission)  Assessment & Plan  As needed Benadryl    CKD (chronic kidney disease) stage 3, GFR 30-59 ml/min- (present on admission)  Assessment & Plan  Minimize nephrotoxic agents, renally dose meds  Cr remains near her baseline, I personally reviewed the bmp on 6/1    COPD (chronic obstructive pulmonary disease) (HCC)- (present on admission)  Assessment & Plan  Per history  Currently not in acute exacerbation    Cardiac pacemaker- (present on admission)  Assessment & Plan  Per history  St Syd placed in 2018, confirmed it is MRI compatible  No recent syncope or issues here, no need for interrogation    Anxiety- (present on admission)  Assessment & Plan  Much better controlled on 6/1  She does not tolerate any benzo's  Seems like MAXIMO, would benefit from an SSRI, but she declines at this time  Benadryl oral PRN  Uses CBD oil at home    Diastolic CHF due to valvular disease (HCC)- (present on admission)  Assessment & Plan  Prior history  Currently not in acute exacerbation    Class 1 obesity in adult- (present on admission)  Assessment & Plan  Diet and lifestyle modification  Body mass index is 32.11 kg/m².      Diabetic ulcer of right heel associated with type 2 diabetes mellitus (HCC)- (present on admission)  Assessment & Plan  Supportive wound care  Glycemic control  Plan as noted above         VTE prophylaxis:    enoxaparin ppx      I have performed a physical exam and reviewed and updated ROS and Plan today (6/1/2024). In review of yesterday's note (5/31/2024), there are no changes except as documented above.

## 2024-06-01 NOTE — THERAPY
Physical Therapy Contact Note    Patient Name: Ophelia Sosa  Age:  78 y.o., Sex:  female  Medical Record #: [id number]  Today's Date: 6/1/2024      PT evaluation attempted. Pt declined due to pain and not ready to start mobility on POD #1.  Educated pt on pathology of bedrest, importance of OOB mobility, and role of acute PT for mobility assessment & DC planning.  Pt continued to decline mobility at this time however agreeable to re-attempt at another date.  Encouraged pt to continue mobilizing daily with nursing staff including sitting up in chair to eat meals & ambulating to bathroom as needed.  Will round back for full PT assessment as able/appropriate.     06/01/24 1047   Prior Living Situation   Prior Services Home-Independent   Housing / Facility 2 Story Apartment / Condo (stays on 1st floor, stated 2nd floor is attic space)   Equipment Owned Single Point Cane (hurry-cane)   Lives with - Patient's Self Care Capacity Alone and Able to Care For Self   Prior Level of Functional Mobility   Bed Mobility Independent   Transfer Status Independent   Ambulation Independent   [facility]   Assistive Devices Used Single Point Cane   Cognition    Cognition / Consciousness WDL   Level of Consciousness Alert   Comments Pleasant & cooperative; initially mildly irritable due to pain & declined mobility   Interdisciplinary Plan of Care Collaboration   IDT Collaboration with  Nursing   Collaboration Comments PT evaluation, pt declined at this time; see note for details.  Will round back as able.

## 2024-06-01 NOTE — WOUND TEAM
[facility]  Inpatient Services  Wound and Skin Care Follow-up    Admission Date: 2024     Last order of IP CONSULT TO WOUND CARE was found on 2024 from Hospital Encounter on 2024     HPI, PMH, SH: Reviewed    Past Surgical History:   Procedure Laterality Date    IRRIGATION & DEBRIDEMENT GENERAL Right 2024    Procedure: IRRIGATION AND DEBRIDEMENT, WOUND;  Surgeon: Pk Fleming M.D.;  Location: SURGERY [facility];  Service: Orthopedics    APPLICATION OR REPLACEMENT, WOUND VAC Right 2024    Procedure: APPLICATION OR REPLACEMENT, WOUND VAC;  Surgeon: Pk Fleming M.D.;  Location: SURGERY [facility];  Service: Orthopedics    BREAST BIOPSY      GYN SURGERY  hysterectomy    MITRAL VALVE REPLACE      OTHER ORTHOPEDIC SURGERY Right     TONSILLECTOMY Bilateral      Social History     Tobacco Use    Smoking status: Former     Current packs/day: 0.00     Types: Cigarettes     Quit date: 2011     Years since quittin.5    Smokeless tobacco: Never   Substance Use Topics    Alcohol use: Not Currently     Comment: denies     Chief Complaint   Patient presents with    Wound Check     Pt c/o wound to R heel. Sent by MD for further evaluation of possible cellulitis    Foot Pain     Right foot/heel wound      Diagnosis: Osteomyelitis (HCC) [M86.9]    Unit where seen by Wound Team: S604/01     WOUND FOLLOW UP RELATED TO:  wound care follow up R heel       WOUND TEAM PLAN OF CARE - Frequency of Follow-up:   Nursing to follow dressing orders written for wound care. Contact wound team if area fails to progress, deteriorates or with any questions/concerns if something comes up before next scheduled follow up (See below as to whether wound is following and frequency of wound follow up)  :                   Bi-weekly -      WOUND HISTORY:     Ophelia Sosa is a 78 y.o. female with history of chronic A-fib on Eliquis, pulmonary hypertension, CHF, SND s/p pacemaker placement, COPD,  insulin-dependent diabetes, hyperlipidemia, CKD 3 who presented 5/28/2024 with evaluation for nonhealing right foot Achilles wound.  Patient was seen by wound care earlier, referred to ER due to nonhealing wound.  Patient has been taking doxycycline and seeing wound care outpatient with minimal improvement.  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  Pt states she spends a lot of time in a recliner chair and sleeps in the recliner at night.  She has been floating her heel while in the recliner after she became aware of the wound on her heel.      5/31/24 Pt with multiple questions about the wound POC. Appearing anxious and became tearful at times.      5/31/24 ortho consult with plans for OR today Dr Fleming   5/31/24 ID following        WOUND ASSESSMENT/LDA        Wound 05/29/24 Diabetic Ulcer Heel Right (Active)   Wound Image    06/01/24 0600   Site Assessment Yellow;Slough 05/31/24 0600   Periwound Assessment Blanchable erythema;Edema;Flaky 05/31/24 1200   Margins Undefined edges 05/29/24 1245   Closure Secondary intention 05/29/24 1245   Drainage Amount Small 05/31/24 1200   Drainage Description Serosanguineous 05/31/24 1200   Treatments Cleansed;Site care 05/31/24 0600   Wound Cleansing Approved Wound Cleanser 05/31/24 0600   Periwound Protectant Barrier Paste 05/31/24 1200   Dressing Status Clean;Dry;Intact 05/31/24 0600   Dressing Changed Changed 05/31/24 1200   Dressing Cleansing/Solutions 1/4 Strength Dakin's Solution 05/31/24 1200   Dressing Options Moist Roll Gauze;Offloading Dressing - Heel 05/31/24 1200   Dressing Change/Treatment Frequency Every Shift, and As Needed 05/31/24 1200   NEXT Dressing Change/Treatment Date 05/31/24 05/31/24 0600   NEXT Weekly Photo (Inpatient Only) 06/08/24 05/31/24 1200   Wound Length (cm) 4 cm 05/31/24 1200   Wound Width (cm) 2.5 cm 05/31/24 1200   Wound Surface Area (cm^2) 10 cm^2 05/31/24 1200    Tract at 1 oclock is 2 cm  Depth to slough is 1.4 so  true depth is greater than 1.4    Shape irregular 24 1245   Wound Odor None 24 1200   Pulses 2+ 24 1245   WOUND NURSE ONLY - Time Spent with Patient (mins) 60 24 1200                                  Vascular:    TACO:   TACO Results, Last 30 Days US-TACO SINGLE LEVEL BILAT    Result Date: 2024  Narrative  Vascular Laboratory  Conclusions  Normal TACO's and 1st TBI's.  PAYAL BAUER  Age:    78    Gender:     F  MRN:    [id number]  :    [id number]      BSA:  Exam Date:     2024 10:52  Room #:     Inpatient  Priority:     Routine  Ht (in):             Wt (lb):  Ordering Physician:        STACEY MONTALVO  Referring Physician:       [id number] Betancur  Sonographer:               Sergo Patrick RVT  Study Type:                Complete Bilateral  Technical Quality:         Adequate  Indications:     Ulceration of LE  CPT Codes:       98701  ICD Codes:       707.1  History:         Non-healing wound on right heel.  Limitations:     Could not obtain left brachial blood pressure due to IV                   placement.                 RIGHT  Waveform            Systolic BPs (mmHg)                             179           Brachial  Triphasic                                Common Femoral  Triphasic                                Popliteal  Triphasic                  199           Posterior Tibial  Triphasic                  207           Dorsalis Pedis  Normal                     142           Digit                             1.16          TACO                             0.79          TBI                       LEFT  Waveform        Systolic BPs (mmHg)                                           Brachial  Triphasic                                Common Femoral  Triphasic                                Popliteal  Triphasic                  230           Posterior Tibial  Biphasic                   211           Dorsalis Pedis  Normal                      129           Digit                             1.28          TACO                             0.72          TBI  Findings  Bilateral-  Doppler waveforms of the common femoral, popliteal, posterior tibial, and  dorsalis pedis arteries are of high amplitude and multiphasic.  The ankle-brachial indices are normal.  The toe-brachial indices are normal.  The 1st digit PPG waveform is normal.  Additional testing was not performed in accordance with lower extremity  arterial evaluation protocol.  Henok Dos Santos  (Electronically Signed)  Final Date:      30 May 2024 12:41    XRAY R foot 5/28/24  Small posterior heel ulcer and there is some new soft tissue gas posterior to the ankle.     No radiographic evidence for acute osteomyelitis.     Multiple chronic osseous changes are again noted.    MRI pending    Lab Values:    Lab Results   Component Value Date/Time    WBC 7.3 06/01/2024 03:10 AM    RBC 3.13 (L) 06/01/2024 03:10 AM    HEMOGLOBIN 9.2 (L) 06/01/2024 03:10 AM    HEMATOCRIT 29.1 (L) 06/01/2024 03:10 AM    CREACTPROT 0.81 (H) 05/28/2024 09:37 PM    SEDRATEWES 132 (H) 05/28/2024 09:37 PM    HBA1C 8.1 (H) 05/29/2024 02:23 AM         Culture Results show:  Recent Results (from the past 720 hour(s))   CULTURE WOUND W/ GRAM STAIN    Collection Time: 05/27/24 10:20 AM    Specimen: Wound   Result Value Ref Range    Significant Indicator POS (POS)     Source WND     Site right heel cx     Culture Result Light growth usual skin shannan. (A)     Gram Stain Result Many Gram positive cocci.     Culture Result Staphylococcus epidermidis  Heavy growth   (A)        Susceptibility    Staphylococcus epidermidis - MONA     Clindamycin  Resistant mcg/mL     Cefazolin  Resistant mcg/mL     Cefepime  Resistant mcg/mL     Daptomycin  Sensitive mcg/mL     Erythromycin  Resistant mcg/mL     Ampicillin/sulbactam  Resistant mcg/mL     Vancomycin  Sensitive mcg/mL     Linezolid  Sensitive mcg/mL     Oxacillin  Resistant mcg/mL      Tetracycline  Resistant mcg/mL       Pain Level/Medicated:  None, Tolerated without pain medication       INTERVENTIONS BY WOUND TEAM:   Performed standard wound care which includes appropriate positioning, dressing removal and non-selective debridement. Pictures and measurements obtained weekly if/when required.    Wound:  R heel  Preparation for Dressing removal: Removed without difficulty  Cleansed/Non-selectively Debrided with:  Wound cleanser and Gauze  Non-Excisional Conservative Sharp debridement: Slough debrided away using curette, scissors, and forceps < 20cm2 debrided down to slough and granulation tissue.  Scant bleeding noted, controlled with manual pressure.  Maty wound: Cleansed with Wound cleanser and Gauze, Prepped with No Sting and Barrier paste  Primary Dressin/4 dakins soaked gauze  Secondary (Outer) Dressing: off loading heel    Worked with  Damari BERNSTEIN with wound team for this treatment.  Damari performed debridement to the R heel    Advanced Wound Care Discharge Planning  Number of Clinicians necessary to complete wound care: 1  Is patient requiring IV pain medications for dressing changes:  No   Length of time for dressing change 60 min. (This does not include chart review, pre-medication time, set up, clean up or time spent charting.)    Interdisciplinary consultation: Patient, Bedside RN (), .  Dr Fleming bedside.  Damari Rome RN wound team    EVALUATION / RATIONALE FOR TREATMENT:     Date:  24  Wound Status:      Wound bed is not entirely clean, there is bone exposure.  A tract was found following bedside debridement.  There is more granular tissue present in this wound compared to earlier assessment.  Explained how wound care may proceed depending on ortho POC.    Date:  24  Wound Status:  Initial evaluation    The L heel is intact and blanching. The patient politely declined to have sacrum assessed. Th R heel with diabetic foot ulcer present. The wound with  over 80% slough present. Dakins applied to chemically debride nonviable tissue, decrease bioburden and odor. The patient has a strong 2+ pedal pulse, and states to have severe neuropathy. MRI and TACO have been ordered by MD.     NURSING PLAN OF CARE ORDERS:  Dressing changes: See Dressing Care orders    NUTRITION RECOMMENDATIONS   Wound Team Recommendations:  N/A    DIET ORDERS (From admission to next 24h)       Start     Ordered    05/28/24 2304  Diet Order Diet: Consistent CHO (Diabetic)  ALL MEALS        Question:  Diet:  Answer:  Consistent CHO (Diabetic)    05/28/24 2304                    PREVENTATIVE INTERVENTIONS:    Q shift Drew - performed per nursing policy  Q shift pressure point assessments - performed per nursing policy    Surface/Positioning  Standard/trauma mattress - Currently in Place    Offloading/Redistribution  Float Heels off Bed with Pillows - Currently in Place         Anticipated discharge plans:  TBD and N/A        Vac Discharge Needs:  Vac Discharge plan is purely a recommendation from wound team and not a requirement for discharge unless otherwise stated by physician.  Pt likely to have a VAC placed in OR.  Pt will need ongoing VAC changes following discharge from [facility].  Possibly vera cindy vac will be beneficial        Goals: Steady decrease in wound area and depth weekly.

## 2024-06-01 NOTE — CARE PLAN
Problem: Fall Risk  Goal: Patient will remain free from falls  Outcome: Progressing  Note: Education provided regarding fall safety such as use of call bell for assistance and bed alarm   The patient is Stable - Low risk of patient condition declining or worsening    Shift Goals  Clinical Goals: antibiotic treatment  Patient Goals: antibiotic treatment  Family Goals: joseluis    Progress made toward(s) clinical / shift goals:      Patient is not progressing towards the following goals:

## 2024-06-01 NOTE — CARE PLAN
"History:  This is a 46 year old female who presents for a left breast excisional biopsy.  She was supposed to have her surgery last month but she developed COVID and needed to reschedule.  She did not have any symptoms but she was exposed to both influenza and COVID.  She was found to be asymptomatic yet positive for both.  She denies any other new medical history at this time.    Allergies:  Lisinopril     Past medical history:  Hypertension  Hypothyroidism     Past surgical history:  Umbilical hernia repair  D&C   x3  Eyelid surgery/orbital decompression  Cystoscopy  Thyroidectomy    Physical:  BP (!) 146/91   Temp 97.3  F (36.3  C) (Temporal)   Resp 16   Ht 1.638 m (5' 4.5\")   LMP 2024 (Approximate)   SpO2 97%   BMI 31.65 kg/m    General: Alert, cooperative, appears stated age   Skin: Skin color, texture, turgor normal, no rashes or lesions   Lymphatic: No obvious adenopathy, no swelling   Eyes: No scleral icterus, pupils equal  HENT: No traumatic injury to the head or face, no gross abnormalities  Lungs: Normal respiratory effort, breath sounds equal bilaterally  Heart: Regular rate   Breasts: Left breast upper outer quadrant mass marked  Abdomen: Soft, non-distended and non-tender to palpation  Neurologic: Grossly intact    IMPRESSION:  Enlarging left breast mass    PLAN:   To the OR for a left breast excisional biopsy    Dena Chase DO  General Surgeon  [facility] - [address]  [address]  [place] [address]  Office: [phone]  Employed by - [facility]    " The patient is Watcher - Medium risk of patient condition declining or worsening    Shift Goals  Clinical Goals: Monitor postop VS and wound vac output  Patient Goals: sleep  Family Goals: BRISA    Progress made toward(s) clinical / shift goals:  right foot dressing cdi.  Wound vac with serosang drainage.   at bedtime.  Pt refused sliding scale insulin.      Patient is not progressing towards the following goals:      Problem: Knowledge Deficit - Standard  Goal: Patient and family/care givers will demonstrate understanding of plan of care, disease process/condition, diagnostic tests and medications  Outcome: Not Progressing

## 2024-06-01 NOTE — CONSULTS
"INFECTIOUS DISEASES INPATIENT CONSULT NOTE     Date of Service:6/1/24    Consult Requested By: Lance Arredondo M.D.    Reason for Consultation: Nonhealing heel wound    History of Present Illness:   Ophelia Sosa is a 78 y.o. female  admitted 5/28/2024 for above  Xray neg for OM  MRI not done due to pacemaker  S/p OR 5/31-cultures pending. Op note states osteomyelitis-patient states she was told there is no bone infection  Currently on Unasyn  Infectious Diseases consulted for antibiotic recommendation and management  Unclear etiology of wound-sent by wound crae. On doxy as outpatient  Wound care noted reviewed-from clinic note 5/3/24  \" Patient is uncertain when it started, but states it has been present for at least 6 weeks before coming into the clinic.  She is unsure of etiology, states she only wears her orthotic shoes with inserts when she leaves the house and wears rubber soled socks while in her house.  She rarely leaves her home, does not drive, relies on [employer].  Patient states that she noticed a large amount of skin that fell out of her sock 4/1/24. 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    Sleep apnea          PSH:  Past Surgical History:   Procedure Laterality Date    IRRIGATION & DEBRIDEMENT GENERAL Right 5/31/2024    Procedure: IRRIGATION AND DEBRIDEMENT, WOUND;  Surgeon: Pk Fleming M.D.;  " Location: SURGERY [facility];  Service: Orthopedics    APPLICATION OR REPLACEMENT, WOUND VAC Right 2024    Procedure: APPLICATION OR REPLACEMENT, WOUND VAC;  Surgeon: Pk Fleming M.D.;  Location: SURGERY [facility];  Service: Orthopedics    BREAST BIOPSY      GYN SURGERY  hysterectomy    MITRAL VALVE REPLACE      OTHER ORTHOPEDIC SURGERY Right     TONSILLECTOMY Bilateral        FAMILY HX:  Family History   Problem Relation Age of Onset    Hypertension Mother     Heart Disease Mother     Diabetes Mother     Diabetes Father     Hypertension Father     Diabetes Maternal Grandmother     Diabetes Paternal Grandfather        SOCIAL HX:  Social History     Socioeconomic History    Marital status: Single     Spouse name: Not on file    Number of children: 0    Years of education: Not on file    Highest education level: Not on file   Occupational History     Employer: Retired   Tobacco Use    Smoking status: Former     Current packs/day: 0.00     Types: Cigarettes     Quit date: 2011     Years since quittin.5    Smokeless tobacco: Never   Vaping Use    Vaping status: Never Used   Substance and Sexual Activity    Alcohol use: Not Currently     Comment: denies    Drug use: Yes     Comment: CBD    Sexual activity: Not Currently   Other Topics Concern    Not on file   Social History Narrative    Not on file     Social Determinants of Health     Financial Resource Strain: Medium Risk (4/3/2024)    Overall Financial Resource Strain (CARDIA)     Difficulty of Paying Living Expenses: Somewhat hard   Food Insecurity: No Food Insecurity (2024)    Hunger Vital Sign     Worried About Running Out of Food in the Last Year: Never true     Ran Out of Food in the Last Year: Never true   Transportation Needs: No Transportation Needs (2024)    PRAPARE - Transportation     Lack of Transportation (Medical): No     Lack of Transportation (Non-Medical): No   Physical Activity: Inactive (4/3/2024)    Exercise Vital  Sign     Days of Exercise per Week: 0 days     Minutes of Exercise per Session: 0 min   Stress: Stress Concern Present (4/3/2024)    [employer] - Occupational Stress Questionnaire     Feeling of Stress : To some extent   Social Connections: Feeling Socially Integrated (2024)    OASIS : Social Isolation     Frequency of experiencing loneliness or isolation: Rarely   Recent Concern: Social Connections - Socially Isolated (4/3/2024)    Social Connection and Isolation Panel [NHANES]     Frequency of Communication with Friends and Family: More than three times a week     Frequency of Social Gatherings with Friends and Family: Never     Attends Hindu Services: Never     Active Member of Clubs or Organizations: No     Attends Club or Organization Meetings: Never     Marital Status: Never    Intimate Partner Violence: Not At Risk (2024)    Humiliation, Afraid, Rape, and Kick questionnaire     Fear of Current or Ex-Partner: No     Emotionally Abused: No     Physically Abused: No     Sexually Abused: No   Housing Stability: Low Risk  (2024)    Housing Stability Vital Sign     Unable to Pay for Housing in the Last Year: No     Number of Places Lived in the Last Year: 1     Unstable Housing in the Last Year: No   Recent Concern: Housing Stability - High Risk (4/3/2024)    Housing Stability Vital Sign     Unable to Pay for Housing in the Last Year: Yes     Number of Places Lived in the Last Year: 1     Unstable Housing in the Last Year: No     Social History     Tobacco Use   Smoking Status Former    Current packs/day: 0.00    Types: Cigarettes    Quit date: 2011    Years since quittin.5   Smokeless Tobacco Never     Social History     Substance and Sexual Activity   Alcohol Use Not Currently    Comment: denies       Allergies/Intolerances:  Allergies   Allergen Reactions    Ativan      DELIRIUM, CONFUSION.    Advair [Fluticasone-Salmeterol]     Ambien  [Zolpidem Tartrate]     Erythromycin Vomiting    Ibuprofen     Lipitor [Atorvastatin Calcium]     Pcn [Penicillins] Hives     Tolerated Unasyn and Ancef 5/2024    Pravachol [Pravastatin Sodium]      ADVERSE REACTION.         Other Current Medications:    Current Facility-Administered Medications:     furosemide (Lasix) tablet 40 mg, 40 mg, Oral, DAILY, Lance Arredondo M.D., 40 mg at 06/01/24 0521    losartan (Cozaar) tablet 25 mg, 25 mg, Oral, BID, Lance Arredondo M.D., 25 mg at 06/01/24 0521    nystatin (Mycostatin) powder, , Topical, BID, Karan Neal M.D., Given at 05/30/24 0550    insulin NPH (HumuLIN N,NovoLIN N) injection, 50 Units, Subcutaneous, QAM, Lance Arredondo M.D., 50 Units at 06/01/24 0944    insulin NPH (HumuLIN N,NovoLIN N) injection, 10 Units, Subcutaneous, PM INSULIN, Lance Arredondo M.D., 10 Units at 05/29/24 1638    dakins 0.125% (1/4 strength) topical soln, , Topical, BID, Lance Arredondo M.D., 1 mL at 05/31/24 0553    enoxaparin (Lovenox) inj 40 mg, 40 mg, Subcutaneous, DAILY AT 1800, Lance Arredondo M.D.    LR (Bolus) infusion 500 mL, 500 mL, Intravenous, Once PRN, Karan Neal M.D.    acetaminophen (Tylenol) tablet 650 mg, 650 mg, Oral, Q6HRS PRN, Karan Neal M.D., 650 mg at 05/30/24 0551    labetalol (Normodyne/Trandate) injection 10 mg, 10 mg, Intravenous, Q4HRS PRN, Karan Neal M.D.    ondansetron (Zofran) syringe/vial injection 4 mg, 4 mg, Intravenous, Q4HRS PRN, Karan Neal M.D.    ondansetron (Zofran ODT) dispertab 4 mg, 4 mg, Oral, Q4HRS PRN, Karan Neal M.D.    guaiFENesin dextromethorphan (Robitussin DM) 100-10 MG/5ML syrup 10 mL, 10 mL, Oral, Q6HRS PRN, Karan Neal M.D.    senna-docusate (Pericolace Or Senokot S) 8.6-50 MG per tablet 2 Tablet, 2 Tablet, Oral, Q EVENING, 2 Tablet at 05/29/24 0552 **AND** polyethylene glycol/lytes (Miralax) Packet 1 Packet, 1 Packet, Oral, QDAY PRN, Karan Neal M.D.    albuterol inhaler 2 Puff, 2 Puff, Inhalation, Q6HRS PRN,  Karan Neal M.D.    rosuvastatin (Crestor) tablet 5 mg, 5 mg, Oral, QPM MO, WE + FR, Karan Neal M.D., 5 mg at 24 1714    Pharmacy Consult Request ...Pain Management Review 1 Each, 1 Each, Other, PHARMACY TO DOSE, Karan Neal M.D.    acetaminophen (Tylenol) tablet 650 mg, 650 mg, Oral, Q6HRS, 650 mg at 24 1158 **FOLLOWED BY** [START ON 2024] acetaminophen (Tylenol) tablet 650 mg, 650 mg, Oral, Q6HRS PRN, Karan Neal M.D.    oxyCODONE immediate-release (Roxicodone) tablet 2.5 mg, 2.5 mg, Oral, Q3HRS PRN **OR** oxyCODONE immediate-release (Roxicodone) tablet 5 mg, 5 mg, Oral, Q3HRS PRN **OR** HYDROmorphone (Dilaudid) injection 0.25 mg, 0.25 mg, Intravenous, Q3HRS PRN, Karan Neal M.D.    diphenhydrAMINE (Benadryl) tablet/capsule 25 mg, 25 mg, Oral, 1X PRN, Karan Neal M.D.    diphenhydrAMINE (Benadryl) tablet/capsule 25 mg, 25 mg, Oral, QHS PRN, Karan Neal M.D., 25 mg at 24 0321    insulin regular (HumuLIN R,NovoLIN R) injection, 3-14 Units, Subcutaneous, 4X/DAY ACHS, 4 Units at 24 0944 **AND** POC blood glucose manual result, , , Q AC AND BEDTIME(S) **AND** NOTIFY MD and PharmD, , , Once **AND** Administer 20 grams of glucose (approximately 8 ounces of fruit juice) every 15 minutes PRN FSBG less than 70 mg/dL, , , PRN **AND** dextrose 50% (D50W) injection 25 g, 25 g, Intravenous, Q15 MIN PRN, Karan Neal M.D.    ipratropium-albuterol (DUONEB) nebulizer solution, 3 mL, Nebulization, Q4H PRN (RT), Karan Neal M.D.    ampicillin/sulbactam (Unasyn) 3 g in  mL IVPB, 3 g, Intravenous, Q6HRS, Karan Neal M.D., Last Rate: 200 mL/hr at 24 1159, 3 g at 24 1159      Most Recent Vital Signs:  BP (!) 159/65   Pulse 81   Temp 36.2 °C (97.2 °F) (Temporal)   Resp 17   Wt 96.3 kg (212 lb 4.9 oz)   SpO2 97%   BMI 33.25 kg/m²   Temp  Av.4 °C (97.6 °F)  Min: 36.1 °C (96.9 °F)  Max: 36.9 °C (98.5 °F)    Physical Exam:  General: well-appearing, well nourished no  "acute distress  HEENT: NCAT, PERRLA, sclera anicteric  Neck: supple, no lymphadenopathy  Chest: CTAB, unlabored.PM  Cardiac: RRR, not tachy  Abdomen:  soft, non-tender, non-distended  Extremities: No cyanosis, clubbing. no edema, VAC  Neuro: Alert and oriented times 3, Speech fluent CN intact HWIPPLE  Psych: Mood normal Affect normal    Pertinent Lab Results:  Recent Labs     05/31/24  0253 06/01/24  0310   WBC 7.7 7.3      Recent Labs     05/31/24  0253 06/01/24  0310   HEMOGLOBIN 9.5* 9.2*   HEMATOCRIT 30.6* 29.1*   MCV 95.3 93.0   MCH 29.6 29.4   PLATELETCT 249 245         Recent Labs     05/30/24  0243 06/01/24 0310   SODIUM 137 139   POTASSIUM 4.7 4.1   CHLORIDE 102 102   CO2 24 25   CREATININE 1.03 1.14        No results for input(s): \"ALBUMIN\" in the last 72 hours.    Invalid input(s): \"AST\", \"ALT\", \"ALKPHOS\", \"BILITOT\", \"TOTALBILIRUB\", \"BILIRUBINTOT\", \"BILIRUBINDIR\", \"BILIRUBININD\", \"ALKALINEPHOS\"     Pertinent Micro:  Results       Procedure Component Value Units Date/Time    GRAM STAIN [[id number]] Collected: 05/31/24 1311    Order Status: Completed Specimen: Bone Updated: 05/31/24 2318     Significant Indicator .     Source BONE     Site L calcaneous     Gram Stain Result Few WBCs.  No organisms seen.      Narrative:      Surgery Specimen    CULTURE TISSUE W/ GRM STAIN [[id number]] Collected: 05/31/24 1311    Order Status: No result Specimen: Bone Updated: 05/31/24 2318     Significant Indicator NEG     Source BONE     Site L calcaneous     Culture Result -     Gram Stain Result Few WBCs.  No organisms seen.      Narrative:      Surgery Specimen    Anaerobic Culture [[id number]] Collected: 05/31/24 1311    Order Status: No result Specimen: Bone Updated: 05/31/24 2318     Significant Indicator NEG     Source BONE     Site L calcaneous     Culture Result -    Narrative:      Surgery Specimen    Fungal Culture [[id number]] Collected: 05/31/24 1311    Order Status: Completed Specimen: Bone Updated: 05/31/24 2318     " Significant Indicator NEG     Source BONE     Site L calcaneous     Culture Result Culture in progress.     Fungal Smear Results -    Narrative:      Surgery Specimen    AFB Culture [[id number]] Collected: 05/31/24 1311    Order Status: No result Specimen: Bone Updated: 05/31/24 2318     Significant Indicator NEG     Source BONE     Site L calcaneous     Culture Result -     AFB Smear Results -    Narrative:      Surgery Specimen    BLOOD CULTURE [[id number]] Collected: 05/28/24 1620    Order Status: Completed Specimen: Blood from Peripheral Updated: 05/29/24 0947     Significant Indicator NEG     Source BLD     Site PERIPHERAL     Culture Result No Growth  Note: Blood cultures are incubated for 5 days and  are monitored continuously.Positive blood cultures  are called to the RN and reported as soon as  they are identified.      Narrative:      Left AC    BLOOD CULTURE [[id number]] Collected: 05/28/24 1615    Order Status: Completed Specimen: Blood from Peripheral Updated: 05/29/24 0947     Significant Indicator NEG     Source BLD     Site PERIPHERAL     Culture Result No Growth  Note: Blood cultures are incubated for 5 days and  are monitored continuously.Positive blood cultures  are called to the RN and reported as soon as  they are identified.      Narrative:      Right Forearm/Arm    Urinalysis [[id number]]  (Abnormal) Collected: 05/29/24 0227    Order Status: Completed Specimen: Urine Updated: 05/29/24 0250     Color Yellow     Character Clear     Specific Gravity 1.010     Ph 6.0     Glucose Negative mg/dL      Ketones Negative mg/dL      Protein Negative mg/dL      Bilirubin Negative     Urobilinogen, Urine 0.2     Nitrite Negative     Leukocyte Esterase Small     Occult Blood Negative     Micro Urine Req Microscopic    MRSA By PCR (Amp) [[id number]] Collected: 05/28/24 1620    Order Status: Completed Specimen: Respirate from Nares Updated: 05/28/24 1820     MRSA by PCR Negative          Blood Culture   Date Value  Ref Range Status   08/25/2015   Final    Blood culture testing and Gram stain, if indicated, are  performed at [facility], [address] [place] [address].  Positive blood cultures are  sent to [facility], [address] [place] [address], for organism identification and  susceptibility testing.  No growth after 5 days of incubation.          Studies:  IMPRESSION:     Small posterior heel ulcer and there is some new soft tissue gas posterior to the ankle.     No radiographic evidence for acute osteomyelitis.     Multiple chronic osseous changes are again noted.  IMPRESSION:   Chronic diabetic heel wound with necrotic Achilles  -large 4 cm X 2.5 cm well-circumscribed with callus and tunneling  Suspected osteomyelitis  -wound swab not useful diagnostically unless SA isolated-contam with skin shannan  S/p 5/31 Excisional debridement of right foot wound including skin, subcutaneous   tissue, tendon and bone through wound measuring approximately 4x5 cm.  2.  Partial craterization of right calcaneus for osteomyelitis.  3.  Application of wound VAC, right foot  Poorly controlled DM-declines changes in diabetes medications  Normal TACO    PLAN:   Given recent surgery, MRI no longer recommended as it is unlikely to differentiate surgical changes from bone infection  FU cultures  Advised 6 weeks of IV antibiotics will not cure chronic osteomyelitis if present  May need additional debridement of any necrotic tissues that could serve as a continued nidus of infection.      At risk for limb loss and poor wound healing  Keep BS under 150 to help control current infection-patient currently declining any change in diet or medications    Final antibiotic recommendations per culture results and clinical course        PICC TBD  Plan of care discussed with RICHARD Arredondo M.D.. Will continue to follow    Becky Bauman M.D.

## 2024-06-01 NOTE — PROGRESS NOTES
Orthopaedic Progress Note    Interval changes:  Patient doing well post op  Wound team to place veraflo vac today- will need prolonged veraflo course due to exposed achilles and bone  RLE vac dressings CDI without leak  Cleared for DC to facility that can continue veraflo only- not ok to switch to standard vac.     ROS - Patient denies any new issues.  Pain well controlled.    BP (!) 159/65   Pulse 81   Temp 36.2 °C (97.2 °F) (Temporal)   Resp 17   Wt 96.3 kg (212 lb 4.9 oz)   SpO2 97%     Patient seen and examined  No acute distress  Breathing non labored  RRR  RLE vac dressings CDI, no change from prior neuropathy, cap refill <2 sec.     Recent Labs     05/31/24  0253 06/01/24  0310   WBC 7.7 7.3   RBC 3.21* 3.13*   HEMOGLOBIN 9.5* 9.2*   HEMATOCRIT 30.6* 29.1*   MCV 95.3 93.0   MCH 29.6 29.4   MCHC 31.0* 31.6*   RDW 46.9 45.1   PLATELETCT 249 245   MPV 10.2 10.6       Active Hospital Problems    Diagnosis     Osteomyelitis ([facility]) [M86.9]     DM (diabetes mellitus) ([facility]) [E11.9]     Cellulitis [L03.90]     ACP (advance care planning) [Z71.89]     PAF (paroxysmal atrial fibrillation) ([facility]) [I48.0]     Other insomnia [G47.09]     COPD (chronic obstructive pulmonary disease) ([facility]) [J44.9]     CKD (chronic kidney disease) stage 3, GFR 30-59 ml/min [N18.30]     Cardiac pacemaker [Z95.0]     Anxiety [F41.9]     Diastolic CHF due to valvular disease ([facility]) [I50.30, I38]     Class 1 obesity in adult [E66.9]     Diabetic ulcer of right heel associated with type 2 diabetes mellitus ([facility]) [E11.621, L97.419]        Assessment/Plan:  Patient doing well post op  Wound team to place veraflo vac today- will need prolonged veraflo course due to exposed achilles and bone  RLE vac dressings CDI without leak  Cleared for DC to facility that can continue veraflo only- not ok to switch to standard vac.  POD#1 S/P:  1.  Excisional debridement of right foot wound including skin, subcutaneous tissue, tendon and bone through wound  measuring approximately 4x5 cm.  2.  Partial craterization of right calcaneus for osteomyelitis.  3.  Application of wound VAC, right foot, less than 50 cm2.  Wt bearing status - WBAT RLE  Wound care/Drains - vac veraflo dressings to be changed by wound team  Future Procedures - may need grafting if unable to heal over tendon with veralfow  Lovenox: Start 5/29, Duration-until ambulatory > 150'  Sutures/Staples out- 14-21 days post operatively. Removal will completed by ortho mid levels only.  PT/OT-initiated  Antibiotics: unasyn 3g IV q6  DVT Prophylaxis- TEDS/SCDs/Foot pumps  Rosas-not needed per ortho  Case Coordination for Discharge Planning - Disposition per therapy recs.

## 2024-06-02 LAB
ALBUMIN SERPL BCP-MCNC: 3.4 G/DL (ref 3.2–4.9)
ALBUMIN/GLOB SERPL: 1.1 G/DL
ALP SERPL-CCNC: 64 U/L (ref 30–99)
ALT SERPL-CCNC: 6 U/L (ref 2–50)
ANION GAP SERPL CALC-SCNC: 14 MMOL/L (ref 7–16)
AST SERPL-CCNC: 12 U/L (ref 12–45)
BACTERIA BLD CULT: NORMAL
BACTERIA BLD CULT: NORMAL
BACTERIA TISS AEROBE CULT: ABNORMAL
BACTERIA TISS AEROBE CULT: ABNORMAL
BILIRUB SERPL-MCNC: 0.2 MG/DL (ref 0.1–1.5)
BUN SERPL-MCNC: 25 MG/DL (ref 8–22)
CALCIUM ALBUM COR SERPL-MCNC: 9.8 MG/DL (ref 8.5–10.5)
CALCIUM SERPL-MCNC: 9.3 MG/DL (ref 8.5–10.5)
CHLORIDE SERPL-SCNC: 97 MMOL/L (ref 96–112)
CK SERPL-CCNC: 86 U/L (ref 0–154)
CO2 SERPL-SCNC: 25 MMOL/L (ref 20–33)
CREAT SERPL-MCNC: 1.29 MG/DL (ref 0.5–1.4)
GFR SERPLBLD CREATININE-BSD FMLA CKD-EPI: 42 ML/MIN/1.73 M 2
GLOBULIN SER CALC-MCNC: 3.2 G/DL (ref 1.9–3.5)
GLUCOSE BLD STRIP.AUTO-MCNC: 115 MG/DL (ref 65–99)
GLUCOSE BLD STRIP.AUTO-MCNC: 171 MG/DL (ref 65–99)
GLUCOSE BLD STRIP.AUTO-MCNC: 175 MG/DL (ref 65–99)
GLUCOSE BLD STRIP.AUTO-MCNC: 203 MG/DL (ref 65–99)
GLUCOSE BLD STRIP.AUTO-MCNC: 262 MG/DL (ref 65–99)
GLUCOSE SERPL-MCNC: 294 MG/DL (ref 65–99)
GRAM STN SPEC: ABNORMAL
POTASSIUM SERPL-SCNC: 3.8 MMOL/L (ref 3.6–5.5)
PROT SERPL-MCNC: 6.6 G/DL (ref 6–8.2)
SIGNIFICANT IND 70042: ABNORMAL
SIGNIFICANT IND 70042: NORMAL
SIGNIFICANT IND 70042: NORMAL
SITE SITE: ABNORMAL
SITE SITE: NORMAL
SITE SITE: NORMAL
SODIUM SERPL-SCNC: 136 MMOL/L (ref 135–145)
SOURCE SOURCE: ABNORMAL
SOURCE SOURCE: NORMAL
SOURCE SOURCE: NORMAL

## 2024-06-02 PROCEDURE — 700105 HCHG RX REV CODE 258: Performed by: INTERNAL MEDICINE

## 2024-06-02 PROCEDURE — 700111 HCHG RX REV CODE 636 W/ 250 OVERRIDE (IP): Mod: JZ | Performed by: INTERNAL MEDICINE

## 2024-06-02 PROCEDURE — 700111 HCHG RX REV CODE 636 W/ 250 OVERRIDE (IP): Mod: JZ | Performed by: STUDENT IN AN ORGANIZED HEALTH CARE EDUCATION/TRAINING PROGRAM

## 2024-06-02 PROCEDURE — 700105 HCHG RX REV CODE 258: Performed by: STUDENT IN AN ORGANIZED HEALTH CARE EDUCATION/TRAINING PROGRAM

## 2024-06-02 PROCEDURE — A9270 NON-COVERED ITEM OR SERVICE: HCPCS | Performed by: INTERNAL MEDICINE

## 2024-06-02 PROCEDURE — 99232 SBSQ HOSP IP/OBS MODERATE 35: CPT | Performed by: INTERNAL MEDICINE

## 2024-06-02 PROCEDURE — 700102 HCHG RX REV CODE 250 W/ 637 OVERRIDE(OP): Performed by: INTERNAL MEDICINE

## 2024-06-02 PROCEDURE — 36415 COLL VENOUS BLD VENIPUNCTURE: CPT

## 2024-06-02 PROCEDURE — A9270 NON-COVERED ITEM OR SERVICE: HCPCS | Performed by: STUDENT IN AN ORGANIZED HEALTH CARE EDUCATION/TRAINING PROGRAM

## 2024-06-02 PROCEDURE — 700102 HCHG RX REV CODE 250 W/ 637 OVERRIDE(OP): Performed by: STUDENT IN AN ORGANIZED HEALTH CARE EDUCATION/TRAINING PROGRAM

## 2024-06-02 PROCEDURE — 770006 HCHG ROOM/CARE - MED/SURG/GYN SEMI*

## 2024-06-02 PROCEDURE — 80053 COMPREHEN METABOLIC PANEL: CPT

## 2024-06-02 PROCEDURE — 82962 GLUCOSE BLOOD TEST: CPT

## 2024-06-02 PROCEDURE — 82550 ASSAY OF CK (CPK): CPT

## 2024-06-02 PROCEDURE — 99233 SBSQ HOSP IP/OBS HIGH 50: CPT | Performed by: INTERNAL MEDICINE

## 2024-06-02 RX ADMIN — ACETAMINOPHEN 650 MG: 325 TABLET, FILM COATED ORAL at 12:09

## 2024-06-02 RX ADMIN — NYSTATIN: 100000 POWDER TOPICAL at 17:54

## 2024-06-02 RX ADMIN — APIXABAN 5 MG: 5 TABLET, FILM COATED ORAL at 17:45

## 2024-06-02 RX ADMIN — AMPICILLIN AND SULBACTAM 3 G: 1; 2 INJECTION, POWDER, FOR SOLUTION INTRAMUSCULAR; INTRAVENOUS at 22:59

## 2024-06-02 RX ADMIN — LOSARTAN POTASSIUM 25 MG: 25 TABLET, FILM COATED ORAL at 05:53

## 2024-06-02 RX ADMIN — AMPICILLIN AND SULBACTAM 3 G: 1; 2 INJECTION, POWDER, FOR SOLUTION INTRAMUSCULAR; INTRAVENOUS at 12:10

## 2024-06-02 RX ADMIN — DIPHENHYDRAMINE HYDROCHLORIDE 25 MG: 25 TABLET ORAL at 03:01

## 2024-06-02 RX ADMIN — FUROSEMIDE 40 MG: 40 TABLET ORAL at 05:53

## 2024-06-02 RX ADMIN — LOSARTAN POTASSIUM 25 MG: 25 TABLET, FILM COATED ORAL at 17:45

## 2024-06-02 RX ADMIN — AMPICILLIN AND SULBACTAM 3 G: 1; 2 INJECTION, POWDER, FOR SOLUTION INTRAMUSCULAR; INTRAVENOUS at 17:46

## 2024-06-02 RX ADMIN — DAPTOMYCIN 750 MG: 500 INJECTION, POWDER, LYOPHILIZED, FOR SOLUTION INTRAVENOUS at 14:47

## 2024-06-02 RX ADMIN — DIPHENHYDRAMINE HYDROCHLORIDE 25 MG: 25 TABLET ORAL at 21:18

## 2024-06-02 RX ADMIN — INSULIN HUMAN 3 UNITS: 100 INJECTION, SOLUTION PARENTERAL at 12:26

## 2024-06-02 RX ADMIN — NYSTATIN: 100000 POWDER TOPICAL at 05:53

## 2024-06-02 RX ADMIN — INSULIN HUMAN 50 UNITS: 100 INJECTION, SUSPENSION SUBCUTANEOUS at 09:07

## 2024-06-02 RX ADMIN — SODIUM HYPOCHLORITE 473 ML: 1.25 SOLUTION TOPICAL at 17:54

## 2024-06-02 RX ADMIN — INSULIN HUMAN 4 UNITS: 100 INJECTION, SOLUTION PARENTERAL at 09:09

## 2024-06-02 RX ADMIN — ACETAMINOPHEN 650 MG: 325 TABLET, FILM COATED ORAL at 21:18

## 2024-06-02 RX ADMIN — AMPICILLIN AND SULBACTAM 3 G: 1; 2 INJECTION, POWDER, FOR SOLUTION INTRAMUSCULAR; INTRAVENOUS at 05:53

## 2024-06-02 RX ADMIN — ACETAMINOPHEN 650 MG: 325 TABLET, FILM COATED ORAL at 05:52

## 2024-06-02 ASSESSMENT — ENCOUNTER SYMPTOMS
VOMITING: 0
CHILLS: 0
SENSORY CHANGE: 1
FEVER: 0
NERVOUS/ANXIOUS: 1
NAUSEA: 0
ABDOMINAL PAIN: 0

## 2024-06-02 ASSESSMENT — PAIN DESCRIPTION - PAIN TYPE
TYPE: ACUTE PAIN
TYPE: ACUTE PAIN

## 2024-06-02 NOTE — PROGRESS NOTES
Orthopaedic Progress Note    Interval changes:  Patient doing well    Veraflo vac in place without leak- will need prolonged veraflo course due to exposed achilles and bone  RLE vac dressings CDI without leak  Cleared for DC to facility that can continue veraflo only- not ok to switch to standard vac.     ROS - Patient denies any new issues.  Pain well controlled.    BP (!) 170/61   Pulse 79   Temp 36.4 °C (97.5 °F) (Temporal)   Resp 16   Wt 96.3 kg (212 lb 4.9 oz)   SpO2 92%     Patient seen and examined  No acute distress  Breathing non labored  RRR  RLE vac dressings CDI, no change from prior neuropathy, cap refill <2 sec.     Recent Labs     05/31/24  0253 06/01/24  0310   WBC 7.7 7.3   RBC 3.21* 3.13*   HEMOGLOBIN 9.5* 9.2*   HEMATOCRIT 30.6* 29.1*   MCV 95.3 93.0   MCH 29.6 29.4   MCHC 31.0* 31.6*   RDW 46.9 45.1   PLATELETCT 249 245   MPV 10.2 10.6       Active Hospital Problems    Diagnosis     Osteomyelitis ([facility]) [M86.9]     DM (diabetes mellitus) ([facility]) [E11.9]     Cellulitis [L03.90]     ACP (advance care planning) [Z71.89]     PAF (paroxysmal atrial fibrillation) ([facility]) [I48.0]     Other insomnia [G47.09]     COPD (chronic obstructive pulmonary disease) ([facility]) [J44.9]     CKD (chronic kidney disease) stage 3, GFR 30-59 ml/min [N18.30]     Cardiac pacemaker [Z95.0]     Anxiety [F41.9]     Diastolic CHF due to valvular disease ([facility]) [I50.30, I38]     Class 1 obesity in adult [E66.9]     Diabetic ulcer of right heel associated with type 2 diabetes mellitus ([facility]) [E11.621, L97.419]        Assessment/Plan:  Patient doing well    Veraflo vac in place without leak- will need prolonged veraflo course due to exposed achilles and bone  RLE vac dressings CDI without leak  Cleared for DC to facility that can continue veraflo only- not ok to switch to standard vac. .  POD#2 S/P:  1.  Excisional debridement of right foot wound including skin, subcutaneous tissue, tendon and bone through wound measuring  approximately 4x5 cm.  2.  Partial craterization of right calcaneus for osteomyelitis.  3.  Application of wound VAC, right foot, less than 50 cm2.  Wt bearing status - WBAT RLE  Wound care/Drains - vac veraflo dressings to be changed by wound team  Future Procedures - may need grafting if unable to heal over tendon with veralfow  Lovenox: Start 5/29, Duration-until ambulatory > 150'  Sutures/Staples out- 14-21 days post operatively. Removal will completed by ortho mid levels only.  PT/OT-initiated  Antibiotics: unasyn 3g IV q6  DVT Prophylaxis- TEDS/SCDs/Foot pumps  Rosas-not needed per ortho  Case Coordination for Discharge Planning - Disposition per therapy recs.

## 2024-06-02 NOTE — DISCHARGE PLANNING
"[facility] chart review completed.  Current discharge considerations are developing, possible resumption of [facility].  PT orders in chart.      Please see ID note by Becky Bauman M.D. on 6/1/24 at 12:06 PM, \"Given recent surgery, MRI no longer recommended as it is unlikely to differentiate surgical changes from bone infection FU cultures.  Advised 6 weeks of IV antibiotics will not cure chronic osteomyelitis if present\".      Please see Surgery Orthopedic note by Phan Hawkins P.A.-C.on 6/1/24 at 12:35 PM, \"Wound team to place veraflo vac today- will need prolonged veraflo course due to exposed achilles and bone RLE vac dressings CDI without leak  Cleared for DC to facility that can continue veraflo only- not ok to switch to standard vac.  Wt bearing status - WBAT RLE\".      TCN will continue to follow and collaborate with discharge planning team as additional post acute needs arise. Thank you.    Completed:  PT orders in chart  Choice obtained: HH (resumption of [facility])  Referral sent to [facility] 5/29. Per [facility] patient is followed by [facility] Care Management outpatient   "

## 2024-06-02 NOTE — PROGRESS NOTES
Infectious Disease Progress Note    Author: Becky Bauman M.D. Date & Time of service: 2024  12:00 PM    Chief Complaint:  Nonhealing heel wound, chronic  Infected Achilles    Interval History:   78 y.o. diabetic female admitted 2024 for above  6/2 AF WBC 7.3  Labs Reviewed, Medications Reviewed, and Wound Reviewed.    Review of Systems:  Review of Systems   Unable to perform ROS: Other   Constitutional:  Negative for fever.       Hemodynamics:  Temp (24hrs), Av.4 °C (97.5 °F), Min:36.3 °C (97.3 °F), Max:36.6 °C (97.8 °F)  Temperature: 36.4 °C (97.5 °F)  Pulse  Av  Min: 71  Max: 100   Blood Pressure : (!) 170/61       Physical Exam:  Physical Exam  Vitals and nursing note reviewed.   Cardiovascular:      Rate and Rhythm: Normal rate.   Pulmonary:      Effort: Pulmonary effort is normal. No respiratory distress.   Musculoskeletal:      Comments: VAC         Meds:    Current Facility-Administered Medications:     DAPTOmycin    furosemide    losartan    nystatin    insulin NPH    insulin NPH    dakins 0.125% (1/4 strength)    enoxaparin (LOVENOX) injection    LR    acetaminophen    labetalol    ondansetron    ondansetron    guaiFENesin dextromethorphan    senna-docusate **AND** polyethylene glycol/lytes    albuterol    rosuvastatin    Pharmacy Consult Request    acetaminophen **FOLLOWED BY** acetaminophen    oxyCODONE immediate-release **OR** oxyCODONE immediate-release **OR** HYDROmorphone    diphenhydrAMINE    insulin regular **AND** POC blood glucose manual result **AND** NOTIFY MD and PharmD **AND** Administer 20 grams of glucose (approximately 8 ounces of fruit juice) every 15 minutes PRN FSBG less than 70 mg/dL **AND** dextrose bolus    ipratropium-albuterol    ampicillin-sulbactam (UNASYN) IV    Labs:  Recent Labs     24  0253 24  0310   WBC 7.7 7.3   RBC 3.21* 3.13*   HEMOGLOBIN 9.5* 9.2*   HEMATOCRIT 30.6* 29.1*   MCV 95.3 93.0   MCH 29.6 29.4   RDW 46.9 45.1   PLATELETCT  249 245   MPV 10.2 10.6   NEUTSPOLYS 67.30 69.00   LYMPHOCYTES 18.70* 16.80*   MONOCYTES 8.90 9.80   EOSINOPHILS 3.80 3.30   BASOPHILS 0.90 0.70     Recent Labs     24  0310 24  1022   SODIUM 139 136   POTASSIUM 4.1 3.8   CHLORIDE 102 97   CO2 25 25   GLUCOSE 213* 294*   BUN 18 25*   CPKTOTAL  --  86     Recent Labs     24  0310 24  1022   ALBUMIN  --  3.4   TBILIRUBIN  --  0.2   ALKPHOSPHAT  --  64   TOTPROTEIN  --  6.6   ALTSGPT  --  6   ASTSGOT  --  12   CREATININE 1.14 1.29       Imaging:  US-TACO SINGLE LEVEL BILAT    Result Date: 2024   Vascular Laboratory  Conclusions  Normal TACO's and 1st TBI's.  PAYAL BAUER  Age:    78    Gender:     F  MRN:    [id number]  :    [id number]      BSA:  Exam Date:     2024 10:52  Room #:     Inpatient  Priority:     Routine  Ht (in):             Wt (lb):  Ordering Physician:        STACEY MONTALVO  Referring Physician:       [id number] Betancur  Sonographer:               Sergo Patrick                             [id number]  Study Type:                Complete Bilateral  Technical Quality:         Adequate  Indications:     Ulceration of LE  CPT Codes:       97389  ICD Codes:       707.1  History:         Non-healing wound on right heel.  Limitations:     Could not obtain left brachial blood pressure due to IV                   placement.                 RIGHT  Waveform            Systolic BPs (mmHg)                             179           Brachial  Triphasic                                Common Femoral  Triphasic                                Popliteal  Triphasic                  199           Posterior Tibial  Triphasic                  207           Dorsalis Pedis  Normal                     142           Digit                             1.16          TACO                             0.79          TBI                       LEFT  Waveform        Systolic BPs (mmHg)                                            Brachial  Triphasic                                Common Femoral  Triphasic                                Popliteal  Triphasic                  230           Posterior Tibial  Biphasic                   211           Dorsalis Pedis  Normal                     129           Digit                             1.28          TACO                             0.72          TBI  Findings  Bilateral-  Doppler waveforms of the common femoral, popliteal, posterior tibial, and  dorsalis pedis arteries are of high amplitude and multiphasic.  The ankle-brachial indices are normal.  The toe-brachial indices are normal.  The 1st digit PPG waveform is normal.  Additional testing was not performed in accordance with lower extremity  arterial evaluation protocol.  Henok Dos Santos  (Electronically Signed)  Final Date:      30 May 2024 12:41    DX-FOOT-COMPLETE 3+ RIGHT    Result Date: 5/28/2024 5/28/2024 3:08 PM HISTORY/REASON FOR EXAM:  Atraumatic Pain/Swelling/Deformity TECHNIQUE/EXAM DESCRIPTION AND NUMBER OF VIEWS: 3 views of the RIGHT foot. COMPARISON:  5/13/2024 FINDINGS: Extensive chronic changes of the midfoot including chronic Lisfranc fracture dislocation and midfoot degenerative changes again noted. There are old fracture deformities of the second and third proximal phalanges again noted as well. No acute fracture or  dislocation. There is soft tissue gas posterior to the ankle which is new from prior radiograph. There is some dystrophic calcification in this area again noted. Small posterior heel ulcer..     Small posterior heel ulcer and there is some new soft tissue gas posterior to the ankle. No radiographic evidence for acute osteomyelitis. Multiple chronic osseous changes are again noted.    DX-FOOT-COMPLETE 3+ RIGHT    Result Date: 5/13/2024 5/13/2024 9:02 PM HISTORY/REASON FOR EXAM:  Atraumatic Pain/Swelling/Deformity TECHNIQUE/EXAM DESCRIPTION AND NUMBER OF VIEWS: 3 views of the RIGHT foot. COMPARISON:  2/1/2016  FINDINGS:  Bone mineralization is normal.  There is no evidence of fracture or dislocation.  There is severe joint space narrowing periarticular sclerosis and spurring throughout the tarsal metatarsal joints with fusion across several joints. Some malalignment is noted with lateral displacement of metatarsals relative to the tarsal bones. Chronic fragmentation of the distal diaphysis of the second metatarsal bone also appears to be present. Degenerative changes at the second and third MTP joints are also noted.  No bone erosions are identified.. Age-indeterminate fracture of the fourth toe proximal phalangeal neck. Osseous fragments at the expected location of the Achilles.     1.  Age-indeterminate fracture of the fourth toe proximal phalangeal neck. 2.  Redemonstration of Charcot's foot including near complete fusion at the tarsal metatarsal joints and across the proximal ends of the metatarsal bones. 3.  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 4.  Degenerative changes in the second and third MTP joint. 5.  No radiographic evidence of acute osteomyelitis. If there is clinical concern for radiographically occult osteomyelitis, MRI can be obtained. 6.  Possible age-indeterminate avulsion of the Achilles.       Micro:  Results       Procedure Component Value Units Date/Time    Anaerobic Culture [[id number]] Collected: 05/31/24 1311    Order Status: Completed Specimen: Bone Updated: 06/01/24 1827     Significant Indicator NEG     Source BONE     Site L calcaneous     Culture Result Culture in progress.    Narrative:      Surgery Specimen    Fungal Culture [[id number]] Collected: 05/31/24 1311    Order Status: Completed Specimen: Bone Updated: 06/01/24 1827     Significant Indicator NEG     Source BONE     Site L calcaneous     Culture Result Culture in progress.     Fungal Smear Results No fungal elements seen.    Narrative:      Surgery Specimen    AFB Culture [[id number]] Collected: 05/31/24 1311    Order  Status: Completed Specimen: Bone Updated: 06/01/24 1827     Significant Indicator NEG     Source BONE     Site L calcaneous     Culture Result Culture in progress.     AFB Smear Results No acid fast bacilli seen.    Narrative:      Surgery Specimen    CULTURE TISSUE W/ GRM STAIN [[id number]]  (Abnormal) Collected: 05/31/24 1311    Order Status: Completed Specimen: Bone Updated: 06/01/24 1827     Significant Indicator POS     Source BONE     Site L calcaneous     Culture Result -     Gram Stain Result Few WBCs.  No organisms seen.       Culture Result Staphylococcus epidermidis  Light growth      Narrative:      Surgery Specimen    Acid Fast Stain [[id number]] Collected: 05/31/24 1311    Order Status: Completed Specimen: Bone Updated: 06/01/24 1740     Significant Indicator NEG     Source BONE     Site L calcaneous     AFB Smear Results No acid fast bacilli seen.    Narrative:      Surgery Specimen    GRAM STAIN [[id number]] Collected: 05/31/24 1311    Order Status: Completed Specimen: Bone Updated: 06/01/24 1740     Significant Indicator .     Source BONE     Site L calcaneous     Gram Stain Result Few WBCs.  No organisms seen.      Narrative:      Surgery Specimen    Fungal Smear [[id number]] Collected: 05/31/24 1311    Order Status: Completed Specimen: Bone Updated: 06/01/24 1740     Significant Indicator NEG     Source BONE     Site L calcaneous     Fungal Smear Results No fungal elements seen.    Narrative:      Surgery Specimen    BLOOD CULTURE [[id number]] Collected: 05/28/24 1620    Order Status: Completed Specimen: Blood from Peripheral Updated: 05/29/24 0947     Significant Indicator NEG     Source BLD     Site PERIPHERAL     Culture Result No Growth  Note: Blood cultures are incubated for 5 days and  are monitored continuously.Positive blood cultures  are called to the RN and reported as soon as  they are identified.      Narrative:      Left AC    BLOOD CULTURE [[id number]] Collected: 05/28/24 1615    Order  Status: Completed Specimen: Blood from Peripheral Updated: 05/29/24 0947     Significant Indicator NEG     Source BLD     Site PERIPHERAL     Culture Result No Growth  Note: Blood cultures are incubated for 5 days and  are monitored continuously.Positive blood cultures  are called to the RN and reported as soon as  they are identified.      Narrative:      Right Forearm/Arm    Urinalysis [[id number]]  (Abnormal) Collected: 05/29/24 0227    Order Status: Completed Specimen: Urine Updated: 05/29/24 0250     Color Yellow     Character Clear     Specific Gravity 1.010     Ph 6.0     Glucose Negative mg/dL      Ketones Negative mg/dL      Protein Negative mg/dL      Bilirubin Negative     Urobilinogen, Urine 0.2     Nitrite Negative     Leukocyte Esterase Small     Occult Blood Negative     Micro Urine Req Microscopic    MRSA By PCR (Amp) [[id number]] Collected: 05/28/24 1620    Order Status: Completed Specimen: Respirate from Nares Updated: 05/28/24 1820     MRSA by PCR Negative            Assessment:  Active Hospital Problems    Diagnosis     *Osteomyelitis ([facility]) [M86.9]     DM (diabetes mellitus) ([facility]) [E11.9]     Cellulitis [L03.90]     ACP (advance care planning) [Z71.89]     PAF (paroxysmal atrial fibrillation) ([facility]) [I48.0]     Other insomnia [G47.09]     COPD (chronic obstructive pulmonary disease) ([facility]) [J44.9]     CKD (chronic kidney disease) stage 3, GFR 30-59 ml/min [N18.30]     Cardiac pacemaker [Z95.0]     Anxiety [F41.9]     Diastolic CHF due to valvular disease ([facility]) [I50.30, I38]     Class 1 obesity in adult [E66.9]     Diabetic ulcer of right heel associated with type 2 diabetes mellitus ([facility]) [E11.621, L97.419]      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  2.  Partial craterization of right calcaneus for osteomyelitis.  3.  Application of wound VAC, right foot  Poorly controlled DM-declines changes in diabetes medications  Normal TACO     PLAN:   FU cultures-bone +staph epi so far from one culture  Advised 6 weeks of IV/PO antibiotics will not cure chronic osteomyelitis   May need additional debridement of any necrotic tissues that could serve as a continued nidus of infection.    Add daptomycin and continue Unasyn for now  Monitor CPK  At risk for limb loss and poor wound healing  Keep BS under 150 to help control current infection-patient currently declining any change in diet or medications     Stop date antibiotics 6 weeks from surgery-initial 4 weeks IV then follow with PO  Midline OK  Placement as needs Pablo

## 2024-06-02 NOTE — PROGRESS NOTES
Hospital Medicine Daily Progress Note    Date of Service  6/2/2024    Chief Complaint  Ophelia Sosa is a 78 y.o. female admitted 5/28/2024 with longstanding type 2 diabetes mellitus on insulin, chronic A-fib on Eliquis, hypertension, COPD and CKD stage III presented to the hospital with a nonhealing right foot Achilles wound and ulcer.  She has been followed by outpatient wound care with worsening wound and was instructed to come to the emergency room in hospital for further evaluation for osteomyelitis and possible abscess.  She was then placed empirically on IV ceftriaxone and vancomycin which the latter was discontinued after MRSA nares was negative.  She now remains on IV Unasyn.  An MRI of the foot has been ordered but will need to check MRI compatibility.    Hospital Course  See above    Interval Problem Update  5/29  Patient very concerned about her insulin regimen which is highly regulated and individualized. I had a long conversation with the patient that we will try to accommodate as much as we can for her insulin regimen. She has no pain of the affected foot given profound neuropathy from the knee distally. She remains afebrile. She had a St Syd PPM placed in 2018 at [facility]. She wants it interrogated and is unsure if it is MRI compatible. Cursory review on Google shows that it might need to be placed into compatible mode.     5/30  No acute events overnight. Patient intermittently refusing BS checks and insulin administration. BS's ranged from 166-272. Remains afebrile. We confirmed with the ST Syd rep that her PPM is MRI compatible. Mildly hypertensive. She feels the redness of her foot is improving.     5/31  I personally consulted and spoke with Dr Fleming of orthopedic surgery and Dr Tidwell of ID about patient's case in detail. Patient is highly anxious this AM with the prospects of going to surgery. Her blood sugars remain very poorly controlled and there is extensive nursing documentation  about various refusals of insulin administration. I spoke extensively with patient about the importance of blood sugar control in the context of a deep space infection of her R foot and she understands. Will add some benadryl for ongoing severe anxiety. She remains afebrile.     6/1  No acute events overnight. Blood sugars remain poorly controlled and patient has tons of ketchup packets at her bedside. I personally spoke with Dr Bauman of ID and since patient already had surgery, MRI foot is not needed. No other further issues. Patient says the lower dose benadryl helps with her anxiety and she is afebrile. She had debridement done down to calcaneal bone with deep intra-operative cultures obtained.     6/2  Blood sugars remain labile and poorly controlled. She remains fixated on dosing her own insulin. Pain is tolerable, remains afebrile. Wound vac converted veraflo. Cultures with presumed MRSE and IV daptomycin has been added.     I have discussed this patient's plan of care and discharge plan at IDT rounds today with Case Management, Nursing, Nursing leadership, and other members of the IDT team.    Consultants/Specialty  none    Code Status  DNAR/DNI    Disposition  The patient is not medically cleared for discharge to home or a post-acute facility.  Anticipate discharge to: home with organized home healthcare and close outpatient follow-up    I have placed the appropriate orders for post-discharge needs.    Review of Systems  Review of Systems   Constitutional:  Negative for chills and fever.        Feels about the same on 6/2   Gastrointestinal:  Negative for abdominal pain, nausea and vomiting.   Genitourinary:  Negative for dysuria, frequency, hematuria and urgency.   Neurological:  Positive for sensory change (chronic in legs).        No clear changes noted 6/2     Psychiatric/Behavioral:  The patient is nervous/anxious (much calmer on 6/1).         Physical Exam  Temp:  [36.3 °C (97.3 °F)-36.6 °C (97.8 °F)]  36.4 °C (97.5 °F)  Pulse:  [71-81] 79  Resp:  [14-19] 16  BP: (115-170)/(46-61) 170/61  SpO2:  [92 %-94 %] 92 %    Physical Exam  Vitals and nursing note reviewed.   Constitutional:       General: She is not in acute distress.     Appearance: She is well-developed.      Comments: Remains decently calm on 6/2   HENT:      Head: Normocephalic and atraumatic.      Mouth/Throat:      Pharynx: No oropharyngeal exudate.   Eyes:      General: No scleral icterus.     Pupils: Pupils are equal, round, and reactive to light.   Neck:      Thyroid: No thyromegaly.   Cardiovascular:      Rate and Rhythm: Normal rate and regular rhythm.      Heart sounds: Normal heart sounds. No murmur heard.  Pulmonary:      Effort: Pulmonary effort is normal. No respiratory distress.      Breath sounds: Normal breath sounds. No wheezing.   Abdominal:      General: Bowel sounds are normal. There is no distension.      Palpations: Abdomen is soft.      Tenderness: There is no abdominal tenderness.   Musculoskeletal:         General: Signs of injury present. No tenderness. Normal range of motion.      Cervical back: Normal range of motion and neck supple.      Right lower leg: Edema present.      Left lower leg: Edema present.      Comments: Wound vac in place with Veraflo, no leakage  Minimal erythema noted   Skin:     General: Skin is warm and dry.      Findings: Erythema (decreased) present. No rash.   Neurological:      Mental Status: She is alert and oriented to person, place, and time.      Cranial Nerves: No cranial nerve deficit.      Sensory: Sensory deficit (neuropathy of B/L LE's) present.         Fluids    Intake/Output Summary (Last 24 hours) at 6/2/2024 1359  Last data filed at 6/1/2024 1939  Gross per 24 hour   Intake 240 ml   Output --   Net 240 ml       Laboratory  Recent Labs     05/31/24  0253 06/01/24  0310   WBC 7.7 7.3   RBC 3.21* 3.13*   HEMOGLOBIN 9.5* 9.2*   HEMATOCRIT 30.6* 29.1*   MCV 95.3 93.0   MCH 29.6 29.4   MCHC  31.0* 31.6*   RDW 46.9 45.1   PLATELETCT 249 245   MPV 10.2 10.6     Recent Labs     06/01/24  0310 06/02/24  1022   SODIUM 139 136   POTASSIUM 4.1 3.8   CHLORIDE 102 97   CO2 25 25   GLUCOSE 213* 294*   BUN 18 25*   CREATININE 1.14 1.29   CALCIUM 9.0 9.3                     Imaging  US-TACO SINGLE LEVEL BILAT   Final Result      DX-FOOT-COMPLETE 3+ RIGHT   Final Result      Small posterior heel ulcer and there is some new soft tissue gas posterior to the ankle.      No radiographic evidence for acute osteomyelitis.      Multiple chronic osseous changes are again noted.           Assessment/Plan  * Osteomyelitis ([facility])- (present on admission)  Assessment & Plan  S/P debridement of calcaneal bone and wound vac placed on 5/31, clinically is stable  Deep intra-operative cultures growing MRSE, same as wound cultures from 5/31  Added IV dapto 6/2 and continue empiric IV unasyn, likely needs at least 6 weeks of abx's  Order PICC line, will need OPIC  Cancel MRI foot  Consulted ortho, going for debridement and wound vac placement on 5/31  ID consulted  Glycemic control (not well controlled as noted elsewhere)  Supportive wound care  TACO are normal  Pain control  MRSA nares negative, WBC has normalized, remains afebrile  I personally reviewed each of the above labs on 6/2    ACP (advance care planning)- (present on admission)  Assessment & Plan  Confirmed DNR/DNI    Cellulitis- (present on admission)  Assessment & Plan  Cellulitis of right foot, possible osteomyelitis  Plan as noted in osteomyelitis    DM (diabetes mellitus) ([facility])- (present on admission)  Assessment & Plan  Has a particular regimen which she wants to adhere to, REMAINS WITH PERSISTENT HYPERGLYCEMIA and highly labile, she continues to be adamant about wanting to do her own insulin.  Start NPH 50 units qam and 10 units QPM (refusing multiple administrations of her blood sugars)  Reinforced with patient that persistent hyperglycemia will worsen her  infection/delay wound healing, she understands  I personally reviewed the blood sugars on 6/2/2024  ISS  Consider standing SA insulin dependent on sugar levels    PAF (paroxysmal atrial fibrillation) ([facility])- (present on admission)  Assessment & Plan  Restart eliquis    Other insomnia- (present on admission)  Assessment & Plan  As needed Benadryl    CKD (chronic kidney disease) stage 3, GFR 30-59 ml/min- (present on admission)  Assessment & Plan  Minimize nephrotoxic agents, renally dose meds  Cr remains near her baseline, I personally reviewed the bmp on 6/2    COPD (chronic obstructive pulmonary disease) ([facility])- (present on admission)  Assessment & Plan  Per history  Currently not in acute exacerbation    Cardiac pacemaker- (present on admission)  Assessment & Plan  Per history  St Syd placed in 2018, confirmed it is MRI compatible  No recent syncope or issues here, no need for interrogation    Anxiety- (present on admission)  Assessment & Plan  Remains decently controlled on 6/2  She does not tolerate any benzo's  Seems like MAXIMO, would benefit from an SSRI, but she declines at this time  Benadryl oral PRN  Uses CBD oil at home    Diastolic CHF due to valvular disease ([facility])- (present on admission)  Assessment & Plan  Prior history  Currently not in acute exacerbation    Class 1 obesity in adult- (present on admission)  Assessment & Plan  Diet and lifestyle modification  Body mass index is 32.11 kg/m².      Diabetic ulcer of right heel associated with type 2 diabetes mellitus (HCC)- (present on admission)  Assessment & Plan  Supportive wound care  Glycemic control  Plan as noted above         VTE prophylaxis:    therapeutic anticoagulation with eliquis 5 mg BID      I have performed a physical exam and reviewed and updated ROS and Plan today (6/2/2024). In review of yesterday's note (6/1/2024), there are no changes except as documented above.

## 2024-06-02 NOTE — CARE PLAN
The patient is Stable - Low risk of patient condition declining or worsening    Shift Goals  Clinical Goals: wound vac care, antibiotics  Patient Goals: get better, sleep  Family Goals: joseluis    Progress made toward(s) clinical / shift goals:    Problem: Pain - Standard  Goal: Alleviation of pain or a reduction in pain to the patient’s comfort goal  Outcome: Progressing     Problem: Skin Integrity  Goal: Risk for impaired skin integrity will decrease  Outcome: Progressing   Patient educated on the need to turn frequently to prevent pressure sores. Barrer cream applied on pressure points.   Patient is not progressing towards the following goals:

## 2024-06-03 ENCOUNTER — APPOINTMENT (OUTPATIENT)
Dept: RADIOLOGY | Facility: MEDICAL CENTER | Age: 79
DRG: 629 | End: 2024-06-03
Attending: INTERNAL MEDICINE
Payer: MEDICARE

## 2024-06-03 LAB
BACTERIA SPEC ANAEROBE CULT: NORMAL
GLUCOSE BLD STRIP.AUTO-MCNC: 136 MG/DL (ref 65–99)
GLUCOSE BLD STRIP.AUTO-MCNC: 198 MG/DL (ref 65–99)
GLUCOSE BLD STRIP.AUTO-MCNC: 253 MG/DL (ref 65–99)
GLUCOSE BLD STRIP.AUTO-MCNC: 307 MG/DL (ref 65–99)
GLUCOSE BLD STRIP.AUTO-MCNC: 333 MG/DL (ref 65–99)
SIGNIFICANT IND 70042: NORMAL
SITE SITE: NORMAL
SOURCE SOURCE: NORMAL

## 2024-06-03 PROCEDURE — 99232 SBSQ HOSP IP/OBS MODERATE 35: CPT | Performed by: INTERNAL MEDICINE

## 2024-06-03 PROCEDURE — 770006 HCHG ROOM/CARE - MED/SURG/GYN SEMI*

## 2024-06-03 PROCEDURE — 97605 NEG PRS WND THER DME<=50SQCM: CPT

## 2024-06-03 PROCEDURE — 700102 HCHG RX REV CODE 250 W/ 637 OVERRIDE(OP)

## 2024-06-03 PROCEDURE — A9270 NON-COVERED ITEM OR SERVICE: HCPCS | Performed by: STUDENT IN AN ORGANIZED HEALTH CARE EDUCATION/TRAINING PROGRAM

## 2024-06-03 PROCEDURE — 700105 HCHG RX REV CODE 258: Performed by: INTERNAL MEDICINE

## 2024-06-03 PROCEDURE — 700111 HCHG RX REV CODE 636 W/ 250 OVERRIDE (IP): Mod: JZ | Performed by: INTERNAL MEDICINE

## 2024-06-03 PROCEDURE — 90832 PSYTX W PT 30 MINUTES: CPT | Performed by: SOCIAL WORKER

## 2024-06-03 PROCEDURE — 82962 GLUCOSE BLOOD TEST: CPT | Mod: 91

## 2024-06-03 PROCEDURE — 700102 HCHG RX REV CODE 250 W/ 637 OVERRIDE(OP): Performed by: STUDENT IN AN ORGANIZED HEALTH CARE EDUCATION/TRAINING PROGRAM

## 2024-06-03 PROCEDURE — 97166 OT EVAL MOD COMPLEX 45 MIN: CPT

## 2024-06-03 PROCEDURE — 700102 HCHG RX REV CODE 250 W/ 637 OVERRIDE(OP): Performed by: INTERNAL MEDICINE

## 2024-06-03 PROCEDURE — A9270 NON-COVERED ITEM OR SERVICE: HCPCS

## 2024-06-03 PROCEDURE — A9270 NON-COVERED ITEM OR SERVICE: HCPCS | Performed by: INTERNAL MEDICINE

## 2024-06-03 PROCEDURE — 90837 PSYTX W PT 60 MINUTES: CPT | Performed by: SOCIAL WORKER

## 2024-06-03 PROCEDURE — 99233 SBSQ HOSP IP/OBS HIGH 50: CPT | Performed by: INTERNAL MEDICINE

## 2024-06-03 PROCEDURE — 97602 WOUND(S) CARE NON-SELECTIVE: CPT

## 2024-06-03 RX ADMIN — NYSTATIN: 100000 POWDER TOPICAL at 04:21

## 2024-06-03 RX ADMIN — INSULIN HUMAN 10 UNITS: 100 INJECTION, SOLUTION PARENTERAL at 20:24

## 2024-06-03 RX ADMIN — DIPHENHYDRAMINE HYDROCHLORIDE 25 MG: 25 TABLET ORAL at 20:38

## 2024-06-03 RX ADMIN — INSULIN HUMAN 10 UNITS: 100 INJECTION, SUSPENSION SUBCUTANEOUS at 18:43

## 2024-06-03 RX ADMIN — LOSARTAN POTASSIUM 25 MG: 25 TABLET, FILM COATED ORAL at 04:13

## 2024-06-03 RX ADMIN — DIPHENHYDRAMINE HYDROCHLORIDE 25 MG: 25 TABLET ORAL at 04:13

## 2024-06-03 RX ADMIN — APIXABAN 5 MG: 5 TABLET, FILM COATED ORAL at 04:12

## 2024-06-03 RX ADMIN — NYSTATIN: 100000 POWDER TOPICAL at 18:43

## 2024-06-03 RX ADMIN — INSULIN HUMAN 7 UNITS: 100 INJECTION, SOLUTION PARENTERAL at 13:49

## 2024-06-03 RX ADMIN — AMPICILLIN AND SULBACTAM 3 G: 1; 2 INJECTION, POWDER, FOR SOLUTION INTRAMUSCULAR; INTRAVENOUS at 18:41

## 2024-06-03 RX ADMIN — ALBUTEROL SULFATE 2 PUFF: 90 AEROSOL, METERED RESPIRATORY (INHALATION) at 20:44

## 2024-06-03 RX ADMIN — LOSARTAN POTASSIUM 25 MG: 25 TABLET, FILM COATED ORAL at 18:39

## 2024-06-03 RX ADMIN — DAPTOMYCIN 750 MG: 500 INJECTION, POWDER, LYOPHILIZED, FOR SOLUTION INTRAVENOUS at 06:00

## 2024-06-03 RX ADMIN — ROSUVASTATIN CALCIUM 5 MG: 5 TABLET, FILM COATED ORAL at 18:39

## 2024-06-03 RX ADMIN — AMPICILLIN AND SULBACTAM 3 G: 1; 2 INJECTION, POWDER, FOR SOLUTION INTRAMUSCULAR; INTRAVENOUS at 14:21

## 2024-06-03 RX ADMIN — ACETAMINOPHEN 650 MG: 325 TABLET, FILM COATED ORAL at 03:17

## 2024-06-03 RX ADMIN — FUROSEMIDE 40 MG: 40 TABLET ORAL at 04:13

## 2024-06-03 RX ADMIN — INSULIN HUMAN 10 UNITS: 100 INJECTION, SOLUTION PARENTERAL at 18:43

## 2024-06-03 RX ADMIN — AMPICILLIN AND SULBACTAM 3 G: 1; 2 INJECTION, POWDER, FOR SOLUTION INTRAMUSCULAR; INTRAVENOUS at 04:17

## 2024-06-03 RX ADMIN — INSULIN HUMAN 3 UNITS: 100 INJECTION, SOLUTION PARENTERAL at 08:16

## 2024-06-03 RX ADMIN — APIXABAN 5 MG: 5 TABLET, FILM COATED ORAL at 18:39

## 2024-06-03 RX ADMIN — ACETAMINOPHEN 650 MG: 325 TABLET, FILM COATED ORAL at 20:38

## 2024-06-03 RX ADMIN — AMPICILLIN AND SULBACTAM 3 G: 1; 2 INJECTION, POWDER, FOR SOLUTION INTRAMUSCULAR; INTRAVENOUS at 23:29

## 2024-06-03 ASSESSMENT — ENCOUNTER SYMPTOMS
NAUSEA: 0
SENSORY CHANGE: 1
ABDOMINAL PAIN: 0
CHILLS: 0
DEPRESSION: 1
NERVOUS/ANXIOUS: 1
MYALGIAS: 1
FEVER: 0
VOMITING: 0

## 2024-06-03 ASSESSMENT — COGNITIVE AND FUNCTIONAL STATUS - GENERAL
HELP NEEDED FOR BATHING: A LITTLE
TOILETING: A LITTLE
DRESSING REGULAR LOWER BODY CLOTHING: A LITTLE
SUGGESTED CMS G CODE MODIFIER DAILY ACTIVITY: CJ
DAILY ACTIVITIY SCORE: 21

## 2024-06-03 ASSESSMENT — PAIN DESCRIPTION - PAIN TYPE
TYPE: ACUTE PAIN
TYPE: ACUTE PAIN

## 2024-06-03 ASSESSMENT — ACTIVITIES OF DAILY LIVING (ADL): TOILETING: INDEPENDENT

## 2024-06-03 NOTE — DISCHARGE PLANNING
Per notes patient looks like they will DC home with both a wound vac as well as IV ABX. Please include both of these on the HH order once available as well as infusion orders from [facility].

## 2024-06-03 NOTE — CONSULTS
"[facility]    INPATIENT ASSESSMENT    Name: Ophelia Sosa  MRN: [id number]  : 1945  Age: 78 y.o.  Date of assessment: 6/3/2024  PCP: Emmanuelle Gan M.D.  Persons in attendance: Patient      HPI: Per Medical Record: \"Ophelia Sosa is a 78 y.o. female admitted 2024 with longstanding type 2 diabetes mellitus on insulin, chronic A-fib on Eliquis, hypertension, COPD and CKD stage III presented to the hospital with a nonhealing right foot Achilles wound and ulcer. She has been followed by outpatient wound care with worsening wound and was instructed to come to the emergency room in hospital for further evaluation for osteomyelitis and possible abscess. She was then placed empirically on IV ceftriaxone and vancomycin which the latter was discontinued after MRSA nares was negative. She now remains on IV Unasyn. \" Patient was referred to Behavioral Health Care due to depression and anxiety related to medical condition.    Psychotherapy Session Summary(as stated by Patient ): Ophelia Sosa is a 78 year old female seen sitting up on hospital bed, alert, orient, pleasant, with rapid speech, tangential with flight of ideas, verbally monopolizing the session. Patient's insight related to social cues and ability to allow others to talk was impaired due to her overwhelming need to be heard.    Patient insists there are no wearing masks in her room or she will refuse to talk because she could hear on \"35% of what you are saying\". Patient continues, \"and are you going to sit 6 feet away because my spit doesn't travel that far, you need to sit 3 feet away or closer\". Patient had to be redirected and reminded that she cannot make health decisions for others. 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 Patient then discussed how terrible the food is here and that she had to insist to have the nurse bring her certain foods because a \"dog wouldn't eat what they gave me\". " "    Patient shared her life story, her time in the , the various men she dated, one who was too young and didn't know how to have sex so she had to drop him, and on and on. Patient was challenging to redirect as she over talked and over ruled any attempt to bring the session to a therapeutic level.    Clinician encouraged patient to transition the conversation to her current challenges . Patient displayed appropriate emotion as she discussed her need for independence and self sufficiency. Patient is concerned she will not be able to take care of herself at the same level she has in the past. Patient reports a history of anxiety in which she used Benadryl to self medicate. Now patient states she uses cannabis oil which helps her maintain calm and manage anxiety disorder.    Clinician worked with patient on accessing her coping skills that has provided her the resilience to effectively manage other situations in her life successfully. Clinician use Solution Focused therapy techniques to help her begin the process of considering change and adjustments that may need to be made in her life to manage emotionally the temporary change in functioning. Patient is open to support and learning new ways of coping stating, \"what I use to do to cope with my therapist is not working now\".     Patient was receptive to suggestions made to access her sources of strength including her mich as patient held up her personal Bible on the bed tray. Patient would benefit from outpatient psychotherapy to provide support and encouragement during this phase of her life. Patient was open to this suggestion.     Chief Complaint   Patient presents with    Wound Check     Pt c/o wound to R heel. Sent by MD for further evaluation of possible cellulitis    Foot Pain     Right foot/heel wound         CURRENT LIVING SITUATION/SOCIAL SUPPORT: Patient resides alone, she has never , no children. Patient reports a good childhood with no " significant trauma. 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    BEHAVIORAL HEALTH TREATMENT HISTORY  Does patient/parent report a history of prior behavioral health treatment for patient?   Outpatient menta; health treatment    SAFETY ASSESSMENT - SELF  Does patient acknowledge current or past symptoms of dangerousness to self? no  Does parent/significant other report patient has current or past symptoms of dangerousness to self? N\A  Does presenting problem suggest symptoms of dangerousness to self? No    SAFETY ASSESSMENT - OTHERS  Does patient acknowledge current or past symptoms of aggressive behavior or risk to others? no  Does parent/significant other report patient has current or past symptoms of aggressive behavior or risk to others?  N\A  Does presenting problem suggest symptoms of dangerousness to others? No    Crisis Safety Plan completed and copy given to patient? no    ABUSE/NEGLECT SCREENING  Does patient report feeling “unsafe” in his/her home, or afraid of anyone?  no  Does patient report any history of physical, sexual, or emotional abuse?  no  Does parent or significant other report any of the above? N\A  Is there evidence of neglect by self?  no  Is there evidence of neglect by a caregiver? no  Does the patient/parent report any history of CPS/APS/police involvement related to suspected abuse/neglect or domestic violence? no  Based on the information provided during the current assessment, is a mandated report of suspected abuse/neglect being made?  No    SUBSTANCE USE SCREENING  Yes:  [place] all substances used in the past 30 days:      Last Use Amount   []   Alcohol     [x]   Marijuana     []   Heroin     []   Prescription Opioids  (used without prescription, for    recreation, or in excess of prescribed amount)     []   Other Prescription  (used without prescription, for    recreation, or in excess of prescribed amount)     []   Cocaine      []   Methamphetamine     []  "  \"\" drugs (ectasy, MDMA)     []   Other substances        UDS results: Not assessed, patient reports she uses cannabis oil for anxiety  Breathalyzer results: not assessed    What consequences does the patient associate with any of the above substance use and or addictive behaviors? None    Risk factors for detox (check all that apply):  []  Seizures   []  Diaphoretic (sweating)   []  Tremors   []  Hallucinations   []  Increased blood pressure   []  Decreased blood pressure   []  Other   []  None      [] Patient education on risk factors for detoxification and instructed to return to ER as needed.      MENTAL STATUS              Participation: Verbally monopolizing  Grooming: Casual  Orientation: Alert  Behavior: Hyperactive  Eye contact: Good  Mood: Manic  Affect: Expansive  Thought process: Tangential and Flight of ideas  Thought content: Within normal limits  Speech: Rapid  Perception: Within normal limits  Memory:  Recent:  Adequate  Insight: Adequate  Judgment:  Adequate  Other:    Collateral information:   Source:   Significant other present in person:    Significant other by telephone   [facility] Nursing Staff-consulted   [facility] Medical Record-reviewed   Other:      Unable to complete full assessment due to:   Acute intoxication   Patient declined to participate/engage   Patient verbally unresponsive   Significant cognitive deficits   Significant perceptual distortions or behavioral disorganization   Other:             CLINICAL IMPRESSIONS:  Primary:  adjustment disorder with mixed emotions  Secondary:     R/O Bipolar disorder  ; R/O Generalized anxiety                                  IDENTIFIED NEEDS/PLAN:  [Trigger DISPOSITION list for any items marked]      Imminent safety risk - self  Imminent safety risk - others     Acute substance withdrawal   Psychosis/Impaired reality testing    x Mood/anxiety   Substance use/Addictive behavior     Maladaptive behavior   Parent/child " conflict     Family/Couples conflict   Biomedical     Housing   Financial      Legal  Occupational/Educational     Domestic violence   Other:     Recommendations and Observation Level:  Case management please provide home based outpatient counseling services if available.    Please place consult order for psychiatry for medication management.    Legal Hold: N/A    Thank you,      Madisyn Morales, Ph.D., [facility]  6/3/2024   Length of intervention: 90 minutes

## 2024-06-03 NOTE — PROGRESS NOTES
Hospital Medicine Daily Progress Note    Date of Service  6/3/2024    Chief Complaint  Ohpelia Sosa is a 78 y.o. female admitted 5/28/2024 with longstanding type 2 diabetes mellitus on insulin, chronic A-fib on Eliquis, hypertension, COPD and CKD stage III presented to the hospital with a nonhealing right foot Achilles wound and ulcer.  She has been followed by outpatient wound care with worsening wound and was instructed to come to the emergency room in hospital for further evaluation for osteomyelitis and possible abscess.  She was then placed empirically on IV ceftriaxone and vancomycin which the latter was discontinued after MRSA nares was negative.  She now remains on IV Unasyn.  An MRI of the foot has been ordered but will need to check MRI compatibility.    Hospital Course  See above    Interval Problem Update  5/29  Patient very concerned about her insulin regimen which is highly regulated and individualized. I had a long conversation with the patient that we will try to accommodate as much as we can for her insulin regimen. She has no pain of the affected foot given profound neuropathy from the knee distally. She remains afebrile. She had a St Syd PPM placed in 2018 at [facility]. She wants it interrogated and is unsure if it is MRI compatible. Cursory review on Google shows that it might need to be placed into compatible mode.     5/30  No acute events overnight. Patient intermittently refusing BS checks and insulin administration. BS's ranged from 166-272. Remains afebrile. We confirmed with the ST Syd rep that her PPM is MRI compatible. Mildly hypertensive. She feels the redness of her foot is improving.     5/31  I personally consulted and spoke with Dr Fleming of orthopedic surgery and Dr Tidwell of ID about patient's case in detail. Patient is highly anxious this AM with the prospects of going to surgery. Her blood sugars remain very poorly controlled and there is extensive nursing documentation  about various refusals of insulin administration. I spoke extensively with patient about the importance of blood sugar control in the context of a deep space infection of her R foot and she understands. Will add some benadryl for ongoing severe anxiety. She remains afebrile.     6/1  No acute events overnight. Blood sugars remain poorly controlled and patient has tons of ketchup packets at her bedside. I personally spoke with Dr Bauman of ID and since patient already had surgery, MRI foot is not needed. No other further issues. Patient says the lower dose benadryl helps with her anxiety and she is afebrile. She had debridement done down to calcaneal bone with deep intra-operative cultures obtained.     6/2  Blood sugars remain labile and poorly controlled. She remains fixated on dosing her own insulin. Pain is tolerable, remains afebrile. Wound vac converted veraflo. Cultures with presumed MRSE and IV daptomycin has been added.     6/3  Patient's anxiety has worsened.  I have consulted behavioral health and consider psychiatry consult.  She wants increasing doses of Benadryl and I explained to the patient that that is not beneficial for generalized anxiety.  I spoke with both orthopedic surgery and infectious disease.  Given her exposed tendon and bone she needs to go to a skilled nursing facility for vera flow which will need to have an extended course.  Other than that patient is anxious to go out of the hospital and is afebrile.  Her blood sugars continue remain labile.    I have discussed this patient's plan of care and discharge plan at IDT rounds today with Case Management, Nursing, Nursing leadership, and other members of the IDT team.    Consultants/Specialty  none    Code Status  DNAR/DNI    Disposition  The patient is medically cleared for discharge to home or a post-acute facility.  Anticipate discharge to: skilled nursing facility    I have placed the appropriate orders for post-discharge  needs.    Review of Systems  Review of Systems   Constitutional:  Negative for chills and fever.        Feels about the same on 6/2   Gastrointestinal:  Negative for abdominal pain, nausea and vomiting.   Genitourinary:  Negative for dysuria, frequency, hematuria and urgency.   Neurological:  Positive for sensory change (chronic in legs).        No clear changes noted 6/2     Psychiatric/Behavioral:  The patient is nervous/anxious (much calmer on 6/1).         She feels more anxious today        Physical Exam  Temp:  [36.1 °C (97 °F)-36.9 °C (98.4 °F)] 36.7 °C (98 °F)  Pulse:  [71-81] 80  Resp:  [16-19] 17  BP: (143-173)/(46-57) 173/46  SpO2:  [93 %-98 %] 98 %    Physical Exam  Vitals and nursing note reviewed.   Constitutional:       General: She is not in acute distress.     Appearance: She is well-developed.      Comments: Much more anxious today on 6/3   HENT:      Head: Normocephalic and atraumatic.      Mouth/Throat:      Pharynx: No oropharyngeal exudate.   Eyes:      General: No scleral icterus.     Pupils: Pupils are equal, round, and reactive to light.   Neck:      Thyroid: No thyromegaly.   Cardiovascular:      Rate and Rhythm: Normal rate and regular rhythm.      Heart sounds: Normal heart sounds. No murmur heard.  Pulmonary:      Effort: Pulmonary effort is normal. No respiratory distress.      Breath sounds: Normal breath sounds. No wheezing.   Abdominal:      General: Bowel sounds are normal. There is no distension.      Palpations: Abdomen is soft.      Tenderness: There is no abdominal tenderness.   Musculoskeletal:         General: Signs of injury present. No tenderness. Normal range of motion.      Cervical back: Normal range of motion and neck supple.      Right lower leg: Edema present.      Left lower leg: Edema present.      Comments: Wound vac in place with Veraflo, no leakage  Minimal erythema noted  No clear changes noted on 6/3   Skin:     General: Skin is warm and dry.      Findings:  Erythema (decreased) present. No rash.   Neurological:      Mental Status: She is alert and oriented to person, place, and time.      Cranial Nerves: No cranial nerve deficit.      Sensory: Sensory deficit (neuropathy of B/L LE's) present.         Fluids    Intake/Output Summary (Last 24 hours) at 6/3/2024 1519  Last data filed at 6/2/2024 1917  Gross per 24 hour   Intake 480 ml   Output --   Net 480 ml       Laboratory  Recent Labs     06/01/24  0310   WBC 7.3   RBC 3.13*   HEMOGLOBIN 9.2*   HEMATOCRIT 29.1*   MCV 93.0   MCH 29.4   MCHC 31.6*   RDW 45.1   PLATELETCT 245   MPV 10.6     Recent Labs     06/01/24  0310 06/02/24  1022   SODIUM 139 136   POTASSIUM 4.1 3.8   CHLORIDE 102 97   CO2 25 25   GLUCOSE 213* 294*   BUN 18 25*   CREATININE 1.14 1.29   CALCIUM 9.0 9.3                     Imaging  US-TACO SINGLE LEVEL BILAT   Final Result      DX-FOOT-COMPLETE 3+ RIGHT   Final Result      Small posterior heel ulcer and there is some new soft tissue gas posterior to the ankle.      No radiographic evidence for acute osteomyelitis.      Multiple chronic osseous changes are again noted.      IR-MIDLINE CATHETER INSERTION WO GUIDANCE > AGE 3    (Results Pending)        Assessment/Plan  * Osteomyelitis (HCC)- (present on admission)  Assessment & Plan  S/P debridement of calcaneal bone and wound vac placed on 5/31, clinically is stable  Deep intra-operative cultures growing MRSE, same as wound cultures from 5/31  Added IV dapto 6/2 and continue empiric IV unasyn, likely needs at least 6 weeks of abx's  Given exposed tendon and bone patient will need prolonged vera flow, therefore she will need skilled nursing facility for many weeks.  This kind of wound VAC arrangement is not possible at home.  I will explain this to the patient.  Ordered midline  Cancel MRI foot  Consulted ortho, going for debridement and wound vac placement on 5/31  ID consulted  Glycemic control (not well controlled as noted elsewhere)  Supportive wound  care  TACO are normal  Pain control  MRSA nares negative, WBC has normalized, remains afebrile  I personally reviewed the deep intra-op cx on 6/3    ACP (advance care planning)- (present on admission)  Assessment & Plan  Confirmed DNR/DNI    Cellulitis- (present on admission)  Assessment & Plan  Cellulitis of right foot, possible osteomyelitis  Plan as noted in osteomyelitis    DM (diabetes mellitus) ([facility])- (present on admission)  Assessment & Plan  Has a particular regimen which she wants to adhere to, REMAINS WITH PERSISTENT HYPERGLYCEMIA and highly labile, she continues to be adamant about wanting to do her own insulin.  Start NPH 50 units qam and 10 units QPM (refusing multiple administrations of her blood sugars)  Reinforced with patient that persistent hyperglycemia will worsen her infection/delay wound healing, she understands  I personally reviewed the blood sugars on 6/3/2024  ISS  Consider standing SA insulin dependent on sugar levels    PAF (paroxysmal atrial fibrillation) ([facility])- (present on admission)  Assessment & Plan  Restart eliquis    Other insomnia- (present on admission)  Assessment & Plan  As needed Benadryl    CKD (chronic kidney disease) stage 3, GFR 30-59 ml/min- (present on admission)  Assessment & Plan  Minimize nephrotoxic agents, renally dose meds  Cr remains near her baseline, I personally reviewed the bmp on 6/2    COPD (chronic obstructive pulmonary disease) ([facility])- (present on admission)  Assessment & Plan  Per history  Currently not in acute exacerbation    Cardiac pacemaker- (present on admission)  Assessment & Plan  Per history  St Syd placed in 2018, confirmed it is MRI compatible  No recent syncope or issues here, no need for interrogation    Anxiety- (present on admission)  Assessment & Plan  Getting worse again, sounds like poorly controlled MAXIMO at home as well  Consulted psychotherapy first, but may need IP psych consult as well  No FURTHER escalation of her benadryl, explained  to patient  She does not tolerate any benzo's  Seems like MAXIMO, would benefit from an SSRI, but she declines at this time  Benadryl oral PRN  Uses CBD oil at home    Diastolic CHF due to valvular disease (HCC)- (present on admission)  Assessment & Plan  Prior history  Currently not in acute exacerbation    Class 1 obesity in adult- (present on admission)  Assessment & Plan  Diet and lifestyle modification  Body mass index is 32.11 kg/m².      Diabetic ulcer of right heel associated with type 2 diabetes mellitus (HCC)- (present on admission)  Assessment & Plan  Supportive wound care  Glycemic control  Plan as noted above         VTE prophylaxis:    therapeutic anticoagulation with eliquis 5 mg BID      I have performed a physical exam and reviewed and updated ROS and Plan today (6/3/2024). In review of yesterday's note (6/2/2024), there are no changes except as documented above.

## 2024-06-03 NOTE — DISCHARGE PLANNING
Received Choice form at [phone]  Agency/Facility Name: [facility]  Referral sent per Choice form @ [phone]

## 2024-06-03 NOTE — WOUND TEAM
Assisted Tonia DASH (Wound RN), with wound care, non-selective debridement performed using wound cleanser/NS and gauze. Please see Tonia DASH (Wound RN) wound note for further wound care details.

## 2024-06-03 NOTE — DISCHARGE PLANNING
"H[facility] TCN chart review completed. Collaborated with MARYLU Guajardo. Discussed current discharge considerations noting patient followed by ID with current recommendations as noted \"Stop date IV antibiotics 6/28/24 followed by PO per culture results.\" Of note, per ID note, a psych consult was recommended. In collaboration with CM, noted discharge considerations are now home with HH and IV infusion versus SNF.    TCN met with patient at bedside. Provided education and discussion regarding current discharge considerations. Patient verbalized understanding advising \"I'd like to give it a 100% try\" to go home with HH and IV infusion. Patient declined consideration of SNF choice during TCN visit this day; although stated understanding to CM protocol of sending SNF referrals as appropriate. Choice obtained for IV infusion ([facility]) faxed to [facility] and given to CM.     TCN will continue to follow and collaborate with discharge planning team as additional post acute needs arise. Thank you.    Completed  PT/OT consults orders present. Per review, noted patient declined PT attempt 6/1  Choice obtained:HH (resumption of [facility]). IV Infusion ([facility])  Patient verbalized understanding regarding  protocol of sending SNF referrals; but declined any SNF consideration with TCN this day  Referral sent to [facility] 5/29. Per [facility] patient is followed by Chronic Care Management outpatient   "

## 2024-06-03 NOTE — PROGRESS NOTES
Infectious Disease Progress Note    Author: Becky Bauman M.D. Date & Time of service: 6/3/2024  11:49 AM    Chief Complaint:  Nonhealing heel wound, chronic  Infected Achilles    Interval History:   78 y.o. diabetic female admitted 2024 for above  6/2 AF WBC 7.3 VAC in place  6/3 AF very depressed about situation-denies SE antibiotics. Would like to speak to psychiatry  Labs Reviewed, Medications Reviewed, and Wound Reviewed.    Review of Systems:  Review of Systems   Constitutional:  Negative for fever.   Musculoskeletal:  Positive for myalgias.   Psychiatric/Behavioral:  Positive for depression.        Hemodynamics:  Temp (24hrs), Av.5 °C (97.7 °F), Min:36.1 °C (97 °F), Max:36.9 °C (98.4 °F)  Temperature: 36.7 °C (98 °F)  Pulse  Av.6  Min: 71  Max: 100   Blood Pressure : (!) 173/46       Physical Exam:  Physical Exam  Vitals and nursing note reviewed.   Constitutional:       General: She is not in acute distress.     Appearance: She is not ill-appearing, toxic-appearing or diaphoretic.   Eyes:      General: No scleral icterus.     Extraocular Movements: Extraocular movements intact.      Pupils: Pupils are equal, round, and reactive to light.   Cardiovascular:      Rate and Rhythm: Normal rate.   Pulmonary:      Effort: Pulmonary effort is normal. No respiratory distress.   Abdominal:      General: There is no distension.      Palpations: Abdomen is soft.   Musculoskeletal:      Cervical back: Neck supple.      Comments: Veraflo VAC to heel   Neurological:      General: No focal deficit present.      Mental Status: She is alert and oriented to person, place, and time.         Meds:    Current Facility-Administered Medications:     DAPTOmycin    apixaban    furosemide    losartan    nystatin    insulin NPH    insulin NPH    dakins 0.125% ( strength)    LR    acetaminophen    labetalol    ondansetron    ondansetron    guaiFENesin dextromethorphan    senna-docusate **AND** polyethylene  glycol/lytes    albuterol    rosuvastatin    Pharmacy Consult Request    [] acetaminophen **FOLLOWED BY** acetaminophen    oxyCODONE immediate-release **OR** oxyCODONE immediate-release **OR** HYDROmorphone    diphenhydrAMINE    insulin regular **AND** POC blood glucose manual result **AND** NOTIFY MD and PharmD **AND** Administer 20 grams of glucose (approximately 8 ounces of fruit juice) every 15 minutes PRN FSBG less than 70 mg/dL **AND** dextrose bolus    ipratropium-albuterol    ampicillin-sulbactam (UNASYN) IV    Labs:  Recent Labs     24   WBC 7.3   RBC 3.13*   HEMOGLOBIN 9.2*   HEMATOCRIT 29.1*   MCV 93.0   MCH 29.4   RDW 45.1   PLATELETCT 245   MPV 10.6   NEUTSPOLYS 69.00   LYMPHOCYTES 16.80*   MONOCYTES 9.80   EOSINOPHILS 3.30   BASOPHILS 0.70     Recent Labs     240 24  1022   SODIUM 139 136   POTASSIUM 4.1 3.8   CHLORIDE 102 97   CO2 25 25   GLUCOSE 213* 294*   BUN 18 25*   CPKTOTAL  --  86     Recent Labs     240 24  1022   ALBUMIN  --  3.4   TBILIRUBIN  --  0.2   ALKPHOSPHAT  --  64   TOTPROTEIN  --  6.6   ALTSGPT  --  6   ASTSGOT  --  12   CREATININE 1.14 1.29       Imaging:  US-TACO SINGLE LEVEL BILAT    Result Date: 2024   Vascular Laboratory  Conclusions  Normal TACO's and 1st TBI's.  PAYAL BAUER  Age:    78    Gender:     F  MRN:    [id number]  :    1945      BSA:  Exam Date:     2024 10:52  Room #:     Inpatient  Priority:     Routine  Ht (in):             Wt (lb):  Ordering Physician:        STACEY MONTALVO  Referring Physician:       [id number] Betancur  Sonographer:               Sergo Patrick                             [id number]  Study Type:                Complete Bilateral  Technical Quality:         Adequate  Indications:     Ulceration of LE  CPT Codes:       33167  ICD Codes:       707.1  History:         Non-healing wound on right heel.  Limitations:     Could not obtain left brachial blood  pressure due to IV                   placement.                 RIGHT  Waveform            Systolic BPs (mmHg)                             179           Brachial  Triphasic                                Common Femoral  Triphasic                                Popliteal  Triphasic                  199           Posterior Tibial  Triphasic                  207           Dorsalis Pedis  Normal                     142           Digit                             1.16          TACO                             0.79          TBI                       LEFT  Waveform        Systolic BPs (mmHg)                                           Brachial  Triphasic                                Common Femoral  Triphasic                                Popliteal  Triphasic                  230           Posterior Tibial  Biphasic                   211           Dorsalis Pedis  Normal                     129           Digit                             1.28          TACO                             0.72          TBI  Findings  Bilateral-  Doppler waveforms of the common femoral, popliteal, posterior tibial, and  dorsalis pedis arteries are of high amplitude and multiphasic.  The ankle-brachial indices are normal.  The toe-brachial indices are normal.  The 1st digit PPG waveform is normal.  Additional testing was not performed in accordance with lower extremity  arterial evaluation protocol.  Henok Dos Santos  (Electronically Signed)  Final Date:      30 May 2024 12:41    DX-FOOT-COMPLETE 3+ RIGHT    Result Date: 5/28/2024 5/28/2024 3:08 PM HISTORY/REASON FOR EXAM:  Atraumatic Pain/Swelling/Deformity TECHNIQUE/EXAM DESCRIPTION AND NUMBER OF VIEWS: 3 views of the RIGHT foot. COMPARISON:  5/13/2024 FINDINGS: Extensive chronic changes of the midfoot including chronic Lisfranc fracture dislocation and midfoot degenerative changes again noted. There are old fracture deformities of the second and third proximal phalanges again noted as well. No  acute fracture or  dislocation. There is soft tissue gas posterior to the ankle which is new from prior radiograph. There is some dystrophic calcification in this area again noted. Small posterior heel ulcer..     Small posterior heel ulcer and there is some new soft tissue gas posterior to the ankle. No radiographic evidence for acute osteomyelitis. Multiple chronic osseous changes are again noted.    DX-FOOT-COMPLETE 3+ RIGHT    Result Date: 5/13/2024 5/13/2024 9:02 PM HISTORY/REASON FOR EXAM:  Atraumatic Pain/Swelling/Deformity TECHNIQUE/EXAM DESCRIPTION AND NUMBER OF VIEWS: 3 views of the RIGHT foot. COMPARISON:  2/1/2016 FINDINGS:  Bone mineralization is normal.  There is no evidence of fracture or dislocation.  There is severe joint space narrowing periarticular sclerosis and spurring throughout the tarsal metatarsal joints with fusion across several joints. Some malalignment is noted with lateral displacement of metatarsals relative to the tarsal bones. Chronic fragmentation of the distal diaphysis of the second metatarsal bone also appears to be present. Degenerative changes at the second and third MTP joints are also noted.  No bone erosions are identified.. Age-indeterminate fracture of the fourth toe proximal phalangeal neck. Osseous fragments at the expected location of the Achilles.     1.  Age-indeterminate fracture of the fourth toe proximal phalangeal neck. 2.  Redemonstration of Charcot's foot including near complete fusion at the tarsal metatarsal joints and across the proximal ends of the metatarsal bones. 3.  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 4.  Degenerative changes in the second and third MTP joint. 5.  No radiographic evidence of acute osteomyelitis. If there is clinical concern for radiographically occult osteomyelitis, MRI can be obtained. 6.  Possible age-indeterminate avulsion of the Achilles.       Micro:  Results       Procedure Component Value Units Date/Time    AFB  Culture [[id number]] Collected: 05/31/24 1311    Order Status: Completed Specimen: Bone Updated: 06/03/24 0909     Significant Indicator NEG     Source BONE     Site L calcaneous     Culture Result Culture in progress.     AFB Smear Results No acid fast bacilli seen.    Narrative:      Surgery Specimen    CULTURE TISSUE W/ GRM STAIN [[id number]]  (Abnormal)  (Susceptibility) Collected: 05/31/24 1311    Order Status: Completed Specimen: Bone Updated: 06/03/24 0909     Significant Indicator POS     Source BONE     Site L calcaneous     Culture Result -     Gram Stain Result Few WBCs.  No organisms seen.       Culture Result Staphylococcus epidermidis  Light growth      Narrative:      Surgery Specimen    Susceptibility       Staphylococcus epidermidis (1)       Antibiotic Interpretation Microscan   Method Status    Clindamycin Resistant >4 mcg/mL MONA Final    Cefazolin Resistant <=8 mcg/mL MONA Final    Cefepime Resistant <=4 mcg/mL OMNA Final    Daptomycin Sensitive <=0.5 mcg/mL MONA Final    Erythromycin Resistant >4 mcg/mL MONA Final    Ampicillin/sulbactam Resistant <=8/4 mcg/mL MONA Final    Vancomycin Sensitive 2 mcg/mL MONA Final    Linezolid Sensitive 2 mcg/mL MONA Final    Oxacillin Resistant >2 mcg/mL MONA Final    Trimeth/Sulfa Resistant >2/38 mcg/mL MONA Final    Tetracycline Resistant >8 mcg/mL MONA Final                       Anaerobic Culture [[id number]] Collected: 05/31/24 1311    Order Status: Completed Specimen: Bone Updated: 06/03/24 0909     Significant Indicator NEG     Source BONE     Site L calcaneous     Culture Result No Anaerobes isolated.    Narrative:      Surgery Specimen    Fungal Culture [[id number]] Collected: 05/31/24 1311    Order Status: Completed Specimen: Bone Updated: 06/03/24 0909     Significant Indicator NEG     Source BONE     Site L calcaneous     Culture Result Culture in progress.     Fungal Smear Results No fungal elements seen.    Narrative:      Surgery Specimen    BLOOD CULTURE  [[id number]] Collected: 05/28/24 1615    Order Status: Completed Specimen: Blood from Peripheral Updated: 06/02/24 1700     Significant Indicator NEG     Source BLD     Site PERIPHERAL     Culture Result No growth after 5 days of incubation.    Narrative:      Right Forearm/Arm    BLOOD CULTURE [[id number]] Collected: 05/28/24 1620    Order Status: Completed Specimen: Blood from Peripheral Updated: 06/02/24 1700     Significant Indicator NEG     Source BLD     Site PERIPHERAL     Culture Result No growth after 5 days of incubation.    Narrative:      Left AC    Acid Fast Stain [[id number]] Collected: 05/31/24 1311    Order Status: Completed Specimen: Bone Updated: 06/01/24 1740     Significant Indicator NEG     Source BONE     Site L calcaneous     AFB Smear Results No acid fast bacilli seen.    Narrative:      Surgery Specimen    GRAM STAIN [[id number]] Collected: 05/31/24 1311    Order Status: Completed Specimen: Bone Updated: 06/01/24 1740     Significant Indicator .     Source BONE     Site L calcaneous     Gram Stain Result Few WBCs.  No organisms seen.      Narrative:      Surgery Specimen    Fungal Smear [[id number]] Collected: 05/31/24 1311    Order Status: Completed Specimen: Bone Updated: 06/01/24 1740     Significant Indicator NEG     Source BONE     Site L calcaneous     Fungal Smear Results No fungal elements seen.    Narrative:      Surgery Specimen    Urinalysis [[id number]]  (Abnormal) Collected: 05/29/24 0227    Order Status: Completed Specimen: Urine Updated: 05/29/24 0250     Color Yellow     Character Clear     Specific Gravity 1.010     Ph 6.0     Glucose Negative mg/dL      Ketones Negative mg/dL      Protein Negative mg/dL      Bilirubin Negative     Urobilinogen, Urine 0.2     Nitrite Negative     Leukocyte Esterase Small     Occult Blood Negative     Micro Urine Req Microscopic    MRSA By PCR (Amp) [[id number]] Collected: 05/28/24 1620    Order Status: Completed Specimen: Respirate from Nares  Updated: 05/28/24 1820     MRSA by PCR Negative            Assessment:  Active Hospital Problems    Diagnosis     *Osteomyelitis ([facility]) [M86.9]     DM (diabetes mellitus) ([facility]) [E11.9]     Cellulitis [L03.90]     ACP (advance care planning) [Z71.89]     PAF (paroxysmal atrial fibrillation) ([facility]) [I48.0]     Other insomnia [G47.09]     COPD (chronic obstructive pulmonary disease) ([facility]) [J44.9]     CKD (chronic kidney disease) stage 3, GFR 30-59 ml/min [N18.30]     Cardiac pacemaker [Z95.0]     Anxiety [F41.9]     Diastolic CHF due to valvular disease ([facility]) [I50.30, I38]     Class 1 obesity in adult [E66.9]     Diabetic ulcer of right heel associated with type 2 diabetes mellitus ([facility]) [E11.621, L97.419]      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  2.  Partial craterization of right calcaneus for osteomyelitis.  3.  Application of wound VAC, right foot  Poorly controlled DM-declines changes in diabetes medications  Normal TACO  Depression     PLAN:   FU cultures-bone +staph epi so far from one culture  Advised 6 weeks of IV/PO antibiotics will not cure chronic osteomyelitis   May need additional debridement of any necrotic tissues that could serve as a continued nidus of infection.    Continue daptomycin and continue Unasyn for now  Monitor CPK  At risk for limb loss and poor wound healing  Keep BS under 150 to help control current infection-patient currently declining any change in diet or medications     Stop date antibiotics 6 weeks from surgery-initial 4 weeks IV then follow with PO  Midline OK  Stop date IV antibiotics 6/28/24 followed by PO per culture results  Can dc Unasyn if no anaerobes/GNR isolated  Placement as needs Veraflo  Recommend psych consult

## 2024-06-03 NOTE — DIETARY
Nutrition Update:    Day 6 of admit.  Ophelia Sosa is a 78 y.o. female with admitting DX of Osteomyelitis (HCC) [M86.9].  Patient being followed to optimize nutrition.    Current Diet: Consistent CHO, Glucerna supplements TID    PO intake per ADLs % x 4 meals, 25-50% x 1 meal; most meals documented at %. Drinking all supplements per RN.    Problem: Nutritional:  Goal: Achieve adequate nutritional intake  Description: Patient will consume >50% of meals and supplements  Outcome: Met    RD will screen weekly per department policy; available PRN.

## 2024-06-03 NOTE — CARE PLAN
The patient is Stable - Low risk of patient condition declining or worsening    Shift Goals  Clinical Goals: Monitor Blood sugars and administer abx this shift  Patient Goals: to get better food  Family Goals: BRISA    Progress made toward(s) clinical / shift goals:      Problem: Pain - Standard  Goal: Alleviation of pain or a reduction in pain to the patient’s comfort goal this shift  Outcome: Progressing     Problem: Skin Integrity  Goal: Risk for impaired skin integrity will decrease this shift  Outcome: Progressing  Got patient a low air loss set up this shift.      Problem: Fall Risk  Goal: Patient will remain free from falls this shift  Outcome: Progressing       Patient is not progressing towards the following goals:

## 2024-06-03 NOTE — THERAPY
Occupational Therapy   Initial Evaluation     Patient Name: Ophelia Sosa  Age:  78 y.o., Sex:  female  Medical Record #: [id number]  Today's Date: 6/3/2024     Precautions  Precautions: (P) Fall Risk, Weight Bearing As Tolerated Right Lower Extremity  Comments: (P) wound vac R heel    Assessment  Patient is a 78 y.o. female with a diagnosis of R heel osteomyelitis s/p excisional debridement with wound vac placement. Additional factors influencing patient status / progress: PMHx includes longstanding type 2 diabetes mellitus on insulin, chronic A-fib on Eliquis, hypertension, COPD and CKD stage III.     During OT eval, pt presented with decreased functional mobility, impaired balance and activity tolerance. Pt refused to mobilize further than transferring to the BSC (stating the floors are too slippery even with her nonskid socks) and insisted the BSC be placed directly in front of her thus needing to turn 180*, was not redirectable to placing BSC at 90* angle for a more direct transfer. Pt reports a FWW will not fit in her house and she can only use her cane. Pt was able to perform her toileting hygiene with SBA and managed her L sock with SPV. Reports her neighbor and a friend are good supports who can provide daily check-in support and IADL assistance. Pt reported she will adamantly refuse post-acute placement but is agreeable to all services through home health; see subjective below. Education provided to pt on need to demonstrate adequate functional mobility with her cane prior to discharge; pt verbalized understanding.     Plan    Occupational Therapy Initial Treatment Plan   Treatment Interventions: (P) Self Care / Activities of Daily Living, Adaptive Equipment, Neuro Re-Education / Balance, Therapeutic Exercises, Therapeutic Activity, Family / Caregiver Training  Treatment Frequency: (P) 3 Times per Week  Duration: (P) Until Therapy Goals Met    DC Equipment Recommendations: (P) Unable to determine at this  "time  Discharge Recommendations: (P) Other - (pt adamantly refuses any post-acute placement, agreeable to home health OT)     Subjective    \"I'm not suicidal now. But I will be a very depressed and suicidal woman if I don't get to go home.\" Per chart, behavioral health consult placed by MD.     Objective     06/03/24 4784   Initial Contact Note    Initial Contact Note Order Received and Verified, Occupational Therapy Evaluation in Progress with Full Report to Follow.   Prior Living Situation   Prior Services Home-Independent   Housing / Facility 2 Story Apartment / Condo  (pt stays on the first floor, sleeps in a recliner and has a half bath; sponge bathes)   Steps Into Home 0   Bathroom Set up   (only uses half bathroom downstairs)   Equipment Owned Single Point Cane   Lives with - Patient's Self Care Capacity Alone and Able to Care For Self   Comments Pt reports her neighbor and a friend can provide daily check-in and IADL assistance   Prior Level of ADL Function   Self Feeding Independent   Grooming / Hygiene Independent   Bathing Independent   Dressing Independent   Toileting Independent   Prior Level of IADL Function   Medication Management Independent   Laundry Independent   Kitchen Mobility Independent   Finances Independent   Home Management Independent   Shopping Independent   Prior Level Of Mobility Independent With Device in Community   Driving / Transportation Driving Independent   Occupation (Pre-Hospital Vocational) Retired Due To Age   History of Falls   History of Falls No  (denies any falls in the past 6 months)   Precautions   Precautions Fall Risk;Weight Bearing As Tolerated Right Lower Extremity   Comments wound vac R heel   Pain   Intervention Declines   Non Verbal Descriptors   Non Verbal Scale  Calm   Cognition    Cognition / Consciousness WDL   Level of Consciousness Alert   Comments tangential/verbose, particular about care   Active ROM Upper Body   Active ROM Upper Body  WDL   Strength " Upper Body   Upper Body Strength  WDL   Sensation Upper Body   Upper Extremity Sensation  Not Tested   Upper Body Muscle Tone   Upper Body Muscle Tone  WDL   Neurological Concerns   Neurological Concerns No   Coordination Upper Body   Coordination WDL   Balance Assessment   Sitting Balance (Static) Good   Sitting Balance (Dynamic) Good   Standing Balance (Static) Fair   Standing Balance (Dynamic) Fair -   Weight Shift Sitting Good   Weight Shift Standing Fair   Comments no AD used for BSC txfer, refused further mobility   Bed Mobility    Supine to Sit Supervised   Sit to Supine Supervised   Scooting Supervised   Comments HOB elevated   ADL Assessment   Upper Body Dressing Supervision   Lower Body Dressing Supervision  (doff/don L sock, assist for R sock only to manage over dressing)   Toileting Standby Assist   How much help from another person does the patient currently need...   Putting on and taking off regular lower body clothing? 3   Bathing (including washing, rinsing, and drying)? 3   Toileting, which includes using a toilet, bedpan, or urinal? 3   Putting on and taking off regular upper body clothing? 4   Taking care of personal grooming such as brushing teeth? 4   Eating meals? 4   6 Clicks Daily Activity Score 21   Functional Mobility   Sit to Stand Standby Assist   Toilet Transfers Standby Assist  (BSC)   Transfer Method Stand Step   Mobility txfer to BSC, pt refused further mobility stating the floors are too slippery even with grippy socks. Pt places BSC in front of her and turns 180*. Was not receptive to placing BSC at 90* angle to the bed   Activity Tolerance   Sitting Edge of Bed 5 min   Standing 1 min   Patient / Family Goals   Patient / Family Goal #1 To go home ASAP   Short Term Goals   Short Term Goal # 1 Pt will demo LBD (underwear/pants) including managing over wound vac with SPV   Short Term Goal # 2 Pt will txfer to toilet with SPV   Short Term Goal # 3 Pt will stand at sink for 2+ min for  g/h routine   Education Group   Role of Occupational Therapist Patient Response Patient;Acceptance;Explanation;Verbal Demonstration   Occupational Therapy Initial Treatment Plan    Treatment Interventions Self Care / Activities of Daily Living;Adaptive Equipment;Neuro Re-Education / Balance;Therapeutic Exercises;Therapeutic Activity;Family / Caregiver Training   Treatment Frequency 3 Times per Week   Duration Until Therapy Goals Met   Problem List   Problem List Decreased Functional Mobility;Decreased Activity Tolerance;Impaired Postural Control / Balance   Anticipated Discharge Equipment and Recommendations   DC Equipment Recommendations Unable to determine at this time   Discharge Recommendations Other -  (pt adamantly refuses any post-acute placement, agreeable to home health OT)   Interdisciplinary Plan of Care Collaboration   IDT Collaboration with  Nursing   Patient Position at End of Therapy In Bed;Bed Alarm On;Call Light within Reach;Tray Table within Reach;Phone within Reach   Collaboration Comments discussed with charge RN, handoff to vascular access team; discussed case with Dr. Morales   Session Information   Date / Session Number  6/3 #1 (1/3, 6/9)

## 2024-06-03 NOTE — WOUND TEAM
[facility]  Inpatient Services  Wound and Skin Care Follow-up    Admission Date: 2024     Last order of IP CONSULT TO WOUND CARE was found on 2024 from Hospital Encounter on 2024     HPI, PMH, SH: Reviewed    Past Surgical History:   Procedure Laterality Date    IRRIGATION & DEBRIDEMENT GENERAL Right 2024    Procedure: IRRIGATION AND DEBRIDEMENT, WOUND;  Surgeon: Pk Fleming M.D.;  Location: SURGERY [facility];  Service: Orthopedics    APPLICATION OR REPLACEMENT, WOUND VAC Right 2024    Procedure: APPLICATION OR REPLACEMENT, WOUND VAC;  Surgeon: Pk Fleming M.D.;  Location: SURGERY [facility];  Service: Orthopedics    BREAST BIOPSY      GYN SURGERY  hysterectomy    MITRAL VALVE REPLACE      OTHER ORTHOPEDIC SURGERY Right     TONSILLECTOMY Bilateral      Social History     Tobacco Use    Smoking status: Former     Current packs/day: 0.00     Types: Cigarettes     Quit date: 2011     Years since quittin.5    Smokeless tobacco: Never   Substance Use Topics    Alcohol use: Not Currently     Comment: denies     Chief Complaint   Patient presents with    Wound Check     Pt c/o wound to R heel. Sent by MD for further evaluation of possible cellulitis    Foot Pain     Right foot/heel wound      Diagnosis: Osteomyelitis (HCC) [M86.9]    Unit where seen by Wound Team: S604/01     WOUND FOLLOW UP RELATED TO:  Right heel posterior scheduled NPWT change       WOUND TEAM PLAN OF CARE - Frequency of Follow-up:   Nursing to follow dressing orders written for wound care. Contact wound team if area fails to progress, deteriorates or with any questions/concerns if something comes up before next scheduled follow up (See below as to whether wound is following and frequency of wound follow up)  Dressing changes by wound team:                   NPWT change 3 times weekly - right posterior heel    WOUND HISTORY:       Ophelia Sosa is a 78 y.o. female with history of  chronic A-fib on Eliquis, pulmonary hypertension, CHF, SND s/p pacemaker placement, COPD, insulin-dependent diabetes, hyperlipidemia, CKD 3 who presented 5/28/2024 with evaluation for nonhealing right foot Achilles wound.  Patient was seen by wound care earlier, referred to ER due to nonhealing wound.  Patient has been taking doxycycline and seeing wound care outpatient with minimal improvement.  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  Pt states she spends a lot of time in a recliner chair and sleeps in the recliner at night.  She has been floating her heel while in the recliner after she became aware of the wound on her heel.      5/31/24 Pt with multiple questions about the wound POC. Appearing anxious and became tearful at times.      5/31/24 ortho consult with plans for OR today Dr Fleming   5/31/24 ID following        WOUND ASSESSMENT/LDA  Wound 05/29/24 Diabetic Ulcer Heel Right (Active)   Date First Assessed/Time First Assessed: 05/29/24 1603   Primary Wound Type: Diabetic Ulcer  Location: Heel  Laterality: Right      Assessments 6/3/2024 11:00 AM   Wound Image       Site Assessment Purple;White   Periwound Assessment Clean;Dry;Intact   Margins Unattached edges;Undefined edges   Closure Secondary intention   Drainage Amount Moderate   Drainage Description Serosanguineous;Sanguineous   Treatments Cleansed;Irrigation;Nonselective debridement;Site care;Compression   Wound Cleansing Approved Wound Cleanser   Periwound Protectant Skin Protectant Wipes to Periwound;Paste Ring;Drape   Dressing Status Clean;Dry;Intact   Dressing Changed Changed   Dressing Cleansing/Solutions Normal Saline   Dressing Options Wound Vac;Tubigrip   Dressing Change/Treatment Frequency Monday, Wednesday, Friday, and As Needed   NEXT Dressing Change/Treatment Date 06/05/24   Non-staged Wound Description Full thickness   WOUND NURSE ONLY - Time Spent with Patient (mins) 45       Negative Pressure Wound Therapy 05/31/24  Heel Right (Active)   Placement Date/Time: 24 1336   Location: Heel  Laterality: Right      Assessments 6/3/2024 11:00 AM   NPWT Pump Mode / Pressure Setting Ulta;Intermittent;125 mmHg   Dressing Type Small;Black Foam (Veraflo)   Number of Foam Pieces Used 2   NEXT Dressing Change/Treatment Date 24   VAC VeraFlo Irrigant Normal Saline   VAC VeraFlo Soak Time (mins) 3   VAC VeraFlo Instill Volume (ml) 14   VAC VeraFlo - Therapy Time (hrs) 2.5   VAC VeraFlo Pressure (mm/Hg) Intermittent;125 mmHg   WOUND NURSE ONLY - Time Spent with Patient (mins) 45        Vascular:    TACO:   TACO Results, Last 30 Days US-TACO SINGLE LEVEL BILAT    Result Date: 2024  Narrative  Vascular Laboratory  Conclusions  Normal TACO's and 1st TBI's.  PAYAL BAUER  Age:    78    Gender:     F  MRN:    [id number]  :    1945      BSA:  Exam Date:     2024 10:52  Room #:     Inpatient  Priority:     Routine  Ht (in):             Wt (lb):  Ordering Physician:        STACEY MONTALVO  Referring Physician:       [id number] Betancur  Sonographer:               Sergo Patrick                             [id number]  Study Type:                Complete Bilateral  Technical Quality:         Adequate  Indications:     Ulceration of LE  CPT Codes:       89083  ICD Codes:       707.1  History:         Non-healing wound on right heel.  Limitations:     Could not obtain left brachial blood pressure due to IV                   placement.                 RIGHT  Waveform            Systolic BPs (mmHg)                             179           Brachial  Triphasic                                Common Femoral  Triphasic                                Popliteal  Triphasic                  199           Posterior Tibial  Triphasic                  207           Dorsalis Pedis  Normal                     142           Digit                             1.16          TACO                             0.79          TBI                        LEFT  Waveform        Systolic BPs (mmHg)                                           Brachial  Triphasic                                Common Femoral  Triphasic                                Popliteal  Triphasic                  230           Posterior Tibial  Biphasic                   211           Dorsalis Pedis  Normal                     129           Digit                             1.28          TACO                             0.72          TBI  Findings  Bilateral-  Doppler waveforms of the common femoral, popliteal, posterior tibial, and  dorsalis pedis arteries are of high amplitude and multiphasic.  The ankle-brachial indices are normal.  The toe-brachial indices are normal.  The 1st digit PPG waveform is normal.  Additional testing was not performed in accordance with lower extremity  arterial evaluation protocol.  Henok Dos Santos  (Electronically Signed)  Final Date:      30 May 2024 12:41      Lab Values:    Lab Results   Component Value Date/Time    WBC 7.3 06/01/2024 03:10 AM    RBC 3.13 (L) 06/01/2024 03:10 AM    HEMOGLOBIN 9.2 (L) 06/01/2024 03:10 AM    HEMATOCRIT 29.1 (L) 06/01/2024 03:10 AM    CREACTPROT 0.81 (H) 05/28/2024 09:37 PM    SEDRATEWES 132 (H) 05/28/2024 09:37 PM    HBA1C 8.1 (H) 05/29/2024 02:23 AM         Culture Results show:  Recent Results (from the past 720 hour(s))   CULTURE WOUND W/ GRAM STAIN    Collection Time: 05/27/24 10:20 AM    Specimen: Wound   Result Value Ref Range    Significant Indicator POS (POS)     Source WND     Site right heel cx     Culture Result Light growth usual skin shannan. (A)     Gram Stain Result Many Gram positive cocci.     Culture Result Staphylococcus epidermidis  Heavy growth   (A)        Susceptibility    Staphylococcus epidermidis - MONA     Clindamycin >4 Resistant mcg/mL     Cefazolin <=8 Resistant mcg/mL     Cefepime <=4 Resistant mcg/mL     Daptomycin <=0.5 Sensitive mcg/mL     Erythromycin >4 Resistant mcg/mL      Ampicillin/sulbactam <=8/4 Resistant mcg/mL     Vancomycin 2 Sensitive mcg/mL     Linezolid <=1 Sensitive mcg/mL     Oxacillin >2 Resistant mcg/mL     Tetracycline >8 Resistant mcg/mL       Pain Level/Medicated:  None, Tolerated without pain medication and Patient denies pain       INTERVENTIONS BY WOUND TEAM:  Chart and images reviewed. Discussed with bedside RN. All areas of concern (based on picture review, LDA review and discussion with bedside RN) have been thoroughly assessed. Documentation of areas based on significant findings. This RN in to assess patient. Performed standard wound care which includes appropriate positioning, dressing removal and non-selective debridement. Pictures and measurements obtained weekly if/when required.    Wound:  RIGHT POSTERIOR HEEL   Preparation for Dressing removal: Removed without difficulty  Cleansed/Non-selectively Debrided with:  Wound cleanser and Gauze  Maty wound: Cleansed with Wound cleanser and Gauze, Prepped with No Sting, Paste Rings, and Drape  Primary Dressing:  Total 2 VF black foam pieces into the wound bed and button  Secondary (Outer) Dressing: secured with drape and TRAC pad.  VF NPWT resumed at 125mmHg, no leak detected.  Heel offloading foam x2 applied to help offloading heel and tubing from NPWT.      Advanced Wound Care Discharge Planning  Number of Clinicians necessary to complete wound care: 1+tech or 2 RN easier for holding  Is patient requiring IV pain medications for dressing changes:  No   Length of time for dressing change 15 min. (This does not include chart review, pre-medication time, set up, clean up or time spent charting.)    Interdisciplinary consultation: Patient, Bedside RN (Lida), Ingrid CORNELL (Wound RN).  Pressure injury and staging reviewed with Ingrid CORNELL (Wound RN).    EVALUATION / RATIONALE FOR TREATMENT:     Date:  06/03/24  Wound Status:  Wound progressing as expected    Patient wound bed is  than previous assessment.   Concern for tendon sheath of achilles since the tunnel goes at 12 o'clock along the tendon.  Will need to continue VF NPWT while patient in the hospital.  Can transition to regular NPWT for discharge to home.  Adaptic can be utilize to protect the tendon with the regular NPWT.  Patient is very anxious and wants to go home.  Discussed with case management and hospitalist regarding POC and discharge plan.      Date:  06/01/24  Wound Status:      Wound bed is not entirely clean, there is bone exposure.  A tract was found following bedside debridement.  There is more granular tissue present in this wound compared to earlier assessment.  Explained how wound care may proceed depending on ortho POC.    Date:  05/29/24  Wound Status:  Initial evaluation    The L heel is intact and blanching. The patient politely declined to have sacrum assessed. Th R heel with diabetic foot ulcer present. The wound with over 80% slough present. Dakins applied to chemically debride nonviable tissue, decrease bioburden and odor. The patient has a strong 2+ pedal pulse, and states to have severe neuropathy. MRI and TACO have been ordered by MD.     NURSING PLAN OF CARE ORDERS:  Dressing changes: See Dressing Care orders    NUTRITION RECOMMENDATIONS   Wound Team Recommendations:  N/A    DIET ORDERS (From admission to next 24h)       Start     Ordered    05/28/24 2304  Diet Order Diet: Consistent CHO (Diabetic)  ALL MEALS        Question:  Diet:  Answer:  Consistent CHO (Diabetic)    05/28/24 2304                    PREVENTATIVE INTERVENTIONS:    Q shift Drew - performed per nursing policy  Q shift pressure point assessments - performed per nursing policy    Surface/Positioning  Standard/trauma mattress - Currently in Place    Offloading/Redistribution  Float Heels off Bed with Pillows - Currently in Place         Anticipated discharge plans:  TBD and N/A        Vac Discharge Needs:  Vac Discharge plan is purely a recommendation from wound team  and not a requirement for discharge unless otherwise stated by physician.  Pt likely to have a VAC placed in OR.  Pt will need ongoing VAC changes following discharge from [facility].  Possibly vera cindy vac will be beneficial        Goals: Steady decrease in wound area and depth weekly.    NURSING PLAN OF CARE ORDERS:  No new orders this visit    NUTRITION RECOMMENDATIONS   Wound Team Recommendations:  Protein supplements  Arginine powder     DIET ORDERS (From admission to next 24h)       Start     Ordered    06/01/24 0858  Supplements  ALL MEALS        Question Answer Comment   Which Supplement Glucerna    Glucerna: Glucerna Shake Carton        06/01/24 0858    05/31/24 1839  Diet Order Diet: Consistent CHO (Diabetic)  ALL MEALS        Question:  Diet:  Answer:  Consistent CHO (Diabetic)    05/31/24 1838                    PREVENTATIVE INTERVENTIONS:   Q shift Drew - performed per nursing policy  Q shift pressure point assessments - performed per nursing policy    Surface/Positioning  ICU Low Airloss - Currently in Place  Reposition q 2 hours - Currently in Place  TAPs Turning system - Currently in Place    Offloading/Redistribution  Sacral offloading dressing (Silicone dressing) - Currently in Place  Heel offloading dressing (Silicone dressing) - Currently in Place  Float Heels off Bed with Pillows - Currently in Place           Respiratory  N/A    Containment/Moisture Prevention    Dri-cindy pad - Currently in Place    Mobilization      Unable to assess and PT/OT      Anticipated discharge plans:  Home Health Care and Outpatient Wound Center        Vac Discharge Needs:  Vac Discharge plan is purely a recommendation from wound team and not a requirement for discharge unless otherwise stated by physician.  Veraflo Vac while inpatient, ok to transition to Regular Vac on discharge with adaptic to protect tendon

## 2024-06-03 NOTE — PROGRESS NOTES
Orthopaedic Progress Note    Interval changes:  Patient doing well    Veraflo vac in place without leak- will need prolonged veraflo course due to exposed achilles and bone  RLE vac dressings CDI without leak  Cleared for DC to facility that can continue veraflo only- not ok to switch to standard vac.     ROS - Patient denies any new issues.  Pain well controlled.    BP (!) 173/46   Pulse 80   Temp 36.7 °C (98 °F) (Temporal)   Resp 17   Wt 96.3 kg (212 lb 4.9 oz)   SpO2 98%     Patient seen and examined  No acute distress  Breathing non labored  RRR  RLE vac dressings CDI, no change from prior neuropathy, cap refill <2 sec.     Recent Labs     06/01/24  0310   WBC 7.3   RBC 3.13*   HEMOGLOBIN 9.2*   HEMATOCRIT 29.1*   MCV 93.0   MCH 29.4   MCHC 31.6*   RDW 45.1   PLATELETCT 245   MPV 10.6       Active Hospital Problems    Diagnosis     Osteomyelitis ([facility]) [M86.9]     DM (diabetes mellitus) ([facility]) [E11.9]     Cellulitis [L03.90]     ACP (advance care planning) [Z71.89]     PAF (paroxysmal atrial fibrillation) ([facility]) [I48.0]     Other insomnia [G47.09]     COPD (chronic obstructive pulmonary disease) ([facility]) [J44.9]     CKD (chronic kidney disease) stage 3, GFR 30-59 ml/min [N18.30]     Cardiac pacemaker [Z95.0]     Anxiety [F41.9]     Diastolic CHF due to valvular disease ([facility]) [I50.30, I38]     Class 1 obesity in adult [E66.9]     Diabetic ulcer of right heel associated with type 2 diabetes mellitus ([facility]) [E11.621, L97.419]        Assessment/Plan:  Patient doing well    Veraflo vac in place without leak- will need prolonged veraflo course due to exposed achilles and bone  RLE vac dressings CDI without leak  Cleared for DC to facility that can continue veraflo only- not ok to switch to standard vac. .  POD#3 S/P:  1.  Excisional debridement of right foot wound including skin, subcutaneous tissue, tendon and bone through wound measuring approximately 4x5 cm.  2.  Partial craterization of right calcaneus for  osteomyelitis.  3.  Application of wound VAC, right foot, less than 50 cm2.  Wt bearing status - WBAT RLE  Wound care/Drains - vac veraflo dressings to be changed by wound team  Future Procedures - may need grafting if unable to heal over tendon with veralfow  Lovenox: Start 5/29, Duration-until ambulatory > 150'  Sutures/Staples out- 14-21 days post operatively. Removal will completed by ortho mid levels only.  PT/OT-initiated  Antibiotics: unasyn 3g IV q6, cubicin 750mg IV QD  DVT Prophylaxis- TEDS/SCDs/Foot pumps  Rosas-not needed per ortho  Case Coordination for Discharge Planning - Disposition per therapy recs.

## 2024-06-03 NOTE — CARE PLAN
The patient is Stable - Low risk of patient condition declining or worsening    Shift Goals  Clinical Goals: monitor blood sugar, maintain skin integrity  Patient Goals: get better  Family Goals: BRISA    Progress made toward(s) clinical / shift goals:  patient is alert and oriented. Medicated for pain. Hourly rounding in place. Wound vac on. Encouraged to turn frequently. Has a MONY. Call light within reach.    Patient is not progressing towards the following goals:

## 2024-06-03 NOTE — PROGRESS NOTES
For lunch inulin patient refused whole 7 units stating it will drop her too much when the long acting kicks in, she likes to it between 150 and 250. Patient request 3 units instead of 7 units. For evening insulin patient refused all insulin because glucose check was 115.  MD notified.

## 2024-06-04 ENCOUNTER — APPOINTMENT (OUTPATIENT)
Dept: RADIOLOGY | Facility: MEDICAL CENTER | Age: 79
DRG: 629 | End: 2024-06-04
Attending: NURSE PRACTITIONER
Payer: MEDICARE

## 2024-06-04 ENCOUNTER — APPOINTMENT (OUTPATIENT)
Dept: RADIOLOGY | Facility: MEDICAL CENTER | Age: 79
DRG: 629 | End: 2024-06-04
Attending: INTERNAL MEDICINE
Payer: MEDICARE

## 2024-06-04 LAB
GLUCOSE BLD STRIP.AUTO-MCNC: 160 MG/DL (ref 65–99)
GLUCOSE BLD STRIP.AUTO-MCNC: 240 MG/DL (ref 65–99)
GLUCOSE BLD STRIP.AUTO-MCNC: 310 MG/DL (ref 65–99)
GLUCOSE BLD STRIP.AUTO-MCNC: 339 MG/DL (ref 65–99)
GLUCOSE BLD STRIP.AUTO-MCNC: 369 MG/DL (ref 65–99)

## 2024-06-04 PROCEDURE — 82962 GLUCOSE BLOOD TEST: CPT | Mod: 91

## 2024-06-04 PROCEDURE — 99233 SBSQ HOSP IP/OBS HIGH 50: CPT | Performed by: HOSPITALIST

## 2024-06-04 PROCEDURE — 99222 1ST HOSP IP/OBS MODERATE 55: CPT | Performed by: PSYCHIATRY & NEUROLOGY

## 2024-06-04 PROCEDURE — A9270 NON-COVERED ITEM OR SERVICE: HCPCS | Performed by: HOSPITALIST

## 2024-06-04 PROCEDURE — 700111 HCHG RX REV CODE 636 W/ 250 OVERRIDE (IP): Mod: JZ | Performed by: INTERNAL MEDICINE

## 2024-06-04 PROCEDURE — 700105 HCHG RX REV CODE 258: Performed by: INTERNAL MEDICINE

## 2024-06-04 PROCEDURE — 99233 SBSQ HOSP IP/OBS HIGH 50: CPT | Performed by: INTERNAL MEDICINE

## 2024-06-04 PROCEDURE — 770006 HCHG ROOM/CARE - MED/SURG/GYN SEMI*

## 2024-06-04 PROCEDURE — A9270 NON-COVERED ITEM OR SERVICE: HCPCS | Performed by: INTERNAL MEDICINE

## 2024-06-04 PROCEDURE — 700102 HCHG RX REV CODE 250 W/ 637 OVERRIDE(OP): Performed by: HOSPITALIST

## 2024-06-04 PROCEDURE — 700102 HCHG RX REV CODE 250 W/ 637 OVERRIDE(OP): Performed by: INTERNAL MEDICINE

## 2024-06-04 PROCEDURE — C1751 CATH, INF, PER/CENT/MIDLINE: HCPCS

## 2024-06-04 RX ORDER — DIPHENHYDRAMINE HCL 25 MG
12.5 TABLET ORAL EVERY 4 HOURS PRN
Status: DISCONTINUED | OUTPATIENT
Start: 2024-06-04 | End: 2024-06-06 | Stop reason: HOSPADM

## 2024-06-04 RX ADMIN — INSULIN HUMAN 10 UNITS: 100 INJECTION, SUSPENSION SUBCUTANEOUS at 18:26

## 2024-06-04 RX ADMIN — FUROSEMIDE 40 MG: 40 TABLET ORAL at 05:08

## 2024-06-04 RX ADMIN — LOSARTAN POTASSIUM 25 MG: 25 TABLET, FILM COATED ORAL at 05:08

## 2024-06-04 RX ADMIN — AMPICILLIN AND SULBACTAM 3 G: 1; 2 INJECTION, POWDER, FOR SOLUTION INTRAMUSCULAR; INTRAVENOUS at 12:38

## 2024-06-04 RX ADMIN — INSULIN HUMAN 10 UNITS: 100 INJECTION, SOLUTION PARENTERAL at 18:26

## 2024-06-04 RX ADMIN — DIPHENHYDRAMINE HYDROCHLORIDE 12.5 MG: 25 TABLET ORAL at 15:33

## 2024-06-04 RX ADMIN — APIXABAN 5 MG: 5 TABLET, FILM COATED ORAL at 05:08

## 2024-06-04 RX ADMIN — INSULIN HUMAN 12 UNITS: 100 INJECTION, SOLUTION PARENTERAL at 13:38

## 2024-06-04 RX ADMIN — LOSARTAN POTASSIUM 25 MG: 25 TABLET, FILM COATED ORAL at 17:29

## 2024-06-04 RX ADMIN — AMPICILLIN AND SULBACTAM 3 G: 1; 2 INJECTION, POWDER, FOR SOLUTION INTRAMUSCULAR; INTRAVENOUS at 17:34

## 2024-06-04 RX ADMIN — APIXABAN 5 MG: 5 TABLET, FILM COATED ORAL at 17:29

## 2024-06-04 RX ADMIN — ALBUTEROL SULFATE 2 PUFF: 90 AEROSOL, METERED RESPIRATORY (INHALATION) at 05:21

## 2024-06-04 RX ADMIN — AMPICILLIN AND SULBACTAM 3 G: 1; 2 INJECTION, POWDER, FOR SOLUTION INTRAMUSCULAR; INTRAVENOUS at 05:18

## 2024-06-04 RX ADMIN — INSULIN HUMAN 10 UNITS: 100 INJECTION, SOLUTION PARENTERAL at 22:04

## 2024-06-04 RX ADMIN — NYSTATIN: 100000 POWDER TOPICAL at 05:11

## 2024-06-04 RX ADMIN — DAPTOMYCIN 750 MG: 500 INJECTION, POWDER, LYOPHILIZED, FOR SOLUTION INTRAVENOUS at 06:27

## 2024-06-04 RX ADMIN — ALBUTEROL SULFATE 2 PUFF: 90 AEROSOL, METERED RESPIRATORY (INHALATION) at 12:12

## 2024-06-04 RX ADMIN — INSULIN HUMAN 4 UNITS: 100 INJECTION, SOLUTION PARENTERAL at 09:18

## 2024-06-04 ASSESSMENT — ENCOUNTER SYMPTOMS
NERVOUS/ANXIOUS: 1
CHILLS: 0
NAUSEA: 0
VOMITING: 0
FEVER: 0
SENSORY CHANGE: 1
ABDOMINAL PAIN: 0

## 2024-06-04 NOTE — PROCEDURES
VAT RN x 2 at bedside to place midline. Attempt to place midline in LUE unsuccessful. Unable to advance wire and midline past 8 cm. Unable to draw blood or flush at 8 cm. Procedure aborted. Left brachials and basilic too deep at 4 cm to access. RUE assessed - brachial veins too deep at 4 cm and unable to access with access needle. Cephalic vein too small to place 4 F midline. Primary RN and ordering MD notified.   **Unable to place midline AND PICC at this time.

## 2024-06-04 NOTE — CARE PLAN
"The patient is Stable - Low risk of patient condition declining or worsening    Shift Goals  Clinical Goals: blood sugar maintain wnl, skin integrity  Patient Goals: have better food, know dishcharge plan  Family Goals: joseluis none present    Progress made toward(s) clinical / shift goals:    Problem: Pain - Standard  Goal: Alleviation of pain or a reduction in pain to the patient’s comfort goal  Description: Target End Date:  Prior to discharge or change in level of care    Document on Vitals flowsheet    1.  Document pain using the appropriate pain scale per order or unit policy  2.  Educate and implement non-pharmacologic comfort measures (i.e. relaxation, distraction, massage, cold/heat therapy, etc.)  3.  Pain management medications as ordered  4.  Reassess pain after pain med administration per policy  5.  If opiods administered assess patient's response to pain medication is appropriate per POSS sedation scale  6.  Follow pain management plan developed in collaboration with patient and interdisciplinary team (including palliative care or pain specialists if applicable)  Outcome: Progressing       Patient is not progressing towards the following goals:      Problem: Psychosocial  Goal: Patient's level of anxiety will decrease  Description: Target End Date:  1-3 days or as soon as patient condition allows    1.  Collaborate with patient and family/caregiver to identify triggers and develop strategies to cope with anxiety  2.  Implement stimuli reduction, calming techniques  3.  Pharmacologic management per provider order  4.  Encourage patient/family/care giver participation  5.  Collaborate with interdisciplinary team including Psychologist or Behavioral Health Team as needed  Outcome: Not Progressing     Patient anxious through out the day. \"Theres too many things happening today with too many surprises. Patient updated on plan through out the day. Emotional support offered.  "

## 2024-06-04 NOTE — PROGRESS NOTES
Hospital Medicine Daily Progress Note    Date of Service  6/4/2024    Chief Complaint  Ophelia Sosa is a 78 y.o. female admitted 5/28/2024 with longstanding type 2 diabetes mellitus on insulin, chronic A-fib on Eliquis, hypertension, COPD and CKD stage III presented to the hospital with a nonhealing right foot Achilles wound and ulcer.  She has been followed by outpatient wound care with worsening wound and was instructed to come to the emergency room in hospital for further evaluation for osteomyelitis and possible abscess.  She was then placed empirically on IV ceftriaxone and vancomycin which the latter was discontinued after MRSA nares was negative.  She now remains on IV Unasyn.  An MRI of the foot has been ordered but will need to check MRI compatibility.    Hospital Course  See above    Interval Problem Update  5/29  Patient very concerned about her insulin regimen which is highly regulated and individualized. I had a long conversation with the patient that we will try to accommodate as much as we can for her insulin regimen. She has no pain of the affected foot given profound neuropathy from the knee distally. She remains afebrile. She had a St Syd PPM placed in 2018 at [facility]. She wants it interrogated and is unsure if it is MRI compatible. Cursory review on Google shows that it might need to be placed into compatible mode.     5/30  No acute events overnight. Patient intermittently refusing BS checks and insulin administration. BS's ranged from 166-272. Remains afebrile. We confirmed with the ST Syd rep that her PPM is MRI compatible. Mildly hypertensive. She feels the redness of her foot is improving.     5/31  I personally consulted and spoke with Dr Fleming of orthopedic surgery and Dr Tidwell of ID about patient's case in detail. Patient is highly anxious this AM with the prospects of going to surgery. Her blood sugars remain very poorly controlled and there is extensive nursing documentation  about various refusals of insulin administration. I spoke extensively with patient about the importance of blood sugar control in the context of a deep space infection of her R foot and she understands. Will add some benadryl for ongoing severe anxiety. She remains afebrile.     6/1  No acute events overnight. Blood sugars remain poorly controlled and patient has tons of ketchup packets at her bedside. I personally spoke with Dr Bauman of ID and since patient already had surgery, MRI foot is not needed. No other further issues. Patient says the lower dose benadryl helps with her anxiety and she is afebrile. She had debridement done down to calcaneal bone with deep intra-operative cultures obtained.     6/2  Blood sugars remain labile and poorly controlled. She remains fixated on dosing her own insulin. Pain is tolerable, remains afebrile. Wound vac converted veraflo. Cultures with presumed MRSE and IV daptomycin has been added.     6/3  Patient's anxiety has worsened.  I have consulted behavioral health and consider psychiatry consult.  She wants increasing doses of Benadryl and I explained to the patient that that is not beneficial for generalized anxiety.  I spoke with both orthopedic surgery and infectious disease.  Given her exposed tendon and bone she needs to go to a skilled nursing facility for vera flow which will need to have an extended course.  Other than that patient is anxious to go out of the hospital and is afebrile.  Her blood sugars continue remain labile.    6/4: Attempted to place midline but unable to advance.  Poor vasculature prevents insertion.  Ordered IR tunneled catheter placement.  DC previously ordered MRI after clearing up confusion.  Ordered low-dose Benadryl.  Changed diet to high-protein.  Total time 53 minutes.    I have discussed this patient's plan of care and discharge plan at IDT rounds today with Case Management, Nursing, Nursing leadership, and other members of the IDT  team.    Consultants/Specialty  none    Code Status  DNAR/DNI    Disposition  The patient is not medically cleared for discharge to home or a post-acute facility.      I have placed the appropriate orders for post-discharge needs.    Review of Systems  Review of Systems   Constitutional:  Positive for malaise/fatigue. Negative for chills and fever.        Feels about the same on 6/2   Gastrointestinal:  Negative for abdominal pain, nausea and vomiting.   Genitourinary:  Negative for dysuria.   Neurological:  Positive for sensory change (chronic in legs).        No clear changes noted 6/2     Psychiatric/Behavioral:  The patient is nervous/anxious (much calmer on 6/1).         She feels more anxious today        Physical Exam  Temp:  [36.4 °C (97.5 °F)-36.7 °C (98.1 °F)] 36.7 °C (98.1 °F)  Pulse:  [73-86] 73  Resp:  [16-18] 16  BP: (149-178)/(45-53) 159/50  SpO2:  [95 %-97 %] 97 %    Physical Exam  Vitals and nursing note reviewed.   Constitutional:       General: She is not in acute distress.     Appearance: She is well-developed.      Comments: Much more anxious today on 6/3   HENT:      Head: Normocephalic and atraumatic.      Mouth/Throat:      Pharynx: No oropharyngeal exudate.   Eyes:      General: No scleral icterus.     Pupils: Pupils are equal, round, and reactive to light.   Neck:      Thyroid: No thyromegaly.   Cardiovascular:      Rate and Rhythm: Normal rate and regular rhythm.      Heart sounds: Normal heart sounds. No murmur heard.  Pulmonary:      Effort: Pulmonary effort is normal. No respiratory distress.      Breath sounds: Normal breath sounds. No wheezing.   Abdominal:      General: Bowel sounds are normal. There is no distension.      Palpations: Abdomen is soft.      Tenderness: There is no abdominal tenderness.   Musculoskeletal:         General: Signs of injury present. No tenderness. Normal range of motion.      Cervical back: Normal range of motion and neck supple.      Right lower leg:  Edema present.      Left lower leg: Edema present.      Comments: Wound vac in place with Veraflo, no leakage  Minimal erythema noted  No clear changes noted on 6/3   Skin:     General: Skin is warm and dry.      Findings: Erythema (decreased) present. No rash.   Neurological:      Mental Status: She is alert and oriented to person, place, and time.      Cranial Nerves: No cranial nerve deficit.      Sensory: Sensory deficit (neuropathy of B/L LE's) present.   Psychiatric:         Mood and Affect: Mood is anxious. Affect is labile.         Fluids    Intake/Output Summary (Last 24 hours) at 6/4/2024 1619  Last data filed at 6/4/2024 1533  Gross per 24 hour   Intake 440 ml   Output --   Net 440 ml       Laboratory        Recent Labs     06/02/24  1022   SODIUM 136   POTASSIUM 3.8   CHLORIDE 97   CO2 25   GLUCOSE 294*   BUN 25*   CREATININE 1.29   CALCIUM 9.3                     Imaging  US-TACO SINGLE LEVEL BILAT   Final Result      DX-FOOT-COMPLETE 3+ RIGHT   Final Result      Small posterior heel ulcer and there is some new soft tissue gas posterior to the ankle.      No radiographic evidence for acute osteomyelitis.      Multiple chronic osseous changes are again noted.      IR-MIDLINE CATHETER INSERTION WO GUIDANCE > AGE 3    (Results Pending)   IR-CHOI,GROSHONG PLACEMENT >5    (Results Pending)        Assessment/Plan  * Osteomyelitis (HCC)- (present on admission)  Assessment & Plan  S/P debridement of calcaneal bone and wound vac placed on 5/31, clinically is stable  Deep intra-operative cultures growing MRSE, same as wound cultures from 5/31  Added IV dapto 6/2 and continue empiric IV unasyn, likely needs at least 6 weeks of abx's  Given exposed tendon and bone patient will need prolonged vera flow, therefore she will need skilled nursing facility for many weeks.  This kind of wound VAC arrangement is not possible at home.  I will explain this to the patient.  Ordered midline  Cancel MRI foot  Consulted ortho,  going for debridement and wound vac placement on 5/31  ID consulted  Glycemic control (not well controlled as noted elsewhere)  Supportive wound care  TACO are normal  Pain control  MRSA nares negative, WBC has normalized, remains afebrile  Unable to place midline due to vasculature, tunneled IR catheter pending    ACP (advance care planning)- (present on admission)  Assessment & Plan  Confirmed DNR/DNI    Cellulitis- (present on admission)  Assessment & Plan  Cellulitis of right foot, possible osteomyelitis  Plan as noted in osteomyelitis    DM (diabetes mellitus) ([facility])- (present on admission)  Assessment & Plan  Has a particular regimen which she wants to adhere to, REMAINS WITH PERSISTENT HYPERGLYCEMIA and highly labile, she continues to be adamant about wanting to do her own insulin.  Start NPH 50 units qam and 10 units QPM (refusing multiple administrations of her blood sugars)  Reinforced with patient that persistent hyperglycemia will worsen her infection/delay wound healing, she understands  I personally reviewed the blood sugars on 6/3/2024  ISS  Consider standing SA insulin dependent on sugar levels    PAF (paroxysmal atrial fibrillation) ([facility])- (present on admission)  Assessment & Plan  Restart eliquis    Other insomnia- (present on admission)  Assessment & Plan  As needed Benadryl    CKD (chronic kidney disease) stage 3, GFR 30-59 ml/min- (present on admission)  Assessment & Plan  Minimize nephrotoxic agents, renally dose meds  Cr remains near her baseline    COPD (chronic obstructive pulmonary disease) ([facility])- (present on admission)  Assessment & Plan  Per history  Currently not in acute exacerbation    Cardiac pacemaker- (present on admission)  Assessment & Plan  Per history  St Syd placed in 2018, confirmed it is MRI compatible  No recent syncope or issues here, no need for interrogation    Anxiety- (present on admission)  Assessment & Plan  Getting worse again, sounds like poorly controlled MAXIMO at  home as well  Consulted psychotherapy first, but may need IP psych consult as well  No FURTHER escalation of her benadryl, explained to patient  She does not tolerate any benzo's  Seems like MAXIMO, would benefit from an SSRI, but she declines at this time  Benadryl oral PRN  Uses CBD oil at home    Diastolic CHF due to valvular disease (HCC)- (present on admission)  Assessment & Plan  Prior history  Currently not in acute exacerbation    Class 1 obesity in adult- (present on admission)  Assessment & Plan  Diet and lifestyle modification  Body mass index is 32.11 kg/m².      Diabetic ulcer of right heel associated with type 2 diabetes mellitus (HCC)- (present on admission)  Assessment & Plan  Supportive wound care  Glycemic control  Plan as noted above         VTE prophylaxis: apixiban    I have performed a physical exam and reviewed and updated ROS and Plan today (6/4/2024). In review of yesterday's note (6/3/2024), there are no changes except as documented above.

## 2024-06-04 NOTE — PROGRESS NOTES
VAT RN came to place midline as patient was declining yesterday, and wanted us to come back this morning. She is now again asking us to come back as her lights is her room are flashing. We spoke to bedside RN and asked her to call us when patient is agreeable.

## 2024-06-04 NOTE — CARE PLAN
The patient is Stable - Low risk of patient condition declining or worsening    Shift Goals  Clinical Goals: maintain skin integrity, monitor blood sugar  Patient Goals: get better food, go home  Family Goals: joseluis    Progress made toward(s) clinical / shift goals:  Patient is alert and oriented. Veraflo wound vac in place.needs met at this time. Hourly rounding in place.   Call light within reach.  Patient is not progressing towards the following goals:

## 2024-06-04 NOTE — CARE PLAN
The patient is Stable - Low risk of patient condition declining or worsening    Shift Goals  Clinical Goals: monitor blood sugars and encourage compliance with insulin regimen  Patient Goals: go home   Family Goals: joseluis    Patient having increase anxiety and depression through out shift, Md place consult orders for behavioral health and psych       Progress made toward(s) clinical / shift goals:      Problem: Pain - Standard  Goal: Alleviation of pain or a reduction in pain to the patient’s comfort goal this shift  Outcome: Progressing     Problem: Skin Integrity  Goal: Risk for impaired skin integrity will decrease this shift  Outcome: Progressing     Problem: Fall Risk  Goal: Patient will remain free from falls this shiftt  Outcome: Progressing       Patient is not progressing towards the following goals:      Problem: Psychosocial  Goal: Patient's level of anxiety will decrease this shiftt  Outcome: Not Progressing     Problem: Psychosocial  Goal: Patient's ability to verbalize feelings about condition will improve this shift  Outcome: Not Progressing

## 2024-06-04 NOTE — PROGRESS NOTES
Orthopedic PA Progress Note    Interval changes:  Patient doing well   RLE dressings are CDI  Veraflo wound vac in place without leak  Ok for d/c planning to SNF that accepts veraflo- do NOT convert to regular vac    ROS - Patient denies any new issues.  Denies any numbness or tingling. Pain well controlled.    BP (!) 159/50   Pulse 73   Temp 36.7 °C (98.1 °F) (Temporal)   Resp 16   Wt 96.3 kg (212 lb 4.9 oz)   SpO2 97%     Patient seen and examined  No acute distress  Breathing non labored  RRR  RLE: Wound vac intact without leak. Sensation at baseline. Motor intact. Cap refill <2s          Active Hospital Problems    Diagnosis     Osteomyelitis ([facility]) [M86.9]     DM (diabetes mellitus) ([facility]) [E11.9]     Cellulitis [L03.90]     ACP (advance care planning) [Z71.89]     PAF (paroxysmal atrial fibrillation) ([facility]) [I48.0]     Other insomnia [G47.09]     COPD (chronic obstructive pulmonary disease) ([facility]) [J44.9]     CKD (chronic kidney disease) stage 3, GFR 30-59 ml/min [N18.30]     Cardiac pacemaker [Z95.0]     Anxiety [F41.9]     Diastolic CHF due to valvular disease ([facility]) [I50.30, I38]     Class 1 obesity in adult [E66.9]     Diabetic ulcer of right heel associated with type 2 diabetes mellitus ([facility]) [E11.621, L97.419]        Assessment/Plan:  Patient doing well   RLE dressings are CDI  Veraflo wound vac in place without leak  Ok for d/c planning to SNF that accepts veraflo- do NOT convert to regular vac    POD#5 S/p  1.  Excisional debridement of right foot wound including skin, subcutaneous   tissue, tendon and bone through wound measuring approximately 4x5 cm.  2.  Partial craterization of right calcaneus for osteomyelitis.  3.  Application of wound VAC, right foot, less than 50 cm2  Wt bearing status - WBAT RLE  Wound care/Drains - Dressings to be performed by wound team  Future Procedures - None planned   PT/OT-initiated  Antibiotics: Unasyn; daptomycin  DVT Prophylaxis- TEDS/SCDs/Foot  pumps/Eliquis  Rosas-not needed per ortho  Case Coordination for Discharge Planning - Disposition per therapy recs.

## 2024-06-04 NOTE — CONSULTS
".PSYCHIATRIC INTAKE EVALUATION(new)  Reason for admission: Wound to R heel  Reason for consult: \"psychiatric medication evaluation/management\"  Requesting Provider:   Dr. Lance Arredondo    Legal Hold Status: not on legal hold          Chart reviewed.       Note written in collaboration with MS3 Kimnajma Rosenbaum     HPI: Ophelia Sosa is a 78 y.o. F admitted for suspected osteomyelitis and cellulitis from a diabetic ulcer of the right heel. Psychiatry was consulted following evaluation by Dr. Morales with impression of adjustment disorder vs Bipolar disorder vs MAXIMO.        Ophelia has been experiencing anxiety over the past couple years with what she describes as panic attacks in which she wants to \"put her head under the sand\" or \"hide from the world. She says this feeling is often triggered by the thought of needing to go to the grocery store, but she is able to overcome this feeling using her coping mechanism and buy her groceries. Her coping mechanisms include remembering how brave she has been in the past, like when she went to visit [place] by herself, as well as using 12.5-25 mg benadryl and CBD oil nightly and as needed to manage her anxiety.    Prior to being hospitalized, Ophelia describes feeling \"in control\" and capable of managing her life, health, and mood. Since admission, Ophelia feels like she has no control over anything except \"bun-buns\" (her stuffed rabbit) and \"my bible.\" She reports feeling anxious about her unexpected stay in the hospital and her lack of control while here. She dislikes her bed and food, but says she is very happy with the staff who have been very nice to her. She says she probably complains about the food and her bed because it is the only thing she has control over. She worries that she won't be able to return home and/or loose her independence.    When asked about her anxiety today, she points to the roof and confirmed she means it is very high.    She says she has had therapy " "in the past with a psychologist named Leticia Nielsen, who she really liked and taught her a number coping mechanisms. She enjoyed speaking with Dr. Morales and is open to seeing therapy outpatient.    Collateral information was provided by the patient's long time friend and co-worker Iesha Luis. They worked as nurses in PACU. She denies noticing any periods of decreased sleep, high energy, or talkativeness in Ophelia. She says Ophelia can sometimes be talkative about subjects she is passionate about at baseline. She also reports that Ophelia may occasionally feel down, but that she recovers quickly and it does not impair her life. She has never heard any SI from Ophelia.      Iesha reports that she noticed Ophelia experiencing increasing anxiety over the past 15 years manifesting as anxiety of going to the grocery store and \"sticking her head in the sand\"/ not wanting to deal with problems like paying bills. She also reports that Ophleia sometimes has difficulty sleeping as a result of her anxiety and will take CBD oil or benadryl to help her sleep. She has also noticed Ophelia appearing more depressed/discouraged regarding her diabetes management over the past two years with impaired mobility and loss of independence in addition to her foot wound. She is no longer mobile enough to do some activities she like to do such as shopping at book [employer] or [employer]. She reports Ophelia has also had difficulty finding a new endocrinologist that is flexible and willing to work with her on what she is comfortable with for her management.      Psychiatric ROS:  Depression: reports feeling a bit down while in the hospital, denies history of low mood, feelings of guilt, low energy, poor concentration, SI/HI  Annia: Denies periods of little sleep, impulsivity, increase activities, or talkativeness  Anxiety: as above  Psychosis:        MAXIMO-7 Questionnaire    Feeling nervous, anxious, or on edge:  2-more than half the days  Not being " able to stop or control worrying: 3- nearly every day    Worrying too much about different things:  1- several days  Trouble relaxin- several days  Being so restless that it's hard to sit still: 0-not at all  Becoming easily annoyed or irritable:  2-more than half the days  Feeling afraid as if something awful might happen: 2 - more than half the days  Total:  11    Interpretation of MAXIMO 7 Total Score  Score Severity :  0-4 No Anxiety  5-9 Mild Anxiety  10-14 Moderate Anxiety  15-21 Severe Anxiety        2024    9:30 AM 4/3/2024    9:06 AM 2023    2:00 PM 2022    1:00 PM 2022    1:20 PM  PHQ-9 Screening  Little interest or pleasure in doing things 1 - several days 1 - several days 0 - not at all 0 - not at all 0 - not at all  Feeling down, depressed, or hopeless 1 - several days 1 - several days 0 - not at all 0 - not at all 0 - not at all  Trouble falling or staying asleep, or sleeping too much 2 - more than half the days 1 - several days 1 - several days  Feeling tired or having little energy 1 - several days 1 - several days 2 - more than half the days  Poor appetite or overeating 1 - several days 1 - several days 1 - several days  Feeling bad about yourself - or that you are a failure or have let yourself or your family down 1 - several days 0 - not at all 0 - not at all  Trouble concentrating on things, such as reading the newspaper or watching television 1 - several days 0 - not at all 0 - not at all  Moving or speaking so slowly that other people could have noticed. Or the opposite - being so fidgety or restless that you have been moving around a lot more than usual 1 - several days 1 - several days 0 - not at all  Thoughts that you would be better off dead, or of hurting yourself in some way 0 - not at all 0 - not at all 0 - not at all  PHQ-2 Total Score 2 2 0 0 0  PHQ-9 Total Score 9 6      Interpretation of PHQ-9 Total Score  Score Severity  1-4 No Depression  5-9 Mild  "Depression  10-14 Moderate Depression  15-19 Moderately Severe Depression  20-27 Severe Depression    Medical ROS (as pertinent):    Review of Systems  Constitutional:  Positive for weight loss. Negative for chills and fever.       Reports loss of appetite over the past couple months leading to weight loss  Respiratory:  Negative for cough and shortness of breath.    Cardiovascular:  Negative for chest pain and palpitations.  Gastrointestinal:  Negative for abdominal pain.  Neurological:  Negative for headaches.          *Psychiatric Examination:  Vitals:  Vitals:  06/04/24 0746  BP: (!) 159/50  Pulse: 73  Resp: 16  Temp: 36.7 °C (98.1 °F)  SpO2: 97%     General Appearance: Appears stated age, appropriately dressed in hospital attire, well groomed  Behavior: Appears uncomfortable in bed, fidgety/restless/anxious, tense, cooperative  Speech: ranging from normal to increased rate, normal volume  Thought processes: tangential but able to be redirected, focused on hospital food and bed  Abnormal or Psychotic Thoughts: no apparent delusions or AVH  Insight: Good, knows what she is anxious about and her triggers  Judgement: fair, hesitant to try coping mechanisms she is unfamiliar with, like new medications  Orientation: AAOx4  Mood: \"I feel like I have no control\"  Affect: mood congruent, anxious  SI/HI: denies current or history    Past Medical History:  Past Medical History:  Diagnosis Date  · ASTHMA  patient denies  · Complete heart block (HCC) 11/16/2018  · Congestive heart failure (HCC)  · COPD  · COPD (chronic obstructive pulmonary disease) (HCC)  · Depressed  · Diabetes  · Diastolic dysfunction  · DM (diabetes mellitus) (HCC)  · Heart murmur  · HLD (hyperlipidemia)  · HTN (hypertension)  · Hypertension  · Obstructive sleep apnea  · Osteoarthritis  · Pulmonary hypertension (HCC)  · 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  · Sleep apnea       Past Psychiatric History:  Previous " Diagnosis: denies  Current meds: denies  Previous med trials: denies  Hospitalizations: denies  Suicide attempts/SIB: denies  Outpatient services: no current services, history of therapy sessions  Abuse/trauma hx:denies      Family Hx:  Psychiatric diagnoses: denies  Suicide/attempts: cousin completed suicide  Substance use: denies  General Medicine: diabetes, bypass in father    Social Hx:  Childhood: good relationship with parents growing up, only child  Education: up to nursing school  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 100 mL/hr at 06/04/24 0627 750 mg at 06/04/24 0627  · apixaban (Eliquis) tablet 5 mg 5 mg Oral BID Lance Arredondo M.D. 5 mg at 06/04/24 0508  · furosemide (Lasix) tablet 40 mg 40 mg Oral DAILY Lance Arredondo M.D. 40 mg at 06/04/24 0508  · losartan (Cozaar) tablet 25 mg 25 mg Oral BID Lance Arredondo M.D. 25 mg at 06/04/24 0508  · nystatin (Mycostatin) powder Topical BID Karan Neal M.D. Given at 06/04/24 0511  · insulin NPH (HumuLIN N,NovoLIN N) injection 50 Units Subcutaneous QAM Lance Arredondo M.D. 50 Units at 06/02/24 0907  · insulin NPH (HumuLIN N,NovoLIN N) injection 10 Units Subcutaneous PM INSULIN Lance Arredondo M.D. 10 Units at 06/03/24 1843  · dakins 0.125% (1/4 strength) topical soln Topical BID Lance Arredondo M.D. 473 mL at 06/02/24 1754  · LR (Bolus) infusion 500 mL 500 mL Intravenous Once PRN Karan Neal M.D.  ·  acetaminophen (Tylenol) tablet 650 mg 650 mg Oral Q6HRS PRN Karan Neal M.D. 650 mg at 06/03/24 2038  · labetalol (Normodyne/Trandate) injection 10 mg 10 mg Intravenous Q4HRS PRN Karan Neal M.D.  · ondansetron (Zofran) syringe/vial injection 4 mg 4 mg Intravenous Q4HRS PRN Karan Neal M.D.  · ondansetron (Zofran ODT) dispertab 4 mg 4 mg Oral Q4HRS PRN Karan Neal M.D.  · guaiFENesin dextromethorphan (Robitussin DM) 100-10 MG/5ML syrup 10 mL 10 mL Oral Q6HRS PRN Karan Neal M.D.  · senna-docusate (Pericolace Or Senokot S) 8.6-50 MG per tablet 2 Tablet 2 Tablet Oral Q EVENING Karan Neal M.D. 2 Tablet at 05/29/24 0552  And  · polyethylene glycol/lytes (Miralax) Packet 1 Packet 1 Packet Oral QDAY PRN Karan Neal M.D.  · albuterol inhaler 2 Puff 2 Puff Inhalation Q6HRS PRN Karan Neal M.D. 2 Puff at 06/04/24 0521  · rosuvastatin (Crestor) tablet 5 mg 5 mg Oral QPM MO, WE + FR Karan Neal M.D. 5 mg at 06/03/24 1839  · Pharmacy Consult Request ...Pain Management Review 1 Each 1 Each Other PHARMACY TO DOSE Karan Neal M.D.  · acetaminophen (Tylenol) tablet 650 mg 650 mg Oral Q6HRS PRN Karan Neal M.D.  · oxyCODONE immediate-release (Roxicodone) tablet 2.5 mg 2.5 mg Oral Q3HRS PRCLAUS Neal M.D.  Or  · oxyCODONE immediate-release (Roxicodone) tablet 5 mg 5 mg Oral Q3HRS PRN Karan Neal M.D.  Or  · HYDROmorphone (Dilaudid) injection 0.25 mg 0.25 mg Intravenous Q3HRS PRN Karan Neal M.D.  · diphenhydrAMINE (Benadryl) tablet/capsule 25 mg 25 mg Oral QHS PRN ANETA JohnsonP.RLatishaN. 25 mg at 06/03/24 2038  · insulin regular (HumuLIN R,NovoLIN R) injection 3-14 Units Subcutaneous 4X/DAY GHAZALA Neal M.D. 4 Units at 06/04/24 0918  And  · dextrose 50% (D50W) injection 25 g 25 g Intravenous Q15 MIN PRN Karan Nael M.D.  · ipratropium-albuterol (DUONEB) nebulizer solution 3 mL Nebulization Q4H PRN (RT) Karan Neal M.D.  · ampicillin/sulbactam (Unasyn) 3 g in  mL IVPB 3 g  Intravenous Q6HRS Lance Arredondo M.D. Stopped at 06/04/24 0548       Allergies:  Ativan, Advair [fluticasone-salmeterol], Ambien [zolpidem tartrate], Erythromycin, Ibuprofen, Lipitor [atorvastatin calcium], Pcn [penicillins], and Pravachol [pravastatin sodium]      Labs personally reviewed:  Recent Results (from the past 72 hour(s))  POCT glucose device results  Collection Time: 06/01/24 12:55 PM  Result Value Ref Range  POC Glucose, Blood 249 (H) 65 - 99 mg/dL  POCT glucose device results  Collection Time: 06/01/24  5:01 PM  Result Value Ref Range  POC Glucose, Blood 123 (H) 65 - 99 mg/dL  POCT glucose device results  Collection Time: 06/01/24  8:46 PM  Result Value Ref Range  POC Glucose, Blood 137 (H) 65 - 99 mg/dL  POCT glucose device results  Collection Time: 06/02/24  5:56 AM  Result Value Ref Range  POC Glucose, Blood 175 (H) 65 - 99 mg/dL  POCT glucose device results  Collection Time: 06/02/24  9:03 AM  Result Value Ref Range  POC Glucose, Blood 203 (H) 65 - 99 mg/dL  Comp Metabolic Panel  Collection Time: 06/02/24 10:22 AM  Result Value Ref Range  Sodium 136 135 - 145 mmol/L  Potassium 3.8 3.6 - 5.5 mmol/L  Chloride 97 96 - 112 mmol/L  Co2 25 20 - 33 mmol/L  Anion Gap 14.0 7.0 - 16.0  Glucose 294 (H) 65 - 99 mg/dL  Bun 25 (H) 8 - 22 mg/dL  Creatinine 1.29 0.50 - 1.40 mg/dL  Calcium 9.3 8.5 - 10.5 mg/dL  Correct Calcium 9.8 8.5 - 10.5 mg/dL  AST(SGOT) 12 12 - 45 U/L  ALT(SGPT) 6 2 - 50 U/L  Alkaline Phosphatase 64 30 - 99 U/L  Total Bilirubin 0.2 0.1 - 1.5 mg/dL  Albumin 3.4 3.2 - 4.9 g/dL  Total Protein 6.6 6.0 - 8.2 g/dL  Globulin 3.2 1.9 - 3.5 g/dL  A-G Ratio 1.1 g/dL  CREATINE KINASE  Collection Time: 06/02/24 10:22 AM  Result Value Ref Range  CPK Total 86 0 - 154 U/L  ESTIMATED GFR  Collection Time: 06/02/24 10:22 AM  Result Value Ref Range  GFR (CKD-EPI) 42 (A) >60 mL/min/1.73 m 2  POCT glucose device results  Collection Time: 06/02/24 12:12 PM  Result Value Ref Range  POC Glucose, Blood 262 (H) 65  - 99 mg/dL  POCT glucose device results  Collection Time: 24  5:51 PM  Result Value Ref Range  POC Glucose, Blood 115 (H) 65 - 99 mg/dL  POCT glucose device results  Collection Time: 24  9:09 PM  Result Value Ref Range  POC Glucose, Blood 171 (H) 65 - 99 mg/dL  POCT glucose device results  Collection Time: 24  2:34 AM  Result Value Ref Range  POC Glucose, Blood 136 (H) 65 - 99 mg/dL  POCT glucose device results  Collection Time: 24  8:14 AM  Result Value Ref Range  POC Glucose, Blood 198 (H) 65 - 99 mg/dL  POCT glucose device results  Collection Time: 24  1:47 PM  Result Value Ref Range  POC Glucose, Blood 253 (H) 65 - 99 mg/dL  POCT glucose device results  Collection Time: 24  6:36 PM  Result Value Ref Range  POC Glucose, Blood 307 (H) 65 - 99 mg/dL  POCT glucose device results  Collection Time: 24  8:24 PM  Result Value Ref Range  POC Glucose, Blood 333 (H) 65 - 99 mg/dL  POCT glucose device results  Collection Time: 24  5:22 AM  Result Value Ref Range  POC Glucose, Blood 160 (H) 65 - 99 mg/dL        EKG:  Results for orders placed or performed during the hospital encounter of 24  EKG  Result Value Ref Range  Report  [facility]    Test Date:  2024  Pt Name:    PAYAL BAUER                Department:   MRN:        [id number]                      Room:       [facility]  Gender:     Female                       Technician: [facility]  :        [id number]                   Requested By:GUERRERO CHRISTIAN  Order #:    [id number]                    Reading MD: Garry Lopez MD    Measurements  Intervals                                Axis  Rate:       81                           P:          82  ME:         198                          QRS:        -79  QRSD:       147                          T:          82  QT:         434  QTc:        504    Interpretive Statements  Atrial sensed Ventricular-paced complexes  No further analysis attempted due to paced  rhythm  Compared to ECG 11/02/2023 14:49:17  Atrial-sensed ventricular-paced complex(es) or rhythm no longer present  Electronically Signed On 05- 14:28:27 PDT by Garry Lopez MD         Assessment: Ophelia Sosa is a 78 y.o. F admitted for suspected osteomyelitis and cellulitis from a diabetic ulcer of the right heel. Psychiatry was consulted following evaluation by Dr. Morales with impression of adjustment disorder vs Bipolar disorder vs MAXIMO.    Pt is not maniac. Ophelia reports struggling with what feels like a loss of control during her hospital stay which seems to have aggravated her anxiety. She has a long history of anxiety that she has been managing with coping mechanisms, benadryl, and CBD oil. She would like to have benadryl while inpatient to help with her anxiety. She was open to hearing about other anxiety medications, but felt anxious and was not interested in starting a new medication that she was not familiar with. She is interested in outpatient therapy.    Dx:  Generalized anxiety disorder    Medical:  Reviewed, please see medical note      Plan:  · Legal hold: None  · Psychotropic medications: she declined at this time. If she reconsiders, recommend Buspar 5mg PO BID PRN for anxiety.  Pt was advised on long term SE from benadryl, and advised to discontinue medication.  · Collateral obtained from friend Iesha Smith [phone]  · Continue inpatient psychotherapy with Dr Moraels  . Request SW to provide pt with resources to outpatient mental health services.   · Psychiatry signing off.     Thank you for the consult.

## 2024-06-04 NOTE — THERAPY
Physical Therapy Contact Note    Patient Name: Ophelia Sosa  Age:  78 y.o., Sex:  female  Medical Record #: [id number]  Today's Date: 6/4/2024 06/04/24 1531   Interdisciplinary Plan of Care Collaboration   Collaboration Comments PT evaluation attempted. This AM pt talking with psychiatry provider, this afternoon pt with VAT.  Will round back as able/appropriate.

## 2024-06-04 NOTE — DISCHARGE PLANNING
"HTH/SC[facility] TCN chart review completed. Collaborated with MARYLU Cosby. Discussed current discharge considerations noting current physician recommendations for SNF level of care at discharge (please see [facility], Dr. Arredondo, note 6/3/2024 at 3:15PM).     In collaboration with CM, TCN met with patient at bedside. Patient advised TCN of meeting with physician yesterday and current discharge recommendations for SNF level of care \"until the 28th of June.\" Provided education and discussion regarding SCP plan benefits. Patient requested that friend, Iesha Smith, assist patient is choice for SNF level of care. Patient placed telephone call to friendIesha, with TCN present in room with time set for meeting with TCN, patient and friend Iesha tomorrow, 6/5, at 1PM to discuss SNF choice. Discussed CM protocol of sending SNF referrals with patient verbalizing understanding and stating agreement.     TCN will continue to follow and collaborate with discharge planning team as additional post acute needs arise. Thank you.    Completed  Choice obtained: HH (resumption of [facility]). [facility] ([facility]); although current physician recommendations are for SNF  Patient verbalized understanding regarding CM protocol of sending SNF referrals. Time set for 1PM tomorrow, 6/5, for meeting with TCN, patient and friend Iesha to discuss SNF choice  Referral sent to [facility] 5/29. Per W patient is followed by [facility] outpatient   "

## 2024-06-04 NOTE — PROGRESS NOTES
Infectious Disease Progress Note    Author: Becky Bauman M.D. Date & Time of service: 2024  12:01 PM    Chief Complaint:  Nonhealing heel wound, chronic  Infected Achilles    Interval History:   78 y.o. diabetic female admitted 2024 for above  6/2 AF WBC 7.3 VAC in place  6/3 AF very depressed about situation-denies SE antibiotics. Would like to speak to psychiatry   AF psych eval appreciated  Labs Reviewed, Medications Reviewed, and Wound Reviewed.    Review of Systems:  Review of Systems   Unable to perform ROS: Psychiatric disorder   Constitutional:  Negative for fever.       Hemodynamics:  Temp (24hrs), Av.5 °C (97.7 °F), Min:36.4 °C (97.5 °F), Max:36.7 °C (98.1 °F)  Temperature: 36.7 °C (98.1 °F)  Pulse  Av.4  Min: 71  Max: 100   Blood Pressure : (!) 159/50       Physical Exam:  Physical Exam  Vitals and nursing note reviewed.   Constitutional:       Appearance: She is not ill-appearing.   Cardiovascular:      Rate and Rhythm: Normal rate.   Pulmonary:      Effort: Pulmonary effort is normal.   Abdominal:      General: There is no distension.   Musculoskeletal:      Cervical back: Neck supple.      Comments: Veraflo VAC to heel         Meds:    Current Facility-Administered Medications:     diphenhydrAMINE    DAPTOmycin    apixaban    furosemide    losartan    nystatin    insulin NPH    insulin NPH    dakins 0.125% (/ strength)    LR    acetaminophen    labetalol    ondansetron    ondansetron    guaiFENesin dextromethorphan    senna-docusate **AND** polyethylene glycol/lytes    albuterol    rosuvastatin    Pharmacy Consult Request    [] acetaminophen **FOLLOWED BY** acetaminophen    oxyCODONE immediate-release **OR** oxyCODONE immediate-release **OR** HYDROmorphone    diphenhydrAMINE    insulin regular **AND** POC blood glucose manual result **AND** NOTIFY MD and PharmD **AND** Administer 20 grams of glucose (approximately 8 ounces of fruit juice) every 15 minutes PRN FSBG  "less than 70 mg/dL **AND** dextrose bolus    ipratropium-albuterol    ampicillin-sulbactam (UNASYN) IV    Labs:  No results for input(s): \"WBC\", \"RBC\", \"HEMOGLOBIN\", \"HEMATOCRIT\", \"MCV\", \"MCH\", \"RDW\", \"PLATELETCT\", \"MPV\", \"NEUTSPOLYS\", \"LYMPHOCYTES\", \"MONOCYTES\", \"EOSINOPHILS\", \"BASOPHILS\", \"RBCMORPHOLO\" in the last 72 hours.    Recent Labs     24  1022   SODIUM 136   POTASSIUM 3.8   CHLORIDE 97   CO2 25   GLUCOSE 294*   BUN 25*   CPKTOTAL 86     Recent Labs     24  1022   ALBUMIN 3.4   TBILIRUBIN 0.2   ALKPHOSPHAT 64   TOTPROTEIN 6.6   ALTSGPT 6   ASTSGOT 12   CREATININE 1.29       Imaging:  US-TACO SINGLE LEVEL BILAT    Result Date: 2024   Vascular Laboratory  Conclusions  Normal TACO's and 1st TBI's.  PAYAL BAUER  Age:    78    Gender:     F  MRN:    [id number]  :    1945      BSA:  Exam Date:     2024 10:52  Room #:     Inpatient  Priority:     Routine  Ht (in):             Wt (lb):  Ordering Physician:        STACEY MONTALVO  Referring Physician:       [id number] Betancur  Sonographer:               Sergo Patrick                             RVT  Study Type:                Complete Bilateral  Technical Quality:         Adequate  Indications:     Ulceration of LE  CPT Codes:       24868  ICD Codes:       707.1  History:         Non-healing wound on right heel.  Limitations:     Could not obtain left brachial blood pressure due to IV                   placement.                 RIGHT  Waveform            Systolic BPs (mmHg)                             179           Brachial  Triphasic                                Common Femoral  Triphasic                                Popliteal  Triphasic                  199           Posterior Tibial  Triphasic                  207           Dorsalis Pedis  Normal                     142           Digit                             1.16          TACO                             0.79          TBI                       " LEFT  Waveform        Systolic BPs (mmHg)                                           Brachial  Triphasic                                Common Femoral  Triphasic                                Popliteal  Triphasic                  230           Posterior Tibial  Biphasic                   211           Dorsalis Pedis  Normal                     129           Digit                             1.28          TACO                             0.72          TBI  Findings  Bilateral-  Doppler waveforms of the common femoral, popliteal, posterior tibial, and  dorsalis pedis arteries are of high amplitude and multiphasic.  The ankle-brachial indices are normal.  The toe-brachial indices are normal.  The 1st digit PPG waveform is normal.  Additional testing was not performed in accordance with lower extremity  arterial evaluation protocol.  Henok Dos Santos  (Electronically Signed)  Final Date:      30 May 2024 12:41    DX-FOOT-COMPLETE 3+ RIGHT    Result Date: 5/28/2024 5/28/2024 3:08 PM HISTORY/REASON FOR EXAM:  Atraumatic Pain/Swelling/Deformity TECHNIQUE/EXAM DESCRIPTION AND NUMBER OF VIEWS: 3 views of the RIGHT foot. COMPARISON:  5/13/2024 FINDINGS: Extensive chronic changes of the midfoot including chronic Lisfranc fracture dislocation and midfoot degenerative changes again noted. There are old fracture deformities of the second and third proximal phalanges again noted as well. No acute fracture or  dislocation. There is soft tissue gas posterior to the ankle which is new from prior radiograph. There is some dystrophic calcification in this area again noted. Small posterior heel ulcer..     Small posterior heel ulcer and there is some new soft tissue gas posterior to the ankle. No radiographic evidence for acute osteomyelitis. Multiple chronic osseous changes are again noted.    DX-FOOT-COMPLETE 3+ RIGHT    Result Date: 5/13/2024 5/13/2024 9:02 PM HISTORY/REASON FOR EXAM:  Atraumatic Pain/Swelling/Deformity  TECHNIQUE/EXAM DESCRIPTION AND NUMBER OF VIEWS: 3 views of the RIGHT foot. COMPARISON:  2/1/2016 FINDINGS:  Bone mineralization is normal.  There is no evidence of fracture or dislocation.  There is severe joint space narrowing periarticular sclerosis and spurring throughout the tarsal metatarsal joints with fusion across several joints. Some malalignment is noted with lateral displacement of metatarsals relative to the tarsal bones. Chronic fragmentation of the distal diaphysis of the second metatarsal bone also appears to be present. Degenerative changes at the second and third MTP joints are also noted.  No bone erosions are identified.. Age-indeterminate fracture of the fourth toe proximal phalangeal neck. Osseous fragments at the expected location of the Achilles.     1.  Age-indeterminate fracture of the fourth toe proximal phalangeal neck. 2.  Redemonstration of Charcot's foot including near complete fusion at the tarsal metatarsal joints and across the proximal ends of the metatarsal bones. 3.  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 4.  Degenerative changes in the second and third MTP joint. 5.  No radiographic evidence of acute osteomyelitis. If there is clinical concern for radiographically occult osteomyelitis, MRI can be obtained. 6.  Possible age-indeterminate avulsion of the Achilles.       Micro:  Results       Procedure Component Value Units Date/Time    Anaerobic Culture [[id number]] Collected: 05/31/24 1311    Order Status: Completed Specimen: Bone Updated: 06/04/24 0838     Significant Indicator NEG     Source BONE     Site L calcaneous     Culture Result No Anaerobes isolated.    Narrative:      Surgery Specimen    Fungal Culture [[id number]] Collected: 05/31/24 1311    Order Status: Completed Specimen: Bone Updated: 06/04/24 0838     Significant Indicator NEG     Source BONE     Site L calcaneous     Culture Result No fungal growth to date.     Fungal Smear Results No fungal elements  seen.    Narrative:      Surgery Specimen    AFB Culture [[id number]] Collected: 05/31/24 1311    Order Status: Completed Specimen: Bone Updated: 06/04/24 0838     Significant Indicator NEG     Source BONE     Site L calcaneous     Culture Result Culture in progress.     AFB Smear Results No acid fast bacilli seen.    Narrative:      Surgery Specimen    CULTURE TISSUE W/ GRM STAIN [[id number]]  (Abnormal)  (Susceptibility) Collected: 05/31/24 1311    Order Status: Completed Specimen: Bone Updated: 06/04/24 0838     Significant Indicator POS     Source BONE     Site L calcaneous     Culture Result -     Gram Stain Result Few WBCs.  No organisms seen.       Culture Result Staphylococcus epidermidis  Light growth      Narrative:      Surgery Specimen    Susceptibility       Staphylococcus epidermidis (1)       Antibiotic Interpretation Microscan   Method Status    Clindamycin Resistant >4 mcg/mL MONA Final    Cefazolin Resistant <=8 mcg/mL MONA Final    Cefepime Resistant <=4 mcg/mL MONA Final    Daptomycin Sensitive <=0.5 mcg/mL MONA Final    Erythromycin Resistant >4 mcg/mL MONA Final    Ampicillin/sulbactam Resistant <=8/4 mcg/mL MONA Final    Vancomycin Sensitive 2 mcg/mL MONA Final    Linezolid Sensitive 2 mcg/mL MONA Final    Oxacillin Resistant >2 mcg/mL MONA Final    Trimeth/Sulfa Resistant >2/38 mcg/mL MONA Final    Tetracycline Resistant >8 mcg/mL MONA Final                       BLOOD CULTURE [[id number]] Collected: 05/28/24 1615    Order Status: Completed Specimen: Blood from Peripheral Updated: 06/02/24 1700     Significant Indicator NEG     Source BLD     Site PERIPHERAL     Culture Result No growth after 5 days of incubation.    Narrative:      Right Forearm/Arm    BLOOD CULTURE [[id number]] Collected: 05/28/24 1620    Order Status: Completed Specimen: Blood from Peripheral Updated: 06/02/24 1700     Significant Indicator NEG     Source BLD     Site PERIPHERAL     Culture Result No growth after 5 days of incubation.     Narrative:      Left AC    Acid Fast Stain [[id number]] Collected: 05/31/24 1311    Order Status: Completed Specimen: Bone Updated: 06/01/24 1740     Significant Indicator NEG     Source BONE     Site L calcaneous     AFB Smear Results No acid fast bacilli seen.    Narrative:      Surgery Specimen    GRAM STAIN [[id number]] Collected: 05/31/24 1311    Order Status: Completed Specimen: Bone Updated: 06/01/24 1740     Significant Indicator .     Source BONE     Site L calcaneous     Gram Stain Result Few WBCs.  No organisms seen.      Narrative:      Surgery Specimen    Fungal Smear [[id number]] Collected: 05/31/24 1311    Order Status: Completed Specimen: Bone Updated: 06/01/24 1740     Significant Indicator NEG     Source BONE     Site L calcaneous     Fungal Smear Results No fungal elements seen.    Narrative:      Surgery Specimen    Urinalysis [[id number]]  (Abnormal) Collected: 05/29/24 0227    Order Status: Completed Specimen: Urine Updated: 05/29/24 0250     Color Yellow     Character Clear     Specific Gravity 1.010     Ph 6.0     Glucose Negative mg/dL      Ketones Negative mg/dL      Protein Negative mg/dL      Bilirubin Negative     Urobilinogen, Urine 0.2     Nitrite Negative     Leukocyte Esterase Small     Occult Blood Negative     Micro Urine Req Microscopic    MRSA By PCR (Amp) [[id number]] Collected: 05/28/24 1620    Order Status: Completed Specimen: Respirate from Nares Updated: 05/28/24 1820     MRSA by PCR Negative            Assessment:  Active Hospital Problems    Diagnosis     *Osteomyelitis ([facility]) [M86.9]     DM (diabetes mellitus) ([facility]) [E11.9]     Cellulitis [L03.90]     ACP (advance care planning) [Z71.89]     PAF (paroxysmal atrial fibrillation) ([facility]) [I48.0]     Other insomnia [G47.09]     COPD (chronic obstructive pulmonary disease) ([facility]) [J44.9]     CKD (chronic kidney disease) stage 3, GFR 30-59 ml/min [N18.30]     Cardiac pacemaker [Z95.0]     Anxiety [F41.9]     Diastolic CHF due to  valvular disease (HCC) [I50.30, I38]     Class 1 obesity in adult [E66.9]     Diabetic ulcer of right heel associated with type 2 diabetes mellitus (HCC) [E11.621, L97.419]      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  2.  Partial craterization of right calcaneus for osteomyelitis.  3.  Application of wound VAC, right foot  Poorly controlled DM-declines changes in diabetes medications  Normal TACO  Depression     PLAN:   FU cultures-bone +MRSE  May need additional debridement of any necrotic tissues that could serve as a continued nidus of infection.    Continue daptomycin and continue Unasyn for now  DC Unasyn if no other pathogens isolated  Monitor CPK  At risk for limb loss and poor wound healing  Keep BS under 150 to help control current infection-patient currently declining any change in diet or medications     Stop date antibiotics 6 weeks from surgery-initial 4 weeks IV then follow with PO Zyvox 600 mg PO BID  Midline OK  Stop date IV antibiotics 6/28/24   Placement as needs Veraflo VAC and not a candidate to manage IVantibiotics at home  Appreciate psych consult    Needs placement  Will sign off  [facility]

## 2024-06-05 ENCOUNTER — APPOINTMENT (OUTPATIENT)
Dept: RADIOLOGY | Facility: MEDICAL CENTER | Age: 79
DRG: 629 | End: 2024-06-05
Attending: HOSPITALIST
Payer: MEDICARE

## 2024-06-05 ENCOUNTER — HOME CARE VISIT (OUTPATIENT)
Dept: HOME HEALTH SERVICES | Facility: HOME HEALTHCARE | Age: 79
End: 2024-06-05
Payer: MEDICARE

## 2024-06-05 LAB
ALBUMIN SERPL BCP-MCNC: 3.2 G/DL (ref 3.2–4.9)
BUN SERPL-MCNC: 21 MG/DL (ref 8–22)
CALCIUM ALBUM COR SERPL-MCNC: 10 MG/DL (ref 8.5–10.5)
CALCIUM SERPL-MCNC: 9.4 MG/DL (ref 8.5–10.5)
CHLORIDE SERPL-SCNC: 96 MMOL/L (ref 96–112)
CO2 SERPL-SCNC: 24 MMOL/L (ref 20–33)
CREAT SERPL-MCNC: 1.11 MG/DL (ref 0.5–1.4)
ERYTHROCYTE [DISTWIDTH] IN BLOOD BY AUTOMATED COUNT: 45.6 FL (ref 35.9–50)
GFR SERPLBLD CREATININE-BSD FMLA CKD-EPI: 50 ML/MIN/1.73 M 2
GLUCOSE BLD STRIP.AUTO-MCNC: 181 MG/DL (ref 65–99)
GLUCOSE BLD STRIP.AUTO-MCNC: 215 MG/DL (ref 65–99)
GLUCOSE BLD STRIP.AUTO-MCNC: 232 MG/DL (ref 65–99)
GLUCOSE BLD STRIP.AUTO-MCNC: 278 MG/DL (ref 65–99)
GLUCOSE SERPL-MCNC: 285 MG/DL (ref 65–99)
HCT VFR BLD AUTO: 26.6 % (ref 37–47)
HGB BLD-MCNC: 8.7 G/DL (ref 12–16)
MCH RBC QN AUTO: 29.5 PG (ref 27–33)
MCHC RBC AUTO-ENTMCNC: 32.7 G/DL (ref 32.2–35.5)
MCV RBC AUTO: 90.2 FL (ref 81.4–97.8)
PHOSPHATE SERPL-MCNC: 3 MG/DL (ref 2.5–4.5)
PLATELET # BLD AUTO: 188 K/UL (ref 164–446)
PMV BLD AUTO: 10.7 FL (ref 9–12.9)
POTASSIUM SERPL-SCNC: 3.9 MMOL/L (ref 3.6–5.5)
RBC # BLD AUTO: 2.95 M/UL (ref 4.2–5.4)
SODIUM SERPL-SCNC: 134 MMOL/L (ref 135–145)
WBC # BLD AUTO: 7 K/UL (ref 4.8–10.8)

## 2024-06-05 PROCEDURE — 85027 COMPLETE CBC AUTOMATED: CPT

## 2024-06-05 PROCEDURE — 700102 HCHG RX REV CODE 250 W/ 637 OVERRIDE(OP): Performed by: STUDENT IN AN ORGANIZED HEALTH CARE EDUCATION/TRAINING PROGRAM

## 2024-06-05 PROCEDURE — 82962 GLUCOSE BLOOD TEST: CPT | Mod: 91

## 2024-06-05 PROCEDURE — 700102 HCHG RX REV CODE 250 W/ 637 OVERRIDE(OP): Performed by: INTERNAL MEDICINE

## 2024-06-05 PROCEDURE — 36415 COLL VENOUS BLD VENIPUNCTURE: CPT

## 2024-06-05 PROCEDURE — A9270 NON-COVERED ITEM OR SERVICE: HCPCS | Performed by: INTERNAL MEDICINE

## 2024-06-05 PROCEDURE — 700111 HCHG RX REV CODE 636 W/ 250 OVERRIDE (IP): Performed by: RADIOLOGY

## 2024-06-05 PROCEDURE — 97163 PT EVAL HIGH COMPLEX 45 MIN: CPT

## 2024-06-05 PROCEDURE — 99233 SBSQ HOSP IP/OBS HIGH 50: CPT | Performed by: HOSPITALIST

## 2024-06-05 PROCEDURE — 80069 RENAL FUNCTION PANEL: CPT

## 2024-06-05 PROCEDURE — 97535 SELF CARE MNGMENT TRAINING: CPT

## 2024-06-05 PROCEDURE — 700111 HCHG RX REV CODE 636 W/ 250 OVERRIDE (IP): Mod: JZ | Performed by: INTERNAL MEDICINE

## 2024-06-05 PROCEDURE — 700111 HCHG RX REV CODE 636 W/ 250 OVERRIDE (IP): Mod: JZ | Performed by: STUDENT IN AN ORGANIZED HEALTH CARE EDUCATION/TRAINING PROGRAM

## 2024-06-05 PROCEDURE — 700105 HCHG RX REV CODE 258: Performed by: INTERNAL MEDICINE

## 2024-06-05 PROCEDURE — 97605 NEG PRS WND THER DME<=50SQCM: CPT

## 2024-06-05 PROCEDURE — 700102 HCHG RX REV CODE 250 W/ 637 OVERRIDE(OP): Performed by: HOSPITALIST

## 2024-06-05 PROCEDURE — 700111 HCHG RX REV CODE 636 W/ 250 OVERRIDE (IP): Mod: JZ

## 2024-06-05 PROCEDURE — 36558 INSERT TUNNELED CV CATH: CPT

## 2024-06-05 PROCEDURE — 770006 HCHG ROOM/CARE - MED/SURG/GYN SEMI*

## 2024-06-05 PROCEDURE — 02HV33Z INSERTION OF INFUSION DEVICE INTO SUPERIOR VENA CAVA, PERCUTANEOUS APPROACH: ICD-10-PCS | Performed by: RADIOLOGY

## 2024-06-05 PROCEDURE — 0JH63XZ INSERTION OF TUNNELED VASCULAR ACCESS DEVICE INTO CHEST SUBCUTANEOUS TISSUE AND FASCIA, PERCUTANEOUS APPROACH: ICD-10-PCS | Performed by: RADIOLOGY

## 2024-06-05 PROCEDURE — A9270 NON-COVERED ITEM OR SERVICE: HCPCS | Performed by: STUDENT IN AN ORGANIZED HEALTH CARE EDUCATION/TRAINING PROGRAM

## 2024-06-05 RX ORDER — ONDANSETRON 2 MG/ML
INJECTION INTRAMUSCULAR; INTRAVENOUS
Status: COMPLETED
Start: 2024-06-05 | End: 2024-06-05

## 2024-06-05 RX ORDER — MIDAZOLAM HYDROCHLORIDE 1 MG/ML
INJECTION INTRAMUSCULAR; INTRAVENOUS
Status: COMPLETED
Start: 2024-06-05 | End: 2024-06-05

## 2024-06-05 RX ORDER — MIDAZOLAM HYDROCHLORIDE 1 MG/ML
.5-2 INJECTION INTRAMUSCULAR; INTRAVENOUS PRN
Status: ACTIVE | OUTPATIENT
Start: 2024-06-05 | End: 2024-06-05

## 2024-06-05 RX ORDER — SODIUM CHLORIDE 9 MG/ML
500 INJECTION, SOLUTION INTRAVENOUS
Status: ACTIVE | OUTPATIENT
Start: 2024-06-05 | End: 2024-06-05

## 2024-06-05 RX ORDER — ONDANSETRON 2 MG/ML
4 INJECTION INTRAMUSCULAR; INTRAVENOUS EVERY 6 HOURS PRN
Status: ACTIVE | OUTPATIENT
Start: 2024-06-05 | End: 2024-06-05

## 2024-06-05 RX ORDER — LIDOCAINE HYDROCHLORIDE AND EPINEPHRINE 10; 10 MG/ML; UG/ML
INJECTION, SOLUTION INFILTRATION; PERINEURAL
Status: DISPENSED
Start: 2024-06-05 | End: 2024-06-06

## 2024-06-05 RX ORDER — DRONABINOL 2.5 MG/1
2.5 CAPSULE ORAL
Status: DISCONTINUED | OUTPATIENT
Start: 2024-06-05 | End: 2024-06-06 | Stop reason: HOSPADM

## 2024-06-05 RX ADMIN — APIXABAN 5 MG: 5 TABLET, FILM COATED ORAL at 04:26

## 2024-06-05 RX ADMIN — MIDAZOLAM HYDROCHLORIDE 1 MG: 2 INJECTION, SOLUTION INTRAMUSCULAR; INTRAVENOUS at 15:13

## 2024-06-05 RX ADMIN — DAPTOMYCIN 750 MG: 500 INJECTION, POWDER, LYOPHILIZED, FOR SOLUTION INTRAVENOUS at 04:27

## 2024-06-05 RX ADMIN — AMPICILLIN AND SULBACTAM 3 G: 1; 2 INJECTION, POWDER, FOR SOLUTION INTRAMUSCULAR; INTRAVENOUS at 18:12

## 2024-06-05 RX ADMIN — INSULIN HUMAN 7 UNITS: 100 INJECTION, SOLUTION PARENTERAL at 08:20

## 2024-06-05 RX ADMIN — FUROSEMIDE 40 MG: 40 TABLET ORAL at 04:26

## 2024-06-05 RX ADMIN — ONDANSETRON 4 MG: 2 INJECTION INTRAMUSCULAR; INTRAVENOUS at 13:47

## 2024-06-05 RX ADMIN — MIDAZOLAM HYDROCHLORIDE 0.5 MG: 1 INJECTION, SOLUTION INTRAMUSCULAR; INTRAVENOUS at 15:25

## 2024-06-05 RX ADMIN — MIDAZOLAM HYDROCHLORIDE 1 MG: 1 INJECTION, SOLUTION INTRAMUSCULAR; INTRAVENOUS at 15:13

## 2024-06-05 RX ADMIN — AMPICILLIN AND SULBACTAM 3 G: 1; 2 INJECTION, POWDER, FOR SOLUTION INTRAMUSCULAR; INTRAVENOUS at 05:11

## 2024-06-05 RX ADMIN — ONDANSETRON 4 MG: 2 INJECTION INTRAMUSCULAR; INTRAVENOUS at 14:58

## 2024-06-05 RX ADMIN — FENTANYL CITRATE 50 MCG: 50 INJECTION, SOLUTION INTRAMUSCULAR; INTRAVENOUS at 15:13

## 2024-06-05 RX ADMIN — AMPICILLIN AND SULBACTAM 3 G: 1; 2 INJECTION, POWDER, FOR SOLUTION INTRAMUSCULAR; INTRAVENOUS at 00:27

## 2024-06-05 RX ADMIN — INSULIN HUMAN 50 UNITS: 100 INJECTION, SUSPENSION SUBCUTANEOUS at 08:20

## 2024-06-05 RX ADMIN — ALBUTEROL SULFATE 2 PUFF: 90 AEROSOL, METERED RESPIRATORY (INHALATION) at 00:00

## 2024-06-05 RX ADMIN — OXYCODONE HYDROCHLORIDE 2.5 MG: 5 TABLET ORAL at 21:42

## 2024-06-05 RX ADMIN — LOSARTAN POTASSIUM 25 MG: 25 TABLET, FILM COATED ORAL at 04:26

## 2024-06-05 RX ADMIN — DRONABINOL 2.5 MG: 2.5 CAPSULE ORAL at 11:14

## 2024-06-05 RX ADMIN — AMPICILLIN AND SULBACTAM 3 G: 1; 2 INJECTION, POWDER, FOR SOLUTION INTRAMUSCULAR; INTRAVENOUS at 11:47

## 2024-06-05 ASSESSMENT — COGNITIVE AND FUNCTIONAL STATUS - GENERAL
MOVING TO AND FROM BED TO CHAIR: A LITTLE
TURNING FROM BACK TO SIDE WHILE IN FLAT BAD: A LITTLE
WALKING IN HOSPITAL ROOM: A LITTLE
MOVING FROM LYING ON BACK TO SITTING ON SIDE OF FLAT BED: A LITTLE
MOBILITY SCORE: 17
CLIMB 3 TO 5 STEPS WITH RAILING: A LOT
SUGGESTED CMS G CODE MODIFIER MOBILITY: CK
STANDING UP FROM CHAIR USING ARMS: A LITTLE

## 2024-06-05 ASSESSMENT — ENCOUNTER SYMPTOMS
NAUSEA: 0
NERVOUS/ANXIOUS: 1
CHILLS: 0
VOMITING: 0
FEVER: 0
SENSORY CHANGE: 1

## 2024-06-05 ASSESSMENT — GAIT ASSESSMENTS
GAIT LEVEL OF ASSIST: CONTACT GUARD ASSIST
ASSISTIVE DEVICE: SINGLE POINT CANE
DISTANCE (FEET): 5

## 2024-06-05 ASSESSMENT — CHA2DS2 SCORE
VASCULAR DISEASE: NO
PRIOR STROKE OR TIA OR THROMBOEMBOLISM: NO
HYPERTENSION: YES
DIABETES: YES
CHA2DS2 VASC SCORE: 6
CHF OR LEFT VENTRICULAR DYSFUNCTION: YES
AGE 65 TO 74: NO
SEX: FEMALE
AGE 75 OR GREATER: YES

## 2024-06-05 ASSESSMENT — PAIN DESCRIPTION - PAIN TYPE
TYPE: ACUTE PAIN
TYPE: ACUTE PAIN

## 2024-06-05 NOTE — DISCHARGE PLANNING
Agency/Facility Name: [facility]  Spoke To: Cristina  Outcome: No female bed today.  Cristina will review referral.    @[id number]  Agency/Facility Name: [facility]  Spoke To: Trevor  Outcome: Referral declined.  Wound vac.    @Agency/Facility Name: [facility]  Outcome: DPA received a voice message from Cristina.  DON needs to review the referral due to the wound care.  Cristina will give this DPA a call back.

## 2024-06-05 NOTE — WOUND TEAM
[facility]  Inpatient Services  Wound and Skin Care Follow-up    Admission Date: 2024     Last order of IP CONSULT TO WOUND CARE was found on 2024 from Hospital Encounter on 2024     HPI, PMH, SH: Reviewed    Past Surgical History:   Procedure Laterality Date    IRRIGATION & DEBRIDEMENT GENERAL Right 2024    Procedure: IRRIGATION AND DEBRIDEMENT, WOUND;  Surgeon: Pk Fleming M.D.;  Location: SURGERY [facility];  Service: Orthopedics    APPLICATION OR REPLACEMENT, WOUND VAC Right 2024    Procedure: APPLICATION OR REPLACEMENT, WOUND VAC;  Surgeon: Pk Fleming M.D.;  Location: SURGERY [facility];  Service: Orthopedics    BREAST BIOPSY      GYN SURGERY  hysterectomy    MITRAL VALVE REPLACE      OTHER ORTHOPEDIC SURGERY Right     TONSILLECTOMY Bilateral      Social History     Tobacco Use    Smoking status: Former     Current packs/day: 0.00     Types: Cigarettes     Quit date: 2011     Years since quittin.6    Smokeless tobacco: Never   Substance Use Topics    Alcohol use: Not Currently     Comment: denies     Chief Complaint   Patient presents with    Wound Check     Pt c/o wound to R heel. Sent by MD for further evaluation of possible cellulitis    Foot Pain     Right foot/heel wound      Diagnosis: Osteomyelitis (HCC) [M86.9]    Unit where seen by Wound Team: S604/01     WOUND FOLLOW UP RELATED TO:  Right heel posterior scheduled NPWT change       WOUND TEAM PLAN OF CARE - Frequency of Follow-up:   Nursing to follow dressing orders written for wound care. Contact wound team if area fails to progress, deteriorates or with any questions/concerns if something comes up before next scheduled follow up (See below as to whether wound is following and frequency of wound follow up)  Dressing changes by wound team:                   NPWT change 3 times weekly - right posterior heel    WOUND HISTORY:       Ophelia Sosa is a 78 y.o. female with history of  chronic A-fib on Eliquis, pulmonary hypertension, CHF, SND s/p pacemaker placement, COPD, insulin-dependent diabetes, hyperlipidemia, CKD 3 who presented 5/28/2024 with evaluation for nonhealing right foot Achilles wound.  Patient was seen by wound care earlier, referred to ER due to nonhealing wound.  Patient has been taking doxycycline and seeing wound care outpatient with minimal improvement.  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  Pt states she spends a lot of time in a recliner chair and sleeps in the recliner at night.  She has been floating her heel while in the recliner after she became aware of the wound on her heel.      5/31/24 Pt with multiple questions about the wound POC. Appearing anxious and became tearful at times.      5/31/24 ortho consult with plans for OR today Dr Fleming   5/31/24 ID following        WOUND ASSESSMENT/LDA  Wound 05/29/24 Diabetic Ulcer Heel Right (Active)   Date First Assessed/Time First Assessed: 05/29/24 1603   Primary Wound Type: Diabetic Ulcer  Location: Heel  Laterality: Right      Assessments 6/5/2024  2:00 PM   Wound Image      Site Assessment Yellow;Slough;Red;Granulation tissue   Periwound Assessment Maceration;Clean;Dry;Intact   Margins Defined edges;Unattached edges   Closure Secondary intention   Drainage Amount Small   Drainage Description Sanguineous   Treatments Cleansed;Nonselective debridement;Site care;Offloading   Wound Cleansing Approved Wound Cleanser   Periwound Protectant No-sting Skin Prep;Paste Ring;Drape   Dressing Status Removed   Dressing Changed Changed   Dressing Cleansing/Solutions Normal Saline   Dressing Options Wound Vac;Tubigrip   Dressing Change/Treatment Frequency Monday, Wednesday, Friday, and As Needed   NEXT Dressing Change/Treatment Date 06/07/24   NEXT Weekly Photo (Inpatient Only) 06/12/24   Non-staged Wound Description Full thickness   Shape irregular   Wound Odor None   Pulses 2+;DP;PT   WOUND NURSE ONLY - Time  Spent with Patient (mins) 45       Negative Pressure Wound Therapy 24 Heel Right (Active)   Placement Date/Time: 24 1336   Location: Heel  Laterality: Right      Assessments 2024  2:00 PM   Vacuum Serial Number [id number]   NPWT Pump Mode / Pressure Setting Ulta;Intermittent;125 mmHg   Dressing Type Small;Black Foam (Veraflo)   Number of Foam Pieces Used 1   Canister Changed No   Output (mL) 200 mL   NEXT Dressing Change/Treatment Date 24   VAC VeraFlo Irrigant Normal Saline   VAC VeraFlo Soak Time (mins) 3   VAC VeraFlo Instill Volume (ml) 14   VAC VeraFlo - Therapy Time (hrs) 2.5   VAC VeraFlo Pressure (mm/Hg) Intermittent;125 mmHg   WOUND NURSE ONLY - Time Spent with Patient (mins) 45        Vascular:    TACO:   TACO Results, Last 30 Days US-TACO SINGLE LEVEL BILAT    Result Date: 2024  Narrative  Vascular Laboratory  Conclusions  Normal TACO's and 1st TBI's.  PAYAL BAUER  Age:    78    Gender:     F  MRN:    [id number]  :    1945      BSA:  Exam Date:     2024 10:52  Room #:     Inpatient  Priority:     Routine  Ht (in):             Wt (lb):  Ordering Physician:        STACEY MONTALVO  Referring Physician:       SELVIN Paz  Sonographer:               Sergo Patrick                             [id number]  Study Type:                Complete Bilateral  Technical Quality:         Adequate  Indications:     Ulceration of LE  CPT Codes:       34607  ICD Codes:       707.1  History:         Non-healing wound on right heel.  Limitations:     Could not obtain left brachial blood pressure due to IV                   placement.                 RIGHT  Waveform            Systolic BPs (mmHg)                             179           Brachial  Triphasic                                Common Femoral  Triphasic                                Popliteal  Triphasic                  199           Posterior Tibial  Triphasic                  207            Dorsalis Pedis  Normal                     142           Digit                             1.16          TACO                             0.79          TBI                       LEFT  Waveform        Systolic BPs (mmHg)                                           Brachial  Triphasic                                Common Femoral  Triphasic                                Popliteal  Triphasic                  230           Posterior Tibial  Biphasic                   211           Dorsalis Pedis  Normal                     129           Digit                             1.28          TACO                             0.72          TBI  Findings  Bilateral-  Doppler waveforms of the common femoral, popliteal, posterior tibial, and  dorsalis pedis arteries are of high amplitude and multiphasic.  The ankle-brachial indices are normal.  The toe-brachial indices are normal.  The 1st digit PPG waveform is normal.  Additional testing was not performed in accordance with lower extremity  arterial evaluation protocol.  Henok Dos Santos  (Electronically Signed)  Final Date:      30 May 2024 12:41      Lab Values:    Lab Results   Component Value Date/Time    WBC 7.0 06/05/2024 07:41 AM    RBC 2.95 (L) 06/05/2024 07:41 AM    HEMOGLOBIN 8.7 (L) 06/05/2024 07:41 AM    HEMATOCRIT 26.6 (L) 06/05/2024 07:41 AM    CREACTPROT 0.81 (H) 05/28/2024 09:37 PM    SEDRATEWES 132 (H) 05/28/2024 09:37 PM    HBA1C 8.1 (H) 05/29/2024 02:23 AM         Culture Results show:  Recent Results (from the past 720 hour(s))   CULTURE WOUND W/ GRAM STAIN    Collection Time: 05/27/24 10:20 AM    Specimen: Wound   Result Value Ref Range    Significant Indicator POS (POS)     Source WND     Site right heel cx     Culture Result Light growth usual skin shannan. (A)     Gram Stain Result Many Gram positive cocci.     Culture Result Staphylococcus epidermidis  Heavy growth   (A)        Susceptibility    Staphylococcus epidermidis - MONA     Clindamycin >4 Resistant  mcg/mL     Cefazolin <=8 Resistant mcg/mL     Cefepime <=4 Resistant mcg/mL     Daptomycin <=0.5 Sensitive mcg/mL     Erythromycin >4 Resistant mcg/mL     Ampicillin/sulbactam <=8/4 Resistant mcg/mL     Vancomycin 2 Sensitive mcg/mL     Linezolid <=1 Sensitive mcg/mL     Oxacillin >2 Resistant mcg/mL     Tetracycline >8 Resistant mcg/mL       Pain Level/Medicated:  None, Tolerated without pain medication and Patient denies pain       INTERVENTIONS BY WOUND TEAM:  Chart and images reviewed. Discussed with bedside RN. All areas of concern (based on picture review, LDA review and discussion with bedside RN) have been thoroughly assessed. Documentation of areas based on significant findings. This RN in to assess patient. Performed standard wound care which includes appropriate positioning, dressing removal and non-selective debridement. Pictures and measurements obtained weekly if/when required.    Wound:  RIGHT POSTERIOR HEEL   Preparation for Dressing removal: Removed without difficulty  Cleansed/Non-selectively Debrided with:  Wound cleanser and Gauze  Maty wound: Cleansed with Wound cleanser and Gauze, Prepped with No Sting, Paste Rings, and Drape  Primary Dressing:  Total 1 VF black foam pieces into the wound bed and button  Secondary (Outer) Dressing: secured with drape and TRAC pad.  VF NPWT resumed at 125mmHg, no leak detected.  Heel offloading foam x2 applied to help offloading heel and tubing from NPWT.      Advanced Wound Care Discharge Planning  Number of Clinicians necessary to complete wound care: 1+tech or 2 RN easier for holding  Is patient requiring IV pain medications for dressing changes:  No   Length of time for dressing change 45 min. (This does not include chart review, pre-medication time, set up, clean up or time spent charting.)    Interdisciplinary consultation: Patient, Bedside RN (Jessica)    EVALUATION / RATIONALE FOR TREATMENT:   Date:  06/05/24  Wound Status:  Wound progressing as  expected    Patient wound bed with red granular tissue and small amount of sanguinous drainage.  Still with tunnel at 12 o'clock along the tendon.  Continue VF NPWT while patient in the hospital, plan to transition to regular NPWT for discharge to home.  Adaptic can be utilize to protect the tendon with the regular NPWT.     Date:  06/03/24  Wound Status:  Wound progressing as expected    Patient wound bed is  than previous assessment.  Concern for tendon sheath of achilles since the tunnel goes at 12 o'clock along the tendon.  Will need to continue VF NPWT while patient in the hospital.  Can transition to regular NPWT for discharge to home.  Adaptic can be utilize to protect the tendon with the regular NPWT.  Patient is very anxious and wants to go home.  Discussed with case management and hospitalist regarding POC and discharge plan.      Date:  06/01/24  Wound Status:      Wound bed is not entirely clean, there is bone exposure.  A tract was found following bedside debridement.  There is more granular tissue present in this wound compared to earlier assessment.  Explained how wound care may proceed depending on ortho POC.    Date:  05/29/24  Wound Status:  Initial evaluation    The L heel is intact and blanching. The patient politely declined to have sacrum assessed. Th R heel with diabetic foot ulcer present. The wound with over 80% slough present. Dakins applied to chemically debride nonviable tissue, decrease bioburden and odor. The patient has a strong 2+ pedal pulse, and states to have severe neuropathy. MRI and TACO have been ordered by MD.     NURSING PLAN OF CARE ORDERS:  Dressing changes: See Dressing Care orders    NUTRITION RECOMMENDATIONS   Wound Team Recommendations:  N/A    DIET ORDERS (From admission to next 24h)       Start     Ordered    05/28/24 2304  Diet Order Diet: Consistent CHO (Diabetic)  ALL MEALS        Question:  Diet:  Answer:  Consistent CHO (Diabetic)    05/28/24 2304                     PREVENTATIVE INTERVENTIONS:    Q shift Drew - performed per nursing policy  Q shift pressure point assessments - performed per nursing policy    Surface/Positioning  Standard/trauma mattress - Currently in Place    Offloading/Redistribution  Float Heels off Bed with Pillows - Currently in Place         Anticipated discharge plans:  TBD and N/A        Vac Discharge Needs:  Vac Discharge plan is purely a recommendation from wound team and not a requirement for discharge unless otherwise stated by physician.  Pt likely to have a VAC placed in OR.  Pt will need ongoing VAC changes following discharge from [facility].  Possibly vera cindy vac will be beneficial        Goals: Steady decrease in wound area and depth weekly.    NURSING PLAN OF CARE ORDERS:  No new orders this visit    NUTRITION RECOMMENDATIONS   Wound Team Recommendations:  Protein supplements  Arginine powder     DIET ORDERS (From admission to next 24h)       Start     Ordered    06/01/24 0858  Supplements  ALL MEALS        Question Answer Comment   Which Supplement Glucerna    Glucerna: Glucerna Shake Carton        06/01/24 0858    05/31/24 1839  Diet Order Diet: Consistent CHO (Diabetic)  ALL MEALS        Question:  Diet:  Answer:  Consistent CHO (Diabetic)    05/31/24 1838                    PREVENTATIVE INTERVENTIONS:   Q shift Drew - performed per nursing policy  Q shift pressure point assessments - performed per nursing policy    Surface/Positioning  ICU Low Airloss - Currently in Place  Reposition q 2 hours - Currently in Place  TAPs Turning system - Currently in Place    Offloading/Redistribution  Sacral offloading dressing (Silicone dressing) - Currently in Place  Heel offloading dressing (Silicone dressing) - Currently in Place  Float Heels off Bed with Pillows - Currently in Place           Respiratory  N/A    Containment/Moisture Prevention    Dri-cindy pad - Currently in Place    Mobilization      Unable to assess and PT/OT       Anticipated discharge plans:  Home Health Care and Outpatient Wound Center        Vac Discharge Needs:  Vac Discharge plan is purely a recommendation from wound team and not a requirement for discharge unless otherwise stated by physician.  Veraflo Vac while inpatient, ok to transition to Regular Vac on discharge with adaptic to protect tendon

## 2024-06-05 NOTE — CARE PLAN
The patient is Watcher - Medium risk of patient condition declining or worsening    Shift Goals  Clinical Goals: Pt will successfully get tunneled cath placed by the end of shift.  Patient Goals: Go home.  Family Goals: BRISA.  Pt able to tolerate placement of tunneled cath in IR.    Progress made toward(s) clinical / shift goals:    Problem: Knowledge Deficit - Standard  Goal: Patient and family/care givers will demonstrate understanding of plan of care, disease process/condition, diagnostic tests and medications  Outcome: Progressing  Pt verbalized understanding of needing tunneled cath and plan of care moving forward post cath placement.     Problem: Skin Integrity  Goal: Risk for impaired skin integrity will decrease  Outcome: Progressing   Pts skin integrity was maintained throughout shift with frequent use of BSC, self turning in bed, and MONY machine.    Patient is not progressing towards the following goals:NA

## 2024-06-05 NOTE — DISCHARGE PLANNING
HTH/SCP TCN chart review completed. Collaborated with MARYLU Cosby. As previously planned, TCN met with patient and friend, Iesha, at bedside to discuss choice for SNF as current discharge considerations remain SNF level of care. Per review and collaboration with CM, noted patient acceptance to [facility] at this time.    Discussed current accepting facilities and in-network SNF facilities in [facility] and [facility]. At this time, patient and friend requested CM continue to follow for accepting facilities before providing choice.     Collaborated with CM following visit with patient and friend at bedside. Appreciate CM following for additional accepting SNF facilities. TCN to continue to follow for choice for SNF as appropriate. Thank you.     Completed  PT recommendations noted to post-acute placement 6/5/2024  OT recommendations noted to  Other - (pt adamantly refuses any post-acute placement, agreeable to home health OT) 6/3  Choice obtained: HH (resumption of [facility]). IV Infusion ([facility]). Current physician recommendations for discharge are for SNF. TCN will continue to follow for SNF choice as appropriate  Patient verbalized understanding regarding CM protocol of sending SNF referrals  Referral sent to [facility] 5/29. Per [facility] patient is followed by Chronic Care Management outpatient

## 2024-06-05 NOTE — PROGRESS NOTES
Pt presents to IR 3. Miss Sosa was consented by MD at bedside, confirmed by this RN and consent at bedside. Pt transferred to IR 3 table in supine position. Patient underwent a PICC line placement by Dr. Jacome. Procedure site was marked by MD and verified using imaging guidance. Pt placed on monitor, prepped and draped in a sterile fashion. Vitals were taken every 5 minutes and remained stable during procedure (see doc flow sheet for results). CO2 waveform capnography was monitored and remained WNL throughout procedure. Report called to DAY Lopez. Pt transported by stretcher with RN to S604.       Powerline Central Venous Catheter 6F x cut to 28cm  REF: [id number]  LOT: [id number]   EXP: 2028-07-31

## 2024-06-05 NOTE — HOSPITAL COURSE
Ophelia Sosa is a 78 y.o. female admitted 5/28/2024 with longstanding type 2 diabetes mellitus on insulin, chronic A-fib on Eliquis, hypertension, COPD and CKD stage III presented to the hospital with a nonhealing right foot Achilles wound and ulcer.  She has been followed by outpatient wound care with worsening wound and was instructed to come to the emergency room in hospital for further evaluation for osteomyelitis and possible abscess.  She was then placed empirically on IV ceftriaxone and vancomycin which the latter was discontinued after MRSA nares was negative.  She now remains on IV Unasyn.  An MRI of the foot has been ordered but will need to check MRI compatibility.

## 2024-06-05 NOTE — CARE PLAN
"The patient is Stable - Low risk of patient condition declining or worsening    Shift Goals  Clinical Goals: Patient will have improved glycenmic control this shift, will not become hypoglycemic.  Patient Goals: Patient wishes to go home.  Family Goals: Not present.    Progress made toward(s) clinical / shift goals:    Problem: Knowledge Deficit - Standard  Goal: Patient and family/care givers will demonstrate understanding of plan of care, disease process/condition, diagnostic tests and medications  Description: Target End Date:  1-3 days or as soon as patient condition allows    Document in Patient Education    1.  Patient and family/caregiver oriented to unit, equipment, visitation policy and means for communicating concern  2.  Complete/review Learning Assessment  3.  Assess knowledge level of disease process/condition, treatment plan, diagnostic tests and medications  4.  Explain disease process/condition, treatment plan, diagnostic tests and medications  Outcome: Progressing     Problem: Fall Risk  Goal: Patient will remain free from falls  Description: Target End Date:  Prior to discharge or change in level of care    Document interventions on the [facility] Fall Risk Assessment    1.  Assess for fall risk factors  2.  Implement fall precautions  Outcome: Progressing       Patient is not progressing towards the following goals:      Problem: Psychosocial  Goal: Patient's level of anxiety will decrease  Description: Target End Date:  1-3 days or as soon as patient condition allows    1.  Collaborate with patient and family/caregiver to identify triggers and develop strategies to cope with anxiety  2.  Implement stimuli reduction, calming techniques  3.  Pharmacologic management per provider order  4.  Encourage patient/family/care giver participation  5.  Collaborate with interdisciplinary team including Psychologist or Behavioral Health Team as needed  Outcome: Not Progressing  Note: Patient keeps saying \"I " "want someone to make me feel better right now\", does not endorse need for anxiety meds. Reassurance provided.      "

## 2024-06-05 NOTE — OR SURGEON
Immediate Post- Operative Note        Findings: needs iv access for abx      Procedure(s): SBCC ok to use      Estimated Blood Loss: Less than 5 ml        Complications: None            6/5/2024     3:40 PM     Ricki Jacome M.D.

## 2024-06-05 NOTE — PROGRESS NOTES
"Assumed care of patient. Assessment performed. Patient does not report pain at this time. She reports being very distressed at lack of progress and poor glycemic control. She is upset with her meals, but refuses anything offered as replacement. Education concerning importance of adherence to insulin regiment reinforced; patient replied that she knows what she is doing more than anyone else in the hospital  because she has had diabetes for decades. Reassurance provided. She says she \"just wants to give up\" on treatment. Call light and belongings within reach. All needs met at this time.    Patient reports that she does not feel well. She is unwilling and/or unable to verbalize any specific needs to help improve comfort. VSS.Asks for help \"feeling better' but does not accept any offered interventions. She claims that no staff care about her as a person despite this RN's repeated attempts to improve comfort and wellbeing. She doesn't want to talk to anyone that offers pharmacological intervention. Does not report suicidal ideation but does say she \"doesn't want to wake up anymore\".   "

## 2024-06-05 NOTE — DISCHARGE PLANNING
Per notes patient looks like they will DC home with both a wound vac as well as IV ABX. Please include both of these on the HH order once available as well as infusion orders from [facility].

## 2024-06-05 NOTE — THERAPY
Physical Therapy   Initial Evaluation     Patient Name: Ophelia Sosa  Age:  78 y.o., Sex:  female  Medical Record #: [id number]  Today's Date: 6/5/2024     Precautions  Precautions: Fall Risk;Weight Bearing As Tolerated Right Lower Extremity  Comments: wound vac right heel    Assessment  Patient is 78 y.o. female with a diagnosis of osteomyelitis. Pt underwent debridement of right foot wound with partial craterization of right calcaneous for osteo and application of wound vac.   WBAT RLE.   Pt states she resides alone in a very small condo that does not accommodate a walker. Pt demonstrating unsteady gait with SPC with reliance on walls and therapist arm to stabilize. Pt will benefit from continued PT while in house. Recommend post acute placement for continued PT and IV antibiotics.     Plan    Physical Therapy Initial Treatment Plan   Treatment Plan : (P) Equipment, Family / Caregiver Training, Gait Training, Neuro Re-Education / Balance, Self Care / Home Evaluation, Therapeutic Activities  Treatment Frequency: (P) 3 Times per Week  Duration: (P) Until Therapy Goals Met    DC Equipment Recommendations: (P) Unable to determine at this time  Discharge Recommendations: (P) Recommend post-acute placement for additional physical therapy services prior to discharge home         Initial Contact Note    Initial Contact Note Order Received and Verified, Physical Therapy Evaluation in Progress with Full Report to Follow.   Precautions   Precautions Fall Risk;Weight Bearing As Tolerated Right Lower Extremity   Comments wound vac right heel   Pain 0 - 10 Group   Location Leg   Location Orientation Right   Therapist Pain Assessment During Activity;2   Prior Living Situation   Prior Services Home-Independent   Housing / Facility 2 Story Apartment / Condo  (states she resides downstairs, sleeps in a recliner, Bathroom is close by.)   Steps Into Home 0   Steps In Home   (FOS)   Equipment Owned Single Point Cane   Lives with -  "Patient's Self Care Capacity Alone and Able to Care For Self   Comments states she has a good friend who will be meeting with \"the lady for insurance\" to develop a plan,   Prior Level of Functional Mobility   Bed Mobility Independent   Transfer Status Independent   Ambulation Independent   Ambulation Distance short community condo to car.   Assistive Devices Used Single Point Cane   History of Falls   History of Falls No   Date of Last Fall   (denies falls.)   Cognition    Level of Consciousness Alert   Comments labile, verbose.   Active ROM Lower Body    Active ROM Lower Body  WDL   Strength Lower Body   Lower Body Strength  X   Gross Strength Generalized Weakness, Equal Bilaterally   Other Treatments   Other Treatments Provided bed to BSC>toilet transfers with use of SPC. Able to perform own post void hygiene when encouraged to do so.   Balance Assessment   Sitting Balance (Static) Good   Sitting Balance (Dynamic) Good   Standing Balance (Static) Fair   Standing Balance (Dynamic) Fair -   Weight Shift Sitting Good   Weight Shift Standing Fair   Comments SPC and walls   Bed Mobility    Supine to Sit Supervised   Sit to Supine Supervised   Scooting Supervised   Gait Analysis   Gait Level Of Assist Contact Guard Assist   Assistive Device Single Point Cane   Distance (Feet) 5   # of Times Distance was Traveled 2   Deviation Step To;Increased Base Of Support;Decreased Toe Off;Decreased Heel Strike  (heavy reliance on wall, furniture and cane.)   Weight Bearing Status WBAT   Functional Mobility   Sit to Stand Contact Guard Assist   Bed, Chair, Wheelchair Transfer Moderate Assist   Toilet Transfers Contact Guard Assist   Transfer Method Stand Step   Mobility supine> EOB> gait to commode> gait with SPC to toilet> back to bed.   6 Clicks Assessment - How much HELP from another person do you currently need... (If the patient hasn't done an activity recently, how much help from another person do you think he/she would need " if he/she tried?)   Turning from your back to your side while in a flat bed without using bedrails? 3   Moving from lying on your back to sitting on the side of a flat bed without using bedrails? 3   Moving to and from a bed to a chair (including a wheelchair)? 3   Standing up from a chair using your arms (e.g., wheelchair, or bedside chair)? 3   Walking in hospital room? 3   Climbing 3-5 steps with a railing? 2   6 clicks Mobility Score 17   Activity Tolerance   Sitting in Chair 7 min x 2   Sitting Edge of Bed 5 min   Standing 1 min   Short Term Goals    Short Term Goal # 1 Pt will perform transfers bed to [facility] with use of SPC at S level by tx 6   Short Term Goal # 2 Pt will ambulate for 20 ft with SPC at S level to access all areas of her home by tx 6   Short Term Goal # 3 Pt will maintain static standing with single UE for 1 min a S level by tx 6   Education Group   Education Provided Role of Physical Therapist;Gait Training;Use of Assistive Device   Role of Physical Therapist Patient Response Patient;Acceptance;Explanation;Verbal Demonstration   Gait Training Patient Response Patient;Nonacceptance;Explanation;Demonstration;No Learning Evidence;Reinforcement Needed   Use of Assistive Device Patient Response Patient;Explanation;Demonstration;Nonacceptance;Reinforcement Needed;No Learning Evidence   Physical Therapy Initial Treatment Plan    Treatment Plan  Equipment;Family / Caregiver Training;Gait Training;Neuro Re-Education / Balance;Self Care / Home Evaluation;Therapeutic Activities   Treatment Frequency 3 Times per Week   Duration Until Therapy Goals Met   Problem List    Problems Impaired Transfers;Impaired Ambulation;Functional Strength Deficit;Impaired Balance;Decreased Activity Tolerance;Safety Awareness Deficits / Cognition   Anticipated Discharge Equipment and Recommendations   DC Equipment Recommendations Unable to determine at this time   Discharge Recommendations Recommend post-acute placement for  additional physical therapy services prior to discharge home   Interdisciplinary Plan of Care Collaboration   IDT Collaboration with  Nursing;Occupational Therapist   Patient Position at End of Therapy In Bed;Tray Table within Reach;Call Light within Reach   Collaboration Comments RN updated on findings. DC pending antibiotic needs.   Session Information   Date / Session Number  6/5-1( 1/3,6/11)

## 2024-06-05 NOTE — PROGRESS NOTES
Hospital Medicine Daily Progress Note    Date of Service  6/5/2024    Chief Complaint  osteomyelitis    Hospital Course  Ophelia Sosa is a 78 y.o. female admitted 5/28/2024 with longstanding type 2 diabetes mellitus on insulin, chronic A-fib on Eliquis, hypertension, COPD and CKD stage III presented to the hospital with a nonhealing right foot Achilles wound and ulcer.  She has been followed by outpatient wound care with worsening wound and was instructed to come to the emergency room in hospital for further evaluation for osteomyelitis and possible abscess.  She was then placed empirically on IV ceftriaxone and vancomycin which the latter was discontinued after MRSA nares was negative.  She now remains on IV Unasyn.  An MRI of the foot has been ordered but will need to check MRI compatibility.    Interval Problem Update  5/29  Patient very concerned about her insulin regimen which is highly regulated and individualized. I had a long conversation with the patient that we will try to accommodate as much as we can for her insulin regimen. She has no pain of the affected foot given profound neuropathy from the knee distally. She remains afebrile. She had a St Syd PPM placed in 2018 at [facility]. She wants it interrogated and is unsure if it is MRI compatible. Cursory review on Google shows that it might need to be placed into compatible mode.     5/30  No acute events overnight. Patient intermittently refusing BS checks and insulin administration. BS's ranged from 166-272. Remains afebrile. We confirmed with the ST Syd rep that her PPM is MRI compatible. Mildly hypertensive. She feels the redness of her foot is improving.     5/31  I personally consulted and spoke with Dr Fleming of orthopedic surgery and Dr Tidwell of ID about patient's case in detail. Patient is highly anxious this AM with the prospects of going to surgery. Her blood sugars remain very poorly controlled and there is extensive nursing  documentation about various refusals of insulin administration. I spoke extensively with patient about the importance of blood sugar control in the context of a deep space infection of her R foot and she understands. Will add some benadryl for ongoing severe anxiety. She remains afebrile.     6/1  No acute events overnight. Blood sugars remain poorly controlled and patient has tons of ketchup packets at her bedside. I personally spoke with Dr Bauman of ID and since patient already had surgery, MRI foot is not needed. No other further issues. Patient says the lower dose benadryl helps with her anxiety and she is afebrile. She had debridement done down to calcaneal bone with deep intra-operative cultures obtained.     6/2  Blood sugars remain labile and poorly controlled. She remains fixated on dosing her own insulin. Pain is tolerable, remains afebrile. Wound vac converted veraflo. Cultures with presumed MRSE and IV daptomycin has been added.     6/3  Patient's anxiety has worsened.  I have consulted behavioral health and consider psychiatry consult.  She wants increasing doses of Benadryl and I explained to the patient that that is not beneficial for generalized anxiety.  I spoke with both orthopedic surgery and infectious disease.  Given her exposed tendon and bone she needs to go to a skilled nursing facility for vera flow which will need to have an extended course.  Other than that patient is anxious to go out of the hospital and is afebrile.  Her blood sugars continue remain labile.    6/4: Attempted to place midline but unable to advance.  Poor vasculature prevents insertion.  Ordered IR tunneled catheter placement.  DC previously ordered MRI after clearing up confusion.  Ordered low-dose Benadryl.  Changed diet to high-protein.     6/5: Patient asking for something similar to cannabis oil.  Trialing dronabinol hide low-dose.  IR placed tunneled catheter.  Patient feeling depressed but does not want to try any  new antidepressants or medications right now.  Total time 52 minutes.    I have discussed this patient's plan of care and discharge plan at IDT rounds today with Case Management, Nursing, Nursing leadership, and other members of the IDT team.    Consultants/Specialty  none    Code Status  DNAR/DNI    Disposition  The patient is not medically cleared for discharge to home or a post-acute facility.      I have placed the appropriate orders for post-discharge needs.    Review of Systems  Review of Systems   Constitutional:  Positive for malaise/fatigue. Negative for chills and fever.        Feels about the same on 6/2   Gastrointestinal:  Negative for nausea and vomiting.   Genitourinary:  Negative for dysuria.   Neurological:  Positive for sensory change (chronic in legs).        No clear changes noted 6/2     Psychiatric/Behavioral:  The patient is nervous/anxious (much calmer on 6/1).         She feels more anxious today        Physical Exam  Temp:  [35.8 °C (96.5 °F)-36.9 °C (98.4 °F)] 36.9 °C (98.4 °F)  Pulse:  [] 96  Resp:  [12-24] 18  BP: (141-186)/(52-78) 145/65  SpO2:  [88 %-100 %] 96 %    Physical Exam  Vitals and nursing note reviewed.   Constitutional:       General: She is not in acute distress.     Appearance: She is well-developed.      Comments: Much more anxious today on 6/3   HENT:      Head: Normocephalic and atraumatic.      Mouth/Throat:      Pharynx: No oropharyngeal exudate.   Eyes:      General: No scleral icterus.     Pupils: Pupils are equal, round, and reactive to light.   Neck:      Thyroid: No thyromegaly.   Cardiovascular:      Rate and Rhythm: Normal rate and regular rhythm.      Heart sounds:      No gallop.   Pulmonary:      Effort: Pulmonary effort is normal. No respiratory distress.      Breath sounds: No wheezing.   Abdominal:      General: There is no distension.      Palpations: Abdomen is soft.      Tenderness: There is no abdominal tenderness.   Musculoskeletal:          General: Signs of injury present. No tenderness.      Cervical back: Neck supple.      Right lower leg: Edema present.      Left lower leg: Edema present.      Comments: Wound vac in place with Veraflo, no leakage  Minimal erythema noted  No clear changes noted on 6/3   Skin:     General: Skin is warm and dry.      Findings: Erythema (decreased) present. No rash.   Neurological:      Mental Status: She is alert and oriented to person, place, and time.      Cranial Nerves: No cranial nerve deficit.      Sensory: Sensory deficit (neuropathy of B/L LE's) present.   Psychiatric:         Mood and Affect: Mood is anxious. Affect is labile.         Fluids    Intake/Output Summary (Last 24 hours) at 6/5/2024 1606  Last data filed at 6/5/2024 1400  Gross per 24 hour   Intake --   Output 200 ml   Net -200 ml       Laboratory  Recent Labs     06/05/24  0741   WBC 7.0   RBC 2.95*   HEMOGLOBIN 8.7*   HEMATOCRIT 26.6*   MCV 90.2   MCH 29.5   MCHC 32.7   RDW 45.6   PLATELETCT 188   MPV 10.7       Recent Labs     06/05/24  0741   SODIUM 134*   POTASSIUM 3.9   CHLORIDE 96   CO2 24   GLUCOSE 285*   BUN 21   CREATININE 1.11   CALCIUM 9.4                     Imaging  IR-CHOI,GROSHONG PLACEMENT >5   Final Result      Successful image guided tunneled small bore central catheter placement.      Plan: The catheter is available for immediate use.            IR-MIDLINE CATHETER INSERTION WO GUIDANCE > AGE 3   Final Result                  Ultrasound-guided midline placement performed by qualified nursing staff    as above.          US-TACO SINGLE LEVEL BILAT   Final Result      DX-FOOT-COMPLETE 3+ RIGHT   Final Result      Small posterior heel ulcer and there is some new soft tissue gas posterior to the ankle.      No radiographic evidence for acute osteomyelitis.      Multiple chronic osseous changes are again noted.           Assessment/Plan  * Osteomyelitis (HCC)- (present on admission)  Assessment & Plan  S/P debridement of calcaneal  bone and wound vac placed on 5/31, clinically is stable  Deep intra-operative cultures growing MRSE, same as wound cultures from 5/31  Added IV dapto 6/2 and continue empiric IV unasyn, likely needs at least 6 weeks of abx's  Given exposed tendon and bone patient will need prolonged vera flow, therefore she will need skilled nursing facility for many weeks.  This kind of wound VAC arrangement is not possible at home.  I will explain this to the patient.  Ordered midline  Cancel MRI foot  Consulted ortho, going for debridement and wound vac placement on 5/31  ID consulted  Glycemic control (not well controlled as noted elsewhere)  Supportive wound care  TACO are normal  Pain control  MRSA nares negative, WBC has normalized, remains afebrile  Unable to place midline due to vasculature, tunneled IR catheter placed 6/5    ACP (advance care planning)- (present on admission)  Assessment & Plan  Confirmed DNR/DNI    Cellulitis- (present on admission)  Assessment & Plan  Cellulitis of right foot, possible osteomyelitis  Plan as noted in osteomyelitis    DM (diabetes mellitus) ([facility])- (present on admission)  Assessment & Plan  Has a particular regimen which she wants to adhere to, REMAINS WITH PERSISTENT HYPERGLYCEMIA and highly labile, she continues to be adamant about wanting to do her own insulin.  Start NPH 50 units qam and 10 units QPM (refusing multiple administrations of her blood sugars)  Reinforced with patient that persistent hyperglycemia will worsen her infection/delay wound healing, she understands  ISS  Consider standing SA insulin dependent on sugar levels    PAF (paroxysmal atrial fibrillation) ([facility])- (present on admission)  Assessment & Plan  Restart eliquis    Other insomnia- (present on admission)  Assessment & Plan  As needed Benadryl    CKD (chronic kidney disease) stage 3, GFR 30-59 ml/min- (present on admission)  Assessment & Plan  Minimize nephrotoxic agents, renally dose meds  Cr remains near her  baseline    COPD (chronic obstructive pulmonary disease) (HCC)- (present on admission)  Assessment & Plan  Per history  Currently not in acute exacerbation    Cardiac pacemaker- (present on admission)  Assessment & Plan  Per history  St Syd placed in 2018, confirmed it is MRI compatible  No recent syncope or issues here, no need for interrogation    Anxiety- (present on admission)  Assessment & Plan  Getting worse again, sounds like poorly controlled MAXIMO at home as well  Consulted psychotherapy first, but may need IP psych consult as well  No FURTHER escalation of her benadryl, explained to patient  She does not tolerate any benzo's  Seems like MAXIMO, would benefit from an SSRI, but she declines at this time  Benadryl oral PRN  Uses CBD oil at home  Psych suggested BuSpar but patient refused  Trialing dronabinol for this and appetite    Diastolic CHF due to valvular disease (HCC)- (present on admission)  Assessment & Plan  Prior history  Currently not in acute exacerbation    Class 1 obesity in adult- (present on admission)  Assessment & Plan  Diet and lifestyle modification  Body mass index is 32.11 kg/m².      Diabetic ulcer of right heel associated with type 2 diabetes mellitus (HCC)- (present on admission)  Assessment & Plan  Supportive wound care  Glycemic control  Plan as noted above         VTE prophylaxis: apixiban    I have performed a physical exam and reviewed and updated ROS and Plan today (6/5/2024). In review of yesterday's note (6/4/2024), there are no changes except as documented above.

## 2024-06-05 NOTE — PROGRESS NOTES
"Received report from off-going nurse.   Assumed pt care at change of shift.   Assessment completed.   Pt is A&Ox4, very anxious and \"very depressed\", vital signs are stable on RA.   Denies pain, no apparent signs of discomfort.   Bed is in low and locked position, call light and belongings in reach, reminded to use call light, bed alarm on.   No needs at this time.   "

## 2024-06-05 NOTE — PROGRESS NOTES
Orthopedic PA Progress Note    Interval changes:  Patient doing well   RLE dressings are CDI  Veraflo wound vac in place without leak  Ok for d/c planning to SNF that accepts veraflo- do NOT convert to regular vac    ROS - Patient denies any new issues.  Denies any numbness or tingling. Pain well controlled.    BP (!) 141/62   Pulse 87   Temp 36.3 °C (97.4 °F) (Temporal)   Resp 18   Wt 96.3 kg (212 lb 4.9 oz)   SpO2 91%     Patient seen and examined  No acute distress  Breathing non labored  RRR  RLE: Wound vac intact without leak. Sensation at baseline. Motor intact. Cap refill <2s    Recent Labs     06/05/24  0741   WBC 7.0   RBC 2.95*   HEMOGLOBIN 8.7*   HEMATOCRIT 26.6*   MCV 90.2   MCH 29.5   MCHC 32.7   RDW 45.6   PLATELETCT 188   MPV 10.7       Active Hospital Problems    Diagnosis     Osteomyelitis ([facility]) [M86.9]     DM (diabetes mellitus) ([facility]) [E11.9]     Cellulitis [L03.90]     ACP (advance care planning) [Z71.89]     PAF (paroxysmal atrial fibrillation) ([facility]) [I48.0]     Other insomnia [G47.09]     COPD (chronic obstructive pulmonary disease) ([facility]) [J44.9]     CKD (chronic kidney disease) stage 3, GFR 30-59 ml/min [N18.30]     Cardiac pacemaker [Z95.0]     Anxiety [F41.9]     Diastolic CHF due to valvular disease ([facility]) [I50.30, I38]     Class 1 obesity in adult [E66.9]     Diabetic ulcer of right heel associated with type 2 diabetes mellitus ([facility]) [E11.621, L97.419]        Assessment/Plan:  Patient doing well   RLE dressings are CDI  Veraflo wound vac in place without leak  Ok for d/c planning to SNF that accepts veraflo- do NOT convert to regular vac    POD#6 S/p  1.  Excisional debridement of right foot wound including skin, subcutaneous   tissue, tendon and bone through wound measuring approximately 4x5 cm.  2.  Partial craterization of right calcaneus for osteomyelitis.  3.  Application of wound VAC, right foot, less than 50 cm2  Wt bearing status - WBAT RLE  Wound care/Drains - Dressings to  be performed by wound team  Future Procedures - None planned   PT/OT-initiated  Antibiotics: Unasyn; daptomycin  DVT Prophylaxis- TEDS/SCDs/Foot pumps/Eliquis  Rosas-not needed per ortho  Case Coordination for Discharge Planning - Disposition per therapy recs.

## 2024-06-06 VITALS
TEMPERATURE: 97.4 F | BODY MASS INDEX: 33.25 KG/M2 | SYSTOLIC BLOOD PRESSURE: 126 MMHG | RESPIRATION RATE: 18 BRPM | OXYGEN SATURATION: 92 % | HEART RATE: 79 BPM | WEIGHT: 212.3 LBS | DIASTOLIC BLOOD PRESSURE: 56 MMHG

## 2024-06-06 LAB
ALBUMIN SERPL BCP-MCNC: 2.9 G/DL (ref 3.2–4.9)
BUN SERPL-MCNC: 21 MG/DL (ref 8–22)
CALCIUM ALBUM COR SERPL-MCNC: 9.9 MG/DL (ref 8.5–10.5)
CALCIUM SERPL-MCNC: 9 MG/DL (ref 8.5–10.5)
CHLORIDE SERPL-SCNC: 101 MMOL/L (ref 96–112)
CO2 SERPL-SCNC: 26 MMOL/L (ref 20–33)
CREAT SERPL-MCNC: 1.46 MG/DL (ref 0.5–1.4)
ERYTHROCYTE [DISTWIDTH] IN BLOOD BY AUTOMATED COUNT: 47.9 FL (ref 35.9–50)
GFR SERPLBLD CREATININE-BSD FMLA CKD-EPI: 36 ML/MIN/1.73 M 2
GLUCOSE BLD STRIP.AUTO-MCNC: 183 MG/DL (ref 65–99)
GLUCOSE BLD STRIP.AUTO-MCNC: 283 MG/DL (ref 65–99)
GLUCOSE SERPL-MCNC: 147 MG/DL (ref 65–99)
HCT VFR BLD AUTO: 23.7 % (ref 37–47)
HGB BLD-MCNC: 7.2 G/DL (ref 12–16)
MCH RBC QN AUTO: 28.7 PG (ref 27–33)
MCHC RBC AUTO-ENTMCNC: 30.4 G/DL (ref 32.2–35.5)
MCV RBC AUTO: 94.4 FL (ref 81.4–97.8)
PHOSPHATE SERPL-MCNC: 3.7 MG/DL (ref 2.5–4.5)
PLATELET # BLD AUTO: 172 K/UL (ref 164–446)
PMV BLD AUTO: 11 FL (ref 9–12.9)
POTASSIUM SERPL-SCNC: 3.4 MMOL/L (ref 3.6–5.5)
RBC # BLD AUTO: 2.51 M/UL (ref 4.2–5.4)
SODIUM SERPL-SCNC: 137 MMOL/L (ref 135–145)
WBC # BLD AUTO: 8.2 K/UL (ref 4.8–10.8)

## 2024-06-06 PROCEDURE — 99239 HOSP IP/OBS DSCHRG MGMT >30: CPT | Performed by: HOSPITALIST

## 2024-06-06 PROCEDURE — 700105 HCHG RX REV CODE 258: Performed by: INTERNAL MEDICINE

## 2024-06-06 PROCEDURE — 700111 HCHG RX REV CODE 636 W/ 250 OVERRIDE (IP): Mod: JZ | Performed by: INTERNAL MEDICINE

## 2024-06-06 PROCEDURE — A9270 NON-COVERED ITEM OR SERVICE: HCPCS | Performed by: STUDENT IN AN ORGANIZED HEALTH CARE EDUCATION/TRAINING PROGRAM

## 2024-06-06 PROCEDURE — 700102 HCHG RX REV CODE 250 W/ 637 OVERRIDE(OP): Performed by: INTERNAL MEDICINE

## 2024-06-06 PROCEDURE — 36415 COLL VENOUS BLD VENIPUNCTURE: CPT

## 2024-06-06 PROCEDURE — 700102 HCHG RX REV CODE 250 W/ 637 OVERRIDE(OP): Mod: JZ | Performed by: HOSPITALIST

## 2024-06-06 PROCEDURE — 700102 HCHG RX REV CODE 250 W/ 637 OVERRIDE(OP): Performed by: STUDENT IN AN ORGANIZED HEALTH CARE EDUCATION/TRAINING PROGRAM

## 2024-06-06 PROCEDURE — A9270 NON-COVERED ITEM OR SERVICE: HCPCS

## 2024-06-06 PROCEDURE — 700102 HCHG RX REV CODE 250 W/ 637 OVERRIDE(OP)

## 2024-06-06 PROCEDURE — A9270 NON-COVERED ITEM OR SERVICE: HCPCS | Mod: JZ | Performed by: HOSPITALIST

## 2024-06-06 PROCEDURE — 80069 RENAL FUNCTION PANEL: CPT

## 2024-06-06 PROCEDURE — 85027 COMPLETE CBC AUTOMATED: CPT

## 2024-06-06 PROCEDURE — 82962 GLUCOSE BLOOD TEST: CPT

## 2024-06-06 PROCEDURE — A9270 NON-COVERED ITEM OR SERVICE: HCPCS | Performed by: INTERNAL MEDICINE

## 2024-06-06 PROCEDURE — 97535 SELF CARE MNGMENT TRAINING: CPT

## 2024-06-06 RX ORDER — DRONABINOL 2.5 MG/1
2.5 CAPSULE ORAL 2 TIMES DAILY
Qty: 4 CAPSULE | Refills: 0 | Status: SHIPPED | OUTPATIENT
Start: 2024-06-06 | End: 2024-06-08

## 2024-06-06 RX ORDER — DRONABINOL 2.5 MG/1
2.5 CAPSULE ORAL 2 TIMES DAILY
Qty: 4 CAPSULE | Refills: 0 | Status: SHIPPED | OUTPATIENT
Start: 2024-06-06 | End: 2024-06-06

## 2024-06-06 RX ORDER — POTASSIUM CHLORIDE 20 MEQ/1
40 TABLET, EXTENDED RELEASE ORAL ONCE
Status: COMPLETED | OUTPATIENT
Start: 2024-06-06 | End: 2024-06-06

## 2024-06-06 RX ADMIN — APIXABAN 5 MG: 5 TABLET, FILM COATED ORAL at 05:19

## 2024-06-06 RX ADMIN — AMPICILLIN AND SULBACTAM 3 G: 1; 2 INJECTION, POWDER, FOR SOLUTION INTRAMUSCULAR; INTRAVENOUS at 00:08

## 2024-06-06 RX ADMIN — INSULIN HUMAN 50 UNITS: 100 INJECTION, SUSPENSION SUBCUTANEOUS at 08:22

## 2024-06-06 RX ADMIN — POTASSIUM CHLORIDE 40 MEQ: 1500 TABLET, EXTENDED RELEASE ORAL at 08:14

## 2024-06-06 RX ADMIN — ALBUTEROL SULFATE 2 PUFF: 90 AEROSOL, METERED RESPIRATORY (INHALATION) at 03:21

## 2024-06-06 RX ADMIN — LOSARTAN POTASSIUM 25 MG: 25 TABLET, FILM COATED ORAL at 05:20

## 2024-06-06 RX ADMIN — DRONABINOL 2.5 MG: 2.5 CAPSULE ORAL at 11:25

## 2024-06-06 RX ADMIN — DIPHENHYDRAMINE HYDROCHLORIDE 25 MG: 25 TABLET ORAL at 02:37

## 2024-06-06 RX ADMIN — DAPTOMYCIN 750 MG: 500 INJECTION, POWDER, LYOPHILIZED, FOR SOLUTION INTRAVENOUS at 05:36

## 2024-06-06 RX ADMIN — FUROSEMIDE 40 MG: 40 TABLET ORAL at 05:20

## 2024-06-06 RX ADMIN — SENNOSIDES AND DOCUSATE SODIUM 2 TABLET: 50; 8.6 TABLET ORAL at 05:20

## 2024-06-06 RX ADMIN — AMPICILLIN AND SULBACTAM 3 G: 1; 2 INJECTION, POWDER, FOR SOLUTION INTRAMUSCULAR; INTRAVENOUS at 05:14

## 2024-06-06 ASSESSMENT — COGNITIVE AND FUNCTIONAL STATUS - GENERAL
DRESSING REGULAR LOWER BODY CLOTHING: A LOT
EATING MEALS: A LITTLE
HELP NEEDED FOR BATHING: A LOT
DRESSING REGULAR UPPER BODY CLOTHING: A LOT
PERSONAL GROOMING: A LITTLE
DAILY ACTIVITIY SCORE: 14
SUGGESTED CMS G CODE MODIFIER DAILY ACTIVITY: CK
TOILETING: A LOT

## 2024-06-06 ASSESSMENT — PAIN DESCRIPTION - PAIN TYPE: TYPE: ACUTE PAIN

## 2024-06-06 NOTE — CARE PLAN
The patient is Stable - Low risk of patient condition declining or worsening    Shift Goals  Clinical Goals: IV abx, pain <4, and monitor bg  Patient Goals: Pain management and feel better  Family Goals: BRISA    Patient a/o x's 4. Patient ambulates to bedside commode. Changed dressing on right sided pleural drain as dressing came off. Patient on 1 liter oxygen w/ oxymask. Patient denies pain. Patient resting and bed is in low position. Call light and belongings are w/in reach. Respirations are even and unlabored.     Progress made toward(s) clinical / shift goals:    Problem: Hemodynamics  Goal: Patient's hemodynamics, fluid balance and neurologic status will be stable or improve  6/6/2024 0353 by Jennifer Schoening, R.N.  Outcome: Not Progressing  6/6/2024 0322 by Jennifer Schoening, R.N.  Outcome: Not Met       Patient is not progressing towards the following goals:      Problem: Knowledge Deficit - Standard  Goal: Patient and family/care givers will demonstrate understanding of plan of care, disease process/condition, diagnostic tests and medications  6/6/2024 0353 by Jennifer Schoening, R.N.  Outcome: Progressing  6/6/2024 0322 by Jennifer Schoening, R.N.  Outcome: Not Met     Problem: Hemodynamics  Goal: Patient's hemodynamics, fluid balance and neurologic status will be stable or improve  6/6/2024 0353 by Jennifer Schoening, R.N.  Outcome: Not Progressing  6/6/2024 0322 by Jennifer Schoening, R.N.  Outcome: Not Met

## 2024-06-06 NOTE — PROGRESS NOTES
GMT at bedside ready to transfer patient to [facility] for discharge. Wound vac remained at bedside but was disconnected from it prior to being transferred to [facility].

## 2024-06-06 NOTE — DISCHARGE PLANNING
Per notes patient looks like they will DC home with both a wound vac as well as IV ABX. Please include both of these on the HH order once available as well as infusion orders from [facility].

## 2024-06-06 NOTE — PROGRESS NOTES
Reviewed patients hemoglobin 00:45 7.2 down from 07:41 8.7. Messaged Hospitalist on call Desean Velez PA-C just to let him know patient's vitals are stable and patient has no symptoms of blood loss.

## 2024-06-06 NOTE — DISCHARGE PLANNING
Case Management Discharge Planning    Admission Date: 5/28/2024  GMLOS: 6.2  ALOS: 9    6-Clicks ADL Score: 21  6-Clicks Mobility Score: 17  PT and/or OT Eval ordered: Yes  Post-acute Referrals Ordered: Yes  Post-acute Choice Obtained: Yes  Has referral(s) been sent to post-acute provider:  Yes      Anticipated Discharge Dispo: Discharge Disposition: D/T to home under [facility]HA care in anticipation of covered skilled care (06)    DME Needed: No    Action(s) Taken: LMSW spoke with Cristina from [facility]. Cristina is working with LUIS ANGEL and her DON on a decision for this patient.     Addendum @ 7627: LMSW spoke with Cristina at [facility]. Cristina and [facility] denying the patient for wound care as they cannot care for the wounds.     Addendum @ 9456: LMSW spoke with the patient at bedside to get decision on if she is willing to go to [facility]. Ophelia requesting that this LMSW work with Iesha for the decision. LMSW spoke with Iesha. Iesha agreeable to transfer to [facility] today as she understood the patient did not have any other options. LMSW notified Ophelia and educated her on her medically cleared status from the MD. LMSW requested transport for 1400 via [facility]. IMM completed. Bedside RN and MD notified. LMSW to complete COBRA.     Escalations Completed: None    Medically Clear: No    Next Steps: LMSW to follow until discharge.     Barriers to Discharge: Pending Placement    Is the patient up for discharge tomorrow: No

## 2024-06-06 NOTE — PROGRESS NOTES
Received report from off-going nurse.   Assumed pt care at change of shift.   Assessment completed.   Pt is A&Ox 4, vital signs are stable on 1L of oxygen.   Denies pain, no apparent signs of discomfort.   Bed is in low and locked position, call light and belongings in reach, reminded to use call light, bed alarm on.   No needs at this time.

## 2024-06-06 NOTE — DISCHARGE PLANNING
DC Transport Scheduled    Transport Company Scheduled:  [facility]  Spoke with Meagan at [facility] to schedule transport.    Scheduled Date: 6/6/2024  Scheduled Time: 1400    Destination: [facility]   Destination address: [place] [address]    Notified care team of scheduled transport via Voalte.     If there are any changes needed to the DC transportation scheduled, please contact [facility] at ext. [phone] between the hours of 9475-8459 Mon-Fri. If outside those hours, contact the ED Case Manager at ext. [phone].

## 2024-06-06 NOTE — PROGRESS NOTES
Pt arrived back on unit via hospital bed with IR nurses. Pt resting with even chest rise and fall bilaterally. Looked on tunneled cath on R chest - dressing clean, dry, intact. Post op vitals initiated, belongings within reach, call light on bed, all needs met at this time.

## 2024-06-06 NOTE — DISCHARGE PLANNING
ATTN: Case Management  RE: Referral for Home Health    Reason for referral denial: Patient transfer to SNF              Unfortunately, we are not able to accept this referral for the reason listed above. If further clarity is needed, our Transitional Care Specialists are available to discuss any barriers to service at [phone].      We look forward to collaborating with you in the future,  [facility] Team

## 2024-06-06 NOTE — DISCHARGE SUMMARY
"Discharge Summary    CHIEF COMPLAINT ON ADMISSION  Chief Complaint   Patient presents with    Wound Check     Pt c/o wound to R heel. Sent by MD for further evaluation of possible cellulitis    Foot Pain     Right foot/heel wound        Reason for Admission  Sent By MD    Admission Date  5/28/2024     CODE STATUS  DNAR/DNI    HPI & HOSPITAL COURSE  Ophelia Sosa is a 78 y.o. female admitted 5/28/2024 with longstanding type 2 diabetes mellitus on insulin, chronic A-fib on Eliquis, hypertension, COPD and CKD stage III presented to the hospital with a nonhealing right foot Achilles wound and ulcer.  She has been followed by outpatient wound care with worsening wound and was instructed to come to the emergency room in hospital for further evaluation for osteomyelitis and possible abscess.  She was then placed empirically on IV ceftriaxone and vancomycin which the latter was discontinued after MRSA nares was negative.  MRI was ordered and it was confirmed with [employer] rep that her PPM is MRI compatible.  Before this could be done however, Ortho was consulted and took patient for surgery (see below).  She had significant anxiety and depression so behavioral health and psychiatry were both consulted.  Psychiatry recommended medications but patient refused to try any new medications.  She was resumed on her home low-dose Benadryl and also given dronabinol given that she normally takes cannabis oil at home.  Midline was attempted but unable to be placed due to poor vasculature.  IR placed small bore central catheter.  Patient will be able to thus transferred to SNF for further antibiotics. Per ID:  \"Stop date antibiotics 6 weeks from surgery-initial 4 weeks IV then follow with PO  Midline OK  Stop date IV antibiotics 6/28/24 followed by PO per culture results  Can dc Unasyn if no anaerobes/GNR isolated  Placement as needs Veraflo\"    Therefore, she is discharged in good and stable condition to skilled nursing " facility.    The patient met 2-midnight criteria for an inpatient stay at the time of discharge.      FOLLOW UP ITEMS POST DISCHARGE  Recheck hemoglobin as this did decrease on day of discharge (8.7 -> 7.2)  Recheck creatinine as this did increase on day of discharge (1.11 -> 1.46)  Recheck potassium at this was repleted on day of discharge (3.4)  Assess psych needs as patient has significant anxiety and depression does not want to try any new psych medications by psychiatry did suggest BuSpar if she is open to trying something.  In the meantime, continue home Benadryl and dronabinol instead of her home cannabinoid oil.    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 *      FOLLOW UP  Future Appointments   Date Time Provider Department Center   6/11/2024  3:05 PM [facility]   6/21/2024 11:40 AM JEREL Rothman   7/11/2024  1:40 PM Aman Wilkinson M.D. [facility] None     No follow-up provider specified.    MEDICATIONS ON DISCHARGE     Medication List        START taking these medications        Instructions   dronabinol 2.5 MG Caps  Commonly known as: Marinol   Take 1 Capsule by mouth 2 times a day for 2 days.  Dose: 2.5 mg     NS SOLN 50 mL with DAPTOmycin 500 MG SOLR 770 mg  Start taking  on: June 7, 2024   Infuse 770 mg into a venous catheter every 24 hours for 8 days.  Dose: 8 mg/kg            CHANGE how you take these medications        Instructions   Eliquis 5 MG Tabs  What changed: how much to take  Generic drug: apixaban   Doctor's comments: DX Code Needed  PT REQUESTING REFILLS.  TAKE 1 TABLET BY MOUTH TWICE A DAY     furosemide 40 MG Tabs  What changed: when to take this  Commonly known as: Lasix   TAKE 1 TABLET BY MOUTH EVERY DAY IN THE EVENING     insulin  UNIT/ML Susp  What changed: additional instructions  Commonly known as: HumuLIN/NovoLIN N   46 units in the morning and 3-10 units in the evening per sliding scale     Klor-Con 8 MEQ tablet  What changed: how much to take  Generic drug: potassium chloride   TAKE 1 TABLET BY MOUTH EVERY DAY IN THE EVENING     rosuvastatin 5 MG Tabs  What changed:   how much to take  how to take this  when to take this  Commonly known as: Crestor   TAKE ONE TABLET AT BEDTIME FOR CHOLESTEROL. TAKE THREE TIMES WEEKLY            CONTINUE taking these medications        Instructions   Acetaminophen Extra Strength 500 MG Caps   Take 2 Capsules by mouth every evening. Indications: Pain  Dose: 2 Capsule     albuterol 108 (90 Base) MCG/ACT Aers inhalation aerosol   Doctor's comments: Fill per formulary equivalent  Inhale 2 Puffs every 6 hours as needed for Shortness of Breath.  Dose: 2 Puff     B COMPLEX PO   Take 1 Tablet by mouth every day.  Dose: 1 Tablet     BENADRYL ALLERGY PO   Take 12.5 mg by mouth 2 times a day as needed (for insomnia). Indications: sleep  Dose: 12.5 mg     clobetasol 0.05 % Crea  Commonly known as: Temovate   Apply 1 Application  topically 2 times a day as needed (eczema).  Dose: 1 Application      fluticasone 50 MCG/ACT nasal spray  Commonly known as: Flonase   Administer 1 Spray into affected nostril(S) 1 time a day as needed. Indications: Stuffy Nose  Dose: 1 Spray     insulin regular 100 Unit/mL Soln  Commonly known as: Humulin  R   Inject 6 Units under the skin 2 times a day. Inject 6 units daily in the morning and 6 units a  Indications: Type 2 Diabetes  Dose: 6 Units     losartan 25 MG Tabs  Commonly known as: Cozaar   Take 25 mg by mouth 2 times a day. Indications: High Blood Pressure Disorder  Dose: 25 mg     METAMUCIL MULTIHEALTH FIBER PO   Take 2-3 Capsules by mouth every day. takes 3 capsules on Monday, Wednesday and Friday. 2 capsules all other days  Indications: constipation  Dose: 2-3 Capsule     metFORMIN  MG Tb24  Commonly known as: Glucophage XR   TAKE 1 TABLET BY MOUTH TWICE A DAY  Dose: 500 mg     Non Formulary Request   Take 1 Capsule by mouth every day. vital veggie capsule  Indications: supplement  Dose: 1 Capsule     non-formulary med   Take 1 Capful by mouth every day. Vital fruit capsules  Indications: supplement  Dose: 1 Capful     VITAMIN D (CHOLECALCIFEROL) PO   Take 3,000 Units by mouth every day. Indications: supplement  Dose: 3,000 Units            STOP taking these medications      doxycycline 100 MG Tabs  Commonly known as: Vibramycin              Allergies  Allergies   Allergen Reactions    Ativan      DELIRIUM, CONFUSION.    Advair [Fluticasone-Salmeterol]     Ambien [Zolpidem Tartrate]     Erythromycin Vomiting    Ibuprofen     Lipitor [Atorvastatin Calcium]     Pcn [Penicillins] Hives     Tolerated Unasyn and Ancef 5/2024    Pravachol [Pravastatin Sodium]      ADVERSE REACTION.       DIET  Orders Placed This Encounter   Procedures    Diet Order Diet: Consistent CHO (Diabetic); Nutrient modifications: (optional): High Protein     Standing Status:   Standing     Number of Occurrences:   1     Order Specific Question:   Diet:     Answer:   Consistent CHO (Diabetic) [4]     Order Specific Question:   Nutrient modifications: (optional)     Answer:   High Protein [4]       ACTIVITY  As tolerated and directed by skilled nursing.  Weight bearing as tolerated    LINES, DRAINS, AND WOUNDS  This is an automated  list. Peripheral IVs will be removed prior to discharge.  Peripheral IV 05/28/24 20 G Right Forearm (Active)   Site Assessment Clean;Dry;Intact 06/06/24 0830   Dressing Type Transparent 06/06/24 0830   Line Status Scrubbed the hub prior to access;Flushed;Saline locked 06/06/24 0830   Dressing Status Clean;Dry;Intact 06/06/24 0830   Dressing Intervention N/A 06/06/24 0830   Dressing Change Due 06/08/24 06/04/24 2200   Date Primary Tubing Changed 06/01/24 06/01/24 2100   Date Secondary Tubing Changed 06/04/24 06/04/24 1238   NEXT Primary Tubing Change  06/08/24 06/01/24 2100   NEXT Secondary Tubing Change  06/05/24 06/04/24 1238   Infiltration Grading ([facility], [facility]) 0 06/06/24 0830   Phlebitis Scale ([facility] Only) 0 06/06/24 0830       PICC Double Lumen 06/05/24 Right Medial (Active)   Site Assessment Clean;Dry;Intact 06/06/24 0830   Lumen 1 Status Flushed;Blood return noted;Normal saline locked 06/06/24 0830   Lumen 2 Status Flushed;Blood return noted;Normal saline locked 06/06/24 0830   Dressing Status Clean;Dry;Intact 06/06/24 0830   Dressing Intervention N/A 06/06/24 0830   Line Necessity Assessed Antibiotic Therapy Greater than 7 Days 06/06/24 0830       Wound 04/25/24 Diabetic Ulcer Heel;Achilles Posterior Right (Active)       Wound 05/24/24 Achilles;Tibia Posterior;Distal Right (Active)       Wound 05/29/24 Diabetic Ulcer Heel Right (Active)   Wound Image    06/05/24 1400   Site Assessment BRISA 06/06/24 0830   Periwound Assessment Clean;Dry;Intact 06/05/24 2223   Margins Defined edges;Unattached edges 06/05/24 1400   Closure Secondary intention 06/05/24 1400   Drainage Amount Small 06/05/24 1400   Drainage Description Sanguineous 06/05/24 1400   Treatments Cleansed;Nonselective debridement;Site care;Offloading 06/05/24 1400   Wound Cleansing Approved Wound Cleanser 06/05/24 1400   Periwound Protectant No-sting Skin Prep;Paste Ring;Drape 06/05/24 1400   Dressing Status Clean;Dry;Intact 06/06/24 6230   Dressing  Changed Observed 06/05/24 2223   Dressing Cleansing/Solutions Normal Saline 06/05/24 1400   Dressing Options Wound Vac;Tubigrip 06/05/24 1400   Dressing Change/Treatment Frequency Monday, Wednesday, Friday, and As Needed 06/05/24 1400   NEXT Dressing Change/Treatment Date 06/07/24 06/05/24 1400   NEXT Weekly Photo (Inpatient Only) 06/12/24 06/05/24 1400   Non-staged Wound Description Full thickness 06/05/24 1400   Wound Length (cm) 4 cm 05/31/24 1200   Wound Width (cm) 2.5 cm 05/31/24 1200   Wound Surface Area (cm^2) 10 cm^2 05/31/24 1200   Shape irregular 06/05/24 1400   Wound Odor None 06/05/24 1400   Pulses 2+;DP;PT 06/05/24 1400   WOUND NURSE ONLY - Time Spent with Patient (mins) 45 06/05/24 1400      PICC Double Lumen 06/05/24 Right Medial (Active)   Site Assessment Clean;Dry;Intact 06/06/24 0830   Lumen 1 Status Flushed;Blood return noted;Normal saline locked 06/06/24 0830   Lumen 2 Status Flushed;Blood return noted;Normal saline locked 06/06/24 0830   Dressing Status Clean;Dry;Intact 06/06/24 0830   Dressing Intervention N/A 06/06/24 0830   Line Necessity Assessed Antibiotic Therapy Greater than 7 Days 06/06/24 0830     Peripheral IV 05/28/24 20 G Right Forearm (Active)   Site Assessment Clean;Dry;Intact 06/06/24 0830   Dressing Type Transparent 06/06/24 0830   Line Status Scrubbed the hub prior to access;Flushed;Saline locked 06/06/24 0830   Dressing Status Clean;Dry;Intact 06/06/24 0830   Dressing Intervention N/A 06/06/24 0830   Dressing Change Due 06/08/24 06/04/24 2200   Date Primary Tubing Changed 06/01/24 06/01/24 2100   Date Secondary Tubing Changed 06/04/24 06/04/24 1238   NEXT Primary Tubing Change  06/08/24 06/01/24 2100   NEXT Secondary Tubing Change  06/05/24 06/04/24 1238   Infiltration Grading ([facility], [facility]) 0 06/06/24 0830   Phlebitis Scale ([facility] Only) 0 06/06/24 0830               MENTAL STATUS ON TRANSFER             CONSULTATIONS  Orthopedic surgeon Dr. Fleming  ID Dr. Bauman  Psychologist   Major  Psychiatrist Dr. Lazcano    PROCEDURES  DATE OF SERVICE:  05/31/2024   PREOPERATIVE DIAGNOSES:    1.  Right chronic open wound to the posterior heel.  2.  Concern for calcaneus osteomyelitis.  POSTOPERATIVE DIAGNOSES:    1.  Right open wound to the posterior heel.  2. Right calcaneus osteomyelitis.  PROCEDURES PERFORMED:    1.  Excisional debridement of right foot wound including skin, subcutaneous   tissue, tendon and bone through wound measuring approximately 4x5 cm.  2.  Partial craterization of right calcaneus for osteomyelitis.  3.  Application of wound VAC, right foot, less than 50 cm2.    Successful image guided tunneled small bore central catheter placement  6/4/2024    LABORATORY  Lab Results   Component Value Date    SODIUM 137 06/06/2024    POTASSIUM 3.4 (L) 06/06/2024    CHLORIDE 101 06/06/2024    CO2 26 06/06/2024    GLUCOSE 147 (H) 06/06/2024    BUN 21 06/06/2024    CREATININE 1.46 (H) 06/06/2024        Lab Results   Component Value Date    WBC 8.2 06/06/2024    HEMOGLOBIN 7.2 (L) 06/06/2024    HEMATOCRIT 23.7 (L) 06/06/2024    PLATELETCT 172 06/06/2024        Total time of the discharge process exceeds 32 minutes.

## 2024-06-06 NOTE — PROGRESS NOTES
Education provided, COBRA given to [facility], wound vac disconnected, pt off unit to [facility] [place] [facility] [place] [employer].

## 2024-06-06 NOTE — PROGRESS NOTES
Orthopedic PA Progress Note    Interval changes:  Patient doing well. Plans for d/c to [facility] today  RLE dressings are CDI  Veraflo wound vac in place without leak  Ok for d/c planning to SNF that accepts veraflo    ROS - Patient denies any new issues.  Denies any numbness or tingling. Pain well controlled.    /51   Pulse 83   Temp 36.3 °C (97.3 °F) (Temporal)   Resp 17   Wt 96.3 kg (212 lb 4.9 oz)   SpO2 92%     Patient seen and examined  No acute distress  Breathing non labored  RRR  RLE: Wound vac intact without leak. Sensation at baseline. Motor intact. Cap refill <2s    Recent Labs     06/05/24  0741 06/06/24  0045   WBC 7.0 8.2   RBC 2.95* 2.51*   HEMOGLOBIN 8.7* 7.2*   HEMATOCRIT 26.6* 23.7*   MCV 90.2 94.4   MCH 29.5 28.7   MCHC 32.7 30.4*   RDW 45.6 47.9   PLATELETCT 188 172   MPV 10.7 11.0       Active Hospital Problems    Diagnosis     Osteomyelitis ([facility]) [M86.9]     DM (diabetes mellitus) ([facility]) [E11.9]     Cellulitis [L03.90]     ACP (advance care planning) [Z71.89]     PAF (paroxysmal atrial fibrillation) ([facility]) [I48.0]     Other insomnia [G47.09]     COPD (chronic obstructive pulmonary disease) ([facility]) [J44.9]     CKD (chronic kidney disease) stage 3, GFR 30-59 ml/min [N18.30]     Cardiac pacemaker [Z95.0]     Anxiety [F41.9]     Diastolic CHF due to valvular disease ([facility]) [I50.30, I38]     Class 1 obesity in adult [E66.9]     Diabetic ulcer of right heel associated with type 2 diabetes mellitus ([facility]) [E11.621, L97.419]        Assessment/Plan:  Patient doing well. Plans for [facility] today  RLE dressings are CDI  Veraflo wound vac in place without leak  Ok for d/c planning to SNF that accepts veraflo    POD#7 S/p  1.  Excisional debridement of right foot wound including skin, subcutaneous   tissue, tendon and bone through wound measuring approximately 4x5 cm.  2.  Partial craterization of right calcaneus for osteomyelitis.  3.  Application of wound VAC, right foot, less than 50  cm2  Wt bearing status - WBAT RLE  Wound care/Drains - Dressings to be performed by wound team  Future Procedures - None planned   PT/OT-initiated  Antibiotics: Unasyn; daptomycin  DVT Prophylaxis- TEDS/SCDs/Foot pumps/Eliquis  Rosas-not needed per ortho  Case Coordination for Discharge Planning - Disposition per therapy recs.

## 2024-06-06 NOTE — DISCHARGE PLANNING
[facility] chart review completed. Appreciate CM obtainment of choice for SNF level of care noting patient an anticipated discharge to [facility] today (please see CM note 6/6 at 8:24AM).  Request for auth for anticipated discharge to [facility] sent to [facility] team.     TCN will continue to follow and collaborate with discharge planning team as additional post acute needs arise. Thank you.    Completed:  PT recommendations noted to post-acute placement 6/5/2024  OT recommendations noted to  Other - (pt adamantly refuses any post-acute placement, agreeable to home health OT) 6/3  Choice obtained: HH (resumption of [facility]). IV Infusion ([facility]). Current physician recommendations for discharge are for SNF. CM obtained choice today for [facility], noting patient is an anticipated discharge today  Patient verbalized understanding regarding CM protocol of sending SNF referrals  Referral sent to [facility] 5/29. Per [facility] patient is followed by Chronic Care Management outpatient     2:19PM- [facility] auth complete for anticipated discharge to [facility] today.

## 2024-06-06 NOTE — CARE PLAN
The patient is Stable - Low risk of patient condition declining or worsening    Shift Goals  Clinical Goals: IV abx, pain <4, and monitor bg  Patient Goals: Pain management and feel better  Family Goals: BRISA    Patient a/o x4. Patient had picc line procedure done today. Picc line double lumen. Both lines flushed with good blood return. Pain was 4/10 earlier but pt denies pain currently. Patient urinated and had a bm at the bedside commode just now. Patient wanted some medicine to help sleep. Benadryl was given. Patient resting with bed in low position. Patient short of breath when on the commode. Patient took to puffs of albuterol and is on 2 liters of O2. Patient resting currently. Respirations are even and unlabored.     Progress made toward(s) clinical / shift goals:    Problem: Knowledge Deficit - Standard  Goal: Patient and family/care givers will demonstrate understanding of plan of care, disease process/condition, diagnostic tests and medications  Outcome: Not Met     Problem: Hemodynamics  Goal: Patient's hemodynamics, fluid balance and neurologic status will be stable or improve  Outcome: Not Met       Patient is not progressing towards the following goals:      Problem: Knowledge Deficit - Standard  Goal: Patient and family/care givers will demonstrate understanding of plan of care, disease process/condition, diagnostic tests and medications  Outcome: Not Met     Problem: Hemodynamics  Goal: Patient's hemodynamics, fluid balance and neurologic status will be stable or improve  Outcome: Not Met

## 2024-06-06 NOTE — THERAPY
"Occupational Therapy  Daily Treatment     Patient Name: Ophelia Sosa  Age:  78 y.o., Sex:  female  Medical Record #: [id number]  Today's Date: 6/6/2024     Precautions  Precautions: Fall Risk, Weight Bearing As Tolerated Right Lower Extremity  Comments: wound vac right heel    Assessment    Pt is progressing slower than expected and needed increased assist for BSC txfer and toileting hygiene. Pt was emotionally labile and tearful throughout. Refused to attempt walking the short distance to the bathroom due to feeling poorly today. Pt needed Roxy for transfer with max cues for safety as pt had difficulty following directions, was highly fearful of falling and placed herself at a high risk to fall without intervention. Pt is currently unable to safely care for herself though is motivated to progress towards her PLOF. Recommend post-acute placement.     Plan    Treatment Plan Status: (P) Continue Current Treatment Plan  Type of Treatment: Self Care / Activities of Daily Living, Adaptive Equipment, Neuro Re-Education / Balance, Therapeutic Exercises, Therapeutic Activity, Family / Caregiver Training  Treatment Frequency: 3 Times per Week  Treatment Duration: Until Therapy Goals Met    DC Equipment Recommendations: (P) Unable to determine at this time  Discharge Recommendations: (P) Recommend post-acute placement for additional occupational therapy services prior to discharge home    Subjective    \"I was so independent with my cane, I've never been so helpless.\"     Objective     06/06/24 0955   Pain   Intervention Declines   Non Verbal Descriptors   Non Verbal Scale  Calm   Cognition    Cognition / Consciousness X   Level of Consciousness Alert   Ability To Follow Commands 1 Step   Safety Awareness Impaired   New Learning Impaired   Attention Impaired   Comments Emotionally labile, tearful throughout. Poor direction following and safety with transfer and standing   Balance   Sitting Balance (Static) Good   Sitting " Balance (Dynamic) Good   Standing Balance (Static) Fair -   Standing Balance (Dynamic) Poor +   Weight Shift Sitting Good   Weight Shift Standing Fair   Comments FWW in standing   Bed Mobility    Supine to Sit Supervised   Sit to Supine Supervised   Scooting Supervised   Comments HOB elevated   Activities of Daily Living   Upper Body Dressing Moderate Assist   Toileting Maximal Assist   Skilled Intervention Tactile Cuing;Verbal Cuing   How much help from another person does the patient currently need...   Putting on and taking off regular lower body clothing? 2   Bathing (including washing, rinsing, and drying)? 2   Toileting, which includes using a toilet, bedpan, or urinal? 2   Putting on and taking off regular upper body clothing? 2   Taking care of personal grooming such as brushing teeth? 3   Eating meals? 3   6 Clicks Daily Activity Score 14   Functional Mobility   Sit to Stand Minimal Assist   Toilet Transfers Minimal Assist  (EOB<>BSC)   Transfer Method Squat Pivot;Stand Step   Mobility reach pivot to BSC, stand step back to EOB with FWW   Skilled Intervention Verbal Cuing;Sequencing;Tactile Cuing   Comments max cues for safety and direction following   Activity Tolerance   Sitting in Chair 10 min on BSC   Standing 2 min   Patient / Family Goals   Patient / Family Goal #1 To go home ASAP   Goal #1 Outcome Progressing slower than expected   Short Term Goals   Short Term Goal # 1 Pt will demo LBD (underwear/pants) including managing over wound vac with SPV   Goal Outcome # 1 Progressing slower than expected   Short Term Goal # 2 Pt will txfer to toilet with SPV   Goal Outcome # 2 Progressing slower than expected   Short Term Goal # 3 Pt will stand at sink for 2+ min for g/h routine   Goal Outcome # 3 Goal not met   Education Group   Education Provided Activities of Daily Living   ADL Patient Response Patient;Acceptance;Explanation;Verbal Demonstration;Action Demonstration;Reinforcement Needed   Occupational  Therapy Treatment Plan    O.T. Treatment Plan Continue Current Treatment Plan   Anticipated Discharge Equipment and Recommendations   DC Equipment Recommendations Unable to determine at this time   Discharge Recommendations Recommend post-acute placement for additional occupational therapy services prior to discharge home   Interdisciplinary Plan of Care Collaboration   IDT Collaboration with  Nursing   Patient Position at End of Therapy In Bed;Bed Alarm On;Call Light within Reach;Tray Table within Reach;Phone within Reach   Collaboration Comments RN aware   Session Information   Date / Session Number  6/6 #2 (2/3, 6/9)

## 2024-06-07 ENCOUNTER — HOME CARE VISIT (OUTPATIENT)
Dept: HOME HEALTH SERVICES | Facility: HOME HEALTHCARE | Age: 79
End: 2024-06-07
Payer: MEDICARE

## 2024-06-07 ENCOUNTER — PATIENT OUTREACH (OUTPATIENT)
Dept: MEDICAL GROUP | Facility: PHYSICIAN GROUP | Age: 79
End: 2024-06-07
Payer: MEDICARE

## 2024-06-07 NOTE — CASE COMMUNICATION
I agree with these changes.   ----- Message -----  From: Radha Pepper R.N.  Sent: 6/7/2024   7:57 AM PDT  To: Iqra Sanders R.N.      Quality Review for SOC OASIS by MATTHEW Pepper, DAY on  June 7, 2024     Edits completed by MATTHEW Pepper RN:  1.  and  diagnosis coding updated per chart review.  2. Changed  to 5/21/24 per the LSOC order  3. Changed  to early episode timing  4. Checked #6 on  per chart  review  5. 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 Changed  to 5 per narrative stating sponge bath with moderate assistance  7. Changed  per narrative stating moderate assistance and a cane for safe ambulation  8.  and  are 3 per am bulation status  9. Completed F2F information

## 2024-06-07 NOTE — CASE COMMUNICATION
Quality Review for SOC OASIS by MATTHEW Pepper RN on  June 7, 2024     Edits completed by MATTHEW Pepper RN:  1.  and  diagnosis coding updated per chart review.  2. Changed  to 5/21/24 per the LSOC order  3. Changed  to early episode timing  4. Checked #6 on  per chart review  5. 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 Changed  to 5 per narrative stating sponge bath with moderate assistance  7. Changed  per narrative stating moderate assistance and a cane for safe ambulation  8.  and  are 3 per ambulation status  9. Completed F2F information

## 2024-06-11 ENCOUNTER — APPOINTMENT (OUTPATIENT)
Dept: RADIOLOGY | Facility: MEDICAL CENTER | Age: 79
End: 2024-06-11
Attending: STUDENT IN AN ORGANIZED HEALTH CARE EDUCATION/TRAINING PROGRAM
Payer: MEDICARE

## 2024-06-25 LAB
FUNGUS SPEC CULT: NORMAL
FUNGUS SPEC FUNGUS STN: NORMAL
SIGNIFICANT IND 70042: NORMAL
SITE SITE: NORMAL
SOURCE SOURCE: NORMAL

## 2024-06-28 ENCOUNTER — TELEPHONE (OUTPATIENT)
Dept: CARDIOLOGY | Facility: MEDICAL CENTER | Age: 79
End: 2024-06-28
Payer: MEDICARE

## 2024-06-28 NOTE — TELEPHONE ENCOUNTER
Called pt in regards to echo that was ordered at previous OV.No answer, LVM to call back. Pt has follow up appointment scheduled with SW on 7/11/24.

## 2024-06-29 ENCOUNTER — HOSPITAL ENCOUNTER (EMERGENCY)
Facility: MEDICAL CENTER | Age: 79
End: 2024-06-29
Attending: STUDENT IN AN ORGANIZED HEALTH CARE EDUCATION/TRAINING PROGRAM
Payer: MEDICARE

## 2024-06-29 ENCOUNTER — APPOINTMENT (OUTPATIENT)
Dept: RADIOLOGY | Facility: MEDICAL CENTER | Age: 79
End: 2024-06-29
Attending: STUDENT IN AN ORGANIZED HEALTH CARE EDUCATION/TRAINING PROGRAM
Payer: MEDICARE

## 2024-06-29 VITALS
SYSTOLIC BLOOD PRESSURE: 134 MMHG | DIASTOLIC BLOOD PRESSURE: 63 MMHG | WEIGHT: 212 LBS | TEMPERATURE: 97.9 F | HEIGHT: 67 IN | BODY MASS INDEX: 33.27 KG/M2 | RESPIRATION RATE: 12 BRPM | OXYGEN SATURATION: 90 % | HEART RATE: 90 BPM

## 2024-06-29 DIAGNOSIS — D64.9 ANEMIA, UNSPECIFIED TYPE: ICD-10-CM

## 2024-06-29 DIAGNOSIS — R07.89 CHEST WALL PAIN: ICD-10-CM
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
## 2024-06-29 LAB
ABO + RH BLD: NORMAL
ABO GROUP BLD: NORMAL
ALBUMIN SERPL BCP-MCNC: 3.8 G/DL (ref 3.2–4.9)
ALBUMIN/GLOB SERPL: 1.3 G/DL
ALP SERPL-CCNC: 38 U/L (ref 30–99)
ALT SERPL-CCNC: 28 U/L (ref 2–50)
ANION GAP SERPL CALC-SCNC: 13 MMOL/L (ref 7–16)
AST SERPL-CCNC: 20 U/L (ref 12–45)
BARCODED ABORH UBTYP: 6200
BARCODED PRD CODE UBPRD: NORMAL
BARCODED UNIT NUM UBUNT: NORMAL
BASOPHILS # BLD AUTO: 0.5 % (ref 0–1.8)
BASOPHILS # BLD: 0.05 K/UL (ref 0–0.12)
BILIRUB SERPL-MCNC: 0.4 MG/DL (ref 0.1–1.5)
BLD GP AB SCN SERPL QL: NORMAL
BUN SERPL-MCNC: 48 MG/DL (ref 8–22)
CALCIUM ALBUM COR SERPL-MCNC: 10.4 MG/DL (ref 8.5–10.5)
CALCIUM SERPL-MCNC: 10.2 MG/DL (ref 8.5–10.5)
CHLORIDE SERPL-SCNC: 101 MMOL/L (ref 96–112)
CO2 SERPL-SCNC: 22 MMOL/L (ref 20–33)
COMPONENT R 8504R: NORMAL
CREAT SERPL-MCNC: 1.99 MG/DL (ref 0.5–1.4)
EKG IMPRESSION: NORMAL
EOSINOPHIL # BLD AUTO: 0.54 K/UL (ref 0–0.51)
EOSINOPHIL NFR BLD: 5.9 % (ref 0–6.9)
ERYTHROCYTE [DISTWIDTH] IN BLOOD BY AUTOMATED COUNT: 57.4 FL (ref 35.9–50)
GFR SERPLBLD CREATININE-BSD FMLA CKD-EPI: 25 ML/MIN/1.73 M 2
GLOBULIN SER CALC-MCNC: 3 G/DL (ref 1.9–3.5)
GLUCOSE BLD STRIP.AUTO-MCNC: 147 MG/DL (ref 65–99)
GLUCOSE BLD STRIP.AUTO-MCNC: 80 MG/DL (ref 65–99)
GLUCOSE SERPL-MCNC: 94 MG/DL (ref 65–99)
HCT VFR BLD AUTO: 20.3 % (ref 37–47)
HGB BLD-MCNC: 6.4 G/DL (ref 12–16)
IMM GRANULOCYTES # BLD AUTO: 0.03 K/UL (ref 0–0.11)
IMM GRANULOCYTES NFR BLD AUTO: 0.3 % (ref 0–0.9)
LIPASE SERPL-CCNC: 26 U/L (ref 11–82)
LYMPHOCYTES # BLD AUTO: 0.98 K/UL (ref 1–4.8)
LYMPHOCYTES NFR BLD: 10.6 % (ref 22–41)
MCH RBC QN AUTO: 29.6 PG (ref 27–33)
MCHC RBC AUTO-ENTMCNC: 31.5 G/DL (ref 32.2–35.5)
MCV RBC AUTO: 94 FL (ref 81.4–97.8)
MONOCYTES # BLD AUTO: 0.82 K/UL (ref 0–0.85)
MONOCYTES NFR BLD AUTO: 8.9 % (ref 0–13.4)
NEUTROPHILS # BLD AUTO: 6.8 K/UL (ref 1.82–7.42)
NEUTROPHILS NFR BLD: 73.8 % (ref 44–72)
NRBC # BLD AUTO: 0 K/UL
NRBC BLD-RTO: 0 /100 WBC (ref 0–0.2)
PLATELET # BLD AUTO: 262 K/UL (ref 164–446)
PMV BLD AUTO: 10.8 FL (ref 9–12.9)
POTASSIUM SERPL-SCNC: 4.1 MMOL/L (ref 3.6–5.5)
PRODUCT TYPE UPROD: NORMAL
PROT SERPL-MCNC: 6.8 G/DL (ref 6–8.2)
RBC # BLD AUTO: 2.16 M/UL (ref 4.2–5.4)
RH BLD: NORMAL
SODIUM SERPL-SCNC: 136 MMOL/L (ref 135–145)
UNIT STATUS USTAT: NORMAL
WBC # BLD AUTO: 9.2 K/UL (ref 4.8–10.8)

## 2024-06-29 PROCEDURE — 85025 COMPLETE CBC W/AUTO DIFF WBC: CPT

## 2024-06-29 PROCEDURE — 700111 HCHG RX REV CODE 636 W/ 250 OVERRIDE (IP): Mod: JZ | Performed by: STUDENT IN AN ORGANIZED HEALTH CARE EDUCATION/TRAINING PROGRAM

## 2024-06-29 PROCEDURE — A9270 NON-COVERED ITEM OR SERVICE: HCPCS | Performed by: STUDENT IN AN ORGANIZED HEALTH CARE EDUCATION/TRAINING PROGRAM

## 2024-06-29 PROCEDURE — 36415 COLL VENOUS BLD VENIPUNCTURE: CPT

## 2024-06-29 PROCEDURE — 71260 CT THORAX DX C+: CPT

## 2024-06-29 PROCEDURE — 72125 CT NECK SPINE W/O DYE: CPT

## 2024-06-29 PROCEDURE — 86900 BLOOD TYPING SEROLOGIC ABO: CPT

## 2024-06-29 PROCEDURE — 80053 COMPREHEN METABOLIC PANEL: CPT

## 2024-06-29 PROCEDURE — 99285 EMERGENCY DEPT VISIT HI MDM: CPT

## 2024-06-29 PROCEDURE — 700117 HCHG RX CONTRAST REV CODE 255: Performed by: STUDENT IN AN ORGANIZED HEALTH CARE EDUCATION/TRAINING PROGRAM

## 2024-06-29 PROCEDURE — 83690 ASSAY OF LIPASE: CPT

## 2024-06-29 PROCEDURE — 82962 GLUCOSE BLOOD TEST: CPT

## 2024-06-29 PROCEDURE — P9016 RBC LEUKOCYTES REDUCED: HCPCS

## 2024-06-29 PROCEDURE — 700105 HCHG RX REV CODE 258: Performed by: STUDENT IN AN ORGANIZED HEALTH CARE EDUCATION/TRAINING PROGRAM

## 2024-06-29 PROCEDURE — 86850 RBC ANTIBODY SCREEN: CPT

## 2024-06-29 PROCEDURE — 700102 HCHG RX REV CODE 250 W/ 637 OVERRIDE(OP): Performed by: STUDENT IN AN ORGANIZED HEALTH CARE EDUCATION/TRAINING PROGRAM

## 2024-06-29 PROCEDURE — 86901 BLOOD TYPING SEROLOGIC RH(D): CPT

## 2024-06-29 PROCEDURE — 70450 CT HEAD/BRAIN W/O DYE: CPT

## 2024-06-29 PROCEDURE — 93005 ELECTROCARDIOGRAM TRACING: CPT | Performed by: STUDENT IN AN ORGANIZED HEALTH CARE EDUCATION/TRAINING PROGRAM

## 2024-06-29 PROCEDURE — 86923 COMPATIBILITY TEST ELECTRIC: CPT

## 2024-06-29 PROCEDURE — 36430 TRANSFUSION BLD/BLD COMPNT: CPT

## 2024-06-29 PROCEDURE — 96374 THER/PROPH/DIAG INJ IV PUSH: CPT

## 2024-06-29 RX ORDER — ACETAMINOPHEN 500 MG
1000 TABLET ORAL ONCE
Status: COMPLETED | OUTPATIENT
Start: 2024-06-29 | End: 2024-06-29

## 2024-06-29 RX ORDER — HYDROCODONE BITARTRATE AND ACETAMINOPHEN 5; 325 MG/1; MG/1
1 TABLET ORAL ONCE
Status: COMPLETED | OUTPATIENT
Start: 2024-06-29 | End: 2024-06-29

## 2024-06-29 RX ORDER — SODIUM CHLORIDE 9 MG/ML
INJECTION, SOLUTION INTRAVENOUS CONTINUOUS
Status: DISCONTINUED | OUTPATIENT
Start: 2024-06-29 | End: 2024-06-29 | Stop reason: HOSPADM

## 2024-06-29 RX ADMIN — FENTANYL CITRATE 50 MCG: 50 INJECTION, SOLUTION INTRAMUSCULAR; INTRAVENOUS at 04:02

## 2024-06-29 RX ADMIN — HYDROCODONE BITARTRATE AND ACETAMINOPHEN 1 TABLET: 5; 325 TABLET ORAL at 06:50

## 2024-06-29 RX ADMIN — IOHEXOL 80 ML: 350 INJECTION, SOLUTION INTRAVENOUS at 04:50

## 2024-06-29 RX ADMIN — SODIUM CHLORIDE: 9 INJECTION, SOLUTION INTRAVENOUS at 04:58

## 2024-06-29 RX ADMIN — ACETAMINOPHEN 1000 MG: 500 TABLET, FILM COATED ORAL at 04:02

## 2024-06-29 ASSESSMENT — PAIN DESCRIPTION - PAIN TYPE: TYPE: ACUTE PAIN

## 2024-06-29 ASSESSMENT — FIBROSIS 4 INDEX: FIB4 SCORE: 2.25

## 2024-06-29 NOTE — DISCHARGE PLANNING
TCN following. HTH/SCP chart review completed. Note that pt is in ED after a GLF at [facility]. Note pt was dc'd from [facility] on 6/6 and given current chart review, anticipating pt will dc back to [facility] from ED. Note that PT and OT continued to recommend post acute placement on 6/5 during last admission as well. TCN will monitor and assist with dc planning/transitional care as needed. Placed VOALTE to ED SW as well to discuss current dc plan and SW confirms dc plan as well.     Completed:  As pt in ED/not admitted currently, no choice is needed to return to [facility]

## 2024-06-29 NOTE — ED NOTES
1 unit PRBC infusion started, verified with Latrell BERNSTEIN. Consent form signed by pt prior to start of infusion

## 2024-06-29 NOTE — ED NOTES
This RN at bedside for first 15 mins of PRBC infusion. VSS. NAD noted. Pt denies any adverse effects to the blood transfusion.

## 2024-06-29 NOTE — ED TRIAGE NOTES
"Chief Complaint   Patient presents with    Fall     Pt reports fall from commode today at 0100. Unknown LOC, head strike, +thinners.     Chest Wall Pain     Onset after fall      BIBA from [facility] [place] for above cc. Pt reports she was up using commode this AM and fell face forward hitting her chest. Pt states she is unsure if she hit her head or lost consciousness. Pt takes eliquis. PMH a. Fib     Medications PTA: none     BP (!) 146/66   Pulse 83   Temp 36.8 °C (98.3 °F) (Temporal)   Resp 15   Ht 1.702 m (5' 7\")   Wt 96.2 kg (212 lb)   SpO2 98%   BMI 33.20 kg/m²       "

## 2024-06-29 NOTE — ED NOTES
Bedside report received from off going R: Onelia, assumed care of patient.  POC discussed with patient. Call light within reach, all needs addressed at this time.       Fall risk interventions in place: Patient's personal possessions are with in their safe reach, Place socks on patient, Place fall risk sign on patient's door, and Give patient urinal if applicable (all applicable per [facility] Fall risk assessment)   Continuous monitoring: Cardiac Leads, Pulse Ox, or Blood Pressure  IVF/IV medications: Not Applicable   Oxygen: Room Air  Bedside sitter: Not Applicable   Isolation: Not Applicable

## 2024-06-29 NOTE — ED NOTES
Pt states she is exhausted and tired form the evening. She also reports sensitivity to pain medications and references her last dose given. States she is ready to sleep.

## 2024-06-29 NOTE — DISCHARGE INSTRUCTIONS
You are seen in the emergency room for chest pain after you fell.  Please see the CT scan results below but there is no evidence of rib fracture.  You are found to have a low hemoglobin at 6.4 and were given 1 unit of blood. Please follow up with your primary care doctor regarding the CT scan results below and to have repeat testing to ensure that your blood work has remained stable.     Please discuss the following findings with your regular doctor:  CT-CHEST (THORAX) WITH   Final Result         1.  Scattered patchy bilateral groundglass pulmonary opacities, consider atypical infiltrates and/or edema.   2.  Low-density lesion in the pancreatic body, could represent pancreatic cyst, or pancreatic mass not excluded. Follow-up MRI of the pancreas with contrast for further characterization.   3.  Mediastinal and bilateral hilar adenopathy, workup and evaluation for causes of adenopathy recommended as clinically appropriate.   4.  Changes of cirrhosis.   5.  Hepatic and splenic granulomatous changes.   6.  Dependent sludge in the gallbladder.      CT-CSPINE WITHOUT PLUS RECONS   Final Result         1.  Multilevel degenerative changes of the cervical spine limit diagnostic sensitivity of this examination, otherwise no acute traumatic bony injury of the cervical spine is apparent.   2.  Enlarged heterogeneous thyroid, greater on left, appearance suggesting goiter. Follow-up evaluation with thyroid ultrasound for further characterization.   3.  Patchy bilateral upper lobe infiltrates   4.  Atherosclerosis      CT-HEAD W/O   Final Result         1.  No acute intracranial abnormality is identified, there are nonspecific white matter changes, commonly associated with small vessel ischemic disease.  Associated mild cerebral atrophy is noted.   2.  Atherosclerosis.                 Labs Reviewed   CBC WITH DIFFERENTIAL - Abnormal; Notable for the following components:       Result Value    RBC 2.16 (*)     Hemoglobin 6.4 (*)      Hematocrit 20.3 (*)     MCHC 31.5 (*)     RDW 57.4 (*)     Neutrophils-Polys 73.80 (*)     Lymphocytes 10.60 (*)     Lymphs (Absolute) 0.98 (*)     Eos (Absolute) 0.54 (*)     All other components within normal limits   COMP METABOLIC PANEL - Abnormal; Notable for the following components:    Bun 48 (*)     Creatinine 1.99 (*)     All other components within normal limits   ESTIMATED GFR - Abnormal; Notable for the following components:    GFR (CKD-EPI) 25 (*)     All other components within normal limits   LIPASE   COD (ADULT)   ABO RH CONFIRM   RELEASE RED BLOOD CELLS   POCT GLUCOSE DEVICE RESULTS   TRANSFUSE RED BLOOD CELLS-NURSING COMMUNICATION (UNITS)

## 2024-06-29 NOTE — ED PROVIDER NOTES
ED Provider Note    CHIEF COMPLAINT  Chief Complaint   Patient presents with    Fall     Pt reports fall from commode today at 0100. Unknown LOC, head strike, +thinners.     Chest Wall Pain     Onset after fall        EXTERNAL RECORDS REVIEWED  Inpatient Notes patient was admitted to the hospital May 28, 2024 to June 6, 2024 for evaluation of wound to posterior heel on the right side.  She was discharged to SNF with wound VAC in place    HPI/ROS  LIMITATION TO HISTORY   Select: : None  OUTSIDE HISTORIAN(S):  None    Ophelia Sosa is a 78 y.o. female who presents for evaluation of anterior chest wall pain after she fell from the commode.  She states that the SNF wanted her to become more independent therefore she independently got herself out of bed to the commode.  There was another commode next to the commode that she decided that she wanted to use therefore when she was transferring she fell forward onto her chest.  She is now having pain to the sternum.  States that she is feeling well before the fall.  She thinks that she hit her head but does not think that she passed out.  She states that all happened so fast.  She is on Eliquis.  She denies any neck pain or back pain.  She states that before the fall she was feeling well and had no chest pain or difficulty breathing.    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    SURGICAL HISTORY   has a past surgical history that includes other orthopedic surgery (Right); gyn surgery (hysterectomy); mitral valve  replace; tonsillectomy (Bilateral); breast biopsy; irrigation & debridement general (Right, 2024); and application or replacement, wound vac (Right, 2024).    FAMILY HISTORY  Family History   Problem Relation Age of Onset    Hypertension Mother     Heart Disease Mother     Diabetes Mother     Diabetes Father     Hypertension Father     Diabetes Maternal Grandmother     Diabetes Paternal Grandfather        SOCIAL HISTORY  Social History     Tobacco Use    Smoking status: Former     Current packs/day: 0.00     Types: Cigarettes     Quit date: 2011     Years since quittin.6    Smokeless tobacco: Never   Vaping Use    Vaping status: Never Used   Substance and Sexual Activity    Alcohol use: Not Currently     Comment: denies    Drug use: Yes     Comment: CBD    Sexual activity: Not Currently       CURRENT MEDICATIONS  Home Medications       Reviewed by Onelia Man R.N. (Registered Nurse) on 24 at 0325  Med List Status: Partial     Medication Last Dose Status   Acetaminophen Extra Strength 500 MG Cap  Active   albuterol 108 (90 Base) MCG/ACT Aero Soln inhalation aerosol  Active   B Complex Vitamins (B COMPLEX PO)  Active   clobetasol (TEMOVATE) 0.05 % Cream  Active   diphenhydrAMINE HCl (BENADRYL ALLERGY PO)  Active   ELIQUIS 5 MG Tab  Active   fluticasone (FLONASE) 50 MCG/ACT nasal spray  Active   furosemide (LASIX) 40 MG Tab  Active   insulin NPH (HUMULIN/NOVOLIN) 100 UNIT/ML Suspension  Active   insulin regular (HUMULIN R) 100 Unit/mL Solution  Active   KLOR-CON 8 MEQ tablet  Active   losartan (COZAAR) 25 MG Tab  Active   metFORMIN ER (GLUCOPHAGE XR) 500 MG TABLET SR 24 HR  Active   Non Formulary Request  Active   non-formulary med  Active   Psyllium (METAMUCIL MULTIHEALTH FIBER PO)  Active   rosuvastatin (CRESTOR) 5 MG Tab  Active   VITAMIN D, CHOLECALCIFEROL, PO  Active                    ALLERGIES  Allergies   Allergen Reactions    Ativan      DELIRIUM, CONFUSION.    Advair  "[Fluticasone-Salmeterol]     Ambien [Zolpidem Tartrate]     Erythromycin Vomiting    Ibuprofen     Lipitor [Atorvastatin Calcium]     Pcn [Penicillins] Hives     Tolerated Unasyn and Ancef 5/2024    Pravachol [Pravastatin Sodium]      ADVERSE REACTION.       PHYSICAL EXAM  VITAL SIGNS: BP (!) 146/66   Pulse 83   Temp 36.8 °C (98.3 °F) (Temporal)   Resp 15   Ht 1.702 m (5' 7\")   Wt 96.2 kg (212 lb)   SpO2 98%   BMI 33.20 kg/m²      Constitutional: Well developed, Well nourished, No acute distress, Non-toxic appearance.   HEENT: Normocephalic, Atraumatic,  external ears normal, pharynx pink,  Mucous  Membranes moist, No rhinorrhea or mucosal edema  Eyes: PERRL, EOMI, Conjunctiva normal, No discharge.   Neck: Normal range of motion, No tenderness, Supple, No stridor.   Cardiovascular: Regular Rate and Rhythm, No murmurs,  rubs, or gallops.   Thorax & Lungs: Lungs clear to auscultation bilaterally, No respiratory distress, No wheezes, rhales or rhonchi, tenderness to the right mid sternum, no bruising  Abdomen:  Soft, non tender, non distended,  No pulsatile masses., no rebound guarding or peritoneal signs.   Skin: Warm, Dry, No erythema, No rash,   Back:  No CVA tenderness,  No spinal tenderness, bony crepitance step offs or instability.   Extremities: Equal, intact distal pulses, No cyanosis, clubbing or edema,  No tenderness.   Musculoskeletal: Good range of motion in all major joints. No tenderness to palpation or major deformities noted.   Neurologic: Alert & oriented No focal deficits noted.  Psychiatric: Affect normal, Judgment normal, Mood normal.      EKG/LABS  Results for orders placed or performed during the hospital encounter of 06/29/24   CBC WITH DIFFERENTIAL   Result Value Ref Range    WBC 9.2 4.8 - 10.8 K/uL    RBC 2.16 (L) 4.20 - 5.40 M/uL    Hemoglobin 6.4 (L) 12.0 - 16.0 g/dL    Hematocrit 20.3 (L) 37.0 - 47.0 %    MCV 94.0 81.4 - 97.8 fL    MCH 29.6 27.0 - 33.0 pg    MCHC 31.5 (L) 32.2 - 35.5 " g/dL    RDW 57.4 (H) 35.9 - 50.0 fL    Platelet Count 262 164 - 446 K/uL    MPV 10.8 9.0 - 12.9 fL    Neutrophils-Polys 73.80 (H) 44.00 - 72.00 %    Lymphocytes 10.60 (L) 22.00 - 41.00 %    Monocytes 8.90 0.00 - 13.40 %    Eosinophils 5.90 0.00 - 6.90 %    Basophils 0.50 0.00 - 1.80 %    Immature Granulocytes 0.30 0.00 - 0.90 %    Nucleated RBC 0.00 0.00 - 0.20 /100 WBC    Neutrophils (Absolute) 6.80 1.82 - 7.42 K/uL    Lymphs (Absolute) 0.98 (L) 1.00 - 4.80 K/uL    Monos (Absolute) 0.82 0.00 - 0.85 K/uL    Eos (Absolute) 0.54 (H) 0.00 - 0.51 K/uL    Baso (Absolute) 0.05 0.00 - 0.12 K/uL    Immature Granulocytes (abs) 0.03 0.00 - 0.11 K/uL    NRBC (Absolute) 0.00 K/uL   COMP METABOLIC PANEL   Result Value Ref Range    Sodium 136 135 - 145 mmol/L    Potassium 4.1 3.6 - 5.5 mmol/L    Chloride 101 96 - 112 mmol/L    Co2 22 20 - 33 mmol/L    Anion Gap 13.0 7.0 - 16.0    Glucose 94 65 - 99 mg/dL    Bun 48 (H) 8 - 22 mg/dL    Creatinine 1.99 (H) 0.50 - 1.40 mg/dL    Calcium 10.2 8.5 - 10.5 mg/dL    Correct Calcium 10.4 8.5 - 10.5 mg/dL    AST(SGOT) 20 12 - 45 U/L    ALT(SGPT) 28 2 - 50 U/L    Alkaline Phosphatase 38 30 - 99 U/L    Total Bilirubin 0.4 0.1 - 1.5 mg/dL    Albumin 3.8 3.2 - 4.9 g/dL    Total Protein 6.8 6.0 - 8.2 g/dL    Globulin 3.0 1.9 - 3.5 g/dL    A-G Ratio 1.3 g/dL   LIPASE   Result Value Ref Range    Lipase 26 11 - 82 U/L   COD - Adult (Type and Screen)   Result Value Ref Range    ABO Grouping Only A     Rh Grouping Only POS     Antibody Screen-Cod NEG     Component R       R99                 Red Cells, LR       [id number]   issued       06/29/24   06:20      Product Type R99     Dispense Status issued     Unit Number (Barcoded) [id number]     Product Code (Barcoded) A9793X11     Blood Type (Barcoded) 6200    ABO Rh Confirm   Result Value Ref Range    ABO Rh Confirm A POS    ESTIMATED GFR   Result Value Ref Range    GFR (CKD-EPI) 25 (A) >60 mL/min/1.73 m 2   EKG   Result Value Ref Range    Report        [facility] Emergency Dept.    Test Date:  2024  Pt Name:    PAYAL BAUER                Department: ER  MRN:        [id number]                      Room:       BL 15  Gender:     Female                       Technician: [id number]  :        [id number]                   Requested By:ER TRIAGE PROTOCOL  Order #:    [id number]                    Reading MD: Kell Ashley    Measurements  Intervals                                Axis  Rate:       88                           P:          0  AZ:         0                            QRS:        -79  QRSD:       154                          T:          92  QT:         442  QTc:        535    Interpretive Statements  Ventricular paced complexes  Electronically Signed On 2024 07:53:41 PDT by Kell Ashley     POCT glucose device results   Result Value Ref Range    POC Glucose, Blood 80 65 - 99 mg/dL       I have independently interpreted this EKG    RADIOLOGY/PROCEDURES   I have independently interpreted the diagnostic imaging associated with this visit and am waiting the final reading from the radiologist.   My preliminary interpretation is as follows: no ICH    Radiologist interpretation:  CT-CHEST (THORAX) WITH   Final Result         1.  Scattered patchy bilateral groundglass pulmonary opacities, consider atypical infiltrates and/or edema.   2.  Low-density lesion in the pancreatic body, could represent pancreatic cyst, or pancreatic mass not excluded. Follow-up MRI of the pancreas with contrast for further characterization.   3.  Mediastinal and bilateral hilar adenopathy, workup and evaluation for causes of adenopathy recommended as clinically appropriate.   4.  Changes of cirrhosis.   5.  Hepatic and splenic granulomatous changes.   6.  Dependent sludge in the gallbladder.      CT-CSPINE WITHOUT PLUS RECONS   Final Result         1.  Multilevel degenerative changes of the cervical spine limit diagnostic sensitivity of this  examination, otherwise no acute traumatic bony injury of the cervical spine is apparent.   2.  Enlarged heterogeneous thyroid, greater on left, appearance suggesting goiter. Follow-up evaluation with thyroid ultrasound for further characterization.   3.  Patchy bilateral upper lobe infiltrates   4.  Atherosclerosis      CT-HEAD W/O   Final Result         1.  No acute intracranial abnormality is identified, there are nonspecific white matter changes, commonly associated with small vessel ischemic disease.  Associated mild cerebral atrophy is noted.   2.  Atherosclerosis.                   COURSE & MEDICAL DECISION MAKING    ASSESSMENT, COURSE AND PLAN  Care Narrative:   This is a 78-year-old female who presents from skilled nursing facility where she is currently there for wound to her posterior heel who presents with anterior chest wall pain which started after she fell from a bedside commode while trying to transfer between the commodes.  She notes that she was feeling well prior to the fall without any chest pain or difficulty breathing.  On arrival here, she is GCS 15.  She does have anterior chest wall tenderness to palpation and a pinpoint location where she states it hurts since she fell.  I do not think that this is representative of ACS given the pain started after a fall and she does have point tenderness.  Troponins were not obtained.  Patient states that this was purely mechanical therefore I do not think that it is syncopal in nature.  She notes that she is trying to be more independent at the SNF and was independently transferring between commode's.    Labs were obtained and she has no leukocytosis.  Hemoglobin is low at 6.4.  This appears to be trending downwards over the past several weeks.  I do think that it is likely from her recent surgery and that she has a wound VAC in place and may have some micro bleeding from the wound.  Patient was counseled on risk, benefits, alternatives of blood  transfusion and ultimately gave consent for blood transfusion.  She denies any blood in her stool or any other bleeding.  Her kidney function is about at baseline with creatinine 1.99.  She does not have a syncopal episode today.  CT scan of the head and neck were obtained to evaluate for potential intracranial injury or C-spine fracture as I cannot rule her out with Nexus criteria given her age.  These are thankfully negative.  CT of the chest was obtained to evaluate for fracture, pneumothorax, hemothorax but there is no evidence of acute traumatic injury.    Patient will be given 1 unit of packed red blood cells.  I do think that she will be stable for discharge following this given this does appear to be a chronic anemia.  She has no acute bleeding.  She can follow-up with her primary care doctor following this to ensure improvement in her blood counts and for incidental findings seen on CT scan of the chest.  She was provided copy of results with incidental findings patient is comfortable with discharge back to SNF after blood transfusion.  Her care was signed out to my partner, Dr. Torres, to ensure that she tolerates the blood transfusion well.  Anticipate her to be discharged after blood finishes transfusing.    ED OBS: Yes; I am placing the patient in to an observation status due to a diagnostic uncertainty as well as therapeutic intensity. Patient placed in observation status at 03:27 AM, 6/29/2024.     Observation plan is as follows: blood transfusion completion  .           ADDITIONAL PROBLEMS MANAGED  Anterior chest wall pain s/p fall -no signs of acute traumatic injury seen on CT scan of chest  Anemia - chronic issue, was given 1 unit pRBC    DISPOSITION AND DISCUSSIONS  I have discussed management of the patient with the following physicians and EARLINE's:    Oncoming ERP - Dr. Torres    Discussion of management with other [facility] or appropriate source(s): None     Escalation of care considered, and  ultimately not performed:acute inpatient care management, however at this time, the patient is most appropriate for outpatient management    Barriers to care at this time, including but not limited to:  None .     Decision tools and prescription drugs considered including, but not limited to:  None .      FINAL DIAGNOSIS  1. Chest wall pain    2. Fall, initial encounter    3. Anemia, unspecified type           Electronically signed by: Kell Ashley M.D., 6/29/2024 3:27 AM

## 2024-06-29 NOTE — ED NOTES
GMT at bedside for transfer. Paper work provided to [facility].     Discharge instructions reviewed with patient/ family. Patient verbalizes understanding of follow up care, medication management, and reasons to return to ER. PIV removed with no complications. Pt wheeled out of department.

## 2024-06-29 NOTE — ED NOTES
Patient assisted onto bedside commode with help from EMT Student [place]. Call light placed in direct reach of patient and patient instructed to use the call light to call for assistance when she is ready to transfer back into [facility].

## 2024-06-29 NOTE — DISCHARGE PLANNING
MSW received notification from bedside RN. Pt needs to return to [facility]. Report already given. MSW arranged [facility] transport for pt for [id number]. Res #[id number] Cost 147.55. COBRA and packet on chart. Bedside RN updated.

## 2024-07-02 ENCOUNTER — APPOINTMENT (OUTPATIENT)
Dept: RADIOLOGY | Facility: MEDICAL CENTER | Age: 79
End: 2024-07-02
Attending: STUDENT IN AN ORGANIZED HEALTH CARE EDUCATION/TRAINING PROGRAM
Payer: MEDICARE

## 2024-07-11 PROBLEM — R53.1 WEAKNESS: Status: ACTIVE | Noted: 2024-01-01

## 2024-07-11 PROBLEM — R41.82 ALTERED MENTAL STATUS, UNSPECIFIED: Status: ACTIVE | Noted: 2024-01-01

## 2024-07-11 PROBLEM — R41.82 ALTERED MENTAL STATUS: Status: ACTIVE | Noted: 2024-01-01

## 2024-07-11 PROBLEM — G93.41 ACUTE METABOLIC ENCEPHALOPATHY: Status: ACTIVE | Noted: 2024-01-01

## 2024-07-11 NOTE — ED TRIAGE NOTES
.  Chief Complaint   Patient presents with    Weakness     Pt BIBA from cardiology for pt being weak. Per pt they have been week for about 1 month. Pt being seen by wound care for wound to right heel as well as DTI on sacrum      ./57   Pulse 86   Temp 36.5 °C (97.7 °F) (Temporal)   Resp 18   SpO2 100%     Pt A&O x4 wears 2 liters oxygen at baseline

## 2024-07-11 NOTE — ED PROVIDER NOTES
ED Provider Note    CHIEF COMPLAINT  Chief Complaint   Patient presents with    Weakness     Pt BIBA from cardiology for pt being weak. Per pt they have been week for about 1 month. Pt being seen by wound care for wound to right heel as well as DTI on sacrum        EXTERNAL RECORDS REVIEWED  Outpatient Notes cardiology clinic note from earlier today patient with previous history of coronary artery disease and paroxysmal A-fib.  At the cardiologist today was very weak shaky and was altered.    HPI/ROS  LIMITATION TO HISTORY   Select: Altered mental status / Confusion  OUTSIDE HISTORIAN(S):      Ophelia Sosa is a 78 y.o. female who presents to the emergency department with chief complaint of weakness.  Patient reports that she is felt very weak since approximately 1 month prior.  She has been managed by wound care for chronic nonhealing wound on the posterior aspect of her right heel.  She also has a history of diabetes has a history of paroxysmal A-fib.  States that she has been compliant with her medications.  Reports that she may have bumped the left side of her head in the last couple days.  Patient also reports minor nausea.  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    SURGICAL HISTORY   has a past surgical history that includes other orthopedic surgery (Right); gyn surgery (hysterectomy); mitral valve replace; tonsillectomy (Bilateral); breast biopsy; irrigation & debridement  "general (Right, 2024); and application or replacement, wound vac (Right, 2024).    FAMILY HISTORY  Family History   Problem Relation Age of Onset    Hypertension Mother     Heart Disease Mother     Diabetes Mother     Diabetes Father     Hypertension Father     Diabetes Maternal Grandmother     Diabetes Paternal Grandfather        SOCIAL HISTORY  Social History     Tobacco Use    Smoking status: Former     Current packs/day: 0.00     Types: Cigarettes     Quit date: 2011     Years since quittin.6    Smokeless tobacco: Never   Vaping Use    Vaping status: Never Used   Substance and Sexual Activity    Alcohol use: Not Currently     Comment: denies    Drug use: Yes     Comment: CBD    Sexual activity: Not Currently       CURRENT MEDICATIONS  Home Medications    **Home medications have not yet been reviewed for this encounter**         ALLERGIES  Allergies   Allergen Reactions    Ativan      DELIRIUM, CONFUSION.    Advair [Fluticasone-Salmeterol]     Ambien [Zolpidem Tartrate]     Erythromycin Vomiting    Ibuprofen     Lipitor [Atorvastatin Calcium]     Pcn [Penicillins] Hives     Tolerated Unasyn and Ancef 2024    Pravachol [Pravastatin Sodium]      ADVERSE REACTION.       PHYSICAL EXAM  VITAL SIGNS: /57   Pulse 99   Temp 36.5 °C (97.7 °F) (Temporal)   Resp 16   Ht 1.702 m (5' 7\")   Wt 96.2 kg (212 lb)   SpO2 96%   BMI 33.20 kg/m²    Pulse ox interpretation: Borderline hypoxic on room air, saturating mid 90s on 2 L by nasal cannula  Constitutional: Alert and oriented x 2 moderate confusion  HEENT: Atraumatic normocephalic, pupils are equal round reactive to light extraocular movements are intact. The nares is clear, external ears are normal, mouth shows moist mucous membranes normal dentition for age  Neck: Supple, no JVD no tracheal deviation  Cardiovascular: Irregular tachycardia no murmur rub or gallop 2+ pulses peripherally x4  Thorax & Lungs: No respiratory distress, no wheezes " rales or rhonchi, No chest tenderness.   GI: Soft nontender nondistended positive bowel sounds, no peritoneal signs  Skin: Patient has ulceration to the posterior heel on the right foot.  She has good granulation tissue within the wound there is no surrounding erythema induration warmth or drainage.  Musculoskeletal: Moving all extremities with full range and 5 of 5 strength no acute  deformity  Neurologic: Cranial nerves III through XII are grossly intact no sensory deficit no cerebellar dysfunction   Psychiatric: Confused and intermittently agitated       EKG/LABS    Results for orders placed or performed during the hospital encounter of 07/11/24   Lactic Acid   Result Value Ref Range    Lactic Acid 1.7 0.5 - 2.0 mmol/L   CBC with Differential   Result Value Ref Range    WBC 5.4 4.8 - 10.8 K/uL    RBC 2.68 (L) 4.20 - 5.40 M/uL    Hemoglobin 8.1 (L) 12.0 - 16.0 g/dL    Hematocrit 25.6 (L) 37.0 - 47.0 %    MCV 95.5 81.4 - 97.8 fL    MCH 30.2 27.0 - 33.0 pg    MCHC 31.6 (L) 32.2 - 35.5 g/dL    RDW 60.4 (H) 35.9 - 50.0 fL    Platelet Count 170 164 - 446 K/uL    MPV 10.3 9.0 - 12.9 fL    Neutrophils-Polys 71.40 44.00 - 72.00 %    Lymphocytes 16.20 (L) 22.00 - 41.00 %    Monocytes 6.80 0.00 - 13.40 %    Eosinophils 4.60 0.00 - 6.90 %    Basophils 0.60 0.00 - 1.80 %    Immature Granulocytes 0.40 0.00 - 0.90 %    Nucleated RBC 0.00 0.00 - 0.20 /100 WBC    Neutrophils (Absolute) 3.88 1.82 - 7.42 K/uL    Lymphs (Absolute) 0.88 (L) 1.00 - 4.80 K/uL    Monos (Absolute) 0.37 0.00 - 0.85 K/uL    Eos (Absolute) 0.25 0.00 - 0.51 K/uL    Baso (Absolute) 0.03 0.00 - 0.12 K/uL    Immature Granulocytes (abs) 0.02 0.00 - 0.11 K/uL    NRBC (Absolute) 0.00 K/uL   Complete Metabolic Panel   Result Value Ref Range    Sodium 136 135 - 145 mmol/L    Potassium 4.6 3.6 - 5.5 mmol/L    Chloride 100 96 - 112 mmol/L    Co2 21 20 - 33 mmol/L    Anion Gap 15.0 7.0 - 16.0    Glucose 294 (H) 65 - 99 mg/dL    Bun 45 (H) 8 - 22 mg/dL    Creatinine  1.47 (H) 0.50 - 1.40 mg/dL    Calcium 10.6 (H) 8.5 - 10.5 mg/dL    Correct Calcium 10.7 (H) 8.5 - 10.5 mg/dL    AST(SGOT) 20 12 - 45 U/L    ALT(SGPT) 24 2 - 50 U/L    Alkaline Phosphatase 61 30 - 99 U/L    Total Bilirubin 0.4 0.1 - 1.5 mg/dL    Albumin 3.9 3.2 - 4.9 g/dL    Total Protein 6.6 6.0 - 8.2 g/dL    Globulin 2.7 1.9 - 3.5 g/dL    A-G Ratio 1.4 g/dL   Urinalysis    Specimen: Urine   Result Value Ref Range    Color Yellow     Character Clear     Specific Gravity 1.017 <1.035    Ph 5.5 5.0 - 8.0    Glucose Negative Negative mg/dL    Ketones Trace (A) Negative mg/dL    Protein Negative Negative mg/dL    Bilirubin Negative Negative    Urobilinogen, Urine 0.2 Negative    Nitrite Negative Negative    Leukocyte Esterase Small (A) Negative    Occult Blood Negative Negative    Micro Urine Req Microscopic    TROPONIN   Result Value Ref Range    Troponin T 72 (H) 6 - 19 ng/L   ESTIMATED GFR   Result Value Ref Range    GFR (CKD-EPI) 36 (A) >60 mL/min/1.73 m 2   URINE MICROSCOPIC (W/UA)   Result Value Ref Range    WBC 5-10 (A) /hpf    RBC 0-2 /hpf    Bacteria Negative None /hpf    Epithelial Cells Few /hpf    Hyaline Cast 0-2 /lpf       I have independently interpreted this EKG    RADIOLOGY/PROCEDURES     Radiologist interpretation:  DX-CHEST-PORTABLE (1 VIEW)   Final Result      Nonspecific mild asymmetric interstitial prominence in the left lung base could be atelectasis versus mild asymmetric edema or less likely infection.          COURSE & MEDICAL DECISION MAKING    ASSESSMENT, COURSE AND PLAN  Care Narrative: Patient is altered here she is also found to be tachycardic she has A-fib.  She has moderate dehydration with acute kidney injury/renal insufficiency.  She will require further evaluation and treatment.  She has some asymmetric interstitial prominence on the left lung base but otherwise no sign of any acute infectious process at this time no white count no fever her urine is negative.  I discussed case up to  this point with the internal medicine resident housestaff and the patient is admitted to their care for further evaluation and treatment admitted in guarded condition.      FINAL DIAGNOSIS  1. Weakness Active   2. Dehydration Active   3. Hyperglycemia Active   4.  Altered mental status  5.  Chronic wounds       Electronically signed by: Mike Massey M.D.

## 2024-07-12 PROBLEM — R41.82 ALTERED MENTAL STATUS: Status: RESOLVED | Noted: 2024-01-01 | Resolved: 2024-01-01

## 2024-07-12 PROBLEM — I48.91 ATRIAL FIBRILLATION (HCC): Status: ACTIVE | Noted: 2021-10-20

## 2024-07-12 PROBLEM — T14.8XXA CHRONIC WOUND: Status: ACTIVE | Noted: 2024-01-01

## 2024-07-12 NOTE — DIETARY
"Nutrition services: Day 1 of admit.  Ophelia Sosa is a 78 y.o. female with admitting DX of Altered mental status, Acute metabolic encephalopathy   Consult received for malnutrition screening tool score of 2 for unsure wt loss.     Visited pt and pt's POA at bedside. Pt said that she \"tried to eat breakfast but that it all didn't work.\" RD mostly talked w/ POA. She said that at [facility], the pt did well w/ Glucerna drinks. She mentioned that the pt was refusing to eat at [facility] d/t wanting to cut life short and not liking the food. POA mentioned that she likes eggs and vegetables and prefers her foods to be cut up. Per pt, her UBW is 197lbs. POA mentioned that she lost a lot of weight at [facility] d/t not eating the food. She also mentioned that when the pt first went to [facility], the pt was over 200lbs.     Per visual assessment, pt likely w/ mild fat loss in buccal fat area.     Assessment:  Height: 170.2 cm (5' 7.01\")  Weight: 91 kg (200 lb 9.9 oz)  Body mass index is 31.41 kg/m²., BMI classification: Obese Class I  Diet/Intake: Level 6 soft/bite-sized w/Level 0 - thin liquids; Per ADLs, breakfast was 0% this am.    Evaluation:   Per  note, POA wishes for pt to be d/x to group home on hospice.   Labs: BUN 38, GFR 46  Meds: Lipitor, SSI, Lantus, D50, oxycodone PRN,   Skin: Diabetic Ulcer to R heel. No edema documented  +BM: PTA  Pre chart review, pt has lot 5.66% wt x 1.5 months. However, note that pt had generalized bilateral lower edema on 5/28  Wt Readings from Last Encounters:   07/11/24 91 kg (200 lb 9.9 oz)   05/28/24 96.3 kg (212 lb 4.9 oz)   05/13/24 93.4 kg (205 lb 14.6 oz)     Malnutrition Risk: Unable to determine at this time d/t unable to quantify how poorly pt has been eating as it doesn't seem that she has lost muscle/fat as her higher weight on 5/28 was not dry weight.    Recommendations/Plan:  Order Glucerna TID per pt preference.   Encourage intake of >50% of meals  Document " intake of all meals as % taken in ADL's to provide interdisciplinary communication across all shifts.   Monitor weight.  Nutrition rep will continue to see patient for ongoing meal and snack preferences.     RD following.

## 2024-07-12 NOTE — PROGRESS NOTES
Patient's hemoglobin noted to be dropping from 7.0 from 8.1. Referred to Dr Latisha Acevedo , ordered repeat hgb and will transfuse if still low . Continued to monitor .

## 2024-07-12 NOTE — ASSESSMENT & PLAN NOTE
Differentials include acute toxic metabolic encephalopathy, hyperglycemia,  -After receiving initial treatment in the ED, patient's mental status seems to be improving

## 2024-07-12 NOTE — DISCHARGE PLANNING
Case Management Discharge Planning    RN CM received call from Hannah (p) [phone] at [facility]. Per Hannah, prior to admission patient was a self-pay long term resident at [facility]. Hannah had already been working with patients LEANNE Smith to arrange hospice services. However, patient is not on service with them yet.       0966- Spoke with Iesha Nguyen  had picked out [facility] [address] [phone] Owner:Juan Daniel.     Iesha wishes for patient to be discharged to the group home on hospice.     Patrick also advised that patient likely will not be able to return to [facility] as she is out of skilled days.       3448- Spoke to Juan Daniel at the group home. He does have availability. Ask RN CM to reach out to his Laura (p) [phone] She confirmed she will need a Quant, H&P, negitive COVID, list of meds.   Will notify MD to order. Laura faxed over the physician placement packet that will also need to be completed.

## 2024-07-12 NOTE — ED NOTES
Unable to obtain med rec at this time    Requested MAR from [facility] or [facility] at 2015

## 2024-07-12 NOTE — RESPIRATORY CARE
"COPD EDUCATION by COPD CLINICAL EDUCATOR  7/12/2024 at 11:23 AM by Elke Kim RRT     Patient interviewed by COPD education team. Patient not appropriate for the COPD program as she is pending being discharged to a group home with Hospice services. An Action Plan was updated in the EMR to reflect current and continued Respiratory Medication use.                     COPD Screen  COPD Risk Screening  Do you have a history of COPD?: Yes  Do you have a Pulmonologist?:  (unknown)    COPD Assessment  COPD Clinical Specialists ONLY  COPD Education Initiated: Yes--Short Intervention (Pt difficult to engage in education, more concerned about her glasses. [facility] mental accuity, pt pending placement to group home w/ Hospice.)  Is this a COPD exacerbation patient?: No  DME Company: none  DME Equipment Type: none  Physician Name: Emmanuelle Gan M.D.  Pulmonologist Name: none on file, previously seen by [facility] from previous conversation  Advance Directive: Yes  Referrals Initiated:  (pending hospice per CM)  (OP) Pulmonary Function Testing:  (none on file)  Interdisciplinary Rounds: Attendance at Rounds (30 Min)    PFT Results    No results found for: \"PFT\"    Meds to Beds  [facility] provides bedside medication delivery for all eligible patients at discharge.  Would you like to opt out of this program for any reason?: No - Stay Opted In     MY COPD ACTION PLAN     It is recommended that patients and physicians /healthcare providers complete this action plan together. This plan should be discussed at each physician visit and updated as needed.    The green, yellow and red zones show groups of symptoms of COPD. This list of symptoms is not comprehensive, and you may experience other symptoms. In the \"Actions\" column, your healthcare provider has recommended actions for you to take based on your symptoms.    Patient Name: Ophelia Sosa   YOB: 1945   Last Updated on:     Green Zone:  I am doing " "well today Actions     Usual activitiy and exercise level   Take daily medications     Usual amounts of cough and phlegm/mucus   Use oxygen as prescribed     Sleep well at night   Continue regular exercise/diet plan     Appetite is good   At all times avoid cigarette smoke, inhaled irritants     Daily Medications (these medications are taken every day):                Yellow Zone:  I am having a bad day or a COPD flare Actions     More breathless than usual   Continue daily medications     I have less energy for my daily activities   Use quick relief inhaler as ordered     Increased or thicker phlegm/mucus   Use oxygen as prescribed     Using quick relief inhaler/nebulizer more often   Get plenty of rest     Swelling of ankles more than usual   Use pursed lip breathing     More coughing than usual   At all times avoid cigarette smoke, inhaled irritants     I feel like I have a \"chest cold\"     Poor sleep and my symptoms woke me up     My appetite is not good     My medicine is not helping      Call provider immediately if symptoms don’t improve     Continue daily medications, add rescue medications:   Albuterol 2 Puffs Every 6 hours PRN       Medications to be used during a flare up, (as Discussed with Provider):           Additional Information:  This medication can alleviate difficulty breathing and is therefore considered a COMFORT medication. Please continue to provide if indicated for comfort from difficulty breathing. A spacer may be used to administer if she is unable to self administer.     Red Zone:  I need urgent medical care Actions     Severe shortness of breath even at rest   Call 911 or seek medical care immediately     Not able to do any activity because of breathing      Fever or shaking chills      Feeling confused or very drowsy       Chest pains      Coughing up blood                  "

## 2024-07-12 NOTE — H&P
"Acute R Internal Medicine History & Physical Note    Date of Service  7/12/2024    [facility] Team:    Attending: Justin Acevedo M.d.  Senior Resident: Dr. Sheth  Contact Number: [phone]    Primary Care Physician  Emmanuelle Gan M.D.    Consultants    Specialist Names:     Code Status  DNAR/DNI    Chief Complaint  Chief Complaint   Patient presents with    Weakness     Pt BIBA from cardiology for pt being weak. Per pt they have been week for about 1 month. Pt being seen by wound care for wound to right heel as well as DTI on sacrum        History of Presenting Illness (HPI):  Ophelia Sosa is a 78 y.o. female with past medical history of s/p pacemaker placement, COPD, insulin-dependent diabetes, hemoglobin A1c of 8.1% last May 2024, hyperlipidemia, CKD 3b,chronic A-fib on Eliquis, pulmonary hypertension RVSP 45 mmHg,  history of untreated sleep apnea,HTN, hyperlipidemia, who presented to the emergency department brought in by ambulance from cardiology office for generalized weakness and confusion .     History limited due to patient's mental status however on my physical examination and history, patient's mentation has improved as compared from first arrival to the ED.     Patient states that she remembers being in her cardiologist's office she states that she was not sure whether or not she passed out but she mentioned that the past few days have been  \"crappy\" and \"shitty\" ,she states that she has generally been weak.  She states that the weakness has been on and off for 1 month. ,poor oral intake.  Otherwise denies any fever or chills, dysuria.    When asked with regards to oxygen use, she states that at wound care she was told to use oxygen but she does not have home oxygen for use.     Also states she has been confused because normally she would have good memory.  She does not complain of any chest pain or shortness of breath.  Patient states that she lives with her cat unfortunately she has no " "family . And that Iesha, her best friend is her healthcare proxy.  Mrs. Sosa is able to cook for herself but for the past few days she has not been eating well. She denies any abdominal pain or changes in bowel movements . It is unclear what her baseline mentation is.     On review of patient's chart, patient was previously admitted for diabetic  nonhealing  wound on Achilles, right, s/p  excisional debridement of right foot wound including skin and subcutaneous tissue with orthopedics last month.  She has been managed by wound care for the chronic nonhealing wound.     When asked about CODE STATUS, patient states, \" let me die naturally, I want to join God naturally.     In the emergency department, patient noted to be afebrile, pulse rate 86, nontachypneic, blood pressure 108/57, on 2 L of nasal cannula.  Urinalysis with small leukocyte esterase, WBC 5-10, sodium 136, potassium 4.6, calcium 10.6 CO2 21 , hyperglycemia with glucose of 294, creatinine 1.47 from 1.99,baseline creatinine 1.11-1.14, lactic acid 1.7, hemoglobin 8.1, baseline hemoglobin of 8-9.  Chest x-ray revealed nonspecific symmetric interstitial prominence in the left lung base.  She received IV hydration in the ED, mentation better after other interventions.  Patient will be admitted for acute metabolic encephalopathy.     I discussed the plan of care with patient, family, and bedside RN.    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    Surgical History   has a past surgical history that includes other orthopedic surgery (Right); gyn surgery (hysterectomy); mitral valve replace; tonsillectomy (Bilateral); breast biopsy; irrigation & debridement general (Right, 5/31/2024); and application or replacement, wound vac (Right, 5/31/2024).     Family History  family history includes Diabetes in her father, maternal grandmother, mother, and paternal grandfather; Heart Disease in her mother; Hypertension in her father and mother.   Family history reviewed with patient.     Social History  Tobacco: Denies tobacco use  Alcohol: Denies alcohol use  Recreational drugs (illegal or prescription): Smokes marijuana  Employment: Not working  Living Situation: Lives alone  Recent Travel: Denies recent travel  Primary Care Provider: Reviewed        Allergies  Allergies   Allergen Reactions    Ativan      DELIRIUM, CONFUSION.    Advair [Fluticasone-Salmeterol]     Ambien [Zolpidem Tartrate]     Erythromycin Vomiting    Ibuprofen     Lipitor [Atorvastatin Calcium]     Pcn [Penicillins] Hives     Tolerated Unasyn and Ancef 5/2024    Pravachol [Pravastatin Sodium]      ADVERSE REACTION.       Medications  Prior to Admission Medications   Prescriptions Last Dose Informant Patient Reported? Taking?   Acetaminophen Extra Strength 500 MG Cap  Patient Yes No   Sig: Take 2 Capsules by mouth every evening. Indications: Pain   ELIQUIS 5 MG Tab  Patient No No   Sig: TAKE 1 TABLET BY MOUTH TWICE A DAY   Patient taking differently: Take 5 mg by mouth 2 times a day. Indications: Thromboembolism secondary to Atrial Fibrillation   KLOR-CON 8 MEQ tablet  Patient No No   Sig: TAKE 1 TABLET BY MOUTH EVERY DAY IN THE EVENING   Patient taking differently: Take 8 mEq by mouth every evening. Indications: takes with Lasix   Non Formulary Request  Patient Yes No   Sig: Take 1 Capsule by mouth every day. vital veggie capsule  Indications: supplement   Psyllium  (METAMUCIL MULTIHEALTH FIBER PO)  Patient Yes No   Sig: Take 2-3 Capsules by mouth every day. takes 3 capsules on Monday, Wednesday and Friday. 2 capsules all other days  Indications: constipation   VITAMIN D, CHOLECALCIFEROL, PO  Patient Yes No   Sig: Take 3,000 Units by mouth every day. Indications: supplement   albuterol 108 (90 Base) MCG/ACT Aero Soln inhalation aerosol  Patient No No   Sig: Inhale 2 Puffs every 6 hours as needed for Shortness of Breath.   clobetasol (TEMOVATE) 0.05 % Cream  Patient No No   Sig: Apply 1 Application  topically 2 times a day as needed (eczema).   diphenhydrAMINE HCl (BENADRYL ALLERGY PO)  Patient Yes No   Sig: Take 12.5 mg by mouth 2 times a day as needed (for insomnia). Indications: sleep   fluticasone (FLONASE) 50 MCG/ACT nasal spray  Patient Yes No   Sig: Administer 1 Spray into affected nostril(S) 1 time a day as needed. Indications: Stuffy Nose   furosemide (LASIX) 40 MG Tab  Patient No No   Sig: TAKE 1 TABLET BY MOUTH EVERY DAY IN THE EVENING   Patient taking differently: Take 40 mg by mouth every day. Indications: Cardiac Failure   insulin NPH (HUMULIN/NOVOLIN) 100 UNIT/ML Suspension  Patient No No   Si units in the morning and 3-10 units in the evening per sliding scale   Patient taking differently: 50 units in the morning and 10 units in the evening   insulin regular (HUMULIN R) 100 Unit/mL Solution  Patient No No   Sig: Inject 6 Units under the skin 2 times a day. Inject 6 units daily in the morning and 6 units a  Indications: Type 2 Diabetes   losartan (COZAAR) 25 MG Tab  Patient Yes No   Sig: Take 25 mg by mouth 2 times a day. Indications: High Blood Pressure Disorder   metFORMIN ER (GLUCOPHAGE XR) 500 MG TABLET SR 24 HR  Patient No No   Sig: TAKE 1 TABLET BY MOUTH TWICE A DAY   non-formulary med  Patient Yes No   Sig: Take 1 Capful by mouth every day. Vital fruit capsules  Indications: supplement   rosuvastatin (CRESTOR) 5 MG Tab  Patient No No   Sig: TAKE ONE  TABLET AT BEDTIME FOR CHOLESTEROL. TAKE THREE TIMES WEEKLY   Patient taking differently: Take 5 mg by mouth in the afternoon every Mon, Wed, and Fri. TAKE ONE TABLET AT BEDTIME FOR CHOLESTEROL. TAKE THREE TIMES WEEKLY  Indications: High Amount of Fats in the Blood      Facility-Administered Medications: None       Physical Exam  Temp:  [36.5 °C (97.7 °F)] 36.5 °C (97.7 °F)  Pulse:  [] 81  Resp:  [14-18] 14  BP: ()/(48-86) 91/58  SpO2:  [94 %-100 %] 99 %  Blood Pressure : (!) 140/69   Temperature: 36.5 °C (97.7 °F)   Pulse: (!) 109   Respiration: 17   Pulse Oximetry: 97 %       Physical Exam  Constitutional:       Appearance: Normal appearance. She is ill-appearing.   HENT:      Head: Normocephalic and atraumatic.      Nose: Nose normal.   Eyes:      Extraocular Movements: Extraocular movements intact.      Pupils: Pupils are equal, round, and reactive to light.   Cardiovascular:      Pulses: Normal pulses.   Pulmonary:      Effort: Pulmonary effort is normal. No respiratory distress.      Breath sounds: Normal breath sounds.   Abdominal:      Palpations: Abdomen is soft.   Skin:     Capillary Refill: Capillary refill takes less than 2 seconds.      Comments: Noted ulceration on posterior heel of right foot with healthy granulation tissue.  Noted mild drainage, otherwise no erythema surrounding, no swelling on surrounding area no warmth, no tenderness around area.     Noted abrasion on right elbow area   Neurological:      General: No focal deficit present.      Mental Status: She is alert.      Comments: Oriented x 1-2(person and place), inconsistent about time, needs frequent reassurance   Psychiatric:         Mood and Affect: Mood normal.         Laboratory:  Recent Labs     07/11/24  1609   WBC 5.4   RBC 2.68*   HEMOGLOBIN 8.1*   HEMATOCRIT 25.6*   MCV 95.5   MCH 30.2   MCHC 31.6*   RDW 60.4*   PLATELETCT 170   MPV 10.3     Recent Labs     07/11/24  1509   SODIUM 136   POTASSIUM 4.6   CHLORIDE 100  "  CO2 21   GLUCOSE 294*   BUN 45*   CREATININE 1.47*   CALCIUM 10.6*     Recent Labs     07/11/24  1509   ALTSGPT 24   ASTSGOT 20   ALKPHOSPHAT 61   TBILIRUBIN 0.4   GLUCOSE 294*         No results for input(s): \"NTPROBNP\" in the last 72 hours.      Recent Labs     07/11/24  1509   TROPONINT 72*       Imaging:  DX-FOOT-2- RIGHT   Final Result      1.  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   2.  No new changes or evidence of destructive osteomyelitis.      DX-CHEST-PORTABLE (1 VIEW)   Final Result      Nonspecific mild asymmetric interstitial prominence in the left lung base could be atelectasis versus mild asymmetric edema or less likely infection.          X-Ray:  I have personally reviewed the images and compared with prior images.    Assessment/Plan:  Problem Representation:  will require at least two midnights for appropriate medical management, necessitating inpatient admission because acute encephalopathy    Patient will need a Telemetry bed on MEDICAL service .  The need is secondary to acute encephalopathy.    * Acute metabolic encephalopathy- (present on admission)  Assessment & Plan  -Differentials include acute toxic metabolic encephalopathy, hyperglycemia  -After receiving initial treatment in the ED, patient's mental status seems to be improving  -Unclear of patient's baseline however encephalopathy likely secondary to dehydration  -IV hydration as appropriate, neurochecks  -Monitor labs    -Patient high risk for aspiration, patient to undergo swallow evaluation, will keep on IV hydration for now    FELICIA (acute kidney injury) (HCC) on CKD- (present on admission)  Assessment & Plan  -Likely prerenal in the setting of poor oral intake  -IV hydration  - continue to monitor  -Avoid nephrotoxic medications    Chronic wound  Assessment & Plan  -Noted chronic wound on right feet, followed by wound care  -Patient with no signs or symptoms suggestive of infection, no " "leukocytosis, afebrile, patient did not present with any sepsis  -No current indication for antibiotics however we will order wound cultures, follow results   -Pending procalcitonin  -Concern for osteomyelitis, had recent wound debridement about 1 month ago, x-ray pending  -Consider Ortho consult in the morning    Weakness  Assessment & Plan  -Multifactorial, likely in the setting of poor oral intake, chronic debility, chronic wound limiting mobility, saying she does not want to wear boots that was advised  -on review of patient's chart, patient was referred to home health previously    -Physical therapy and Occupational Therapy reevaluation      Goals of care, counseling/discussion- (present on admission)  Assessment & Plan  -When asked CODE STATUS, patient states, \" let me die naturally, I want to join God naturally\"    Atrial fibrillation (HCC)- (present on admission)  Assessment & Plan  -Currently rate controlled, hold anticoagulation for now in anticipation of any possible surgery/debridement    Type 2 diabetes mellitus with stage 3b chronic kidney disease, with long-term current use of insulin (HCC)- (present on admission)  Assessment & Plan  -Glargine and sliding scale insulin  -Ensure appropriate blood glucose control to avoid any other precipitants for altered mental status/encephalopathy  Diabetic education        VTE prophylaxis: On apixaban    "

## 2024-07-12 NOTE — ASSESSMENT & PLAN NOTE
-Noted chronic wound on right feet, followed by wound care  -Patient with no signs or symptoms suggestive of infection, no leukocytosis, afebrile, patient did not present with any sepsis  -No current indication for antibiotics however we will order wound cultures, follow results   -Procalcitonin negative  X-ray shows chronic changes and no new concerns for osteomyelitis

## 2024-07-12 NOTE — WOUND TEAM
[facility]  Inpatient Services  Initial Wound and Skin Care Evaluation    Admission Date: 2024     Last order of IP CONSULT TO WOUND CARE was found on 2024 from Hospital Encounter on 2024     HPI, PMH, SH: Reviewed    Past Surgical History:   Procedure Laterality Date    IRRIGATION & DEBRIDEMENT GENERAL Right 2024    Procedure: IRRIGATION AND DEBRIDEMENT, WOUND;  Surgeon: Pk Fleming M.D.;  Location: SURGERY [facility];  Service: Orthopedics    APPLICATION OR REPLACEMENT, WOUND VAC Right 2024    Procedure: APPLICATION OR REPLACEMENT, WOUND VAC;  Surgeon: Pk Fleming M.D.;  Location: SURGERY [facility];  Service: Orthopedics    BREAST BIOPSY      GYN SURGERY  hysterectomy    MITRAL VALVE REPLACE      OTHER ORTHOPEDIC SURGERY Right     TONSILLECTOMY Bilateral      Social History     Tobacco Use    Smoking status: Former     Current packs/day: 0.00     Types: Cigarettes     Quit date: 2011     Years since quittin.7    Smokeless tobacco: Never   Substance Use Topics    Alcohol use: Not Currently     Comment: denies     Chief Complaint   Patient presents with    Weakness     Pt BIBA from cardiology for pt being weak. Per pt they have been week for about 1 month. Pt being seen by wound care for wound to right heel as well as DTI on sacrum      Diagnosis: Altered mental status [R41.82]  Acute metabolic encephalopathy [G93.41]    Unit where seen by Wound Team: S190/01     WOUND CONSULT RELATED TO:  R heel    WOUND TEAM PLAN OF CARE - Frequency of Follow-up:   Nursing to follow dressing orders written for wound care. Contact wound team if area fails to progress, deteriorates or with any questions/concerns if something comes up before next scheduled follow up (See below as to whether wound is following and frequency of wound follow up)   Weekly -      WOUND HISTORY:   Pt is a 79 yo female who presented to the ED for weakness.  Pt admitted for weakness,  dehydration, hyperglycemia. AMS And chronic wounds.  Pt has a history of DM.  Pt was under the care of home care for heel wound    5/31/24 debridement R foot wound and bone for osteo with [facility], Dr Fleming        WOUND ASSESSMENT/LDA      Negative Pressure Wound Therapy 05/31/24 Heel Right (Active)           Wound 04/25/24 Diabetic Ulcer Heel;Achilles Posterior Right (Active)       Wound 05/24/24 Achilles;Tibia Posterior;Distal Right (Active)       Wound 05/29/24 Diabetic Ulcer Heel Right (Active)   Wound Image   07/12/24 1700   Site Assessment Clean;Red;Tunneling 07/12/24 0800   Periwound Assessment Sweeny 07/12/24 1700   Margins Undefined edges 07/12/24 0800   Closure Other (Comment) 07/12/24 0800   Drainage Amount Small 07/12/24 1700   Drainage Description Serosanguineous 07/12/24 1700   Treatments Cleansed;Offloading 07/12/24 0800   Offloading/DME Heel float boot 07/12/24 0800   Wound Cleansing Approved Wound Cleanser 07/12/24 0800   Dressing Status Clean;Dry;Intact 07/12/24 0800   Dressing Changed Changed 07/12/24 1700   Dressing Options Collagen Dressing;Hydrofera Blue Ready;Offloading Dressing - Heel 07/12/24 1700   Dressing Change/Treatment Frequency Every 72 hrs, and As Needed 07/12/24 1700   NEXT Dressing Change/Treatment Date 07/15/24 07/12/24 1700   NEXT Weekly Photo (Inpatient Only) 07/19/24 07/12/24 1700   Wound Length (cm) 3.5 cm 07/12/24 1700   Wound Width (cm) 2.6 cm 07/12/24 1700   Wound Surface Area (cm^2) 9.1 cm^2 07/12/24 1700   Wound Odor None 07/12/24 1700   WOUND NURSE ONLY - Time Spent with Patient (mins) 60 07/12/24 1700       Wound 07/11/24 Pressure Injury Sacrum DTI (Active)   Wound Image   07/12/24 1700   Site Assessment Purple 07/12/24 1700   Periwound Assessment Pink 07/12/24 1700   Drainage Amount None 07/12/24 1700   Dressing Status Other (Comment) 07/12/24 1700   Dressing Changed Changed 07/12/24 1700   Dressing Options Offloading Dressing - Sacral 07/12/24 1700   Dressing  Change/Treatment Frequency Every 72 hrs, and As Needed 07/12/24 1700   NEXT Dressing Change/Treatment Date 07/15/24 07/12/24 1700   NEXT Weekly Photo (Inpatient Only) 07/19/24 07/12/24 1700   Wound Team Following Other (comment) 07/12/24 1700   WOUND NURSE ONLY - Pressure Injury Stage DTPI 07/12/24 1700   Wound Length (cm) 14 cm 07/12/24 1700   Wound Width (cm) 12 cm 07/12/24 1700   Wound Surface Area (cm^2) 168 cm^2 07/12/24 1700   Wound Odor None 07/12/24 1700           Vascular:    TACO:   No results found.    Lab Values:    Lab Results   Component Value Date/Time    WBC 6.2 07/12/2024 10:15 AM    RBC 2.72 (L) 07/12/2024 10:15 AM    HEMOGLOBIN 8.3 (L) 07/12/2024 10:15 AM    HEMATOCRIT 26.4 (L) 07/12/2024 10:15 AM    CREACTPROT 0.81 (H) 05/28/2024 09:37 PM    SEDRATEWES 132 (H) 05/28/2024 09:37 PM    HBA1C 8.1 (H) 05/29/2024 02:23 AM         Culture Results show:  No results found for this or any previous visit (from the past 720 hour(s)).    Pain Level/Medicated:  None, Tolerated without pain medication     . Performed standard wound care which includes appropriate positioning, dressing removal and non-selective debridement. Pictures and measurements obtained weekly if/when required.    Wound:  R heel, SACRUM  Cleansed with saline and gauze,   Sharp debrided heel with currette to remove <20cm2 yellow slough to granulation tissue  Collagen, hfb and heel off loading to R heel  Sacrum off loading sacral    Advanced Wound Care Discharge Planning  Number of Clinicians necessary to complete wound care: 1  Is patient requiring IV pain medications for dressing changes:  No   Length of time for dressing change 60 min. (This does not include chart review, pre-medication time, set up, clean up or time spent charting.)    Interdisciplinary consultation: Patient, Bedside RN (), .      EVALUATION / RATIONALE FOR TREATMENT:     Date:  07/12/24  Wound Status:      Pt's heel wound is improved since early June 2024 with   wound bed and area has contracted.  Continue with wound care and off loading    Pt has a pressure injury on her sacrum which will require consistent off loading and surveillance since it is possible that the site will open up.      R dorsal toes with PTWs  that should resolve with wound care.            Goals: Steady decrease in wound area and depth weekly.    NURSING PLAN OF CARE ORDERS:  Dressing changes: See Dressing Care orders    NUTRITION     PREVENTATIVE INTERVENTIONS:    Q shift Drew - performed per nursing policy  Q shift pressure point assessments - performed per nursing policy    Surface/Positioning  Low Airloss - Currently in Place  TAPs Turning system - Currently in Place    Offloading/Redistribution  Sacral offloading dressing (Silicone dressing) - Currently in Place  Heel offloading dressing (Silicone dressing) - Currently in Place      Containment/Moisture Prevention    Purwick/Condom Cath - Currently in Place    Anticipated discharge plans:  TBD    pt will need ongoing wound care for R heel and sacral wounds    Vac Discharge Needs:  Vac Discharge plan is purely a recommendation from wound team and not a requirement for discharge unless otherwise stated by physician.  Not Applicable Pt not on a wound vac

## 2024-07-12 NOTE — PROGRESS NOTES
Patient's hemoglobin noted to be dropping from 7.0 from 8.1.  Patient with baseline hemoglobin of 8-9. Clinically, patient with no signs of bleeding, asymptomatic.  Hold Eliquis, repeat H&H ordered for 11 AM.  Closely monitor.  Transfuse for hemoglobin less than 7.

## 2024-07-12 NOTE — PROGRESS NOTES
Monitor Summary: AV/V-Paced 77-89, AK --, QRS 0.13, QT 0.43, with rare PVCs per strip from monitor room.

## 2024-07-12 NOTE — ASSESSMENT & PLAN NOTE
-Likely prerenal in the setting of poor oral intake  -IV hydration  - continue to monitor  -Avoid nephrotoxic medications

## 2024-07-12 NOTE — ED NOTES
Pt yelling that she's scared and uncomfortable. Pt repositioned for comfort, pt sitting up, claiming she can't breathe. VSS. Hospitalist notified. Hospital bed ordered

## 2024-07-12 NOTE — PROGRESS NOTES
4 Eyes Skin Assessment Completed by DAY thomson and DAY henry.    Head WDL  Ears WDL  Nose WDL  Mouth WDL  Neck WDL  Breast/Chest Abrasion  Shoulder Blades WDL  Spine Redness and Blanching  (R) Arm/Elbow/Hand WDL  (L) Arm/Elbow/Hand WDL  Abdomen Rash  Groin Redness and Blanching  Scrotum/Coccyx/Buttocks Redness and Blanching  (R) Leg Abrasion  (L) Leg Bruising and Abrasion  (R) Heel/Foot/Toe Ulcer(s) and Swelling  (L) Heel/Foot/Toe Scab          Devices In Places Tele Box and Blood Pressure Cuff      Interventions In Place NC W/Ear Foams    Possible Skin Injury Yes    Pictures Uploaded Into Epic Yes  Wound Consult Placed Yes  RN Wound Prevention Protocol Ordered YES

## 2024-07-12 NOTE — ED NOTES
Bedside report received from off going RN/tech: Oneyda, assumed care of patient.  POC discussed with patient. Call light within reach, all needs addressed at this time.       Fall risk interventions in place: Place fall risk sign on patient's door, Give patient urinal if applicable, Keep floor surfaces clean and dry, and Accompanied to restroom (all applicable per [place] Fall risk assessment)   Continuous monitoring: Cardiac Leads, Pulse Ox, or Blood Pressure  IVF/IV medications: Not Applicable   Oxygen: Room Air  Bedside sitter: Not Applicable   Isolation: Not Applicable

## 2024-07-12 NOTE — DISCHARGE PLANNING
TCN following. HTH/SCP chart review completed. Collaborated with MARYLU Adams. Note that pt was admitted from [facility] where she was self pay for LTC services. Note that prior to admit, pt was essentially being slated for transition from [facility] to a  with hospice services. Per discussion with CM and chart review, currently it is anticipated that pt will dc from [facility] to a  with hospice services (and note that pt/POA have also essentially decided on [facility] per discussion with CM/review). Note that per discussion with outside TCN, appears pt could return to [facility] via self pay if needed. TCN will monitor if change in POC/status though will not actively be involved given anticipated dc to  with hospice plan. (Note anticipated  is [facility] and this facility does appear to have availability as well)

## 2024-07-12 NOTE — ASSESSMENT & PLAN NOTE
"-When asked CODE STATUS, patient states, \" let me die naturally, I want to join God naturally\"  Patient does have a DPOA who is discussing with patient further goals of care    - 7/14 f/u palliative care consult, consider hospice  "

## 2024-07-13 NOTE — PROGRESS NOTES
Hospital Medicine Daily Progress Note    Date of Service  7/12/2024    Chief Complaint  Ophelia Sosa is a 78 y.o. female admitted 7/11/2024 with generalized weakness and confusion    Hospital Course  No notes on file    Interval Problem Update  7/12: Patient describes now having difficulty with vision which is new for her.  She has a pacemaker.  Discussed with POA who came and discussed with patient and the patient would like a CT scan.  Ordered CT brain.  Ordered QuantiFERON gold.  Total time 54 minutes.    I have discussed this patient's plan of care and discharge plan at IDT rounds today with Case Management, Nursing, Nursing leadership, and other members of the IDT team.    Disposition  The patient is not medically cleared for discharge to home or a post-acute facility.    Pl[facility]  I have placed the appropriate orders for post-discharge needs.  QuantiFERON gold pending    Review of Systems  Review of Systems   Unable to perform ROS: Mental acuity        Physical Exam  Temp:  [36.3 °C (97.4 °F)-36.5 °C (97.7 °F)] 36.4 °C (97.5 °F)  Pulse:  [] 97  Resp:  [14-18] 18  BP: ()/(50-86) 128/52  SpO2:  [94 %-100 %] 97 %    Physical Exam  Constitutional:       General: She is not in acute distress.     Appearance: She is well-developed. She is ill-appearing.   HENT:      Head: Normocephalic.   Eyes:      Comments: Now unable to see   Cardiovascular:      Rate and Rhythm: Normal rate.      Heart sounds:      No gallop.   Pulmonary:      Effort: No respiratory distress.      Breath sounds: No wheezing.   Abdominal:      General: There is no distension.      Tenderness: There is no abdominal tenderness.   Skin:     General: Skin is warm.   Neurological:      Mental Status: She is alert. She is disoriented.         Fluids    Intake/Output Summary (Last 24 hours) at 7/12/2024 1727  Last data filed at 7/12/2024 1442  Gross per 24 hour   Intake 360 ml   Output --   Net 360 ml        Laboratory  Recent Labs     07/11/24  1609 07/12/24  0237 07/12/24  1015   WBC 5.4 4.6* 6.2   RBC 2.68* 2.29* 2.72*   HEMOGLOBIN 8.1* 7.0* 8.3*   HEMATOCRIT 25.6* 23.0* 26.4*   MCV 95.5 100.4* 97.1   MCH 30.2 30.6 30.5   MCHC 31.6* 30.4* 31.4*   RDW 60.4* 62.5* 59.5*   PLATELETCT 170 143* 169   MPV 10.3 10.5 10.3     Recent Labs     07/11/24  1509 07/12/24  0237   SODIUM 136 139   POTASSIUM 4.6 4.1   CHLORIDE 100 105   CO2 21 21   GLUCOSE 294* 136*   BUN 45* 38*   CREATININE 1.47* 1.20   CALCIUM 10.6* 10.1                   Imaging  CT-HEAD W/O   Final Result      No acute intracranial abnormality.               IR-US GUIDED PIV   Final Result    Ultrasound-guided PERIPHERAL IV INSERTION performed by    qualified nursing staff as above.      DX-FOOT-2- RIGHT   Final Result      1.  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   2.  No new changes or evidence of destructive osteomyelitis.      DX-CHEST-PORTABLE (1 VIEW)   Final Result      Nonspecific mild asymmetric interstitial prominence in the left lung base could be atelectasis versus mild asymmetric edema or less likely infection.           Assessment/Plan  * Acute metabolic encephalopathy- (present on admission)  Assessment & Plan  -Differentials include acute toxic metabolic encephalopathy, hyperglycemia  -After receiving initial treatment in the ED, patient's mental status seems to be improving  -Unclear of patient's baseline however encephalopathy likely secondary to dehydration  -IV hydration as appropriate, neurochecks  With new visual changes  Has pacemaker  CT head pending    Chronic wound  Assessment & Plan  -Noted chronic wound on right feet, followed by wound care  -Patient with no signs or symptoms suggestive of infection, no leukocytosis, afebrile, patient did not present with any sepsis  -No current indication for antibiotics however we will order wound cultures, follow results   -Procalcitonin  "negative  X-ray shows chronic changes and no new concerns for osteomyelitis    Weakness  Assessment & Plan  -Multifactorial, likely in the setting of poor oral intake, chronic debility, chronic wound limiting mobility, saying she does not want to wear boots that was advised  -on review of patient's chart, patient was referred to home health previously    -Physical therapy and Occupational Therapy reevaluation      Goals of care, counseling/discussion- (present on admission)  Assessment & Plan  -When asked CODE STATUS, patient states, \" let me die naturally, I want to join God naturally\"  Patient does have a DPOA who is discussing with patient further goals of care    Atrial fibrillation (HCC)- (present on admission)  Assessment & Plan  -Currently rate controlled, hold anticoagulation for now in anticipation of any possible surgery/debridement    FELICIA (acute kidney injury) (HCC) on CKD- (present on admission)  Assessment & Plan  -Likely prerenal in the setting of poor oral intake  -IV hydration  - continue to monitor  -Avoid nephrotoxic medications    Type 2 diabetes mellitus with stage 3b chronic kidney disease, with long-term current use of insulin (HCC)- (present on admission)  Assessment & Plan  -Glargine and sliding scale insulin  -Ensure appropriate blood glucose control to avoid any other precipitants for altered mental status/encephalopathy  Diabetic education         VTE prophylaxis:   SCDs/TEDs      I have performed a physical exam and reviewed and updated ROS and Plan today (7/12/2024). In review of yesterday's note (7/11/2024), there are no changes except as documented above.        "

## 2024-07-13 NOTE — PROGRESS NOTES
Hospital Medicine Daily Progress Note    Date of Service  7/13/2024    Chief Complaint  Ophelia Sosa is a 78 y.o. female admitted 7/11/2024 with generalized weakness and confusion    Hospital Course  No notes on file    Interval Problem Update  7/12: Patient describes now having difficulty with vision which is new for her.  She has a pacemaker.  Discussed with POA who came and discussed with patient and the patient would like a CT scan.  Ordered CT brain.  Ordered QuantiFERON gold.    7/13: Discussed with DPOA at bedside.  Discussed CT head negative result.  Discussed 1 furuncle lab pending.  Also discussed patient's sensitivities to benzos.  Total time 51 minutes.  Total time spent on advanced care planning, excluding time spent on daily care: 22 minutes.  Changed to comfort care per discussion with POA.    I have discussed this patient's plan of care and discharge plan at IDT rounds today with Case Management, Nursing, Nursing leadership, and other members of the IDT team.    Disposition  The patient is not medically cleared for discharge to home or a post-acute facility.      Planning for hospice at [facility]  I have placed the appropriate orders for post-discharge needs.  QuantiFERON gold pending    Review of Systems  Review of Systems   Unable to perform ROS: Mental acuity        Physical Exam  Temp:  [36.4 °C (97.5 °F)-36.7 °C (98.1 °F)] 36.4 °C (97.5 °F)  Pulse:  [] 94  Resp:  [17-18] 17  BP: (120-159)/(41-68) 159/68  SpO2:  [94 %-100 %] 100 %    Physical Exam  Constitutional:       General: She is not in acute distress.     Appearance: She is well-developed. She is ill-appearing. She is not toxic-appearing.   HENT:      Head: Normocephalic.   Eyes:      Comments: Now unable to see   Cardiovascular:      Rate and Rhythm: Normal rate.   Pulmonary:      Effort: No respiratory distress.      Breath sounds: No wheezing.   Abdominal:      General: There is no distension.      Tenderness: There is no  abdominal tenderness.   Skin:     General: Skin is warm.   Neurological:      Mental Status: She is alert. She is disoriented.         Fluids  No intake or output data in the 24 hours ending 07/13/24 1502      Laboratory  Recent Labs     07/12/24  0237 07/12/24  1015 07/13/24  0503   WBC 4.6* 6.2 7.4   RBC 2.29* 2.72* 2.49*   HEMOGLOBIN 7.0* 8.3* 7.6*   HEMATOCRIT 23.0* 26.4* 24.8*   .4* 97.1 99.6*   MCH 30.6 30.5 30.5   MCHC 30.4* 31.4* 30.6*   RDW 62.5* 59.5* 61.3*   PLATELETCT 143* 169 169   MPV 10.5 10.3 10.6     Recent Labs     07/11/24  1509 07/12/24  0237 07/13/24  0503   SODIUM 136 139 135   POTASSIUM 4.6 4.1 4.3   CHLORIDE 100 105 102   CO2 21 21 19*   GLUCOSE 294* 136* 166*   BUN 45* 38* 26*   CREATININE 1.47* 1.20 0.95   CALCIUM 10.6* 10.1 9.9                   Imaging  CT-HEAD W/O   Final Result      No acute intracranial abnormality.               IR-US GUIDED PIV   Final Result    Ultrasound-guided PERIPHERAL IV INSERTION performed by    qualified nursing staff as above.      DX-FOOT-2- RIGHT   Final Result      1.  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   2.  No new changes or evidence of destructive osteomyelitis.      DX-CHEST-PORTABLE (1 VIEW)   Final Result      Nonspecific mild asymmetric interstitial prominence in the left lung base could be atelectasis versus mild asymmetric edema or less likely infection.           Assessment/Plan  * Acute metabolic encephalopathy- (present on admission)  Assessment & Plan  -Differentials include acute toxic metabolic encephalopathy, hyperglycemia  -After receiving initial treatment in the ED, patient's mental status seems to be improving  -Unclear of patient's baseline however encephalopathy likely secondary to dehydration  -IV hydration as appropriate, neurochecks  With new visual changes  Has pacemaker  CT head pending    Chronic wound  Assessment & Plan  -Noted chronic wound on right feet, followed by wound  "care  -Patient with no signs or symptoms suggestive of infection, no leukocytosis, afebrile, patient did not present with any sepsis  -No current indication for antibiotics however we will order wound cultures, follow results   -Procalcitonin negative  X-ray shows chronic changes and no new concerns for osteomyelitis    Weakness  Assessment & Plan  -Multifactorial, likely in the setting of poor oral intake, chronic debility, chronic wound limiting mobility, saying she does not want to wear boots that was advised  -on review of patient's chart, patient was referred to home health previously    -Physical therapy and Occupational Therapy reevaluation      Goals of care, counseling/discussion- (present on admission)  Assessment & Plan  -When asked CODE STATUS, patient states, \" let me die naturally, I want to join God naturally\"  Patient does have a DPOA who is discussing with patient further goals of care    Atrial fibrillation (HCC)- (present on admission)  Assessment & Plan  -Currently rate controlled, hold anticoagulation for now in anticipation of any possible surgery/debridement    FELICIA (acute kidney injury) (HCC) on CKD- (present on admission)  Assessment & Plan  -Likely prerenal in the setting of poor oral intake  -IV hydration  - continue to monitor  -Avoid nephrotoxic medications    Type 2 diabetes mellitus with stage 3b chronic kidney disease, with long-term current use of insulin (HCC)- (present on admission)  Assessment & Plan  -Glargine and sliding scale insulin  -Ensure appropriate blood glucose control to avoid any other precipitants for altered mental status/encephalopathy  Diabetic education         VTE prophylaxis: comfort care    I have performed a physical exam and reviewed and updated ROS and Plan today (7/13/2024). In review of yesterday's note (7/12/2024), there are no changes except as documented above.        "

## 2024-07-13 NOTE — PROGRESS NOTES
Monitor Summary: Paced 83-84, [place] --, QRS 0.13, QT 0.40, with bigeminal couplet PVCs per strip from monitor room.

## 2024-07-13 NOTE — CARE PLAN
The patient is Stable - Low risk of patient condition declining or worsening    Shift Goals  Clinical Goals: Stable neuro status, maintain skin integrity  Patient Goals: Rest  Family Goals: BRSIA    Progress made toward(s) clinical / shift goals:      Problem: Neuro Status  Goal: Neuro status will remain stable or improve  Outcome: Progressing    Q4H neuro checks in place. Pt's neurological status (A/Ox2-3) remains unchanged throughout shift. Bed alarm is on, call light within reach.     Problem: Skin Integrity  Goal: Skin integrity is maintained or improved  Outcome: Progressing   Pt turned and repositioned Q2H or as requested. Barrier cream and mepilexes used to prevent skin breakdown on delicate perineal areas and bony prominences. Linen changes provided as needed to avoid risk of developing pressure ulcers.     Patient is not progressing towards the following goals:

## 2024-07-13 NOTE — PROGRESS NOTES
Monitor Summary: Paced 80-98 [place] 0.19 QRS 0.18 QT 0.46 with BBB, rare PVCs, rare PACs per strip from monitor room.

## 2024-07-14 LAB
MYCOBACTERIUM SPEC CULT: NORMAL
RHODAMINE-AURAMINE STN SPEC: NORMAL
SIGNIFICANT IND 70042: NORMAL
SITE SITE: NORMAL
SOURCE SOURCE: NORMAL

## 2024-07-14 NOTE — PROGRESS NOTES
Hospital Medicine Daily Progress Note    Date of Service  7/14/2024    Chief Complaint  Ophelia Sosa is a 78 y.o. female admitted 7/11/2024 with generalized weakness and confusion    Hospital Course  No notes on file    Interval Problem Update  7/12: Patient describes now having difficulty with vision which is new for her.  She has a pacemaker.  Discussed with POA who came and discussed with patient and the patient would like a CT scan.  Ordered CT brain.  Ordered QuantiFERON gold.  Total time 54 minutes.    7/14 patient is awake and oriented x 2, she reports generalized discomfort, readjusted in bed  Patient continues to repeat herself, plan of care reviewed, easily reoriented  Patient continues to state her wishes of being comfortable, plan for transfer to group home on hospice when accepted  Will follow-up with palliative care consult    I have discussed this patient's plan of care and discharge plan at IDT rounds today with Case Management, Nursing, Nursing leadership, and other members of the IDT team.    Disposition  The patient is not medically cleared for discharge to home or a post-acute facility.    Planning for hospice at [facility]  I have placed the appropriate orders for post-discharge needs.  QuantiFERON gold pending    Review of Systems  Review of Systems   Unable to perform ROS: Mental acuity        Physical Exam  Temp:  [36.4 °C (97.5 °F)-36.8 °C (98.2 °F)] 36.8 °C (98.2 °F)  Pulse:  [] 95  Resp:  [18] 18  BP: (135-146)/(62-70) 146/62  SpO2:  [91 %-99 %] 99 %    Physical Exam  Constitutional:       General: She is not in acute distress.     Appearance: She is well-developed. She is ill-appearing. She is not toxic-appearing or diaphoretic.      Comments: Awake, intermittent agitation  Following directions, oriented x 1-2   HENT:      Head: Normocephalic.      Mouth/Throat:      Mouth: Mucous membranes are moist.   Eyes:      Extraocular Movements: Extraocular movements intact.       Pupils: Pupils are equal, round, and reactive to light.      Comments: Now unable to see   Cardiovascular:      Rate and Rhythm: Normal rate.      Heart sounds:      No gallop.   Pulmonary:      Effort: No respiratory distress.      Breath sounds: No wheezing.   Abdominal:      General: There is no distension.      Tenderness: There is no abdominal tenderness.   Musculoskeletal:         General: No swelling or tenderness.   Skin:     General: Skin is warm.      Coloration: Skin is not pale.   Neurological:      Mental Status: She is alert. She is disoriented.      Sensory: No sensory deficit.      Motor: Weakness present.      Coordination: Coordination normal.         Fluids    Intake/Output Summary (Last 24 hours) at 7/14/2024 1221  Last data filed at 7/14/2024 1000  Gross per 24 hour   Intake 450 ml   Output --   Net 450 ml       Laboratory  Recent Labs     07/12/24  0237 07/12/24  1015 07/13/24  0503   WBC 4.6* 6.2 7.4   RBC 2.29* 2.72* 2.49*   HEMOGLOBIN 7.0* 8.3* 7.6*   HEMATOCRIT 23.0* 26.4* 24.8*   .4* 97.1 99.6*   MCH 30.6 30.5 30.5   MCHC 30.4* 31.4* 30.6*   RDW 62.5* 59.5* 61.3*   PLATELETCT 143* 169 169   MPV 10.5 10.3 10.6     Recent Labs     07/11/24  1509 07/12/24  0237 07/13/24  0503   SODIUM 136 139 135   POTASSIUM 4.6 4.1 4.3   CHLORIDE 100 105 102   CO2 21 21 19*   GLUCOSE 294* 136* 166*   BUN 45* 38* 26*   CREATININE 1.47* 1.20 0.95   CALCIUM 10.6* 10.1 9.9                   Imaging  CT-HEAD W/O   Final Result      No acute intracranial abnormality.               IR-US GUIDED PIV   Final Result    Ultrasound-guided PERIPHERAL IV INSERTION performed by    qualified nursing staff as above.      DX-FOOT-2- RIGHT   Final Result      1.  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   2.  No new changes or evidence of destructive osteomyelitis.      DX-CHEST-PORTABLE (1 VIEW)   Final Result      Nonspecific mild asymmetric interstitial prominence in the left  "lung base could be atelectasis versus mild asymmetric edema or less likely infection.           Assessment/Plan  * Acute metabolic encephalopathy- (present on admission)  Assessment & Plan  -Differentials include acute toxic metabolic encephalopathy, hyperglycemia  -After receiving initial treatment in the ED, patient's mental status seems to be improving  -Unclear of patient's baseline however encephalopathy likely secondary to dehydration  Finished IV fluids and neurochecks  With new visual changes  Has pacemaker  CT head negative for acute changes  Discussed with patient and DPOA    7/14 now on comfort care  Intermittent agitation, will add Seroquel and Haldol as needed  Pending group home placement    - quant gold pending, CM to assist    Chronic wound  Assessment & Plan  -Noted chronic wound on right feet, followed by wound care  -Patient with no signs or symptoms suggestive of infection, no leukocytosis, afebrile, patient did not present with any sepsis  -No current indication for antibiotics however we will order wound cultures, follow results   -Procalcitonin negative  X-ray shows chronic changes and no new concerns for osteomyelitis    Weakness  Assessment & Plan  -Multifactorial, likely in the setting of poor oral intake, chronic debility, chronic wound limiting mobility, saying she does not want to wear boots that was advised  -on review of patient's chart, patient was referred to home health previously  Goal is hospice at group home      Goals of care, counseling/discussion- (present on admission)  Assessment & Plan  -When asked CODE STATUS, patient states, \" let me die naturally, I want to join God naturally\"  Patient does have a DPOA who is discussing with patient further goals of care    - 7/14 f/u palliative care consult, consider hospice    Atrial fibrillation (HCC)- (present on admission)  Assessment & Plan  Was rate controlled,   Now on comfort care    FELICIA (acute kidney injury) (HCC) on CKD- " (present on admission)  Assessment & Plan  -Likely prerenal in the setting of poor oral intake  -IV hydration  - continue to monitor  -Avoid nephrotoxic medications    Type 2 diabetes mellitus with stage 3b chronic kidney disease, with long-term current use of insulin (HCC)- (present on admission)  Assessment & Plan  -Glargine and sliding scale insulin  -Ensure appropriate blood glucose control to avoid any other precipitants for altered mental status/encephalopathy  No longer needs diabetic education now on comfort care         VTE prophylaxis:   SCDs/TEDs      I have performed a physical exam and reviewed and updated ROS and Plan today (7/14/2024). In review of yesterday's note (7/13/2024), there are no changes except as documented above.

## 2024-07-14 NOTE — CARE PLAN
The patient is Watcher - Medium risk of patient condition declining or worsening    Shift Goals  Clinical Goals: Stable neuro status, safety  Patient Goals: Rest      Progress made toward(s) clinical / shift goals:     Problem: Pain - Standard  Goal: Alleviation of pain or a reduction in pain to the patient’s comfort goal  Outcome: Progressing     Problem: Knowledge Deficit - Standard  Goal: Patient and family/care givers will demonstrate understanding of plan of care, disease process/condition, diagnostic tests and medications  Outcome: Progressing     Problem: Skin Integrity  Goal: Skin integrity is maintained or improved  Outcome: Progressing     Problem: Fall Risk  Goal: Patient will remain free from falls  Outcome: Progressing

## 2024-07-14 NOTE — CARE PLAN
The patient is Watcher - Medium risk of patient condition declining or worsening    Shift Goals  Clinical Goals: Pt will have improvement in her anxiety and remain free from injury.  Patient Goals: Pt will be able to sit in a chair.  Family Goals: Not present    Progress made toward(s) clinical / shift goals: Patient up with stand by assist to a bedside commode. Tolerates well but generalized weakness.     Patient is not progressing towards the following goals:  Patient continues with severe anxiety, Hydroxyzine given with no relief. Order received for seroquel with some improvement. Haldol prn. Patient sleeping comfortably at this time.     Problem: Knowledge Deficit - Standard  Goal: Patient and family/care givers will demonstrate understanding of plan of care, disease process/condition, diagnostic tests and medications  Outcome: Not Progressing     Problem: Skin Integrity  Goal: Skin integrity is maintained or improved  Outcome: Not Progressing     Problem: Neuro Status  Goal: Neuro status will remain stable or improve  Outcome: Not Progressing

## 2024-07-15 NOTE — PROGRESS NOTES
Hospital Medicine Daily Progress Note    Date of Service  7/15/2024    Chief Complaint  Ophelia Sosa is a 78 y.o. female admitted 7/11/2024 with generalized weakness and confusion    Hospital Course  No notes on file    Interval Problem Update  7/12: Patient describes now having difficulty with vision which is new for her.  She has a pacemaker.  Discussed with POA who came and discussed with patient and the patient would like a CT scan.  Ordered CT brain.  Ordered QuantiFERON gold.  Total time 54 minutes.    7/14 patient is awake and oriented x 2, she reports generalized discomfort, readjusted in bed  Patient continues to repeat herself, plan of care reviewed, easily reoriented  Patient continues to state her wishes of being comfortable, plan for transfer to group home on hospice when accepted  Will follow-up with palliative care consult    7/15 ongoing agitation, intermittent  Appears comfortable, able to feed with assistance  [facility] placement pending, hospice referral    I have discussed this patient's plan of care and discharge plan at IDT rounds today with Case Management, Nursing, Nursing leadership, and other members of the IDT team.    Disposition  The patient is not medically cleared for discharge to home or a post-acute facility.    Planning for hospice at [facility]  I have placed the appropriate orders for post-discharge needs.  QuantiFERON gold pending    Review of Systems  Review of Systems   Unable to perform ROS: Mental acuity        Physical Exam       Physical Exam  Constitutional:       General: She is not in acute distress.     Appearance: She is well-developed. She is ill-appearing. She is not diaphoretic.      Comments: Awake, intermittent agitation  Following directions, oriented x 1-2   HENT:      Head: Normocephalic.      Mouth/Throat:      Mouth: Mucous membranes are moist.   Eyes:      Extraocular Movements: Extraocular movements intact.      Conjunctiva/sclera: Conjunctivae normal.       Pupils: Pupils are equal, round, and reactive to light.   Cardiovascular:      Rate and Rhythm: Normal rate.      Heart sounds:      No gallop.   Pulmonary:      Effort: Pulmonary effort is normal. No respiratory distress.   Abdominal:      General: There is no distension.      Tenderness: There is no abdominal tenderness.   Musculoskeletal:         General: No tenderness.   Skin:     General: Skin is warm.      Coloration: Skin is not pale.   Neurological:      Mental Status: She is alert. She is disoriented.      Sensory: No sensory deficit.      Motor: Weakness present.      Coordination: Coordination normal.         Fluids  No intake or output data in the 24 hours ending 07/15/24 1040      Laboratory  Recent Labs     07/13/24  0503   WBC 7.4   RBC 2.49*   HEMOGLOBIN 7.6*   HEMATOCRIT 24.8*   MCV 99.6*   MCH 30.5   MCHC 30.6*   RDW 61.3*   PLATELETCT 169   MPV 10.6     Recent Labs     07/13/24  0503   SODIUM 135   POTASSIUM 4.3   CHLORIDE 102   CO2 19*   GLUCOSE 166*   BUN 26*   CREATININE 0.95   CALCIUM 9.9                   Imaging  CT-HEAD W/O   Final Result      No acute intracranial abnormality.               IR-US GUIDED PIV   Final Result    Ultrasound-guided PERIPHERAL IV INSERTION performed by    qualified nursing staff as above.      DX-FOOT-2- RIGHT   Final Result      1.  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   2.  No new changes or evidence of destructive osteomyelitis.      DX-CHEST-PORTABLE (1 VIEW)   Final Result      Nonspecific mild asymmetric interstitial prominence in the left lung base could be atelectasis versus mild asymmetric edema or less likely infection.           Assessment/Plan  * Acute metabolic encephalopathy- (present on admission)  Assessment & Plan  -Differentials include acute toxic metabolic encephalopathy, hyperglycemia  -After receiving initial treatment in the ED, patient's mental status seems to be improving  -Unclear of patient's  "baseline however encephalopathy likely secondary to dehydration  Finished IV fluids and neurochecks  With new visual changes  Has pacemaker  CT head negative for acute changes  Discussed with patient and DPOA    7/14 now on comfort care  Intermittent agitation, will add Seroquel and Haldol as needed  Pending group home placement    - quant gold pending, CM to assist    7/15 quant in process, needs placement  Intermittent agitation, increase seroquel  - hospice consult pending    Chronic wound  Assessment & Plan  -Noted chronic wound on right feet, followed by wound care  -Patient with no signs or symptoms suggestive of infection, no leukocytosis, afebrile, patient did not present with any sepsis  -No current indication for antibiotics however we will order wound cultures, follow results   -Procalcitonin negative  X-ray shows chronic changes and no new concerns for osteomyelitis    Weakness  Assessment & Plan  -Multifactorial, likely in the setting of poor oral intake, chronic debility, chronic wound limiting mobility, saying she does not want to wear boots that was advised  -on review of patient's chart, patient was referred to home health previously  Goal is hospice at group home      Goals of care, counseling/discussion- (present on admission)  Assessment & Plan  -When asked CODE STATUS, patient states, \" let me die naturally, I want to join God naturally\"  Patient does have a DPOA who is discussing with patient further goals of care    - 7/14 f/u palliative care consult, consider hospice    Atrial fibrillation (HCC)- (present on admission)  Assessment & Plan  Was rate controlled,   Now on comfort care    FELICIA (acute kidney injury) (HCC) on CKD- (present on admission)  Assessment & Plan  -Likely prerenal in the setting of poor oral intake  -IV hydration  - continue to monitor  -Avoid nephrotoxic medications    Type 2 diabetes mellitus with stage 3b chronic kidney disease, with long-term current use of insulin " ([facility])- (present on admission)  Assessment & Plan  -Glargine and sliding scale insulin  -Ensure appropriate blood glucose control to avoid any other precipitants for altered mental status/encephalopathy  No longer needs diabetic education now on comfort care         VTE prophylaxis:   SCDs/TEDs      I have performed a physical exam and reviewed and updated ROS and Plan today (7/15/2024). In review of yesterday's note (7/14/2024), there are no changes except as documented above.

## 2024-07-15 NOTE — CONSULTS
Palliative   Consult and EMR reviewed, checked in with primary team patient is comfort care measures.  Consult deferred and hospice consult placed.  Please reconsult for further needs.  Thank you,      Lillian Leon, MSN, APRN, ACNPC-AG

## 2024-07-15 NOTE — DISCHARGE PLANNING
Case Management Discharge Planning    Admission Date: 7/11/2024  GMLOS: 4.1  ALOS: 4    6-Clicks ADL Score: 18  6-Clicks Mobility Score: 18      Anticipated Discharge Dispo: Discharge Disposition: D/T to hospice home (50)    DME Needed: No    Action(s) Taken: Discussed in IDT rounds, pt is agitated, MAR adjusted. Referral Management team following. Obtained Group home paperwork and will provide paperwork to RMT. Pt needs Covid test per group home, requested Covid test. Quant in process at this time.     Escalations Completed: None    Medically Clear: No

## 2024-07-15 NOTE — CARE PLAN
The patient is Stable - Low risk of patient condition declining or worsening    Shift Goals  Clinical Goals: Decrease in anxiousness/restlessness  Patient Goals: BRISA  Family Goals: Not present    Progress made toward(s) clinical / shift goals:        Problem: Skin Integrity  Goal: Skin integrity is maintained or improved  Description: Target End Date:  Prior to discharge or change in level of care    Document interventions on Skin Risk/Drew flowsheet groups and corresponding LDA    1.  Assess and monitor skin integrity, appearance and/or temperature  2.  Assess risk factors for impaired skin integrity and/or pressures ulcers  3.  Implement precautions to protect skin integrity in collaboration with interdisciplinary team  4.  Implement pressure ulcer prevention protocol if at risk for skin breakdown  5.  Confirm wound care consult if at risk for skin breakdown  6.  Ensure patient use of pressure relieving devices  (Low air loss bed, waffle overlay, heel protectors, ROHO cushion, etc)  Outcome: Progressing       Problem: Fall Risk  Goal: Patient will remain free from falls  Description: Target End Date:  Prior to discharge or change in level of care    Document interventions on the Eda Hernandez Fall Risk Assessment    1.  Assess for fall risk factors  2.  Implement fall precautions  Outcome: Progressing      Problem: Psychosocial - Comfort Care  Goal: Privacy will be maintained for patient and family  Description: Target End Date:  End of day 1    Obtain private room  Outcome: Progressing      Problem: Respiratory - Comfort Care  Goal: Patient's respiratory function will be supported  Description: Target End Date:  1 to 3 days    1.  Discontinue cardiac and pulse oximetry monitoring  2.  Extubate and discontinue mechanical ventilation  3.  Administer/titrate oxygen per order  4.  Position patient for maximum ventilatory efficiency  Outcome: Progressing

## 2024-07-15 NOTE — DISCHARGE PLANNING
Referral Management Team     Hospice referral Received via epic.    Call placed requesting a call back from Iesha (EDUAR)     [id number] Received call back from Iesha (EDUAR) requesting referral to be sent to [facility].   Referral sent per choice  Patient discharging to [facility]  [address] [place] [address]    DME needed Hospital bed

## 2024-07-15 NOTE — CARE PLAN
The patient is Stable - Low risk of patient condition declining or worsening    Shift Goals  Clinical Goals: Pt will remain free from falls and decrease in restlessness  Patient Goals: BRISA  Family Goals: N/A    Progress made toward(s) clinical / shift goals:    Problem: Fall Risk  Goal: Patient will remain free from falls  Outcome: Progressing  Note: Pt bed alarm is on and tele sitter in place. Hourly rounding in place. Pt remains free from falls     Problem: Psychosocial - Comfort Care  Goal: Patient's level of anxiety will decrease  Outcome: Progressing  Note: Pt was tearful and confused at start of shift. Anxiety reduced, pt medicated per MAR.        Patient is not progressing towards the following goals:

## 2024-07-16 PROBLEM — T14.8XXA DEEP TISSUE INJURY: Status: ACTIVE | Noted: 2024-01-01

## 2024-07-16 PROBLEM — R54 FRAILTY SYNDROME IN GERIATRIC PATIENT: Status: ACTIVE | Noted: 2024-01-01

## 2024-07-16 PROBLEM — U07.1 COVID-19: Status: ACTIVE | Noted: 2024-01-01

## 2024-07-16 NOTE — DISCHARGE PLANNING
Referral Management Team     Discharge on hold d/t Covid positive.  Group home requested chest x-rays.  Medical team notified

## 2024-07-16 NOTE — PROGRESS NOTES
Pt is comfort care. Quick skin check completed during admittance to floor. No photos taken at that time.    4 Eyes Skin Assessment Completed by DAY Neville and DAY Dumont.    Head WDL  Ears WDL  Nose WDL  Mouth WDL  Neck WDL  Breast/Chest Moisture irritation under L breast  Shoulder Blades WDL  Spine discoloration, warts/ scabs  (R) Arm/Elbow/Hand WDL  (L) Arm/Elbow/Hand WDL  Abdomen WDL  Groin WDL  Scrotum/Coccyx/Buttocks Redness  (R) Leg WDL  (L) Leg WDL  (R) Heel/Foot/Toe pressure injury on heel  (L) Heel/Foot/Toe WDL          Devices In Places hydro blue with bandage/ mepitel on top      Interventions In Place NC W/Ear Foams, Heel Mepilex, Pillows, Q2 Turns, and Pressure Redistribution Mattress    Possible Skin Injury Yes    Pictures Uploaded Into Epic N/A  Wound Consult Placed N/A wound team following for R heel  RN Wound Prevention Protocol Ordered Yes

## 2024-07-16 NOTE — CARE PLAN
"The patient is Watcher - Medium risk of patient condition declining or worsening    Shift Goals  Clinical Goals: pt anxiousness will be decreased throughout shift  Patient Goals: joseluis  Family Goals: joseluis    Progress made toward(s) clinical / shift goals: Benadryl and Morphine given at same time with arrival to floor. Pt was able to rest comfortably throughout night until morning medication. Pt given PRN medications but pt remains calling out in discomfort for \"help.\"       Problem: Skin Integrity  Goal: Skin integrity is maintained or improved  Outcome: Progressing     Problem: Fall Risk  Goal: Patient will remain free from falls  Outcome: Progressing     Problem: Psychosocial - Comfort Care  Goal: Patient's level of anxiety will decrease  Outcome: Not Progressing       Patient is not progressing towards the following goals:    Problem: Psychosocial - Comfort Care  Goal: Patient's level of anxiety will decrease  Outcome: Not Progressing     "

## 2024-07-16 NOTE — DIETARY
Nutrition Services: Update    Pt transitioned to comfort care measures.    For pt's comfort and pleasure, dietary requests should be honored.     Consult RD as needed. RD will re-screen weekly. RD available PRN.

## 2024-07-16 NOTE — PROGRESS NOTES
Hospital Medicine Daily Progress Note    Date of Service  7/16/2024    Chief Complaint  Ophelia Sosa is a 78 y.o. female admitted 7/11/2024 with generalized weakness    Hospital Course  Admitted with generalized weakness, acute encephalopathy, known history of diabetes, diabetic foot ulcer, CKD stage III, paroxysmal atrial fibrillation, pulmonary hypertension, and anxiety.     Interval Problem Update  COVID -does not appear to have symptoms, does not appear to be short of breath  Anxiety - Seroquel started, may be causing more agitation    I have discussed this patient's plan of care and discharge plan at IDT rounds today with Case Management, Nursing, Nursing leadership, and other members of the IDT team.    Consultants/Specialty  palliative care    Code Status  Comfort Care/DNR    Disposition  The patient is not medically cleared for discharge to home or a post-acute facility.  Anticipate discharge to: hospice    I have placed the appropriate orders for post-discharge needs.    Review of Systems  Review of Systems   Unable to perform ROS: Medical condition        Physical Exam  Temp:  [36.3 °C (97.3 °F)-36.8 °C (98.2 °F)] 36.8 °C (98.2 °F)  Pulse:  [] 95  Resp:  [16-18] 18  BP: ()/(56-83) 138/56  SpO2:  [89 %-98 %] 89 %    Physical Exam  Vitals and nursing note reviewed.   Constitutional:       Appearance: She is obese.   HENT:      Head: Normocephalic and atraumatic.      Nose: No congestion.      Mouth/Throat:      Mouth: Mucous membranes are moist.   Eyes:      Conjunctiva/sclera: Conjunctivae normal.   Cardiovascular:      Rate and Rhythm: Normal rate and regular rhythm.   Pulmonary:      Effort: Pulmonary effort is normal.      Breath sounds: Normal breath sounds.   Abdominal:      General: There is no distension.      Tenderness: There is no abdominal tenderness. There is no guarding or rebound.   Musculoskeletal:      Cervical back: No tenderness.      Right lower leg: No edema.      Left  lower leg: No edema.      Comments: Right heel ulcer   Skin:     Comments: Deep tissue injury sacrococcygeal area   Neurological:      Mental Status: She is alert.      Comments: Oriented to self only   Psychiatric:         Mood and Affect: Mood is anxious.         Speech: Speech is delayed and tangential.         Behavior: Behavior is agitated.         Cognition and Memory: Cognition is impaired.         Fluids  No intake or output data in the 24 hours ending 07/16/24 1445    Laboratory                        Imaging  CT-HEAD W/O   Final Result      No acute intracranial abnormality.               IR-US GUIDED PIV   Final Result    Ultrasound-guided PERIPHERAL IV INSERTION performed by    qualified nursing staff as above.      DX-FOOT-2- RIGHT   Final Result      1.  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   2.  No new changes or evidence of destructive osteomyelitis.      DX-CHEST-PORTABLE (1 VIEW)   Final Result      Nonspecific mild asymmetric interstitial prominence in the left lung base could be atelectasis versus mild asymmetric edema or less likely infection.           Assessment/Plan  * Acute metabolic encephalopathy- (present on admission)  Assessment & Plan  Comfort care    COVID-19- (present on admission)  Assessment & Plan  Supportive measures  Comfort care    Frailty syndrome in geriatric patient- (present on admission)  Assessment & Plan  Comfort care      Anxiety- (present on admission)  Assessment & Plan  Stop Seroquel  Start Buspar, Xanax    Diabetic ulcer of right heel associated with type 2 diabetes mellitus (HCC)- (present on admission)  Assessment & Plan  Comfort care    Paroxysmal atrial fibrillation (HCC)- (present on admission)  Assessment & Plan  Comfort care    CKD (chronic kidney disease) stage 3, GFR 30-59 ml/min- (present on admission)  Assessment & Plan  Comfort care    COPD (chronic obstructive pulmonary disease) ([facility])- (present on  admission)  Assessment & Plan  Comfort care    Cardiac pacemaker- (present on admission)  Assessment & Plan  Comfort care  Follow-up as outpatient    Essential hypertension, benign- (present on admission)  Assessment & Plan  Comfort care    Pulmonary hypertension (HCC)- (present on admission)  Assessment & Plan  Comfort care    Type 2 diabetes mellitus with stage 3b chronic kidney disease, with long-term current use of insulin (HCC)- (present on admission)  Assessment & Plan  Comfort care    Hyperlipidemia- (present on admission)  Assessment & Plan  Comfort care         VTE prophylaxis:    pharmacologic prophylaxis contraindicated due to Comfort care      I have performed a physical exam and reviewed and updated ROS and Plan today (7/16/2024). In review of yesterday's note (7/15/2024), there are no changes except as documented above.

## 2024-07-16 NOTE — CARE PLAN
"The patient is Stable - Low risk of patient condition declining or worsening    Shift Goals  Clinical Goals: provide pain relief, comfort measures      Progress made toward(s) clinical / shift goals:  pt. Start of the shift very restless, calls for help multiple times but when asked unable to identify what she needs help with, PRN meds given. Family arrived as well at bedside, pt. Still restless. Per Family pt. Usually responds well to Xanax 0.25mg to help her relax, family also aware pt. Not in O2 as well. Updated Dr. Kingsley received orders for meds since pt. Still restless and unable to settle down. PRN Xanax given at 1243, tolerated crushed with apple sauce. Provided a listening ear to patients concerns. Pt. Still restless afterwards trying to get out of bed, she calling out and saying \"it hurts\" but unable to state where. Repositioned pt in bed and readjusted her sheets. Pt. Also given PRN Morphine at 1327. Reassessed pt. Around 1500, started to relax down, moaning and no tense body language. Repositioned her again. She is not yelling out in pain or help. And covered in blankets. Offered food for lunch and drink but pt. Refused.     1600 Pt. Reassessed, resting comfortably. No moaning, calm and sleeping.       Patient is not progressing towards the following goals:      "

## 2024-07-17 NOTE — PROGRESS NOTES
Received phone call from Iesha, updated her about pt. Sleeping. Unlabored breathing. Not responding to voice but tends to bite on the suction cath when this RN was suctioning. Does not respond to pain as well. Repositioned her and removed a pillow on the side. Updated family, suctioned prn. Provided warm blankets. Needs attended.

## 2024-07-17 NOTE — PROGRESS NOTES
Hospital Medicine Daily Progress Note    Date of Service  7/17/2024    Chief Complaint  Ophelia Sosa is a 78 y.o. female admitted 7/11/2024 with generalized weakness    Hospital Course  Admitted with generalized weakness, acute encephalopathy, known history of diabetes, diabetic foot ulcer, CKD stage III, paroxysmal atrial fibrillation, pulmonary hypertension, and anxiety.     Interval Problem Update  COVID -does not appear to have symptoms, does not appear to be short of breath  Anxiety -  remains very anxious    I have discussed this patient's plan of care and discharge plan at IDT rounds today with Case Management, Nursing, Nursing leadership, and other members of the IDT team.    Consultants/Specialty  palliative care    Code Status  Comfort Care/DNR    Disposition  The patient is medically cleared for discharge to home or a post-acute facility.  Anticipate discharge to: hospice    I have placed the appropriate orders for post-discharge needs.    Review of Systems  Review of Systems   Unable to perform ROS: Medical condition        Physical Exam  Temp:  [36.7 °C (98.1 °F)] 36.7 °C (98.1 °F)  Pulse:  [94] 94  Resp:  [16] 16  BP: (109)/(27) 109/27  SpO2:  [72 %] 72 %    Physical Exam  Vitals and nursing note reviewed.   Constitutional:       Appearance: She is obese.   HENT:      Head: Normocephalic and atraumatic.      Nose: No congestion.      Mouth/Throat:      Mouth: Mucous membranes are moist.   Eyes:      Conjunctiva/sclera: Conjunctivae normal.   Cardiovascular:      Rate and Rhythm: Normal rate and regular rhythm.   Pulmonary:      Effort: Pulmonary effort is normal.      Breath sounds: Normal breath sounds.   Abdominal:      General: There is no distension.      Tenderness: There is no abdominal tenderness. There is no guarding or rebound.   Musculoskeletal:      Cervical back: No tenderness.      Right lower leg: No edema.      Left lower leg: No edema.      Comments: Right heel ulcer   Skin:      Comments: Deep tissue injury sacrococcygeal area   Neurological:      Mental Status: She is alert.      Comments: Oriented to self only   Psychiatric:         Mood and Affect: Mood is anxious.         Speech: Speech is delayed and tangential.         Cognition and Memory: Cognition is impaired.         Fluids  No intake or output data in the 24 hours ending 07/17/24 1253    Laboratory                        Imaging  CT-HEAD W/O   Final Result      No acute intracranial abnormality.               IR-US GUIDED PIV   Final Result    Ultrasound-guided PERIPHERAL IV INSERTION performed by    qualified nursing staff as above.      DX-FOOT-2- RIGHT   Final Result      1.  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   2.  No new changes or evidence of destructive osteomyelitis.      DX-CHEST-PORTABLE (1 VIEW)   Final Result      Nonspecific mild asymmetric interstitial prominence in the left lung base could be atelectasis versus mild asymmetric edema or less likely infection.           Assessment/Plan  * Acute metabolic encephalopathy- (present on admission)  Assessment & Plan  Comfort care    COVID-19- (present on admission)  Assessment & Plan  Supportive measures  Comfort care    Frailty syndrome in geriatric patient- (present on admission)  Assessment & Plan  Comfort care      Anxiety- (present on admission)  Assessment & Plan  Buspar  Ativan as needed    Diabetic ulcer of right heel associated with type 2 diabetes mellitus (HCC)- (present on admission)  Assessment & Plan  Comfort care    Deep tissue injury- (present on admission)  Assessment & Plan  Comfort care    Paroxysmal atrial fibrillation (HCC)- (present on admission)  Assessment & Plan  Comfort care    CKD (chronic kidney disease) stage 3, GFR 30-59 ml/min- (present on admission)  Assessment & Plan  Comfort care    COPD (chronic obstructive pulmonary disease) (HCC)- (present on admission)  Assessment & Plan  Comfort  care    Cardiac pacemaker- (present on admission)  Assessment & Plan  Comfort care    Essential hypertension, benign- (present on admission)  Assessment & Plan  Comfort care    Pulmonary hypertension (HCC)- (present on admission)  Assessment & Plan  Comfort care    Type 2 diabetes mellitus with stage 3b chronic kidney disease, with long-term current use of insulin (HCC)- (present on admission)  Assessment & Plan  Comfort care    Hyperlipidemia- (present on admission)  Assessment & Plan  Comfort care         VTE prophylaxis:    pharmacologic prophylaxis contraindicated due to comfort care      I have performed a physical exam and reviewed and updated ROS and Plan today (7/17/2024). In review of yesterday's note (7/16/2024), there are no changes except as documented above.

## 2024-07-17 NOTE — PROGRESS NOTES
4 Eyes Skin Assessment Completed by Emely RN and DAY La.    Head WDL  Ears WDL  Nose WDL  Mouth WDL  Neck WDL  Breast/Chest WDL  Shoulder Blades WDL  Spine WDL  (R) Arm/Elbow/Hand Redness and Blanching  (L) Arm/Elbow/Hand Redness and Blanching  Abdomen WDL  Groin Redness  Scrotum/Coccyx/Buttocks Redness and Discoloration, some open areas, purplish  (R) Leg Redness and Scab  (L) Leg Redness and Scab  (R) Heel/Foot/Toe Ulcer(s), yellow and red  (L) Heel/Foot/Toe Redness and Blanching          Devices In Places PIV,       Interventions In Place TAP System, Pillows, Barrier Cream, Heels Loaded W/Pillows, and Pressure Redistribution Mattress    Possible Skin Injury Yes    Pictures Uploaded Into Epic Yes  Wound Consult Placed Yes  RN Wound Prevention Protocol Ordered Yes Upon admission to the floor patient was informed how we do 2 RN Skin checks to make sure there are no skin issues. Pt gave this RN consent to perform a skin check.     Unable to do complete skin assessment yesterday as pt. Was restless and anxious, pt. On comfort care status, provided comfort and safety measures. Reordered MONY mattress pump as it was not available yesterday. Bed alarm on.

## 2024-07-17 NOTE — DISCHARGE PLANNING
Referral Management team     Received a call from  owner she can accept patient 7 days abler the positive Covid.  Plan DC date 7/22/24    [facility] for Iesha to update dc plan.

## 2024-07-17 NOTE — DOCUMENTATION QUERY
[facility]                                                                       Query Response Note      PATIENT:               PAYAL BAUER  ACCT #:                  [id number]  MRN:                     [id number]  :                      1945  ADMIT DATE:       2024 3:00 PM  DISCH DATE:          RESPONDING  PROVIDER #:        [id number]           QUERY TEXT:    The wound care consult indicates this patient is being treated for ulcer of the following site:     Pressure injury sacrum DTI     Based on your medical judgement, can you please confirm this clinical finding?    The patient's Clinical Indicators include:  - Findings:  wound care consult 24:  Pressure injury sacrum DTI present on admission     - Treatments:  wound care consult, offloading dressing, Registered Dietician assessment    - Risk factors:  poor po intake, advanced age, diabetes, dehydration     Thank You,  Mayela Robertson RN  Clinical Documentation   [email or web address]  Connect via [email or web address]  Options provided:   -- Pressure injury sacrum DTI present on admission      Query created by: Mayela Robertson on 7/15/2024 10:47 AM    RESPONSE TEXT:    Pressure injury sacrum DTI present on admission          Electronically signed by:  ANGY CONWAY MD 2024 5:04 PM

## 2024-07-17 NOTE — CARE PLAN
Problem: Pain - Standard  Goal: Alleviation of pain or a reduction in pain to the patient’s comfort goal  Outcome: Progressing     The patient is Stable - Low risk of patient condition declining or worsening    Shift Goals  Clinical Goals: provide comfort and pain management  Patient Goals: joseluis  Family Goals: joseluis    Progress made toward(s) clinical / shift goals:  medicated for pain per MAR. Turned and repositioned.    Patient is not progressing towards the following goals:

## 2024-07-18 ENCOUNTER — PATIENT OUTREACH (OUTPATIENT)
Dept: HEALTH INFORMATION MANAGEMENT | Facility: OTHER | Age: 79
End: 2024-07-18
Payer: MEDICARE

## 2024-07-18 DIAGNOSIS — F32.A DEPRESSION, UNSPECIFIED DEPRESSION TYPE: ICD-10-CM

## 2024-07-18 DIAGNOSIS — Z79.4 TYPE 2 DIABETES MELLITUS WITH STAGE 3B CHRONIC KIDNEY DISEASE, WITH LONG-TERM CURRENT USE OF INSULIN (HCC): ICD-10-CM

## 2024-07-18 DIAGNOSIS — N18.32 TYPE 2 DIABETES MELLITUS WITH STAGE 3B CHRONIC KIDNEY DISEASE, WITH LONG-TERM CURRENT USE OF INSULIN (HCC): ICD-10-CM

## 2024-07-18 DIAGNOSIS — E11.22 TYPE 2 DIABETES MELLITUS WITH STAGE 3B CHRONIC KIDNEY DISEASE, WITH LONG-TERM CURRENT USE OF INSULIN (HCC): ICD-10-CM

## 2024-07-18 NOTE — PROGRESS NOTES
Patient belongings packed into bag. Patient wheelchair, bag with clothing, yarn. Patients two rings placed in small clear baggie.

## 2024-07-18 NOTE — DISCHARGE SUMMARY
Death Summary    Cause of Death  Acute encephalopathy  due to diabetic ulcer due to type 2 diabetes     Comorbid Conditions at the Time of Death  Principal Problem:    Acute metabolic encephalopathy (POA: Yes)  Active Problems:    Diabetic ulcer of right heel associated with type 2 diabetes mellitus (HCC) (Chronic) (POA: Yes)    Anxiety (POA: Yes)    Frailty syndrome in geriatric patient (POA: Yes)    COVID-19 (POA: Yes)    Type 2 diabetes mellitus with stage 3b chronic kidney disease, with long-term current use of insulin (HCC) (Chronic) (POA: Yes)    Pulmonary hypertension (HCC) (Chronic) (POA: Yes)    Essential hypertension, benign (POA: Yes)    Cardiac pacemaker (POA: Yes)    COPD (chronic obstructive pulmonary disease) (HCC) (Chronic) (POA: Yes)    CKD (chronic kidney disease) stage 3, GFR 30-59 ml/min (POA: Yes)    Paroxysmal atrial fibrillation (HCC) (POA: Yes)    Deep tissue injury (POA: Yes)    Hyperlipidemia (POA: Yes)      Overview: IMO load March 2020  Resolved Problems:    Altered mental status (POA: Yes)      History of Presenting Illness and Hospital Course  This is a 78 y.o. female admitted 7/11/2024 with generalized weakness, acute encephalopathy, known history of diabetes, diabetic foot ulcer, CKD stage III, paroxysmal atrial fibrillation, pulmonary hypertension, and anxiety.  Recent admission in May 2024 for diabetic ulcer right heel, possible osteomyelitis.  She has a nonhealing wound.  CT scan of the head was noted to be negative.  Patient has been frail, and has had poor clinical recovery.  Goals of care were discussed with the patient's POA, and the preferred to place patient on comfort care measures only.    Death Date: 07/17/24   Death Time: 1640         Pronounced By (RN1): Anita Schmidt  Pronounced By (RN2): Alyssa Velez

## 2024-11-20 NOTE — PROGRESS NOTES
Community Health Worker Follow-Up    Reason for outreach: CHW Agustina received referral from Aracely BERNSTEIN asking to send pt alternate transportation options via mail.    CHW Interventions: CHW sent pt resources via mail.    Specific Resources Provided:  Housing/Shelter: N/A  Transportation: Access to Healthcare            [employer]            [place] [facility]            N4  Food: N/A  Financial: N/A  Social Supports: N/A  Other: N/A    Plan: CHW to follow up with pt.    
JOSE RAUL Berrios reached out to pt to ensure resources were received. Unable to reach pt at this time. LVM with contact information.  
JOSE RAUL Berrios reached out to pt to ensure resources were received. Unable to reach pt at this time. LVM with contact information. CHW will continue to collaborate with Aracely BERNSTEIN to assist pt where needed.  
2

## 2025-04-15 NOTE — CARE PLAN
Pt was seen today in CR for a Phase II visit. Vital signs and session notes recorded in [email or web address] and will be scanned into Epic by HIM.    The patient is Stable - Low risk of patient condition declining or worsening    Shift Goals  Clinical Goals: mobilize and nutrition  Patient Goals: shower  Family Goals: BRISA    Progress made toward(s) clinical / shift goals:    Problem: Skin Integrity  Goal: Skin integrity is maintained or improved  Outcome: Progressing  Note: Patient has Q2 turns as tolerated due to comfort care, shower provided for patient and new mepilexes applied to heels and bottom.        Patient is not progressing towards the following goals:      Problem: Psychosocial - Comfort Care  Goal: Patient's level of anxiety will decrease  Outcome: Not Progressing  Note: Patient has been started on Q1 hour morphine as needed so far patient is still experiencing pain and anxiety but giving the morphine time to fully work before reaching out and asking to increase dose or add more medication.

## (undated) DEVICE — COVER LIGHT HANDLE ALC PLUS DISP (18EA/BX)

## (undated) DEVICE — BANDAGE ELASTIC 4 IN X 5 YDS - LATEX FREE(10/BX 5BX/CA)

## (undated) DEVICE — GOWN WARMING STANDARD FLEX - (30/CA)

## (undated) DEVICE — TUBE CONNECTING SUCTION - CLEAR PLASTIC STERILE 72 IN (50EA/CA)

## (undated) DEVICE — PADDING CAST 4 IN X 4 YDS - SOF-ROLL (12RL/BG 6BG/CT)

## (undated) DEVICE — MASK OXYGEN VNYL ADLT MED CONC WITH 7 FOOT TUBING  - (50EA/CA)

## (undated) DEVICE — CANISTER SUCTION RIGID RED 1500CC (40EA/CA)

## (undated) DEVICE — ELECTRODE DUAL RETURN W/ CORD - (50/PK)

## (undated) DEVICE — PACK LOWER EXTREMITY - (2/CA)

## (undated) DEVICE — VAC CANISTER W/GEL 500ML - FITS NEW MACHINES (10EA/CA)

## (undated) DEVICE — DRESSING KIT V.A.C. SENSA T.R.A.C. SMALL (5EA/CA)

## (undated) DEVICE — WRAP COBAN SELF-ADHERENT 6 IN X  5YDS STERILE TAN (12/CA)

## (undated) DEVICE — CONTAINER, SPECIMEN, STERILE

## (undated) DEVICE — PAD LAP STERILE 18 X 18 - (5/PK 40PK/CA)

## (undated) DEVICE — GLOVE SZ 6 BIOGEL PI MICRO - PF LF (50PR/BX 4BX/CA)

## (undated) DEVICE — CANISTER SUCTION 3000ML MECHANICAL FILTER AUTO SHUTOFF MEDI-VAC NONSTERILE LF DISP  (40EA/CA)

## (undated) DEVICE — TOWELS CLOTH SURGICAL - (4/PK 20PK/CA)

## (undated) DEVICE — SET LEADWIRE 5 LEAD BEDSIDE DISPOSABLE ECG (1SET OF 5/EA)

## (undated) DEVICE — SENSOR OXIMETER ADULT SPO2 RD SET (20EA/BX)

## (undated) DEVICE — CANNULA O2 COMFORT SOFT EAR ADULT 7 FT TUBING (50/CA)

## (undated) DEVICE — GLOVE BIOGEL PI INDICATOR SZ 6.0 SURGICAL PF LF -(200PR/CA)

## (undated) DEVICE — SET EXTENSION WITH 2 PORTS (48EA/CA) ***PART #2C8610 IS A SUBSTITUTE*****

## (undated) DEVICE — SWAB CULTURE AMIES ESWAB (50EA/PK)

## (undated) DEVICE — TIP INTPLS HFLO ML ORFC BTRY - (12/CS)  FOR SURGILAV

## (undated) DEVICE — GLOVE BIOGEL PI ORTHO SZ 7.5 PF LF (40PR/BX)

## (undated) DEVICE — GLOVE SZ 7 BIOGEL PI MICRO - PF LF (50PR/BX 4BX/CA)

## (undated) DEVICE — CONNECTOR 5-IN-1 STERILE - (25EA/BX)

## (undated) DEVICE — LACTATED RINGERS INJ 1000 ML - (14EA/CA 60CA/PF)

## (undated) DEVICE — SUCTION INSTRUMENT YANKAUER BULBOUS TIP W/O VENT (50EA/CA)

## (undated) DEVICE — BLADE SURGICAL #10 - (50/BX)

## (undated) DEVICE — SODIUM CHL IRRIGATION 0.9% 1000ML (12EA/CA)

## (undated) DEVICE — DRAPE SURG STERI-DRAPE 7X11OD - (40EA/CA)

## (undated) DEVICE — SLEEVE VASO CALF MED - (10PR/CA)

## (undated) DEVICE — TUBING CLEARLINK DUO-VENT - C-FLO (48EA/CA)

## (undated) DEVICE — DRESSING 3X3 ADAPTIC GAUZE - (50EA/CT)

## (undated) DEVICE — DRAPE SURGICAL U 77X120 - (10/CA)

## (undated) DEVICE — GLOVE BIOGEL PI INDICATOR SZ 7.0 SURGICAL PF LF - (50/BX 4BX/CA)

## (undated) DEVICE — HANDPIECE 10FT INTPLS SCT PLS IRRIGATION HAND CONTROL SET (6/PK)

## (undated) DEVICE — DRAPE LARGE 3 QUARTER - (20/CA)

## (undated) DEVICE — SODIUM CHL. IRRIGATION 0.9% 3000ML (4EA/CA 65CA/PF)

## (undated) DEVICE — GLOVE BIOGEL INDICATOR SZ 7.5 SURGICAL PF LTX - (50PR/BX 4BX/CA)

## (undated) DEVICE — BOVIE BLADE COATED - (50/PK)

## (undated) DEVICE — TUBE CONNECT SUCTION CLEAR 120 X 1/4" (50EA/CA)"